# Patient Record
Sex: FEMALE | Race: OTHER
[De-identification: names, ages, dates, MRNs, and addresses within clinical notes are randomized per-mention and may not be internally consistent; named-entity substitution may affect disease eponyms.]

---

## 2021-08-18 ENCOUNTER — HOSPITAL ENCOUNTER (EMERGENCY)
Dept: HOSPITAL 54 - ER | Age: 51
Discharge: HOME | End: 2021-08-18
Payer: MEDICAID

## 2021-08-18 VITALS — WEIGHT: 140 LBS | HEIGHT: 63 IN | BODY MASS INDEX: 24.8 KG/M2

## 2021-08-18 VITALS — DIASTOLIC BLOOD PRESSURE: 68 MMHG | SYSTOLIC BLOOD PRESSURE: 111 MMHG

## 2021-08-18 DIAGNOSIS — K94.23: Primary | ICD-10-CM

## 2021-08-18 DIAGNOSIS — G40.909: ICD-10-CM

## 2021-08-18 DIAGNOSIS — E11.9: ICD-10-CM

## 2021-08-18 PROCEDURE — 74018 RADEX ABDOMEN 1 VIEW: CPT

## 2021-08-18 PROCEDURE — 99284 EMERGENCY DEPT VISIT MOD MDM: CPT

## 2021-08-18 PROCEDURE — 43762 RPLC GTUBE NO REVJ TRC: CPT

## 2021-08-18 NOTE — NUR
PT IS MEDICALLY STABLE FOR D/C PER MD. DRESSING CHANGE ON GT SITE PROVIDED . GT 
PATENT. Patient discharged to home in stable condition. Written and verbal 
after care instructions given to the CGs who verbalized understanding of 
instruction. pt was assisted back to the wc.

## 2021-08-18 NOTE — NUR
-------------------------------------------------------------------------------

            *** Note undone in ED - 08/18/21 at 2145 by MELLY ***             

-------------------------------------------------------------------------------

Patient discharged to home in stable condition. Written and verbal after care 
instructions given. Patient verbalizes understanding of instruction. RX given 
pt alert asymptomatic

## 2021-08-18 NOTE — NUR
PT BIB CG FROMA AN [facility]ULT [facility]IDENT[facility]IV[facility] FOR GT PLACEMENT. PT ARRIVED W/ A 
F/C IN PLACED IN GT STOMA. PT IS IN A WHEELCHAIR AND ALERT.

## 2022-02-20 ENCOUNTER — HOSPITAL ENCOUNTER (INPATIENT)
Dept: HOSPITAL 54 - ER | Age: 52
LOS: 9 days | Discharge: NURSING, REHAB OR LONG-TERM CARE | DRG: 137 | End: 2022-03-01
Attending: INTERNAL MEDICINE | Admitting: INTERNAL MEDICINE
Payer: MEDICAID

## 2022-02-20 VITALS — DIASTOLIC BLOOD PRESSURE: 67 MMHG | SYSTOLIC BLOOD PRESSURE: 121 MMHG

## 2022-02-20 VITALS — WEIGHT: 105.04 LBS | BODY MASS INDEX: 20.62 KG/M2 | HEIGHT: 60 IN

## 2022-02-20 VITALS — DIASTOLIC BLOOD PRESSURE: 85 MMHG | SYSTOLIC BLOOD PRESSURE: 104 MMHG

## 2022-02-20 DIAGNOSIS — Z93.1: ICD-10-CM

## 2022-02-20 DIAGNOSIS — E43: ICD-10-CM

## 2022-02-20 DIAGNOSIS — D53.9: ICD-10-CM

## 2022-02-20 DIAGNOSIS — L30.4: ICD-10-CM

## 2022-02-20 DIAGNOSIS — R78.89: ICD-10-CM

## 2022-02-20 DIAGNOSIS — G80.9: ICD-10-CM

## 2022-02-20 DIAGNOSIS — E11.65: ICD-10-CM

## 2022-02-20 DIAGNOSIS — J98.11: ICD-10-CM

## 2022-02-20 DIAGNOSIS — M41.9: ICD-10-CM

## 2022-02-20 DIAGNOSIS — J15.6: Primary | ICD-10-CM

## 2022-02-20 DIAGNOSIS — D69.6: ICD-10-CM

## 2022-02-20 DIAGNOSIS — E87.6: ICD-10-CM

## 2022-02-20 DIAGNOSIS — G40.909: ICD-10-CM

## 2022-02-20 DIAGNOSIS — G93.49: ICD-10-CM

## 2022-02-20 DIAGNOSIS — Z79.84: ICD-10-CM

## 2022-02-20 DIAGNOSIS — J96.01: ICD-10-CM

## 2022-02-20 DIAGNOSIS — E87.0: ICD-10-CM

## 2022-02-20 DIAGNOSIS — Z20.822: ICD-10-CM

## 2022-02-20 DIAGNOSIS — E88.09: ICD-10-CM

## 2022-02-20 DIAGNOSIS — E87.70: ICD-10-CM

## 2022-02-20 DIAGNOSIS — R13.10: ICD-10-CM

## 2022-02-20 DIAGNOSIS — Z79.4: ICD-10-CM

## 2022-02-20 LAB
ALBUMIN SERPL BCP-MCNC: 1.3 G/DL (ref 3.4–5)
ALP SERPL-CCNC: 110 U/L (ref 46–116)
ALT SERPL W P-5'-P-CCNC: 13 U/L (ref 12–78)
AST SERPL W P-5'-P-CCNC: 18 U/L (ref 15–37)
BASOPHILS # BLD AUTO: 0.1 K/UL (ref 0–0.2)
BASOPHILS NFR BLD AUTO: 0.6 % (ref 0–2)
BILIRUB DIRECT SERPL-MCNC: 0.1 MG/DL (ref 0–0.2)
BILIRUB SERPL-MCNC: 0.2 MG/DL (ref 0.2–1)
BUN SERPL-MCNC: 23 MG/DL (ref 7–18)
CALCIUM SERPL-MCNC: 9.8 MG/DL (ref 8.5–10.1)
CHLORIDE SERPL-SCNC: 121 MMOL/L (ref 98–107)
CO2 SERPL-SCNC: 31 MMOL/L (ref 21–32)
CREAT SERPL-MCNC: 0.5 MG/DL (ref 0.6–1.3)
EOSINOPHIL NFR BLD AUTO: 0.6 % (ref 0–6)
GLUCOSE SERPL-MCNC: 180 MG/DL (ref 74–106)
HCT VFR BLD AUTO: 34 % (ref 33–45)
HGB BLD-MCNC: 11.1 G/DL (ref 11.5–14.8)
LYMPHOCYTES NFR BLD AUTO: 3.7 K/UL (ref 0.8–4.8)
LYMPHOCYTES NFR BLD AUTO: 39.9 % (ref 20–44)
LYMPHOCYTES NFR BLD MANUAL: 41 % (ref 16–48)
MCHC RBC AUTO-ENTMCNC: 33 G/DL (ref 31–36)
MCV RBC AUTO: 104 FL (ref 82–100)
MONOCYTES NFR BLD AUTO: 0.9 K/UL (ref 0.1–1.3)
MONOCYTES NFR BLD AUTO: 10.1 % (ref 2–12)
MONOCYTES NFR BLD MANUAL: 5 % (ref 0–11)
NEUTROPHILS # BLD AUTO: 4.5 K/UL (ref 1.8–8.9)
NEUTROPHILS NFR BLD AUTO: 48.8 % (ref 43–81)
NEUTS BAND NFR BLD MANUAL: 6 % (ref 0–5)
NEUTS SEG NFR BLD MANUAL: 48 % (ref 42–76)
PLATELET # BLD AUTO: 144 K/UL (ref 150–450)
POTASSIUM SERPL-SCNC: 3.3 MMOL/L (ref 3.5–5.1)
PROT SERPL-MCNC: 5.4 G/DL (ref 6.4–8.2)
RBC # BLD AUTO: 3.29 MIL/UL (ref 4–5.2)
SODIUM SERPL-SCNC: 157 MMOL/L (ref 136–145)
WBC NRBC COR # BLD AUTO: 9.2 K/UL (ref 4.3–11)

## 2022-02-20 PROCEDURE — U0003 INFECTIOUS AGENT DETECTION BY NUCLEIC ACID (DNA OR RNA); SEVERE ACUTE RESPIRATORY SYNDROME CORONAVIRUS 2 (SARS-COV-2) (CORONAVIRUS DISEASE [COVID-19]), AMPLIFIED PROBE TECHNIQUE, MAKING USE OF HIGH THROUGHPUT TECHNOLOGIES AS DESCRIBED BY CMS-2020-01-R: HCPCS

## 2022-02-20 PROCEDURE — G0378 HOSPITAL OBSERVATION PER HR: HCPCS

## 2022-02-20 PROCEDURE — 02HV33Z INSERTION OF INFUSION DEVICE INTO SUPERIOR VENA CAVA, PERCUTANEOUS APPROACH: ICD-10-PCS | Performed by: NURSE PRACTITIONER

## 2022-02-20 PROCEDURE — A6403 STERILE GAUZE>16 <= 48 SQ IN: HCPCS

## 2022-02-20 PROCEDURE — C9113 INJ PANTOPRAZOLE SODIUM, VIA: HCPCS

## 2022-02-20 PROCEDURE — B548ZZA ULTRASONOGRAPHY OF SUPERIOR VENA CAVA, GUIDANCE: ICD-10-PCS | Performed by: NURSE PRACTITIONER

## 2022-02-20 PROCEDURE — C1751 CATH, INF, PER/CENT/MIDLINE: HCPCS

## 2022-02-20 RX ADMIN — PIPERACILLIN SODIUM AND TAZOBACTAM SODIUM SCH MLS/HR: .375; 3 INJECTION, POWDER, LYOPHILIZED, FOR SOLUTION INTRAVENOUS at 21:04

## 2022-02-20 RX ADMIN — ENOXAPARIN SODIUM SCH MG: 40 INJECTION SUBCUTANEOUS at 13:00

## 2022-02-20 NOTE — NUR
RN NOTE



PT RESTING IN BED, ON O2 VIA NC @2L WITH O2 SAT OF 98. NOT IN RESPIRATORY DISTRESS. NEEDS 
ATTENDED. ALL SAFETY MEASURES MAINTAINED.

## 2022-02-20 NOTE — NUR
ROBERTO HERNANDEZ From [facility] [phone] "SOB/Respiratory distress. 
Received the patient on non-rebreather mask mask and saturation is at 98%. 
Attached to the monitor. Will continue t monitor the patient.

## 2022-02-20 NOTE — NUR
CALLED [facility] [phone] PER CLARA PRAKASH NOT A RESIDENT

[address] 

[facility] [phone]

## 2022-02-20 NOTE — NUR
To ER bed 8, BIBA  RA78 From [facility] [phone] "SOB/Respiratory 
distress, was seen in [facility] yesterday for the same reasons RA 90% and 
tachypneic, non verbal, gtube, breathing even and non labored, connected to 
monitor, awaiting md orders

## 2022-02-20 NOTE — NUR
WAITING FOR CHEST X-RAY RESULT FOR PICC LINE PLACEMENT CONFIRMATION. ONCE 
CONFIRMED IV MEDS WILL BE GIVEN.

## 2022-02-20 NOTE — NUR
RN OPENING NOTES:



RECEIVED PT IN BED AWAKE, ALERT/ORIENTED X1, RESPONSIVE TO PAINFUL STIMULI. ON O2 @2L/MIN 
VIA N/C, O2 SAT 98%. PT TOLERATED WELL. IV ACCESS ON YULISSA MIDLINE@18G INTACT AND PATENT. NO 
BLEEDING NOTED. RUNNING D5W@70CC/HR. NO FACIAL GRIMACING NOTED. NO ACUTE DISTRESS. PT HAS 
GTUBE. SITE INTACT. AT THIS MOMENT NPO. ALL SAFETY MEASURE IN PLACE. BED ALARM ON. BED SIDE 
RAILS UP X2, PLACE CALL LIGHT WITH IN REACH. WILL CONTINUE TO MONITOR

-------------------------------------------------------------------------------

Addendum: 02/20/22 at 2057 by SCOOTER MOROCHO RN

-------------------------------------------------------------------------------

RN NOTES:



IV ACCESS ON YULISSA PICC#18G INTACT AND PATENT

## 2022-02-20 NOTE — NUR
Unable to give IV meds due to no peripheral or central line. Dr Gibson 
attampted to place a central line, however, not successfull.

## 2022-02-21 VITALS — DIASTOLIC BLOOD PRESSURE: 68 MMHG | SYSTOLIC BLOOD PRESSURE: 105 MMHG

## 2022-02-21 VITALS — SYSTOLIC BLOOD PRESSURE: 116 MMHG | DIASTOLIC BLOOD PRESSURE: 75 MMHG

## 2022-02-21 VITALS — DIASTOLIC BLOOD PRESSURE: 48 MMHG | SYSTOLIC BLOOD PRESSURE: 96 MMHG

## 2022-02-21 VITALS — DIASTOLIC BLOOD PRESSURE: 59 MMHG | SYSTOLIC BLOOD PRESSURE: 103 MMHG

## 2022-02-21 VITALS — DIASTOLIC BLOOD PRESSURE: 56 MMHG | SYSTOLIC BLOOD PRESSURE: 110 MMHG

## 2022-02-21 VITALS — DIASTOLIC BLOOD PRESSURE: 86 MMHG | SYSTOLIC BLOOD PRESSURE: 112 MMHG

## 2022-02-21 LAB
BASOPHILS # BLD AUTO: 0 K/UL (ref 0–0.2)
BASOPHILS NFR BLD AUTO: 0.4 % (ref 0–2)
BUN SERPL-MCNC: 28 MG/DL (ref 7–18)
CALCIUM SERPL-MCNC: 9.1 MG/DL (ref 8.5–10.1)
CHLORIDE SERPL-SCNC: 118 MMOL/L (ref 98–107)
CHOLEST SERPL-MCNC: 85 MG/DL (ref ?–200)
CO2 SERPL-SCNC: 32 MMOL/L (ref 21–32)
CREAT SERPL-MCNC: 0.4 MG/DL (ref 0.6–1.3)
EOSINOPHIL NFR BLD AUTO: 0.1 % (ref 0–6)
GLUCOSE SERPL-MCNC: 190 MG/DL (ref 74–106)
HCT VFR BLD AUTO: 28 % (ref 33–45)
HDLC SERPL-MCNC: 12 MG/DL (ref 40–60)
HGB BLD-MCNC: 9.4 G/DL (ref 11.5–14.8)
LDLC SERPL DIRECT ASSAY-MCNC: 32 MG/DL (ref 0–99)
LYMPHOCYTES NFR BLD AUTO: 2.6 K/UL (ref 0.8–4.8)
LYMPHOCYTES NFR BLD AUTO: 32.4 % (ref 20–44)
LYMPHOCYTES NFR BLD MANUAL: 30 % (ref 16–48)
MAGNESIUM SERPL-MCNC: 2.4 MG/DL (ref 1.8–2.4)
MCHC RBC AUTO-ENTMCNC: 33 G/DL (ref 31–36)
MCV RBC AUTO: 102 FL (ref 82–100)
METAMYELOCYTES NFR BLD MANUAL: 2 % (ref 0–0)
MONOCYTES NFR BLD AUTO: 0.3 K/UL (ref 0.1–1.3)
MONOCYTES NFR BLD AUTO: 4.1 % (ref 2–12)
MONOCYTES NFR BLD MANUAL: 2 % (ref 0–11)
MYELOCYTES NFR BLD MANUAL: 2 % (ref 0–0)
NEUTROPHILS # BLD AUTO: 5.1 K/UL (ref 1.8–8.9)
NEUTROPHILS NFR BLD AUTO: 63 % (ref 43–81)
NEUTS BAND NFR BLD MANUAL: 12 % (ref 0–5)
NEUTS SEG NFR BLD MANUAL: 52 % (ref 42–76)
PHOSPHATE SERPL-MCNC: 4.2 MG/DL (ref 2.5–4.9)
PLATELET # BLD AUTO: 143 K/UL (ref 150–450)
POTASSIUM SERPL-SCNC: 3.4 MMOL/L (ref 3.5–5.1)
RBC # BLD AUTO: 2.78 MIL/UL (ref 4–5.2)
SODIUM SERPL-SCNC: 154 MMOL/L (ref 136–145)
TRIGL SERPL-MCNC: 307 MG/DL (ref 30–150)
TSH SERPL DL<=0.005 MIU/L-ACNC: 3.4 UIU/ML (ref 0.36–3.74)
WBC NRBC COR # BLD AUTO: 8.2 K/UL (ref 4.3–11)

## 2022-02-21 RX ADMIN — PIPERACILLIN SODIUM AND TAZOBACTAM SODIUM SCH MLS/HR: .375; 3 INJECTION, POWDER, LYOPHILIZED, FOR SOLUTION INTRAVENOUS at 20:30

## 2022-02-21 RX ADMIN — PIPERACILLIN SODIUM AND TAZOBACTAM SODIUM SCH MLS/HR: .375; 3 INJECTION, POWDER, LYOPHILIZED, FOR SOLUTION INTRAVENOUS at 05:24

## 2022-02-21 RX ADMIN — DEXAMETHASONE SODIUM PHOSPHATE SCH MG: 10 INJECTION INTRAMUSCULAR; INTRAVENOUS at 08:47

## 2022-02-21 RX ADMIN — SODIUM CHLORIDE SCH MG: 9 INJECTION, SOLUTION INTRAVENOUS at 08:47

## 2022-02-21 RX ADMIN — PIPERACILLIN SODIUM AND TAZOBACTAM SODIUM SCH MLS/HR: .375; 3 INJECTION, POWDER, LYOPHILIZED, FOR SOLUTION INTRAVENOUS at 12:42

## 2022-02-21 RX ADMIN — Medication SCH ML: at 17:00

## 2022-02-21 NOTE — NUR
RN CLOSING NOTES



PT IN BED AWAKE, ALERT/ORIENTED X1, RESPONSIVE TO PAINFUL STIMULI. ON O2 @3L/MIN VIA N/C,  
.IV ACCESS ON YULISSA PICC #18G INTACT AND PATENT. NO BLEEDING NOTED. NO FACIAL GRIMACING NOTED. 
NO ACUTE DISTRESS. PT HAS G TUBE. ALL SAFETY MEASURE IN PLACE. BED ALARM ON. BED SIDE RAILS 
UP X2, PLACE CALL LIGHT WITH IN REACH. WILL ENDORSE TO NIGHT NURSE FOR AVELINA.

## 2022-02-21 NOTE — NUR
RN OPENING  NOTES



PT IN BED AWAKE, ALERT/ORIENTED X1, RESPONSIVE TO PAINFUL STIMULI. ON O2 @3L/MIN VIA N/C,  
.IV ACCESS ON YULISSA PICC #18G INTACT AND PATENT. NO BLEEDING NOTED. NO FACIAL GRIMACING NOTED. 
NO ACUTE DISTRESS. PT HAS G TUBE. ALL SAFETY MEASURE IN PLACE. BED ALARM ON. BED SIDE RAILS 
UP X2, PLACE CALL LIGHT WITH IN REACH.

## 2022-02-21 NOTE — NUR
RN CLOSING NOTES:



 PT IN BED AWAKE, ALERT/ORIENTED X1, RESPONSIVE TO PAINFUL STIMULI. ON O2 @2L/MIN VIA N/C, 
O2 SAT 98%. PT TOLERATED WELL. IV ACCESS ON YULISSA PICC #18G INTACT AND PATENT. NO BLEEDING 
NOTED. NO FACIAL GRIMACING NOTED. NO ACUTE DISTRESS. PT HAS GTUBE. GT SITE SITE INTACT AND 
PATENT. REMAIN NPO. DUE IV MEDS GIVEN AS ORDER. ALL SAFETY MEASURE IN PLACE. BED ALARM ON. 
BED SIDE RAILS UP X2, PLACE CALL LIGHT WITH IN REACH. WILL ENDORSE TO MORNING SHIFT NURSE.

## 2022-02-22 VITALS — DIASTOLIC BLOOD PRESSURE: 53 MMHG | SYSTOLIC BLOOD PRESSURE: 87 MMHG

## 2022-02-22 VITALS — DIASTOLIC BLOOD PRESSURE: 47 MMHG | SYSTOLIC BLOOD PRESSURE: 91 MMHG

## 2022-02-22 VITALS — SYSTOLIC BLOOD PRESSURE: 99 MMHG | DIASTOLIC BLOOD PRESSURE: 51 MMHG

## 2022-02-22 VITALS — SYSTOLIC BLOOD PRESSURE: 79 MMHG | DIASTOLIC BLOOD PRESSURE: 56 MMHG

## 2022-02-22 VITALS — SYSTOLIC BLOOD PRESSURE: 72 MMHG | DIASTOLIC BLOOD PRESSURE: 45 MMHG

## 2022-02-22 VITALS — SYSTOLIC BLOOD PRESSURE: 97 MMHG | DIASTOLIC BLOOD PRESSURE: 53 MMHG

## 2022-02-22 LAB
BASOPHILS # BLD AUTO: 0 K/UL (ref 0–0.2)
BASOPHILS NFR BLD AUTO: 0.4 % (ref 0–2)
BUN SERPL-MCNC: 35 MG/DL (ref 7–18)
CALCIUM SERPL-MCNC: 8.5 MG/DL (ref 8.5–10.1)
CHLORIDE SERPL-SCNC: 118 MMOL/L (ref 98–107)
CO2 SERPL-SCNC: 30 MMOL/L (ref 21–32)
CREAT SERPL-MCNC: 0.6 MG/DL (ref 0.6–1.3)
EOSINOPHIL NFR BLD AUTO: 0 % (ref 0–6)
GLUCOSE SERPL-MCNC: 335 MG/DL (ref 74–106)
HCT VFR BLD AUTO: 25 % (ref 33–45)
HGB BLD-MCNC: 8.1 G/DL (ref 11.5–14.8)
LYMPHOCYTES NFR BLD AUTO: 3 K/UL (ref 0.8–4.8)
LYMPHOCYTES NFR BLD AUTO: 34.5 % (ref 20–44)
LYMPHOCYTES NFR BLD MANUAL: 31 % (ref 16–48)
MAGNESIUM SERPL-MCNC: 2.6 MG/DL (ref 1.8–2.4)
MCHC RBC AUTO-ENTMCNC: 32 G/DL (ref 31–36)
MCV RBC AUTO: 103 FL (ref 82–100)
MONOCYTES NFR BLD AUTO: 0.9 K/UL (ref 0.1–1.3)
MONOCYTES NFR BLD AUTO: 10.2 % (ref 2–12)
MONOCYTES NFR BLD MANUAL: 3 % (ref 0–11)
NEUTROPHILS # BLD AUTO: 4.8 K/UL (ref 1.8–8.9)
NEUTROPHILS NFR BLD AUTO: 54.9 % (ref 43–81)
NEUTS BAND NFR BLD MANUAL: 2 % (ref 0–5)
NEUTS SEG NFR BLD MANUAL: 64 % (ref 42–76)
PHOSPHATE SERPL-MCNC: 4.6 MG/DL (ref 2.5–4.9)
PLATELET # BLD AUTO: 198 K/UL (ref 150–450)
POTASSIUM SERPL-SCNC: 3.5 MMOL/L (ref 3.5–5.1)
RBC # BLD AUTO: 2.43 MIL/UL (ref 4–5.2)
SODIUM SERPL-SCNC: 156 MMOL/L (ref 136–145)
WBC NRBC COR # BLD AUTO: 8.6 K/UL (ref 4.3–11)

## 2022-02-22 RX ADMIN — Medication SCH EACH: at 23:09

## 2022-02-22 RX ADMIN — PIPERACILLIN SODIUM AND TAZOBACTAM SODIUM SCH MLS/HR: .375; 3 INJECTION, POWDER, LYOPHILIZED, FOR SOLUTION INTRAVENOUS at 11:01

## 2022-02-22 RX ADMIN — VALPROIC ACID SCH MG: 250 SOLUTION ORAL at 18:18

## 2022-02-22 RX ADMIN — INSULIN GLARGINE SCH UNIT: 100 INJECTION, SOLUTION SUBCUTANEOUS at 23:08

## 2022-02-22 RX ADMIN — PIPERACILLIN SODIUM AND TAZOBACTAM SODIUM SCH MLS/HR: .375; 3 INJECTION, POWDER, LYOPHILIZED, FOR SOLUTION INTRAVENOUS at 04:43

## 2022-02-22 RX ADMIN — DEXAMETHASONE SODIUM PHOSPHATE SCH MG: 10 INJECTION INTRAMUSCULAR; INTRAVENOUS at 08:37

## 2022-02-22 RX ADMIN — Medication SCH EACH: at 18:25

## 2022-02-22 RX ADMIN — Medication SCH ML: at 18:31

## 2022-02-22 RX ADMIN — INSULIN HUMAN PRN UNITS: 100 INJECTION, SOLUTION PARENTERAL at 18:50

## 2022-02-22 RX ADMIN — SODIUM CHLORIDE SCH MG: 9 INJECTION, SOLUTION INTRAVENOUS at 08:36

## 2022-02-22 RX ADMIN — DEXTROSE MONOHYDRATE SCH MLS/HR: 50 INJECTION, SOLUTION INTRAVENOUS at 20:33

## 2022-02-22 RX ADMIN — ENOXAPARIN SODIUM SCH MG: 40 INJECTION SUBCUTANEOUS at 08:39

## 2022-02-22 RX ADMIN — PIPERACILLIN SODIUM AND TAZOBACTAM SODIUM SCH MLS/HR: .375; 3 INJECTION, POWDER, LYOPHILIZED, FOR SOLUTION INTRAVENOUS at 20:34

## 2022-02-22 RX ADMIN — INSULIN HUMAN PRN UNITS: 100 INJECTION, SOLUTION PARENTERAL at 23:12

## 2022-02-22 NOTE — NUR
RN OPENING NOTES:



 PT IN BED AWAKE, ALERT/ORIENTED X1, RESPONSIVE TO PAINFUL STIMULI. ON O2 @2L/MIN VIA N/C 
AND  PT TOLERATED WELL. IV ACCESS ON YULISSA PICC #18G INTACT AND PATENT. NO BLEEDING NOTED. NO 
FACIAL GRIMACING NOTED. NO ACUTE DISTRESS. ON GTUBE FEEDING GLUCERNA 1.2 @50CC/HR . GT SITE 
SITE INTACT AND PATENT. ALL SAFETY MEASURE IN PLACE. BED ALARM ON. BED SIDE RAILS UP X2, 
PLACE CALL LIGHT WITH IN REACH.

## 2022-02-22 NOTE — NUR
RN OPENING NOTE



RECEIVE REPORT FROM NIGHT SHIFT NURSE. PATIENT IN STABLE CONDITION WITH NO SIGN OF DISTRESS. 
RESTING COMFORTABLY IN BED AND RECEIVING IV FLUID D5W @100ML/HR. WILL FOLLOW UP AM LAB AND 
DOCTOR ORDERS. PROPER ISOLATION PRECAUTION IN PLACE. ALL SAFETY MEASURE IN PLACE. BED ON 
LOWEST POSITION WITH HOB ELEVATED AND 3 SIDE RAIL UP. GERRY LIGHT WITHIN REACH. WILL CONTINUE 
TO MONITOR.

## 2022-02-22 NOTE — NUR
RN NOTES:



PT'S BLOOD SUGAR 365, 10 UNITS OF REGULAR INSULIN PER SLIDING SCALE AND LANTUS 5 UNITS 
GIVEN. NO S/S OF HYPER/HYPOGLYCEMIA. WILL CONTINUE TO MONITOR

## 2022-02-22 NOTE — NUR
RN CLOSING NOTE



REPORT WAS GIVEN TO NIGHT SHIFT NURSE. PATIENT IN STABLE CONDITION WITH NO SIGN OF DISTRESS. 
PROPER ISOLATION IN PLACE. ALL SAFETY MEASURE IN PLACE. BED ON LOWEST POSITION WITH HOB 
ELEVATED. CALL LIGHT WITHIN REACH.

## 2022-02-23 VITALS — SYSTOLIC BLOOD PRESSURE: 102 MMHG | DIASTOLIC BLOOD PRESSURE: 64 MMHG

## 2022-02-23 VITALS — SYSTOLIC BLOOD PRESSURE: 110 MMHG | DIASTOLIC BLOOD PRESSURE: 61 MMHG

## 2022-02-23 VITALS — DIASTOLIC BLOOD PRESSURE: 69 MMHG | SYSTOLIC BLOOD PRESSURE: 113 MMHG

## 2022-02-23 VITALS — DIASTOLIC BLOOD PRESSURE: 68 MMHG | SYSTOLIC BLOOD PRESSURE: 128 MMHG

## 2022-02-23 VITALS — SYSTOLIC BLOOD PRESSURE: 121 MMHG | DIASTOLIC BLOOD PRESSURE: 67 MMHG

## 2022-02-23 VITALS — DIASTOLIC BLOOD PRESSURE: 75 MMHG | SYSTOLIC BLOOD PRESSURE: 128 MMHG

## 2022-02-23 LAB
BASOPHILS # BLD AUTO: 0 K/UL (ref 0–0.2)
BASOPHILS NFR BLD AUTO: 0.3 % (ref 0–2)
BUN SERPL-MCNC: 22 MG/DL (ref 7–18)
BUN SERPL-MCNC: 23 MG/DL (ref 7–18)
CALCIUM SERPL-MCNC: 7.1 MG/DL (ref 8.5–10.1)
CALCIUM SERPL-MCNC: 7.7 MG/DL (ref 8.5–10.1)
CHLORIDE SERPL-SCNC: 100 MMOL/L (ref 98–107)
CHLORIDE SERPL-SCNC: 96 MMOL/L (ref 98–107)
CO2 SERPL-SCNC: 27 MMOL/L (ref 21–32)
CO2 SERPL-SCNC: 29 MMOL/L (ref 21–32)
CREAT SERPL-MCNC: 0.5 MG/DL (ref 0.6–1.3)
CREAT SERPL-MCNC: 0.5 MG/DL (ref 0.6–1.3)
EOSINOPHIL NFR BLD AUTO: 0 % (ref 0–6)
GLUCOSE SERPL-MCNC: 339 MG/DL (ref 74–106)
GLUCOSE SERPL-MCNC: 731 MG/DL (ref 74–106)
HCT VFR BLD AUTO: 24 % (ref 33–45)
HGB BLD-MCNC: 7.6 G/DL (ref 11.5–14.8)
LYMPHOCYTES NFR BLD AUTO: 4.3 K/UL (ref 0.8–4.8)
LYMPHOCYTES NFR BLD AUTO: 41.5 % (ref 20–44)
LYMPHOCYTES NFR BLD MANUAL: 44 % (ref 16–48)
MAGNESIUM SERPL-MCNC: 2.1 MG/DL (ref 1.8–2.4)
MCHC RBC AUTO-ENTMCNC: 32 G/DL (ref 31–36)
MCV RBC AUTO: 109 FL (ref 82–100)
METAMYELOCYTES NFR BLD MANUAL: 1 % (ref 0–0)
MONOCYTES NFR BLD AUTO: 1.2 K/UL (ref 0.1–1.3)
MONOCYTES NFR BLD AUTO: 11.3 % (ref 2–12)
MONOCYTES NFR BLD MANUAL: 12 % (ref 0–11)
NEUTROPHILS # BLD AUTO: 4.8 K/UL (ref 1.8–8.9)
NEUTROPHILS NFR BLD AUTO: 46.9 % (ref 43–81)
NEUTS BAND NFR BLD MANUAL: 6 % (ref 0–5)
NEUTS SEG NFR BLD MANUAL: 37 % (ref 42–76)
PHOSPHATE SERPL-MCNC: 3.1 MG/DL (ref 2.5–4.9)
PLATELET # BLD AUTO: 240 K/UL (ref 150–450)
POTASSIUM SERPL-SCNC: 3 MMOL/L (ref 3.5–5.1)
POTASSIUM SERPL-SCNC: 4.3 MMOL/L (ref 3.5–5.1)
RBC # BLD AUTO: 2.19 MIL/UL (ref 4–5.2)
SODIUM SERPL-SCNC: 129 MMOL/L (ref 136–145)
SODIUM SERPL-SCNC: 133 MMOL/L (ref 136–145)
WBC NRBC COR # BLD AUTO: 10.3 K/UL (ref 4.3–11)

## 2022-02-23 RX ADMIN — INSULIN HUMAN PRN UNITS: 100 INJECTION, SOLUTION PARENTERAL at 23:39

## 2022-02-23 RX ADMIN — Medication SCH EACH: at 06:06

## 2022-02-23 RX ADMIN — ATENOLOL SCH MG: 50 TABLET ORAL at 08:26

## 2022-02-23 RX ADMIN — THERA TABS SCH UDTAB: TAB at 08:27

## 2022-02-23 RX ADMIN — DEXTROSE MONOHYDRATE SCH MLS/HR: 50 INJECTION, SOLUTION INTRAVENOUS at 11:30

## 2022-02-23 RX ADMIN — INSULIN HUMAN PRN UNITS: 100 INJECTION, SOLUTION PARENTERAL at 12:22

## 2022-02-23 RX ADMIN — Medication SCH EACH: at 12:21

## 2022-02-23 RX ADMIN — POTASSIUM CHLORIDE SCH MLS/HR: 200 INJECTION, SOLUTION INTRAVENOUS at 17:57

## 2022-02-23 RX ADMIN — PIPERACILLIN SODIUM AND TAZOBACTAM SODIUM SCH MLS/HR: .375; 3 INJECTION, POWDER, LYOPHILIZED, FOR SOLUTION INTRAVENOUS at 19:55

## 2022-02-23 RX ADMIN — PIPERACILLIN SODIUM AND TAZOBACTAM SODIUM SCH MLS/HR: .375; 3 INJECTION, POWDER, LYOPHILIZED, FOR SOLUTION INTRAVENOUS at 04:03

## 2022-02-23 RX ADMIN — Medication SCH ML: at 15:13

## 2022-02-23 RX ADMIN — ACETAMINOPHEN PRN MG: 325 TABLET ORAL at 13:28

## 2022-02-23 RX ADMIN — DEXTROSE MONOHYDRATE SCH MLS/HR: 50 INJECTION, SOLUTION INTRAVENOUS at 03:54

## 2022-02-23 RX ADMIN — Medication SCH EACH: at 17:53

## 2022-02-23 RX ADMIN — VALPROIC ACID SCH MG: 250 SOLUTION ORAL at 17:57

## 2022-02-23 RX ADMIN — Medication SCH MG: at 08:27

## 2022-02-23 RX ADMIN — PIPERACILLIN SODIUM AND TAZOBACTAM SODIUM SCH MLS/HR: .375; 3 INJECTION, POWDER, LYOPHILIZED, FOR SOLUTION INTRAVENOUS at 12:17

## 2022-02-23 RX ADMIN — POTASSIUM CHLORIDE SCH MLS/HR: 200 INJECTION, SOLUTION INTRAVENOUS at 16:56

## 2022-02-23 RX ADMIN — DEXAMETHASONE SODIUM PHOSPHATE SCH MG: 10 INJECTION INTRAMUSCULAR; INTRAVENOUS at 08:27

## 2022-02-23 RX ADMIN — POTASSIUM CHLORIDE SCH MLS/HR: 200 INJECTION, SOLUTION INTRAVENOUS at 12:10

## 2022-02-23 RX ADMIN — Medication SCH EACH: at 23:32

## 2022-02-23 RX ADMIN — VITAMIN D, TAB 1000IU (100/BT) SCH UNIT: 25 TAB at 08:27

## 2022-02-23 RX ADMIN — POTASSIUM CHLORIDE SCH MLS/HR: 200 INJECTION, SOLUTION INTRAVENOUS at 15:45

## 2022-02-23 RX ADMIN — ENOXAPARIN SODIUM SCH MG: 40 INJECTION SUBCUTANEOUS at 08:28

## 2022-02-23 RX ADMIN — POTASSIUM CHLORIDE SCH MLS/HR: 200 INJECTION, SOLUTION INTRAVENOUS at 14:43

## 2022-02-23 RX ADMIN — INSULIN GLARGINE SCH UNIT: 100 INJECTION, SOLUTION SUBCUTANEOUS at 21:53

## 2022-02-23 RX ADMIN — DEXTROSE MONOHYDRATE SCH MLS/HR: 50 INJECTION, SOLUTION INTRAVENOUS at 19:39

## 2022-02-23 RX ADMIN — POTASSIUM CHLORIDE SCH MLS/HR: 200 INJECTION, SOLUTION INTRAVENOUS at 13:29

## 2022-02-23 RX ADMIN — INSULIN HUMAN PRN UNITS: 100 INJECTION, SOLUTION PARENTERAL at 06:08

## 2022-02-23 RX ADMIN — SODIUM CHLORIDE SCH MG: 9 INJECTION, SOLUTION INTRAVENOUS at 08:26

## 2022-02-23 RX ADMIN — INSULIN HUMAN PRN UNITS: 100 INJECTION, SOLUTION PARENTERAL at 17:56

## 2022-02-23 NOTE — NUR
RN CLOSING NOTES:



 PT IN BED AWAKE, ALERT/ORIENTED X1, RESPONSIVE TO PAINFUL STIMULI. ON O2 @2L/MIN VIA N/C, 
O2 SAT 97%  AND  PT TOLERATED WELL. IV ACCESS ON YULISSA PICC #18G INTACT AND PATENT. NO 
BLEEDING NOTED. RUNNING D5W @100CC/HR. NO FACIAL GRIMACING NOTED. NO ACUTE DISTRESS. ON 
GTUBE FEEDING GLUCERNA 1.2 @50CC/HR . GT SITE SITE INTACT AND PATENT. BLOOD SUGAR 331, 8 
UNITS OF REGULAR INSULIN GIVEN. ALL MEDS GIVEN AS ORDER. ALL SAFETY MEASURE IN PLACE. BED 
ALARM ON. BED SIDE RAILS UP X2, PLACE CALL LIGHT WITH IN REACH.  WILL ENDORSE TO MORNING 
SHIFT NURSE.

## 2022-02-23 NOTE — NUR
RN CLOSING NOTES



PT IN BED AWAKE, ALERT/ORIENTED X1, RESPONSIVE TO PAINFUL STIMULI. ON O2 @4L/MIN VIA N/C,  
.IV ACCESS ON YULISSA PICC #18G INTACT AND RUNNING 125MLS/HR D5. NO BLEEDING NOTED. NO FACIAL 
GRIMACING NOTED. NO ACUTE DISTRESS. PT HAS G TUBE RUNNING GLUCERNA 50ML/HR. ALL SAFETY 
MEASURE IN PLACE. BED ALARM ON. BED SIDE RAILS UP X2, PLACE CALL LIGHT WITH IN REACH. WILL 
ENDORSE TO NIGHT NURSE FOR AVELINA.

## 2022-02-23 NOTE — NUR
RN NOTE 



RECEIVED CRITICAL LAB FOR GLUCOSE . INFORMED NP ALBER DELENO. PER NP REDRAW GLUCOSE 
FROM DIFFERENT SITE. WILL FOLLOW ORDER.

## 2022-02-23 NOTE — NUR
RN NOTE



RECEIVED PATIENT IN BED RESTING ALERT ORIENTED X0 OPEN EYES ON 4L OXYGEN VIA NASAL CANNULA 
O2:99% IV SITE IS ON LEFT UPPER ARM PICC LINE INTACT PATENT ON IV HYDRATION D5 125CC/HR,ON 
G-TUBE GLUCERNA 1.2 50CC/HR CHECKED PLACEMENT IN PLACE,NO RESIDUAL NOTED,SAFETY MEASURE 
IMPLEMENT,HEAD OF THE BED ELEVATED,BED IN LOW POSITON AND LOCKED CONTINUE TO MONITOR.

## 2022-02-23 NOTE — NUR
RN OPENING  NOTES



PT IN BED AWAKE, ALERT/ORIENTED X1, RESPONSIVE TO PAINFUL STIMULI. ON O2 @2L/MIN VIA N/C,  
.IV ACCESS ON YULISSA PICC #18G INTACT AND PATENT. NO BLEEDING NOTED. NO FACIAL GRIMACING NOTED. 
NO ACUTE DISTRESS. PT HAS G TUBE CURRENTLY RUNNING GLUCERNA AT 50ML/HR. ALL SAFETY MEASURE 
IN PLACE. BED ALARM ON. BED SIDE RAILS UP X2, PLACE CALL LIGHT WITH IN REACH.

## 2022-02-24 VITALS — SYSTOLIC BLOOD PRESSURE: 94 MMHG | DIASTOLIC BLOOD PRESSURE: 76 MMHG

## 2022-02-24 VITALS — DIASTOLIC BLOOD PRESSURE: 68 MMHG | SYSTOLIC BLOOD PRESSURE: 113 MMHG

## 2022-02-24 VITALS — DIASTOLIC BLOOD PRESSURE: 59 MMHG | SYSTOLIC BLOOD PRESSURE: 104 MMHG

## 2022-02-24 VITALS — DIASTOLIC BLOOD PRESSURE: 70 MMHG | SYSTOLIC BLOOD PRESSURE: 122 MMHG

## 2022-02-24 VITALS — DIASTOLIC BLOOD PRESSURE: 62 MMHG | SYSTOLIC BLOOD PRESSURE: 124 MMHG

## 2022-02-24 LAB
BASOPHILS # BLD AUTO: 0 K/UL (ref 0–0.2)
BASOPHILS NFR BLD AUTO: 0.3 % (ref 0–2)
BUN SERPL-MCNC: 15 MG/DL (ref 7–18)
CALCIUM SERPL-MCNC: 8.1 MG/DL (ref 8.5–10.1)
CHLORIDE SERPL-SCNC: 108 MMOL/L (ref 98–107)
CO2 SERPL-SCNC: 28 MMOL/L (ref 21–32)
CREAT SERPL-MCNC: 0.4 MG/DL (ref 0.6–1.3)
EOSINOPHIL NFR BLD AUTO: 0 % (ref 0–6)
GLUCOSE SERPL-MCNC: 250 MG/DL (ref 74–106)
HCT VFR BLD AUTO: 27 % (ref 33–45)
HGB BLD-MCNC: 9.3 G/DL (ref 11.5–14.8)
LYMPHOCYTES NFR BLD AUTO: 46.4 % (ref 20–44)
LYMPHOCYTES NFR BLD AUTO: 6.1 K/UL (ref 0.8–4.8)
LYMPHOCYTES NFR BLD MANUAL: 50 % (ref 16–48)
MAGNESIUM SERPL-MCNC: 2.2 MG/DL (ref 1.8–2.4)
MCHC RBC AUTO-ENTMCNC: 34 G/DL (ref 31–36)
MCV RBC AUTO: 101 FL (ref 82–100)
MONOCYTES NFR BLD AUTO: 1.5 K/UL (ref 0.1–1.3)
MONOCYTES NFR BLD AUTO: 11.1 % (ref 2–12)
MONOCYTES NFR BLD MANUAL: 12 % (ref 0–11)
NEUTROPHILS # BLD AUTO: 5.5 K/UL (ref 1.8–8.9)
NEUTROPHILS NFR BLD AUTO: 42.2 % (ref 43–81)
NEUTS BAND NFR BLD MANUAL: 2 % (ref 0–5)
NEUTS SEG NFR BLD MANUAL: 34 % (ref 42–76)
PLATELET # BLD AUTO: 320 K/UL (ref 150–450)
POTASSIUM SERPL-SCNC: 3.8 MMOL/L (ref 3.5–5.1)
RBC # BLD AUTO: 2.7 MIL/UL (ref 4–5.2)
SODIUM SERPL-SCNC: 142 MMOL/L (ref 136–145)
VARIANT LYMPHS NFR BLD MANUAL: 2 % (ref 0–0)
WBC NRBC COR # BLD AUTO: 13.1 K/UL (ref 4.3–11)

## 2022-02-24 RX ADMIN — Medication SCH EACH: at 12:28

## 2022-02-24 RX ADMIN — SODIUM CHLORIDE SCH MG: 9 INJECTION, SOLUTION INTRAVENOUS at 08:41

## 2022-02-24 RX ADMIN — ENOXAPARIN SODIUM SCH MG: 40 INJECTION SUBCUTANEOUS at 08:48

## 2022-02-24 RX ADMIN — PIPERACILLIN SODIUM AND TAZOBACTAM SODIUM SCH MLS/HR: .375; 3 INJECTION, POWDER, LYOPHILIZED, FOR SOLUTION INTRAVENOUS at 20:28

## 2022-02-24 RX ADMIN — INSULIN HUMAN PRN UNITS: 100 INJECTION, SOLUTION PARENTERAL at 23:13

## 2022-02-24 RX ADMIN — Medication SCH EACH: at 16:47

## 2022-02-24 RX ADMIN — INSULIN HUMAN PRN UNITS: 100 INJECTION, SOLUTION PARENTERAL at 06:06

## 2022-02-24 RX ADMIN — DEXAMETHASONE SODIUM PHOSPHATE SCH MG: 10 INJECTION INTRAMUSCULAR; INTRAVENOUS at 08:42

## 2022-02-24 RX ADMIN — INSULIN HUMAN PRN UNITS: 100 INJECTION, SOLUTION PARENTERAL at 17:31

## 2022-02-24 RX ADMIN — INSULIN GLARGINE SCH UNIT: 100 INJECTION, SOLUTION SUBCUTANEOUS at 23:13

## 2022-02-24 RX ADMIN — PIPERACILLIN SODIUM AND TAZOBACTAM SODIUM SCH MLS/HR: .375; 3 INJECTION, POWDER, LYOPHILIZED, FOR SOLUTION INTRAVENOUS at 04:31

## 2022-02-24 RX ADMIN — THERA TABS SCH UDTAB: TAB at 08:41

## 2022-02-24 RX ADMIN — Medication SCH EACH: at 06:05

## 2022-02-24 RX ADMIN — ATENOLOL SCH MG: 50 TABLET ORAL at 08:42

## 2022-02-24 RX ADMIN — Medication SCH EACH: at 23:08

## 2022-02-24 RX ADMIN — Medication SCH MG: at 08:41

## 2022-02-24 RX ADMIN — VALPROIC ACID SCH MG: 250 SOLUTION ORAL at 16:47

## 2022-02-24 RX ADMIN — VITAMIN D, TAB 1000IU (100/BT) SCH UNIT: 25 TAB at 08:41

## 2022-02-24 RX ADMIN — PIPERACILLIN SODIUM AND TAZOBACTAM SODIUM SCH MLS/HR: .375; 3 INJECTION, POWDER, LYOPHILIZED, FOR SOLUTION INTRAVENOUS at 11:37

## 2022-02-24 NOTE — NUR
RN NOTES



RECEIVED PT FOR CONTINUITY OF CARE. PATIENT A/OX0 IN NO S/SX OF ACUTE DISTRESS AT THIS TIME; 
CURRENTLY ON 2L OF 02 VIA NC; WITH 02 SAT >95% AT THIS TIME. WILL ENSURE SAFETY MEASURES 
WITHIN THE SHIFT. PATIENT BED ALARM IS ON. HEAD OF BED ELEVATED. BED IS LOCKED,  IN LOWEST 
POSITION AND SIDE RAILS UP. CALL LIGHT WITHIN REACH OF THE PATIENT. APPLICABLE ISOLATION 
PRECAUTIONS IN PLACE. WILL CONTINUE TO MONITOR AND REASSESS FOR ANY CHANGES AND WILL CARRY 
OUT ANY ONGOING AND ACTIVE MD ORDER.

## 2022-02-25 VITALS — SYSTOLIC BLOOD PRESSURE: 98 MMHG | DIASTOLIC BLOOD PRESSURE: 98 MMHG

## 2022-02-25 VITALS — SYSTOLIC BLOOD PRESSURE: 100 MMHG | DIASTOLIC BLOOD PRESSURE: 67 MMHG

## 2022-02-25 VITALS — SYSTOLIC BLOOD PRESSURE: 97 MMHG | DIASTOLIC BLOOD PRESSURE: 59 MMHG

## 2022-02-25 VITALS — SYSTOLIC BLOOD PRESSURE: 116 MMHG | DIASTOLIC BLOOD PRESSURE: 51 MMHG

## 2022-02-25 VITALS — SYSTOLIC BLOOD PRESSURE: 140 MMHG | DIASTOLIC BLOOD PRESSURE: 80 MMHG

## 2022-02-25 VITALS — DIASTOLIC BLOOD PRESSURE: 80 MMHG | SYSTOLIC BLOOD PRESSURE: 140 MMHG

## 2022-02-25 LAB
BASOPHILS # BLD AUTO: 0 K/UL (ref 0–0.2)
BASOPHILS NFR BLD AUTO: 0.3 % (ref 0–2)
BUN SERPL-MCNC: 22 MG/DL (ref 7–18)
CALCIUM SERPL-MCNC: 7.9 MG/DL (ref 8.5–10.1)
CHLORIDE SERPL-SCNC: 102 MMOL/L (ref 98–107)
CO2 SERPL-SCNC: 28 MMOL/L (ref 21–32)
CREAT SERPL-MCNC: 0.5 MG/DL (ref 0.6–1.3)
EOSINOPHIL NFR BLD AUTO: 0.1 % (ref 0–6)
GLUCOSE SERPL-MCNC: 286 MG/DL (ref 74–106)
HCT VFR BLD AUTO: 28 % (ref 33–45)
HGB BLD-MCNC: 8.8 G/DL (ref 11.5–14.8)
LYMPHOCYTES NFR BLD AUTO: 41.3 % (ref 20–44)
LYMPHOCYTES NFR BLD AUTO: 5 K/UL (ref 0.8–4.8)
LYMPHOCYTES NFR BLD MANUAL: 45 % (ref 16–48)
MAGNESIUM SERPL-MCNC: 2.1 MG/DL (ref 1.8–2.4)
MCHC RBC AUTO-ENTMCNC: 32 G/DL (ref 31–36)
MCV RBC AUTO: 104 FL (ref 82–100)
MONOCYTES NFR BLD AUTO: 1.2 K/UL (ref 0.1–1.3)
MONOCYTES NFR BLD AUTO: 9.7 % (ref 2–12)
MONOCYTES NFR BLD MANUAL: 7 % (ref 0–11)
NEUTROPHILS # BLD AUTO: 5.8 K/UL (ref 1.8–8.9)
NEUTROPHILS NFR BLD AUTO: 48.6 % (ref 43–81)
NEUTS BAND NFR BLD MANUAL: 1 % (ref 0–5)
NEUTS SEG NFR BLD MANUAL: 46 % (ref 42–76)
PLATELET # BLD AUTO: 323 K/UL (ref 150–450)
POTASSIUM SERPL-SCNC: 3.9 MMOL/L (ref 3.5–5.1)
RBC # BLD AUTO: 2.64 MIL/UL (ref 4–5.2)
SODIUM SERPL-SCNC: 136 MMOL/L (ref 136–145)
VARIANT LYMPHS NFR BLD MANUAL: 1 % (ref 0–0)
WBC NRBC COR # BLD AUTO: 12 K/UL (ref 4.3–11)

## 2022-02-25 RX ADMIN — Medication SCH EACH: at 12:47

## 2022-02-25 RX ADMIN — THERA TABS SCH UDTAB: TAB at 09:01

## 2022-02-25 RX ADMIN — INSULIN HUMAN PRN UNITS: 100 INJECTION, SOLUTION PARENTERAL at 23:12

## 2022-02-25 RX ADMIN — Medication SCH EACH: at 05:11

## 2022-02-25 RX ADMIN — INSULIN GLARGINE SCH UNIT: 100 INJECTION, SOLUTION SUBCUTANEOUS at 23:11

## 2022-02-25 RX ADMIN — PIPERACILLIN SODIUM AND TAZOBACTAM SODIUM SCH MLS/HR: .375; 3 INJECTION, POWDER, LYOPHILIZED, FOR SOLUTION INTRAVENOUS at 12:47

## 2022-02-25 RX ADMIN — INSULIN HUMAN PRN UNITS: 100 INJECTION, SOLUTION PARENTERAL at 18:42

## 2022-02-25 RX ADMIN — VALPROIC ACID SCH MG: 250 SOLUTION ORAL at 18:18

## 2022-02-25 RX ADMIN — ENOXAPARIN SODIUM SCH MG: 40 INJECTION SUBCUTANEOUS at 08:59

## 2022-02-25 RX ADMIN — Medication SCH MG: at 09:01

## 2022-02-25 RX ADMIN — ATENOLOL SCH MG: 50 TABLET ORAL at 09:01

## 2022-02-25 RX ADMIN — Medication SCH EACH: at 23:09

## 2022-02-25 RX ADMIN — VITAMIN D, TAB 1000IU (100/BT) SCH UNIT: 25 TAB at 09:01

## 2022-02-25 RX ADMIN — PANTOPRAZOLE SODIUM SCH MG: 40 GRANULE, DELAYED RELEASE ORAL at 09:00

## 2022-02-25 RX ADMIN — DEXAMETHASONE SODIUM PHOSPHATE SCH MG: 10 INJECTION INTRAMUSCULAR; INTRAVENOUS at 09:01

## 2022-02-25 RX ADMIN — PIPERACILLIN SODIUM AND TAZOBACTAM SODIUM SCH MLS/HR: .375; 3 INJECTION, POWDER, LYOPHILIZED, FOR SOLUTION INTRAVENOUS at 04:04

## 2022-02-25 RX ADMIN — INSULIN HUMAN PRN UNITS: 100 INJECTION, SOLUTION PARENTERAL at 12:46

## 2022-02-25 RX ADMIN — PIPERACILLIN SODIUM AND TAZOBACTAM SODIUM SCH MLS/HR: .375; 3 INJECTION, POWDER, LYOPHILIZED, FOR SOLUTION INTRAVENOUS at 20:23

## 2022-02-25 RX ADMIN — Medication SCH EACH: at 18:40

## 2022-02-25 RX ADMIN — Medication SCH ML: at 04:05

## 2022-02-25 RX ADMIN — INSULIN HUMAN PRN UNITS: 100 INJECTION, SOLUTION PARENTERAL at 05:18

## 2022-02-25 NOTE — NUR
RN CLOSING NOTE: 



PATIENT REMAINS IN ROOM IN NO SIGNS OF RESPIRATORY DISTRESS, PATIENT STILL ON 2L OF 02 VIA 
NC;TOLERATING WELL SATURATING @ >95% SP02. STILL ON TUBE FEEDING AS PRESCRIBED; TOLERATES 
WELL; FREE WATER FLUSHES OF 20CC Q4H DONE. SAFETY MEASURES IMPLEMENTED, BED IN LOWEST 
POSITION, LOCKED, SIDE RAILS UP, CALL LIGHT WITHIN REACH. ALL NEEDS AND ORDERS ADDRESSED 
DURING THE SHIFT. IV ACCESS MAINTAINED INTACT, SECURED AND FLUSHING WELL.  ALL DUE MEDS 
GIVEN AS ORDERED & SCHEDULED ; PATIENT TOLERATED WELL. PATIENT KEPT CLEAN AND COMFORTABLE 
WITHIN THE SHIFT. PATIENT ENDORSED TO INCOMING SHIFT RN WITH STABLE VITAL SIGN AND FOR 
CONTINUITY OF CARE.

## 2022-02-25 NOTE — NUR
RN CLOSING NOTES



PT IN BED AWAKE, ALERT/ORIENTED X1, RESPONSIVE TO PAINFUL STIMULI. ON O2 @4L/MIN VIA N/C,  
.IV ACCESS ON YULISSA PICC #18G INTACT. NO BLEEDING NOTED. NO FACIAL GRIMACING NOTED. NO ACUTE 
DISTRESS. PT HAS G TUBE RUNNING GLUCERNA 50ML/HR. ALL SAFETY MEASURE IN PLACE. BED ALARM ON. 
BED SIDE RAILS UP X2, PLACE CALL LIGHT WITH IN REACH. WILL ENDORSE TO NIGHT NURSE FOR AVELINA.

## 2022-02-25 NOTE — NUR
RN NOTES



RECEIVED PT FOR CONTINUITY OF CARE. PATIENT A/OX0 IN NO S/SX OF ACUTE DISTRESS AT THIS TIME; 
CURRENTLY ON 4L OF 02 VIA NC; WITH 02 SAT >95% AT THIS TIME. NOTED IV SITE ON L UA PICC LINE 
; PATENT, INTACT AND FLUSHING WELL; NO S/S OF INFECTION OR INFILTRATION. PT HAS G TUBE 
FLUSHING AND PATENT; SITE CLEAN DRY AND INTACT;CONNECTED TO GTUBE FEEDING OF GLUCERNA 1.2 
@50CC/HR;TOLERATES WELL; WITH FREE WATER FLUSHES Q4H.  WILL ENSURE SAFETY MEASURES WITHIN 
THE SHIFT. PATIENT BED ALARM IS ON. HEAD OF BED ELEVATED. BED IS LOCKED,  IN LOWEST POSITION 
AND SIDE RAILS UP. CALL LIGHT WITHIN REACH OF THE PATIENT. APPLICABLE ISOLATION PRECAUTIONS 
IN PLACE. WILL CONTINUE TO MONITOR AND REASSESS FOR ANY CHANGES AND WILL CARRY OUT ANY 
ONGOING AND ACTIVE MD ORDER.

## 2022-02-25 NOTE — NUR
RN NOTES



PATIENT REMAINED TO BE IN NO SIGNS OF ACUTE RESPIRATORY DISTRESS , VITAL SIGNS STABLE AT 
THIS TIME. REGULAR TURNING AND REPOSITIONING DONE Q2H AND SUCTIONING RENDERED.  WILL 
CONTINUE TO MONITOR AND REASSESS FOR ANY CHANGES THROUGHOUT THE SHIFT.

## 2022-02-26 VITALS — SYSTOLIC BLOOD PRESSURE: 131 MMHG | DIASTOLIC BLOOD PRESSURE: 81 MMHG

## 2022-02-26 VITALS — DIASTOLIC BLOOD PRESSURE: 74 MMHG | SYSTOLIC BLOOD PRESSURE: 118 MMHG

## 2022-02-26 VITALS — SYSTOLIC BLOOD PRESSURE: 117 MMHG | DIASTOLIC BLOOD PRESSURE: 51 MMHG

## 2022-02-26 VITALS — SYSTOLIC BLOOD PRESSURE: 123 MMHG | DIASTOLIC BLOOD PRESSURE: 72 MMHG

## 2022-02-26 VITALS — DIASTOLIC BLOOD PRESSURE: 50 MMHG | SYSTOLIC BLOOD PRESSURE: 91 MMHG

## 2022-02-26 VITALS — DIASTOLIC BLOOD PRESSURE: 80 MMHG | SYSTOLIC BLOOD PRESSURE: 125 MMHG

## 2022-02-26 RX ADMIN — Medication SCH EACH: at 12:34

## 2022-02-26 RX ADMIN — PIPERACILLIN SODIUM AND TAZOBACTAM SODIUM SCH MLS/HR: .375; 3 INJECTION, POWDER, LYOPHILIZED, FOR SOLUTION INTRAVENOUS at 12:35

## 2022-02-26 RX ADMIN — ENOXAPARIN SODIUM SCH MG: 40 INJECTION SUBCUTANEOUS at 09:34

## 2022-02-26 RX ADMIN — VALPROIC ACID SCH MG: 250 SOLUTION ORAL at 17:20

## 2022-02-26 RX ADMIN — INSULIN HUMAN PRN UNITS: 100 INJECTION, SOLUTION PARENTERAL at 05:25

## 2022-02-26 RX ADMIN — INSULIN GLARGINE SCH UNIT: 100 INJECTION, SOLUTION SUBCUTANEOUS at 22:43

## 2022-02-26 RX ADMIN — ATENOLOL SCH MG: 50 TABLET ORAL at 09:33

## 2022-02-26 RX ADMIN — Medication SCH MG: at 09:32

## 2022-02-26 RX ADMIN — PANTOPRAZOLE SODIUM SCH MG: 40 GRANULE, DELAYED RELEASE ORAL at 09:32

## 2022-02-26 RX ADMIN — INSULIN HUMAN PRN UNITS: 100 INJECTION, SOLUTION PARENTERAL at 12:53

## 2022-02-26 RX ADMIN — Medication SCH EACH: at 17:17

## 2022-02-26 RX ADMIN — Medication SCH ML: at 02:00

## 2022-02-26 RX ADMIN — INSULIN HUMAN PRN UNITS: 100 INJECTION, SOLUTION PARENTERAL at 18:00

## 2022-02-26 RX ADMIN — PIPERACILLIN SODIUM AND TAZOBACTAM SODIUM SCH MLS/HR: .375; 3 INJECTION, POWDER, LYOPHILIZED, FOR SOLUTION INTRAVENOUS at 03:20

## 2022-02-26 RX ADMIN — THERA TABS SCH UDTAB: TAB at 09:33

## 2022-02-26 RX ADMIN — Medication SCH EACH: at 05:25

## 2022-02-26 RX ADMIN — PIPERACILLIN SODIUM AND TAZOBACTAM SODIUM SCH MLS/HR: .375; 3 INJECTION, POWDER, LYOPHILIZED, FOR SOLUTION INTRAVENOUS at 20:48

## 2022-02-26 RX ADMIN — VITAMIN D, TAB 1000IU (100/BT) SCH UNIT: 25 TAB at 09:33

## 2022-02-26 NOTE — NUR
RN CLOSING NOTE: 



PATIENT REMAINS IN ROOM IN NO SIGNS OF RESPIRATORY DISTRESS, PATIENT STILL ON 4L OF 02 VIA 
NC;TOLERATING WELL SATURATING @ >95% SP02. STILL ON TUBE FEEDING AS PRESCRIBED; TOLERATES 
WELL; FREE WATER FLUSHES OF 20CC Q4H DONE. SAFETY MEASURES IMPLEMENTED, BED IN LOWEST 
POSITION, LOCKED, SIDE RAILS UP, CALL LIGHT WITHIN REACH. ALL NEEDS AND ORDERS ADDRESSED 
DURING THE SHIFT. IV ACCESS MAINTAINED INTACT, SECURED AND FLUSHING WELL.  ALL DUE MEDS 
GIVEN AS ORDERED & SCHEDULED ; PATIENT TOLERATED WELL. PATIENT KEPT CLEAN AND COMFORTABLE 
WITHIN THE SHIFT. PATIENT ENDORSED TO INCOMING SHIFT RN WITH STABLE VITAL SIGN AND FOR 
CONTINUITY OF CARE.

## 2022-02-26 NOTE — NUR
RN NOTES



SPOKE WITH RICARDO AT FACILITY [phone]. AWARE PATIENT IS GOING BACK TOT [facility] AND 
WILL BE PICKED UP BY AMBULANCE AT 1700.

## 2022-02-26 NOTE — NUR
TELE RN AM NOTE: 



PATIENT IN BED, ON 4L 02 VIA NC;TOLERATING WELL SATURATING @ >95% SP02. NOT IN ANY DISTRESS, 
RESPIRATION UNLABORED. SR ON MONITOR WITH HR AT 85, NO SIGNS OF PAIN OR DISCOMFORT, YULISSA PICC 
LINE FLUSHES WELL, SITE CLEAR, CDI DRESSING. WITH G TUBE, CHECKED FOR PLACEMENT, ON TUBE 
FEEDING AT 50ML, WELL TOLERATED. O RESIDUAL. SAFETY MEASURES IMPLEMENTED, BED IN LOWEST 
POSITION, LOCKED, SIDE RAILS UP, CALL LIGHT WITHIN REACH. PATIENT KEPT CLEAN AND 
COMFORTABLE. WILL TURN AND REPOSITION Q 2 HOURS. WILL CONTINUE TO MONITOR.

## 2022-02-27 VITALS — DIASTOLIC BLOOD PRESSURE: 66 MMHG | SYSTOLIC BLOOD PRESSURE: 117 MMHG

## 2022-02-27 VITALS — DIASTOLIC BLOOD PRESSURE: 51 MMHG | SYSTOLIC BLOOD PRESSURE: 102 MMHG

## 2022-02-27 VITALS — SYSTOLIC BLOOD PRESSURE: 128 MMHG | DIASTOLIC BLOOD PRESSURE: 51 MMHG

## 2022-02-27 VITALS — DIASTOLIC BLOOD PRESSURE: 65 MMHG | SYSTOLIC BLOOD PRESSURE: 113 MMHG

## 2022-02-27 VITALS — SYSTOLIC BLOOD PRESSURE: 101 MMHG | DIASTOLIC BLOOD PRESSURE: 59 MMHG

## 2022-02-27 VITALS — DIASTOLIC BLOOD PRESSURE: 58 MMHG | SYSTOLIC BLOOD PRESSURE: 141 MMHG

## 2022-02-27 RX ADMIN — INSULIN GLARGINE SCH UNIT: 100 INJECTION, SOLUTION SUBCUTANEOUS at 23:02

## 2022-02-27 RX ADMIN — INSULIN HUMAN PRN UNITS: 100 INJECTION, SOLUTION PARENTERAL at 00:18

## 2022-02-27 RX ADMIN — INSULIN HUMAN PRN UNITS: 100 INJECTION, SOLUTION PARENTERAL at 17:49

## 2022-02-27 RX ADMIN — Medication SCH EACH: at 17:47

## 2022-02-27 RX ADMIN — ENOXAPARIN SODIUM SCH MG: 40 INJECTION SUBCUTANEOUS at 08:43

## 2022-02-27 RX ADMIN — THERA TABS SCH UDTAB: TAB at 08:41

## 2022-02-27 RX ADMIN — INSULIN HUMAN PRN UNITS: 100 INJECTION, SOLUTION PARENTERAL at 05:20

## 2022-02-27 RX ADMIN — ACETAMINOPHEN PRN MG: 325 TABLET ORAL at 20:59

## 2022-02-27 RX ADMIN — Medication SCH EACH: at 11:47

## 2022-02-27 RX ADMIN — PIPERACILLIN SODIUM AND TAZOBACTAM SODIUM SCH MLS/HR: .375; 3 INJECTION, POWDER, LYOPHILIZED, FOR SOLUTION INTRAVENOUS at 11:23

## 2022-02-27 RX ADMIN — VITAMIN D, TAB 1000IU (100/BT) SCH UNIT: 25 TAB at 08:41

## 2022-02-27 RX ADMIN — Medication SCH EACH: at 23:03

## 2022-02-27 RX ADMIN — VALPROIC ACID SCH MG: 250 SOLUTION ORAL at 17:05

## 2022-02-27 RX ADMIN — Medication SCH EACH: at 00:10

## 2022-02-27 RX ADMIN — INSULIN HUMAN PRN UNITS: 100 INJECTION, SOLUTION PARENTERAL at 23:05

## 2022-02-27 RX ADMIN — Medication SCH EACH: at 05:07

## 2022-02-27 RX ADMIN — PANTOPRAZOLE SODIUM SCH MG: 40 GRANULE, DELAYED RELEASE ORAL at 08:41

## 2022-02-27 RX ADMIN — PIPERACILLIN SODIUM AND TAZOBACTAM SODIUM SCH MLS/HR: .375; 3 INJECTION, POWDER, LYOPHILIZED, FOR SOLUTION INTRAVENOUS at 04:25

## 2022-02-27 RX ADMIN — Medication SCH ML: at 02:14

## 2022-02-27 RX ADMIN — PIPERACILLIN SODIUM AND TAZOBACTAM SODIUM SCH MLS/HR: .375; 3 INJECTION, POWDER, LYOPHILIZED, FOR SOLUTION INTRAVENOUS at 20:15

## 2022-02-27 RX ADMIN — Medication SCH MG: at 08:41

## 2022-02-27 RX ADMIN — ATENOLOL SCH MG: 50 TABLET ORAL at 08:42

## 2022-02-27 NOTE — NUR
RN NOTES:



PT HAS INCREASED TEMP 99.8. TYLENOL 325MG 2 TABS GIVEN AND PT TOLERATED WELL. WILL CONTINUE 
TO MONITOR

## 2022-02-27 NOTE — NUR
TELE RN NOTE: 



PATIENT AWAKE. NON VERBAL, ON 4L 02 VIA NC;TOLERATING WELL. NOT IN ANY DISTRESS, RESPIRATION 
UNLABORED. SR ON MONITOR  SR , NO SOB NOTED AT THIS TIME , NO SIGNS OF PAIN OR DISCOMFORT, 
YULISSA PICC LINE FLUSHES WELL, WITH G TUBE, CHECKED FOR PLACEMENT, ON TUBE FEEDING AT 50ML, 
WELL TOLERATED. NO RESIDUAL. SAFETY MEASURES IMPLEMENTED, BED IN LOWEST POSITION, LOCKED, 
SIDE RAILS UP, CALL LIGHT WITHIN REACH. PATIENT KEPT CLEAN AND COMFORTABLE. WILL MONITOR

## 2022-02-27 NOTE — NUR
RN NOTES:



PT'S BLOOD SUGAR 212, LANTUS 5 UNITS AND 4 UNITS OF REGULAR INSULIN GIVEN. NO S/S OF 
HYPER/HYPOGLYCEMIA. WILL CONTINUE TO MONITOR

## 2022-02-27 NOTE — NUR
TELE RN CLOSING NOTE: 



PATIENT ASLEEP, WAKES UP ON NAME AND TOUCH. NON VERBAL, ON 4L 02 VIA NC;TOLERATING WELL 
SATURATING @ >95% SP02. NOT IN ANY DISTRESS, RESPIRATION UNLABORED. SR ON MONITOR WITH HR AT 
80s, NO SIGNS OF PAIN OR DISCOMFORT, YULISSA PICC LINE FLUSHES WELL, SITE CLEAR, CDI DRESSING. 
WITH G TUBE, CHECKED FOR PLACEMENT, ON TUBE FEEDING AT 50ML, WELL TOLERATED. O RESIDUAL. 
SAFETY MEASURES IMPLEMENTED, BED IN LOWEST POSITION, LOCKED, SIDE RAILS UP, CALL LIGHT 
WITHIN REACH. PATIENT KEPT CLEAN AND COMFORTABLE. TURNED AND REPOSITIONED Q 2 HOURS. MITTEN 
ON LEFT HAND RELEASED AND CHECKED FOR CIRCULATION Q 2 HOURS. ALL NEEDS MET AT THIS TIME. 
WILL ENDORSE TO NEXT SHIFT FOR AVELINA.

## 2022-02-27 NOTE — NUR
henry rn note 

 noted with chest congestion , dr Wolfgang Calzada notified with order chest x ray order carried 
out 

-------------------------------------------------------------------------------

Addendum: 02/27/22 at 1824 by KEV GUERRERO RN

-------------------------------------------------------------------------------

 henry rn note 

 chest  ray done as ordered

## 2022-02-27 NOTE — NUR
RN OPENING  NOTES



RECEIVED PT IN BED, AWAKE, ALERT/ORIENTED X1, RESPONSIVE TO PAINFUL STIMULI. ON O2 @4L/MIN 
VIA N/C AND PT TOLERATED WELL. IV ACCESS ON YULISSA PICC #18G INTACT AND PATENT. NO BLEEDING 
NOTED. NO FACIAL GRIMACING NOTED. NO ACUTE DISTRESS. G TUBE FEEDING CURRENTLY RUNNING 
GLUCERNA 1.2 AT 50ML/HR AND PT TOLERATED WELL. SISTER AT BEDSIDE.  ALL SAFETY MEASURE IN 
PLACE. BED ALARM ON. BED SIDE RAILS UP X2, PLACE CALL LIGHT WITH IN REACH. WILL CONTINUE TO 
MONITOR

## 2022-02-27 NOTE — NUR
tele rn note

 rounds made, keep clean dry, made a bm,  turn reposition , cont g tube feeding as ordered, 
keep hob elevated as tolerate spoke with sister, update patient condition

## 2022-02-28 VITALS — DIASTOLIC BLOOD PRESSURE: 81 MMHG | SYSTOLIC BLOOD PRESSURE: 136 MMHG

## 2022-02-28 VITALS — DIASTOLIC BLOOD PRESSURE: 79 MMHG | SYSTOLIC BLOOD PRESSURE: 122 MMHG

## 2022-02-28 VITALS — DIASTOLIC BLOOD PRESSURE: 69 MMHG | SYSTOLIC BLOOD PRESSURE: 133 MMHG

## 2022-02-28 VITALS — SYSTOLIC BLOOD PRESSURE: 138 MMHG | DIASTOLIC BLOOD PRESSURE: 69 MMHG

## 2022-02-28 VITALS — SYSTOLIC BLOOD PRESSURE: 122 MMHG | DIASTOLIC BLOOD PRESSURE: 79 MMHG

## 2022-02-28 VITALS — DIASTOLIC BLOOD PRESSURE: 74 MMHG | SYSTOLIC BLOOD PRESSURE: 129 MMHG

## 2022-02-28 LAB
BASOPHILS # BLD AUTO: 0.1 K/UL (ref 0–0.2)
BASOPHILS NFR BLD AUTO: 0.7 % (ref 0–2)
BUN SERPL-MCNC: 10 MG/DL (ref 7–18)
CALCIUM SERPL-MCNC: 8.2 MG/DL (ref 8.5–10.1)
CHLORIDE SERPL-SCNC: 101 MMOL/L (ref 98–107)
CO2 SERPL-SCNC: 27 MMOL/L (ref 21–32)
CREAT SERPL-MCNC: 0.5 MG/DL (ref 0.6–1.3)
EOSINOPHIL NFR BLD AUTO: 0.5 % (ref 0–6)
GLUCOSE SERPL-MCNC: 254 MG/DL (ref 74–106)
HCT VFR BLD AUTO: 30 % (ref 33–45)
HGB BLD-MCNC: 9.7 G/DL (ref 11.5–14.8)
LYMPHOCYTES NFR BLD AUTO: 23.9 % (ref 20–44)
LYMPHOCYTES NFR BLD AUTO: 4.2 K/UL (ref 0.8–4.8)
MAGNESIUM SERPL-MCNC: 2.1 MG/DL (ref 1.8–2.4)
MCHC RBC AUTO-ENTMCNC: 32 G/DL (ref 31–36)
MCV RBC AUTO: 103 FL (ref 82–100)
MONOCYTES NFR BLD AUTO: 1.8 K/UL (ref 0.1–1.3)
MONOCYTES NFR BLD AUTO: 10 % (ref 2–12)
NEUTROPHILS # BLD AUTO: 11.3 K/UL (ref 1.8–8.9)
NEUTROPHILS NFR BLD AUTO: 64.9 % (ref 43–81)
PHOSPHATE SERPL-MCNC: 3.5 MG/DL (ref 2.5–4.9)
PLATELET # BLD AUTO: 469 K/UL (ref 150–450)
POTASSIUM SERPL-SCNC: 4.1 MMOL/L (ref 3.5–5.1)
RBC # BLD AUTO: 2.89 MIL/UL (ref 4–5.2)
SODIUM SERPL-SCNC: 133 MMOL/L (ref 136–145)
WBC NRBC COR # BLD AUTO: 17.5 K/UL (ref 4.3–11)

## 2022-02-28 RX ADMIN — VALPROIC ACID SCH MG: 250 SOLUTION ORAL at 17:31

## 2022-02-28 RX ADMIN — Medication SCH OZ: at 09:49

## 2022-02-28 RX ADMIN — INSULIN HUMAN PRN UNITS: 100 INJECTION, SOLUTION PARENTERAL at 05:45

## 2022-02-28 RX ADMIN — Medication SCH EACH: at 11:51

## 2022-02-28 RX ADMIN — PIPERACILLIN SODIUM AND TAZOBACTAM SODIUM SCH MLS/HR: .375; 3 INJECTION, POWDER, LYOPHILIZED, FOR SOLUTION INTRAVENOUS at 03:41

## 2022-02-28 RX ADMIN — Medication SCH EACH: at 05:42

## 2022-02-28 RX ADMIN — Medication SCH MG: at 09:29

## 2022-02-28 RX ADMIN — VITAMIN D, TAB 1000IU (100/BT) SCH UNIT: 25 TAB at 09:29

## 2022-02-28 RX ADMIN — INSULIN HUMAN PRN UNITS: 100 INJECTION, SOLUTION PARENTERAL at 12:31

## 2022-02-28 RX ADMIN — PANTOPRAZOLE SODIUM SCH MG: 40 GRANULE, DELAYED RELEASE ORAL at 09:29

## 2022-02-28 RX ADMIN — Medication SCH ML: at 00:10

## 2022-02-28 RX ADMIN — ENOXAPARIN SODIUM SCH MG: 40 INJECTION SUBCUTANEOUS at 10:17

## 2022-02-28 RX ADMIN — THERA TABS SCH UDTAB: TAB at 09:29

## 2022-02-28 RX ADMIN — INSULIN GLARGINE SCH UNIT: 100 INJECTION, SOLUTION SUBCUTANEOUS at 22:06

## 2022-02-28 RX ADMIN — Medication SCH EACH: at 18:40

## 2022-02-28 RX ADMIN — ATENOLOL SCH MG: 50 TABLET ORAL at 09:29

## 2022-02-28 NOTE — NUR
RN NOTE



SPOKE WITH [facility] NURSE, SHE SAID HER NAME IS ORA, THAT THEY ARE WAITING FOR THEIR MD 
TO GIVE APPROVAL TO ACCEPT THE PATIENT. WE HAD SEVERAL CORRESPONDENCE IN THE LAST 1 1/2 
HOURS AND EVENTUALLY HAD THE CONFERENCE CALL WITH THE FACILITY NURSE AND  ELISEO 
AND THEY HAVE COME TO A CONCLUSION THAT THEY ARE NOT COMFORTABLE TAKING THE PATIENT AT THIS 
TIME BECAUSE THEIR DOCTOR HASN'T CALLED THEM BACK YET AND THAT BECAUSE PATIENT WAS ADMITTED 
TO [facility] AND WAS SENT BACK TO HOSPITAL A DAY AFTER FOR THE SAME REASON.  
FROY NOTIFIED OF THE FACILITY DECISION. WILL ENDORSE ACCORDINGLY. WILL CONTINUE TO MONITOR 
PATIENT.

## 2022-02-28 NOTE — NUR
TELE RN NOTE



 PATIENT ON BED WITH SPONTANEOUS EYE OPENING. PATIENT IS NON-VERBAL BUT SOMETIMES MAKES 
SOUNDS. WITH CONTRACTURES ON ALL EXTREMITIES. PATIENT ON OXYGEN AT 3LPM VIA NASAL CANULA 
WITH NO SIGNS AND SYMPTOMS OF DISTRESS. ON G-TUBE FEEDING WITH GLUCERNA 1.2 AT 50 ML/HR, 
TOLERATED WELL. WITH LEFT ARM PICC LINE, PATENT AND INTACT.  ON MODERATE TO HIGH BACK REST. 
PATIENT STILL FOR DISCHARGE BUT FACILITY  AND NURSE DOES NOT WANT TO ACCEPT 
PATIENT.  AWARE. AMBULANCE CANCELLED. WILL ENDORSE ACCORDINGLY. SAFETY MEASURES 
ENSURED WITH BED ON LOWEST LOCKED POSITION, SIDERAILS RAISED AND CALL LIGHT WITHIN REACH AT 
ALL TIMES. WILL ENDORSE PATIETN FOR CONTINUITY OF CARE.

## 2022-02-28 NOTE — NUR
RN CLOSING NOTES



PT IN BED, AWAKE, ALERT/ORIENTED X1, RESPONSIVE TO PAINFUL STIMULI. ON O2 @3L/MIN VIA N/C, 
100% AND PT TOLERATED WELL. IV ACCESS ON YULISSA PICC #18G INTACT AND PATENT. NO BLEEDING NOTED. 
NO FACIAL GRIMACING NOTED. NO ACUTE DISTRESS. G TUBE FEEDING CURRENTLY RUNNING GLUCERNA 1.2 
AT 50ML/HR AND PT TOLERATED WELL. ALL DUE MEDS GIVEN AS ORDERED. ALL SAFETY MEASURE IN 
PLACE. BED ALARM ON. BED SIDE RAILS UP X2, PLACE CALL LIGHT WITH IN REACH. WILL ENDORSE TO 
MORNING SHIFT NURSE.

## 2022-02-28 NOTE — NUR
TELE RN OPENING



RECEIVED PATIENT IN BED, NON-VERBAL, RESPONSE TO PAINFUL STIMULI. NOT EXHIBITING S/S OF 
DISTRESS ON ROOM AIR. NOT EXHIBITING PAIN VIA FLACC. PATIENT IS CONTRACTED. NOTED TO HAVE 
BILATERAL ARM BRUISES.-- ONE ON L.UA PICC LINE-- TKO 10CC/HR RUNNING AT THIS TIME. R. FOOT 
PITTING 2+ EDEMA. L. LEG NON-PITTING. L. HAND MITTEN NOTED IN PLACE. TELE MONITOR READING SR 
100 BPM. G-TUBE NOPTED TO BE RUNNING GLUCERNA @50CC/HR. NEEDS ATTENDED AT THIS TIME. SAFETY 
IN PLACE. WILL CONTINUE WITH PATIENT'S PLAN OF CARE.

## 2022-02-28 NOTE — NUR
TELE RN NOTE



RECEIVED PATIENT ON BED WITH SPONTANEOUS EYE OPENING. PATIENT IS NON-VERBAL BUT SOMETIMES 
MAKES SOUNDS. WITH CONTRACTURES ON ALL EXTREMITIES. PATIENT ON OXYGEN AT 3LPM VIA NASAL 
CANULA WITH NO SIGNS AND SYMPTOMS OF DISTRESS. ON G-TUBE FEEDING WITH GLUCERNA 1.2 AT 50 
ML/HR, TOLERATED WELL. WITH LEFT ARM PICC LINE, PATENT AND INTACT.  ON MODERATE TO HIGH BACK 
REST. SAFETY MEASURES ENSURED WITH BED ON LOWEST LOCKED POSITION, SIDERAILS RAISED AND CALL 
LIGHT WITHIN REACH AT ALL TIMES. WILL CONTINUE TO MONITOR PATIENT.

## 2022-03-01 VITALS — DIASTOLIC BLOOD PRESSURE: 74 MMHG | SYSTOLIC BLOOD PRESSURE: 102 MMHG

## 2022-03-01 VITALS — SYSTOLIC BLOOD PRESSURE: 115 MMHG | DIASTOLIC BLOOD PRESSURE: 68 MMHG

## 2022-03-01 VITALS — DIASTOLIC BLOOD PRESSURE: 68 MMHG | SYSTOLIC BLOOD PRESSURE: 119 MMHG

## 2022-03-01 VITALS — SYSTOLIC BLOOD PRESSURE: 122 MMHG | DIASTOLIC BLOOD PRESSURE: 88 MMHG

## 2022-03-01 VITALS — DIASTOLIC BLOOD PRESSURE: 81 MMHG | SYSTOLIC BLOOD PRESSURE: 127 MMHG

## 2022-03-01 LAB
BASOPHILS # BLD AUTO: 0.1 K/UL (ref 0–0.2)
BASOPHILS NFR BLD AUTO: 0.5 % (ref 0–2)
EOSINOPHIL NFR BLD AUTO: 0.6 % (ref 0–6)
EOSINOPHIL NFR BLD MANUAL: 1 % (ref 0–4)
HCT VFR BLD AUTO: 30 % (ref 33–45)
HGB BLD-MCNC: 9.7 G/DL (ref 11.5–14.8)
LYMPHOCYTES NFR BLD AUTO: 36.9 % (ref 20–44)
LYMPHOCYTES NFR BLD AUTO: 5.1 K/UL (ref 0.8–4.8)
LYMPHOCYTES NFR BLD MANUAL: 45 % (ref 16–48)
MCHC RBC AUTO-ENTMCNC: 33 G/DL (ref 31–36)
MCV RBC AUTO: 104 FL (ref 82–100)
MONOCYTES NFR BLD AUTO: 1.6 K/UL (ref 0.1–1.3)
MONOCYTES NFR BLD AUTO: 11.5 % (ref 2–12)
MONOCYTES NFR BLD MANUAL: 12 % (ref 0–11)
NEUTROPHILS # BLD AUTO: 7 K/UL (ref 1.8–8.9)
NEUTROPHILS NFR BLD AUTO: 50.5 % (ref 43–81)
NEUTS SEG NFR BLD MANUAL: 42 % (ref 42–76)
PLATELET # BLD AUTO: 483 K/UL (ref 150–450)
RBC # BLD AUTO: 2.87 MIL/UL (ref 4–5.2)
WBC NRBC COR # BLD AUTO: 13.8 K/UL (ref 4.3–11)

## 2022-03-01 RX ADMIN — CLOTRIMAZOLE AND BETAMETHASONE DIPROPIONATE SCH GM: 10; .5 CREAM TOPICAL at 17:34

## 2022-03-01 RX ADMIN — INSULIN HUMAN PRN UNITS: 100 INJECTION, SOLUTION PARENTERAL at 00:09

## 2022-03-01 RX ADMIN — Medication SCH EACH: at 00:06

## 2022-03-01 RX ADMIN — Medication SCH EACH: at 11:34

## 2022-03-01 RX ADMIN — Medication SCH MG: at 08:46

## 2022-03-01 RX ADMIN — NYSTATIN SCH GM: 100000 CREAM TOPICAL at 11:33

## 2022-03-01 RX ADMIN — Medication SCH OZ: at 08:48

## 2022-03-01 RX ADMIN — PANTOPRAZOLE SODIUM SCH MG: 40 GRANULE, DELAYED RELEASE ORAL at 08:46

## 2022-03-01 RX ADMIN — INSULIN HUMAN PRN UNITS: 100 INJECTION, SOLUTION PARENTERAL at 11:34

## 2022-03-01 RX ADMIN — INSULIN HUMAN PRN UNITS: 100 INJECTION, SOLUTION PARENTERAL at 06:23

## 2022-03-01 RX ADMIN — Medication SCH EACH: at 06:22

## 2022-03-01 RX ADMIN — THERA TABS SCH UDTAB: TAB at 08:46

## 2022-03-01 RX ADMIN — NYSTATIN SCH GM: 100000 CREAM TOPICAL at 17:34

## 2022-03-01 RX ADMIN — ATENOLOL SCH MG: 50 TABLET ORAL at 08:47

## 2022-03-01 RX ADMIN — VITAMIN D, TAB 1000IU (100/BT) SCH UNIT: 25 TAB at 08:46

## 2022-03-01 RX ADMIN — ENOXAPARIN SODIUM SCH MG: 40 INJECTION SUBCUTANEOUS at 08:48

## 2022-03-01 RX ADMIN — CLOTRIMAZOLE AND BETAMETHASONE DIPROPIONATE SCH GM: 10; .5 CREAM TOPICAL at 11:33

## 2022-03-01 NOTE — NUR
RN TELE CLOSING NOTE



PATIENT IN BED WITH EYES CLOSED, EASY TO AROUSE-- CHANGED ROOM. NON-VERBAL, RESPONSIVE TO 
PAIN. NOT EXHIBITING ANY S/S OF DISTRESS ON ROOM AIR THROUGHOUT SHIFT. SATURATION %. 
NOT EXHIBITING PAIN VIA FLACC. REPOSITIONING DONE THROUGHOUT SHIFT WITH CNA. NOTED TO HAVE 
BILATERAL ARMS BRUISES.BLE EDEMA. L. HAND MITTEN RESTRAINTS RENEWED. TELE MONITOR READING ST 
107. ALL NEEDS ATTENDED. ALL SCHEDULED MEDICATIONS ADMINISTERED. SAFETY KEPT IN PLACE THE 
WHOLE SHIFT. WILL ENDORSE TO MORNING SHIFT RN FOR CONTINUITY OF CARE.

## 2022-03-01 NOTE — NUR
RN NOTES 



CALLED [facility] AND SPOKE WITH SHERRY, ENDORSED PATIENT TO BE TRANSFERRED 
ETA 1700 AMBULANCE .

## 2022-03-01 NOTE — NUR
WOUND CARE CONSULT: PT PRESENTS WITH RED RASH TO GROIN FOLDS, INNER THIGHS, PERINEUM AND 
GLUTEAL CREASE/INNER BUTTOCKS AS WELL AS DISCOLORATION TO BILATERAL ARMS. LOWER EXTREMITIES 
ARE CONTRACTED. PT IS INCONTINENT. PT IS IMMOBILE. RECOMMENDATIONS MADE FOR SKIN PROTECTION. 
DISCUSSED WITH NURSING STAFF. FIRST STEP LOW AIRLOSS MATTRESS ORDERED. MD IN AGREEMENT WITH 
PLAN OF CARE. 

-------------------------------------------------------------------------------

Addendum: 03/01/22 at 1032 by JASMINA HOLLAND WNDNU

-------------------------------------------------------------------------------

Amended: Links added.

## 2022-03-01 NOTE — NUR
RN OPENING NOTES 



RECEIVED PATIENT IN BED, NON-VERBAL, NOT IN DISTRESS, NO FACIAL GRIMACING NOTED. LEFT PICC 
LINE PATENT AND INTACT. GT INTACT WITH TF GLUCERNA @ 50CC/HR. WITH LEFT MITTEN RESTRAINT IN 
PLACE. BED TO LOWEST POSITION AND LOCKED.

## 2022-03-01 NOTE — NUR
RN NOTES 



PATIENT WAS DISCHARGED VIA GURNEY IN STABLE CONDITION WITH SISTER EILEEN. PICC LINE WAS 
PULLED OUT ASEPTICALLY.

## 2022-04-16 ENCOUNTER — HOSPITAL ENCOUNTER (INPATIENT)
Dept: HOSPITAL 54 - ER | Age: 52
LOS: 12 days | Discharge: NURSING, REHAB OR LONG-TERM CARE | DRG: 720 | End: 2022-04-28
Attending: INTERNAL MEDICINE | Admitting: INTERNAL MEDICINE
Payer: MEDICAID

## 2022-04-16 VITALS — SYSTOLIC BLOOD PRESSURE: 151 MMHG | DIASTOLIC BLOOD PRESSURE: 75 MMHG

## 2022-04-16 VITALS — SYSTOLIC BLOOD PRESSURE: 101 MMHG | DIASTOLIC BLOOD PRESSURE: 57 MMHG

## 2022-04-16 VITALS — SYSTOLIC BLOOD PRESSURE: 108 MMHG | DIASTOLIC BLOOD PRESSURE: 53 MMHG

## 2022-04-16 VITALS — DIASTOLIC BLOOD PRESSURE: 110 MMHG | SYSTOLIC BLOOD PRESSURE: 140 MMHG

## 2022-04-16 VITALS — SYSTOLIC BLOOD PRESSURE: 136 MMHG | DIASTOLIC BLOOD PRESSURE: 103 MMHG

## 2022-04-16 VITALS — DIASTOLIC BLOOD PRESSURE: 49 MMHG | SYSTOLIC BLOOD PRESSURE: 98 MMHG

## 2022-04-16 VITALS — SYSTOLIC BLOOD PRESSURE: 63 MMHG | DIASTOLIC BLOOD PRESSURE: 39 MMHG

## 2022-04-16 VITALS — BODY MASS INDEX: 21.44 KG/M2 | HEIGHT: 63 IN | WEIGHT: 121 LBS

## 2022-04-16 VITALS — DIASTOLIC BLOOD PRESSURE: 56 MMHG | SYSTOLIC BLOOD PRESSURE: 89 MMHG

## 2022-04-16 VITALS — SYSTOLIC BLOOD PRESSURE: 96 MMHG | DIASTOLIC BLOOD PRESSURE: 63 MMHG

## 2022-04-16 VITALS — DIASTOLIC BLOOD PRESSURE: 57 MMHG | SYSTOLIC BLOOD PRESSURE: 97 MMHG

## 2022-04-16 VITALS — DIASTOLIC BLOOD PRESSURE: 55 MMHG | SYSTOLIC BLOOD PRESSURE: 89 MMHG

## 2022-04-16 VITALS — DIASTOLIC BLOOD PRESSURE: 75 MMHG | SYSTOLIC BLOOD PRESSURE: 111 MMHG

## 2022-04-16 VITALS — DIASTOLIC BLOOD PRESSURE: 50 MMHG | SYSTOLIC BLOOD PRESSURE: 101 MMHG

## 2022-04-16 VITALS — SYSTOLIC BLOOD PRESSURE: 94 MMHG | DIASTOLIC BLOOD PRESSURE: 60 MMHG

## 2022-04-16 VITALS — DIASTOLIC BLOOD PRESSURE: 57 MMHG | SYSTOLIC BLOOD PRESSURE: 147 MMHG

## 2022-04-16 VITALS — DIASTOLIC BLOOD PRESSURE: 25 MMHG | SYSTOLIC BLOOD PRESSURE: 124 MMHG

## 2022-04-16 VITALS — SYSTOLIC BLOOD PRESSURE: 60 MMHG | DIASTOLIC BLOOD PRESSURE: 37 MMHG

## 2022-04-16 VITALS — SYSTOLIC BLOOD PRESSURE: 85 MMHG | DIASTOLIC BLOOD PRESSURE: 53 MMHG

## 2022-04-16 VITALS — SYSTOLIC BLOOD PRESSURE: 104 MMHG | DIASTOLIC BLOOD PRESSURE: 66 MMHG

## 2022-04-16 VITALS — DIASTOLIC BLOOD PRESSURE: 56 MMHG | SYSTOLIC BLOOD PRESSURE: 91 MMHG

## 2022-04-16 VITALS — SYSTOLIC BLOOD PRESSURE: 95 MMHG | DIASTOLIC BLOOD PRESSURE: 51 MMHG

## 2022-04-16 VITALS — DIASTOLIC BLOOD PRESSURE: 61 MMHG | SYSTOLIC BLOOD PRESSURE: 102 MMHG

## 2022-04-16 VITALS — SYSTOLIC BLOOD PRESSURE: 109 MMHG | DIASTOLIC BLOOD PRESSURE: 59 MMHG

## 2022-04-16 VITALS — DIASTOLIC BLOOD PRESSURE: 56 MMHG | SYSTOLIC BLOOD PRESSURE: 96 MMHG

## 2022-04-16 VITALS — DIASTOLIC BLOOD PRESSURE: 69 MMHG | SYSTOLIC BLOOD PRESSURE: 101 MMHG

## 2022-04-16 VITALS — SYSTOLIC BLOOD PRESSURE: 107 MMHG | DIASTOLIC BLOOD PRESSURE: 67 MMHG

## 2022-04-16 VITALS — SYSTOLIC BLOOD PRESSURE: 86 MMHG | DIASTOLIC BLOOD PRESSURE: 54 MMHG

## 2022-04-16 VITALS — SYSTOLIC BLOOD PRESSURE: 88 MMHG | DIASTOLIC BLOOD PRESSURE: 55 MMHG

## 2022-04-16 VITALS — DIASTOLIC BLOOD PRESSURE: 62 MMHG | SYSTOLIC BLOOD PRESSURE: 102 MMHG

## 2022-04-16 VITALS — DIASTOLIC BLOOD PRESSURE: 61 MMHG | SYSTOLIC BLOOD PRESSURE: 99 MMHG

## 2022-04-16 VITALS — SYSTOLIC BLOOD PRESSURE: 66 MMHG | DIASTOLIC BLOOD PRESSURE: 38 MMHG

## 2022-04-16 VITALS — DIASTOLIC BLOOD PRESSURE: 55 MMHG | SYSTOLIC BLOOD PRESSURE: 97 MMHG

## 2022-04-16 VITALS — DIASTOLIC BLOOD PRESSURE: 63 MMHG | SYSTOLIC BLOOD PRESSURE: 98 MMHG

## 2022-04-16 VITALS — SYSTOLIC BLOOD PRESSURE: 73 MMHG | DIASTOLIC BLOOD PRESSURE: 38 MMHG

## 2022-04-16 VITALS — DIASTOLIC BLOOD PRESSURE: 51 MMHG | SYSTOLIC BLOOD PRESSURE: 94 MMHG

## 2022-04-16 VITALS — DIASTOLIC BLOOD PRESSURE: 65 MMHG | SYSTOLIC BLOOD PRESSURE: 84 MMHG

## 2022-04-16 VITALS — DIASTOLIC BLOOD PRESSURE: 53 MMHG | SYSTOLIC BLOOD PRESSURE: 108 MMHG

## 2022-04-16 VITALS — DIASTOLIC BLOOD PRESSURE: 47 MMHG | SYSTOLIC BLOOD PRESSURE: 89 MMHG

## 2022-04-16 VITALS — DIASTOLIC BLOOD PRESSURE: 44 MMHG | SYSTOLIC BLOOD PRESSURE: 79 MMHG

## 2022-04-16 VITALS — SYSTOLIC BLOOD PRESSURE: 88 MMHG | DIASTOLIC BLOOD PRESSURE: 54 MMHG

## 2022-04-16 VITALS — SYSTOLIC BLOOD PRESSURE: 89 MMHG | DIASTOLIC BLOOD PRESSURE: 49 MMHG

## 2022-04-16 VITALS — SYSTOLIC BLOOD PRESSURE: 86 MMHG | DIASTOLIC BLOOD PRESSURE: 53 MMHG

## 2022-04-16 DIAGNOSIS — N17.9: ICD-10-CM

## 2022-04-16 DIAGNOSIS — Z79.84: ICD-10-CM

## 2022-04-16 DIAGNOSIS — Z74.01: ICD-10-CM

## 2022-04-16 DIAGNOSIS — A41.9: Primary | ICD-10-CM

## 2022-04-16 DIAGNOSIS — R13.10: ICD-10-CM

## 2022-04-16 DIAGNOSIS — E43: ICD-10-CM

## 2022-04-16 DIAGNOSIS — F81.9: ICD-10-CM

## 2022-04-16 DIAGNOSIS — L71.9: ICD-10-CM

## 2022-04-16 DIAGNOSIS — M41.9: ICD-10-CM

## 2022-04-16 DIAGNOSIS — I10: ICD-10-CM

## 2022-04-16 DIAGNOSIS — R65.21: ICD-10-CM

## 2022-04-16 DIAGNOSIS — Z99.3: ICD-10-CM

## 2022-04-16 DIAGNOSIS — G35: ICD-10-CM

## 2022-04-16 DIAGNOSIS — M24.561: ICD-10-CM

## 2022-04-16 DIAGNOSIS — E87.5: ICD-10-CM

## 2022-04-16 DIAGNOSIS — L85.3: ICD-10-CM

## 2022-04-16 DIAGNOSIS — Z87.19: ICD-10-CM

## 2022-04-16 DIAGNOSIS — H54.7: ICD-10-CM
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
## 2022-04-16 DIAGNOSIS — Z82.49: ICD-10-CM

## 2022-04-16 DIAGNOSIS — E11.65: ICD-10-CM

## 2022-04-16 DIAGNOSIS — H91.90: ICD-10-CM

## 2022-04-16 DIAGNOSIS — Z20.822: ICD-10-CM

## 2022-04-16 DIAGNOSIS — Z79.4: ICD-10-CM

## 2022-04-16 DIAGNOSIS — S72.332A: ICD-10-CM

## 2022-04-16 DIAGNOSIS — E86.0: ICD-10-CM

## 2022-04-16 DIAGNOSIS — J96.01: ICD-10-CM

## 2022-04-16 DIAGNOSIS — M81.0: ICD-10-CM

## 2022-04-16 DIAGNOSIS — J69.0: ICD-10-CM

## 2022-04-16 DIAGNOSIS — G80.9: ICD-10-CM

## 2022-04-16 DIAGNOSIS — Z93.1: ICD-10-CM
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
## 2022-04-16 DIAGNOSIS — Z83.3: ICD-10-CM

## 2022-04-16 DIAGNOSIS — G40.909: ICD-10-CM

## 2022-04-16 DIAGNOSIS — Z79.83: ICD-10-CM

## 2022-04-16 DIAGNOSIS — M24.562: ICD-10-CM

## 2022-04-16 DIAGNOSIS — E87.0: ICD-10-CM

## 2022-04-16 DIAGNOSIS — G47.00: ICD-10-CM

## 2022-04-16 LAB
ALBUMIN SERPL BCP-MCNC: 2 G/DL (ref 3.4–5)
ALP SERPL-CCNC: 258 U/L (ref 46–116)
ALT SERPL W P-5'-P-CCNC: < 6 U/L (ref 12–78)
AST SERPL W P-5'-P-CCNC: 10 U/L (ref 15–37)
BASE EXCESS BLDA CALC-SCNC: 4.2 MMOL/L
BASOPHILS # BLD AUTO: 0.1 K/UL (ref 0–0.2)
BASOPHILS NFR BLD AUTO: 1.1 % (ref 0–2)
BILIRUB DIRECT SERPL-MCNC: 0.1 MG/DL (ref 0–0.2)
BILIRUB SERPL-MCNC: 0.5 MG/DL (ref 0.2–1)
BILIRUB UR QL STRIP: NEGATIVE
BUN SERPL-MCNC: 84 MG/DL (ref 7–18)
CALCIUM SERPL-MCNC: 9.8 MG/DL (ref 8.5–10.1)
CHLORIDE SERPL-SCNC: 108 MMOL/L (ref 98–107)
CO2 SERPL-SCNC: 28 MMOL/L (ref 21–32)
COLOR UR: YELLOW
CREAT SERPL-MCNC: 1.2 MG/DL (ref 0.6–1.3)
EOSINOPHIL NFR BLD AUTO: 0.4 % (ref 0–6)
GLUCOSE SERPL-MCNC: 477 MG/DL (ref 74–106)
GLUCOSE UR STRIP-MCNC: >=1000 MG/DL
HCT VFR BLD AUTO: 41 % (ref 33–45)
HGB BLD-MCNC: 12.2 G/DL (ref 11.5–14.8)
INHALED O2 CONCENTRATION: 36 %
LEUKOCYTE ESTERASE UR QL STRIP: NEGATIVE
LYMPHOCYTES NFR BLD AUTO: 3.6 K/UL (ref 0.8–4.8)
LYMPHOCYTES NFR BLD AUTO: 35.8 % (ref 20–44)
MCHC RBC AUTO-ENTMCNC: 30 G/DL (ref 31–36)
MCV RBC AUTO: 108 FL (ref 82–100)
MONOCYTES NFR BLD AUTO: 1.4 K/UL (ref 0.1–1.3)
MONOCYTES NFR BLD AUTO: 14.3 % (ref 2–12)
NEUTROPHILS # BLD AUTO: 4.8 K/UL (ref 1.8–8.9)
NEUTROPHILS NFR BLD AUTO: 48.4 % (ref 43–81)
NITRITE UR QL STRIP: NEGATIVE
PCO2 TEMP ADJ BLDA: 47.7 MMHG (ref 35–45)
PH TEMP ADJ BLDA: 7.41 [PH] (ref 7.35–7.45)
PH UR STRIP: 5.5 [PH] (ref 5–8)
PLATELET # BLD AUTO: 174 K/UL (ref 150–450)
PO2 TEMP ADJ BLDA: 76.2 MMHG (ref 75–100)
POTASSIUM SERPL-SCNC: 5.9 MMOL/L (ref 3.5–5.1)
PROT SERPL-MCNC: 6.4 G/DL (ref 6.4–8.2)
PROT UR QL STRIP: (no result) MG/DL
RBC # BLD AUTO: 3.76 MIL/UL (ref 4–5.2)
SODIUM SERPL-SCNC: 143 MMOL/L (ref 136–145)
UROBILINOGEN UR STRIP-MCNC: 0.2 EU/DL
VENTILATION MODE VENT: (no result)
WBC #/AREA URNS HPF: (no result) /HPF (ref 0–3)
WBC NRBC COR # BLD AUTO: 9.9 K/UL (ref 4.3–11)

## 2022-04-16 PROCEDURE — 02HV33Z INSERTION OF INFUSION DEVICE INTO SUPERIOR VENA CAVA, PERCUTANEOUS APPROACH: ICD-10-PCS | Performed by: NURSE PRACTITIONER

## 2022-04-16 PROCEDURE — B548ZZA ULTRASONOGRAPHY OF SUPERIOR VENA CAVA, GUIDANCE: ICD-10-PCS | Performed by: NURSE PRACTITIONER

## 2022-04-16 PROCEDURE — A6253 ABSORPT DRG > 48 SQ IN W/O B: HCPCS

## 2022-04-16 PROCEDURE — A6403 STERILE GAUZE>16 <= 48 SQ IN: HCPCS

## 2022-04-16 PROCEDURE — G0378 HOSPITAL OBSERVATION PER HR: HCPCS

## 2022-04-16 PROCEDURE — C9803 HOPD COVID-19 SPEC COLLECT: HCPCS

## 2022-04-16 RX ADMIN — SODIUM CHLORIDE PRN MLS/HR: 9 INJECTION, SOLUTION INTRAVENOUS at 16:44

## 2022-04-16 RX ADMIN — DEXTROSE MONOHYDRATE SCH MLS/HR: 50 INJECTION, SOLUTION INTRAVENOUS at 21:36

## 2022-04-16 RX ADMIN — PIPERACILLIN SODIUM AND TAZOBACTAM SODIUM SCH MLS/HR: .375; 3 INJECTION, POWDER, LYOPHILIZED, FOR SOLUTION INTRAVENOUS at 16:02

## 2022-04-16 RX ADMIN — ENOXAPARIN SODIUM SCH MG: 40 INJECTION SUBCUTANEOUS at 11:24

## 2022-04-16 RX ADMIN — PIPERACILLIN SODIUM AND TAZOBACTAM SODIUM SCH MLS/HR: .375; 3 INJECTION, POWDER, LYOPHILIZED, FOR SOLUTION INTRAVENOUS at 23:52

## 2022-04-16 RX ADMIN — SODIUM CHLORIDE PRN MLS/HR: 9 INJECTION, SOLUTION INTRAVENOUS at 16:50

## 2022-04-16 RX ADMIN — INSULIN HUMAN PRN UNITS: 100 INJECTION, SOLUTION PARENTERAL at 23:50

## 2022-04-16 RX ADMIN — SODIUM CHLORIDE PRN MLS/HR: 9 INJECTION, SOLUTION INTRAVENOUS at 10:49

## 2022-04-16 RX ADMIN — Medication SCH EACH: at 23:48

## 2022-04-16 RX ADMIN — VALPROIC ACID SCH MG: 250 SOLUTION ORAL at 18:58

## 2022-04-16 RX ADMIN — ACETAMINOPHEN PRN MG: 160 SOLUTION ORAL at 14:49

## 2022-04-16 RX ADMIN — FENOFIBRATE SCH MG: 145 TABLET ORAL at 18:58

## 2022-04-16 RX ADMIN — CLOTRIMAZOLE SCH GM: 1 CREAM TOPICAL at 11:53

## 2022-04-16 RX ADMIN — Medication SCH EACH: at 13:00

## 2022-04-16 RX ADMIN — Medication SCH EACH: at 18:23

## 2022-04-16 RX ADMIN — Medication PRN TAB: at 23:59

## 2022-04-16 RX ADMIN — CLOTRIMAZOLE SCH GM: 1 CREAM TOPICAL at 17:00

## 2022-04-16 RX ADMIN — SODIUM CHLORIDE PRN MLS/HR: 9 INJECTION, SOLUTION INTRAVENOUS at 18:17

## 2022-04-16 RX ADMIN — INSULIN HUMAN PRN UNITS: 100 INJECTION, SOLUTION PARENTERAL at 13:01

## 2022-04-17 VITALS — SYSTOLIC BLOOD PRESSURE: 108 MMHG | DIASTOLIC BLOOD PRESSURE: 59 MMHG

## 2022-04-17 VITALS — DIASTOLIC BLOOD PRESSURE: 57 MMHG | SYSTOLIC BLOOD PRESSURE: 99 MMHG

## 2022-04-17 VITALS — SYSTOLIC BLOOD PRESSURE: 105 MMHG | DIASTOLIC BLOOD PRESSURE: 62 MMHG

## 2022-04-17 VITALS — SYSTOLIC BLOOD PRESSURE: 144 MMHG | DIASTOLIC BLOOD PRESSURE: 97 MMHG

## 2022-04-17 VITALS — DIASTOLIC BLOOD PRESSURE: 59 MMHG | SYSTOLIC BLOOD PRESSURE: 95 MMHG

## 2022-04-17 VITALS — SYSTOLIC BLOOD PRESSURE: 98 MMHG | DIASTOLIC BLOOD PRESSURE: 60 MMHG

## 2022-04-17 VITALS — SYSTOLIC BLOOD PRESSURE: 92 MMHG | DIASTOLIC BLOOD PRESSURE: 43 MMHG

## 2022-04-17 VITALS — SYSTOLIC BLOOD PRESSURE: 101 MMHG | DIASTOLIC BLOOD PRESSURE: 57 MMHG

## 2022-04-17 VITALS — DIASTOLIC BLOOD PRESSURE: 68 MMHG | SYSTOLIC BLOOD PRESSURE: 114 MMHG

## 2022-04-17 VITALS — SYSTOLIC BLOOD PRESSURE: 117 MMHG | DIASTOLIC BLOOD PRESSURE: 51 MMHG

## 2022-04-17 VITALS — SYSTOLIC BLOOD PRESSURE: 90 MMHG | DIASTOLIC BLOOD PRESSURE: 55 MMHG

## 2022-04-17 VITALS — SYSTOLIC BLOOD PRESSURE: 132 MMHG | DIASTOLIC BLOOD PRESSURE: 66 MMHG

## 2022-04-17 VITALS — SYSTOLIC BLOOD PRESSURE: 104 MMHG | DIASTOLIC BLOOD PRESSURE: 60 MMHG

## 2022-04-17 VITALS — DIASTOLIC BLOOD PRESSURE: 46 MMHG | SYSTOLIC BLOOD PRESSURE: 100 MMHG

## 2022-04-17 VITALS — SYSTOLIC BLOOD PRESSURE: 99 MMHG | DIASTOLIC BLOOD PRESSURE: 56 MMHG

## 2022-04-17 VITALS — DIASTOLIC BLOOD PRESSURE: 57 MMHG | SYSTOLIC BLOOD PRESSURE: 98 MMHG

## 2022-04-17 VITALS — DIASTOLIC BLOOD PRESSURE: 67 MMHG | SYSTOLIC BLOOD PRESSURE: 110 MMHG

## 2022-04-17 VITALS — DIASTOLIC BLOOD PRESSURE: 42 MMHG | SYSTOLIC BLOOD PRESSURE: 110 MMHG

## 2022-04-17 VITALS — SYSTOLIC BLOOD PRESSURE: 106 MMHG | DIASTOLIC BLOOD PRESSURE: 64 MMHG

## 2022-04-17 VITALS — SYSTOLIC BLOOD PRESSURE: 102 MMHG | DIASTOLIC BLOOD PRESSURE: 70 MMHG

## 2022-04-17 VITALS — SYSTOLIC BLOOD PRESSURE: 87 MMHG | DIASTOLIC BLOOD PRESSURE: 53 MMHG

## 2022-04-17 VITALS — SYSTOLIC BLOOD PRESSURE: 101 MMHG | DIASTOLIC BLOOD PRESSURE: 73 MMHG

## 2022-04-17 VITALS — SYSTOLIC BLOOD PRESSURE: 72 MMHG | DIASTOLIC BLOOD PRESSURE: 36 MMHG

## 2022-04-17 VITALS — SYSTOLIC BLOOD PRESSURE: 99 MMHG | DIASTOLIC BLOOD PRESSURE: 58 MMHG

## 2022-04-17 VITALS — SYSTOLIC BLOOD PRESSURE: 106 MMHG | DIASTOLIC BLOOD PRESSURE: 20 MMHG

## 2022-04-17 VITALS — SYSTOLIC BLOOD PRESSURE: 116 MMHG | DIASTOLIC BLOOD PRESSURE: 75 MMHG

## 2022-04-17 VITALS — SYSTOLIC BLOOD PRESSURE: 107 MMHG | DIASTOLIC BLOOD PRESSURE: 65 MMHG

## 2022-04-17 VITALS — SYSTOLIC BLOOD PRESSURE: 102 MMHG | DIASTOLIC BLOOD PRESSURE: 64 MMHG

## 2022-04-17 VITALS — SYSTOLIC BLOOD PRESSURE: 119 MMHG | DIASTOLIC BLOOD PRESSURE: 54 MMHG

## 2022-04-17 VITALS — SYSTOLIC BLOOD PRESSURE: 126 MMHG | DIASTOLIC BLOOD PRESSURE: 38 MMHG

## 2022-04-17 VITALS — SYSTOLIC BLOOD PRESSURE: 113 MMHG | DIASTOLIC BLOOD PRESSURE: 67 MMHG

## 2022-04-17 VITALS — SYSTOLIC BLOOD PRESSURE: 106 MMHG | DIASTOLIC BLOOD PRESSURE: 69 MMHG

## 2022-04-17 VITALS — DIASTOLIC BLOOD PRESSURE: 69 MMHG | SYSTOLIC BLOOD PRESSURE: 109 MMHG

## 2022-04-17 VITALS — SYSTOLIC BLOOD PRESSURE: 93 MMHG | DIASTOLIC BLOOD PRESSURE: 56 MMHG

## 2022-04-17 VITALS — DIASTOLIC BLOOD PRESSURE: 61 MMHG | SYSTOLIC BLOOD PRESSURE: 99 MMHG

## 2022-04-17 VITALS — DIASTOLIC BLOOD PRESSURE: 50 MMHG | SYSTOLIC BLOOD PRESSURE: 89 MMHG

## 2022-04-17 VITALS — SYSTOLIC BLOOD PRESSURE: 95 MMHG | DIASTOLIC BLOOD PRESSURE: 58 MMHG

## 2022-04-17 VITALS — SYSTOLIC BLOOD PRESSURE: 106 MMHG | DIASTOLIC BLOOD PRESSURE: 47 MMHG

## 2022-04-17 VITALS — DIASTOLIC BLOOD PRESSURE: 71 MMHG | SYSTOLIC BLOOD PRESSURE: 99 MMHG

## 2022-04-17 VITALS — DIASTOLIC BLOOD PRESSURE: 62 MMHG | SYSTOLIC BLOOD PRESSURE: 99 MMHG

## 2022-04-17 VITALS — DIASTOLIC BLOOD PRESSURE: 51 MMHG | SYSTOLIC BLOOD PRESSURE: 127 MMHG

## 2022-04-17 VITALS — SYSTOLIC BLOOD PRESSURE: 100 MMHG | DIASTOLIC BLOOD PRESSURE: 68 MMHG

## 2022-04-17 VITALS — DIASTOLIC BLOOD PRESSURE: 50 MMHG | SYSTOLIC BLOOD PRESSURE: 118 MMHG

## 2022-04-17 VITALS — SYSTOLIC BLOOD PRESSURE: 98 MMHG | DIASTOLIC BLOOD PRESSURE: 73 MMHG

## 2022-04-17 VITALS — SYSTOLIC BLOOD PRESSURE: 97 MMHG | DIASTOLIC BLOOD PRESSURE: 54 MMHG

## 2022-04-17 VITALS — DIASTOLIC BLOOD PRESSURE: 55 MMHG | SYSTOLIC BLOOD PRESSURE: 102 MMHG

## 2022-04-17 VITALS — SYSTOLIC BLOOD PRESSURE: 97 MMHG | DIASTOLIC BLOOD PRESSURE: 59 MMHG

## 2022-04-17 VITALS — DIASTOLIC BLOOD PRESSURE: 52 MMHG | SYSTOLIC BLOOD PRESSURE: 95 MMHG

## 2022-04-17 VITALS — DIASTOLIC BLOOD PRESSURE: 70 MMHG | SYSTOLIC BLOOD PRESSURE: 125 MMHG

## 2022-04-17 VITALS — SYSTOLIC BLOOD PRESSURE: 121 MMHG | DIASTOLIC BLOOD PRESSURE: 71 MMHG

## 2022-04-17 VITALS — DIASTOLIC BLOOD PRESSURE: 57 MMHG | SYSTOLIC BLOOD PRESSURE: 101 MMHG

## 2022-04-17 VITALS — SYSTOLIC BLOOD PRESSURE: 116 MMHG | DIASTOLIC BLOOD PRESSURE: 57 MMHG

## 2022-04-17 VITALS — DIASTOLIC BLOOD PRESSURE: 61 MMHG | SYSTOLIC BLOOD PRESSURE: 100 MMHG

## 2022-04-17 VITALS — SYSTOLIC BLOOD PRESSURE: 101 MMHG | DIASTOLIC BLOOD PRESSURE: 64 MMHG

## 2022-04-17 VITALS — SYSTOLIC BLOOD PRESSURE: 101 MMHG | DIASTOLIC BLOOD PRESSURE: 62 MMHG

## 2022-04-17 VITALS — SYSTOLIC BLOOD PRESSURE: 137 MMHG | DIASTOLIC BLOOD PRESSURE: 72 MMHG

## 2022-04-17 VITALS — DIASTOLIC BLOOD PRESSURE: 72 MMHG | SYSTOLIC BLOOD PRESSURE: 97 MMHG

## 2022-04-17 VITALS — DIASTOLIC BLOOD PRESSURE: 59 MMHG | SYSTOLIC BLOOD PRESSURE: 101 MMHG

## 2022-04-17 VITALS — SYSTOLIC BLOOD PRESSURE: 92 MMHG | DIASTOLIC BLOOD PRESSURE: 63 MMHG

## 2022-04-17 VITALS — DIASTOLIC BLOOD PRESSURE: 55 MMHG | SYSTOLIC BLOOD PRESSURE: 93 MMHG

## 2022-04-17 VITALS — SYSTOLIC BLOOD PRESSURE: 138 MMHG | DIASTOLIC BLOOD PRESSURE: 74 MMHG

## 2022-04-17 VITALS — DIASTOLIC BLOOD PRESSURE: 64 MMHG | SYSTOLIC BLOOD PRESSURE: 94 MMHG

## 2022-04-17 VITALS — DIASTOLIC BLOOD PRESSURE: 57 MMHG | SYSTOLIC BLOOD PRESSURE: 118 MMHG

## 2022-04-17 VITALS — DIASTOLIC BLOOD PRESSURE: 68 MMHG | SYSTOLIC BLOOD PRESSURE: 110 MMHG

## 2022-04-17 VITALS — SYSTOLIC BLOOD PRESSURE: 105 MMHG | DIASTOLIC BLOOD PRESSURE: 58 MMHG

## 2022-04-17 VITALS — SYSTOLIC BLOOD PRESSURE: 84 MMHG | DIASTOLIC BLOOD PRESSURE: 40 MMHG

## 2022-04-17 VITALS — DIASTOLIC BLOOD PRESSURE: 62 MMHG | SYSTOLIC BLOOD PRESSURE: 114 MMHG

## 2022-04-17 VITALS — DIASTOLIC BLOOD PRESSURE: 61 MMHG | SYSTOLIC BLOOD PRESSURE: 106 MMHG

## 2022-04-17 VITALS — DIASTOLIC BLOOD PRESSURE: 48 MMHG | SYSTOLIC BLOOD PRESSURE: 99 MMHG

## 2022-04-17 VITALS — DIASTOLIC BLOOD PRESSURE: 55 MMHG | SYSTOLIC BLOOD PRESSURE: 84 MMHG

## 2022-04-17 VITALS — SYSTOLIC BLOOD PRESSURE: 109 MMHG | DIASTOLIC BLOOD PRESSURE: 58 MMHG

## 2022-04-17 VITALS — SYSTOLIC BLOOD PRESSURE: 107 MMHG | DIASTOLIC BLOOD PRESSURE: 70 MMHG

## 2022-04-17 VITALS — SYSTOLIC BLOOD PRESSURE: 105 MMHG | DIASTOLIC BLOOD PRESSURE: 74 MMHG

## 2022-04-17 VITALS — SYSTOLIC BLOOD PRESSURE: 87 MMHG | DIASTOLIC BLOOD PRESSURE: 64 MMHG

## 2022-04-17 LAB
BASOPHILS # BLD AUTO: 0.1 K/UL (ref 0–0.2)
BASOPHILS NFR BLD AUTO: 1 % (ref 0–2)
BUN SERPL-MCNC: 32 MG/DL (ref 7–18)
CALCIUM SERPL-MCNC: 7.7 MG/DL (ref 8.5–10.1)
CHLORIDE SERPL-SCNC: 118 MMOL/L (ref 98–107)
CO2 SERPL-SCNC: 24 MMOL/L (ref 21–32)
CREAT SERPL-MCNC: 0.5 MG/DL (ref 0.6–1.3)
EOSINOPHIL NFR BLD AUTO: 0.9 % (ref 0–6)
GLUCOSE SERPL-MCNC: 223 MG/DL (ref 74–106)
HCT VFR BLD AUTO: 36 % (ref 33–45)
HGB BLD-MCNC: 10.9 G/DL (ref 11.5–14.8)
LYMPHOCYTES NFR BLD AUTO: 40.6 % (ref 20–44)
LYMPHOCYTES NFR BLD AUTO: 5 K/UL (ref 0.8–4.8)
LYMPHOCYTES NFR BLD MANUAL: 37 % (ref 16–48)
MAGNESIUM SERPL-MCNC: 2.2 MG/DL (ref 1.8–2.4)
MCHC RBC AUTO-ENTMCNC: 31 G/DL (ref 31–36)
MCV RBC AUTO: 106 FL (ref 82–100)
MONOCYTES NFR BLD AUTO: 1.3 K/UL (ref 0.1–1.3)
MONOCYTES NFR BLD AUTO: 10.9 % (ref 2–12)
MONOCYTES NFR BLD MANUAL: 11 % (ref 0–11)
NEUTROPHILS # BLD AUTO: 5.7 K/UL (ref 1.8–8.9)
NEUTROPHILS NFR BLD AUTO: 46.6 % (ref 43–81)
NEUTS BAND NFR BLD MANUAL: 18 % (ref 0–5)
NEUTS SEG NFR BLD MANUAL: 34 % (ref 42–76)
PHOSPHATE SERPL-MCNC: 2.9 MG/DL (ref 2.5–4.9)
PLATELET # BLD AUTO: 154 K/UL (ref 150–450)
POTASSIUM SERPL-SCNC: 3.5 MMOL/L (ref 3.5–5.1)
RBC # BLD AUTO: 3.36 MIL/UL (ref 4–5.2)
SODIUM SERPL-SCNC: 148 MMOL/L (ref 136–145)
TSH SERPL DL<=0.005 MIU/L-ACNC: 2.08 UIU/ML (ref 0.36–3.74)
WBC NRBC COR # BLD AUTO: 12.3 K/UL (ref 4.3–11)

## 2022-04-17 RX ADMIN — CALCIUM CARBONATE-CHOLECALCIFEROL TAB 250 MG-125 UNIT SCH UDTAB: 250-125 TAB at 18:03

## 2022-04-17 RX ADMIN — Medication SCH EACH: at 09:50

## 2022-04-17 RX ADMIN — INSULIN HUMAN PRN UNITS: 100 INJECTION, SOLUTION PARENTERAL at 18:30

## 2022-04-17 RX ADMIN — CLOTRIMAZOLE SCH GM: 1 CREAM TOPICAL at 09:58

## 2022-04-17 RX ADMIN — SODIUM CHLORIDE PRN MLS/HR: 9 INJECTION, SOLUTION INTRAVENOUS at 11:29

## 2022-04-17 RX ADMIN — PIPERACILLIN SODIUM AND TAZOBACTAM SODIUM SCH MLS/HR: .375; 3 INJECTION, POWDER, LYOPHILIZED, FOR SOLUTION INTRAVENOUS at 16:10

## 2022-04-17 RX ADMIN — Medication SCH EACH: at 18:25

## 2022-04-17 RX ADMIN — PIPERACILLIN SODIUM AND TAZOBACTAM SODIUM SCH MLS/HR: .375; 3 INJECTION, POWDER, LYOPHILIZED, FOR SOLUTION INTRAVENOUS at 23:46

## 2022-04-17 RX ADMIN — Medication SCH ML: at 18:04

## 2022-04-17 RX ADMIN — PIPERACILLIN SODIUM AND TAZOBACTAM SODIUM SCH MLS/HR: .375; 3 INJECTION, POWDER, LYOPHILIZED, FOR SOLUTION INTRAVENOUS at 09:02

## 2022-04-17 RX ADMIN — SODIUM CHLORIDE PRN MLS/HR: 9 INJECTION, SOLUTION INTRAVENOUS at 03:30

## 2022-04-17 RX ADMIN — SODIUM CHLORIDE PRN MLS/HR: 9 INJECTION, SOLUTION INTRAVENOUS at 16:17

## 2022-04-17 RX ADMIN — FENOFIBRATE SCH MG: 145 TABLET ORAL at 18:05

## 2022-04-17 RX ADMIN — SIMETHICONE SCH MG: 20 SUSPENSION/ DROPS ORAL at 18:08

## 2022-04-17 RX ADMIN — DEXTROSE MONOHYDRATE SCH MLS/HR: 50 INJECTION, SOLUTION INTRAVENOUS at 21:00

## 2022-04-17 RX ADMIN — CLOTRIMAZOLE SCH GM: 1 CREAM TOPICAL at 18:18

## 2022-04-17 RX ADMIN — INSULIN HUMAN PRN UNITS: 100 INJECTION, SOLUTION PARENTERAL at 13:14

## 2022-04-17 RX ADMIN — DEXTROSE MONOHYDRATE SCH MLS/HR: 50 INJECTION, SOLUTION INTRAVENOUS at 09:57

## 2022-04-17 RX ADMIN — PANTOPRAZOLE SODIUM SCH MG: 40 GRANULE, DELAYED RELEASE ORAL at 09:54

## 2022-04-17 RX ADMIN — Medication PRN TAB: at 09:22

## 2022-04-17 RX ADMIN — Medication SCH EACH: at 06:23

## 2022-04-17 RX ADMIN — CALCIUM CARBONATE-CHOLECALCIFEROL TAB 250 MG-125 UNIT SCH UDTAB: 250-125 TAB at 09:50

## 2022-04-17 RX ADMIN — LISINOPRIL SCH MG: 5 TABLET ORAL at 09:48

## 2022-04-17 RX ADMIN — INSULIN HUMAN PRN UNITS: 100 INJECTION, SOLUTION PARENTERAL at 06:25

## 2022-04-17 RX ADMIN — VALPROIC ACID SCH MG: 250 SOLUTION ORAL at 18:05

## 2022-04-17 RX ADMIN — Medication SCH MG: at 09:54

## 2022-04-17 RX ADMIN — SODIUM CHLORIDE PRN MLS/HR: 9 INJECTION, SOLUTION INTRAVENOUS at 18:57

## 2022-04-17 RX ADMIN — VITAMIN D, TAB 1000IU (100/BT) SCH UNIT: 25 TAB at 09:53

## 2022-04-17 RX ADMIN — Medication PRN TAB: at 18:06

## 2022-04-17 RX ADMIN — Medication PRN ML: at 12:41

## 2022-04-17 RX ADMIN — SODIUM CHLORIDE PRN MLS/HR: 9 INJECTION, SOLUTION INTRAVENOUS at 05:41

## 2022-04-17 RX ADMIN — Medication SCH ML: at 09:45

## 2022-04-17 RX ADMIN — MAGNESIUM OXIDE TAB 400 MG (241.3 MG ELEMENTAL MG) SCH MG: 400 (241.3 MG) TAB at 18:03

## 2022-04-17 RX ADMIN — THERA TABS SCH UDTAB: TAB at 09:51

## 2022-04-17 RX ADMIN — MAGNESIUM OXIDE TAB 400 MG (241.3 MG ELEMENTAL MG) SCH MG: 400 (241.3 MG) TAB at 09:54

## 2022-04-17 RX ADMIN — SIMETHICONE SCH MG: 20 SUSPENSION/ DROPS ORAL at 09:57

## 2022-04-17 RX ADMIN — Medication SCH EACH: at 12:59

## 2022-04-17 RX ADMIN — SIMETHICONE SCH MG: 20 SUSPENSION/ DROPS ORAL at 13:04

## 2022-04-17 RX ADMIN — ENOXAPARIN SODIUM SCH MG: 40 INJECTION SUBCUTANEOUS at 10:48

## 2022-04-18 VITALS — DIASTOLIC BLOOD PRESSURE: 91 MMHG | SYSTOLIC BLOOD PRESSURE: 138 MMHG

## 2022-04-18 VITALS — SYSTOLIC BLOOD PRESSURE: 114 MMHG | DIASTOLIC BLOOD PRESSURE: 54 MMHG

## 2022-04-18 VITALS — SYSTOLIC BLOOD PRESSURE: 122 MMHG | DIASTOLIC BLOOD PRESSURE: 71 MMHG

## 2022-04-18 VITALS — DIASTOLIC BLOOD PRESSURE: 60 MMHG | SYSTOLIC BLOOD PRESSURE: 129 MMHG

## 2022-04-18 VITALS — DIASTOLIC BLOOD PRESSURE: 19 MMHG | SYSTOLIC BLOOD PRESSURE: 117 MMHG

## 2022-04-18 VITALS — DIASTOLIC BLOOD PRESSURE: 71 MMHG | SYSTOLIC BLOOD PRESSURE: 118 MMHG

## 2022-04-18 VITALS — SYSTOLIC BLOOD PRESSURE: 120 MMHG | DIASTOLIC BLOOD PRESSURE: 68 MMHG

## 2022-04-18 VITALS — DIASTOLIC BLOOD PRESSURE: 38 MMHG | SYSTOLIC BLOOD PRESSURE: 140 MMHG

## 2022-04-18 VITALS — DIASTOLIC BLOOD PRESSURE: 63 MMHG | SYSTOLIC BLOOD PRESSURE: 123 MMHG

## 2022-04-18 VITALS — SYSTOLIC BLOOD PRESSURE: 116 MMHG | DIASTOLIC BLOOD PRESSURE: 73 MMHG

## 2022-04-18 VITALS — DIASTOLIC BLOOD PRESSURE: 52 MMHG | SYSTOLIC BLOOD PRESSURE: 115 MMHG

## 2022-04-18 VITALS — DIASTOLIC BLOOD PRESSURE: 19 MMHG | SYSTOLIC BLOOD PRESSURE: 64 MMHG

## 2022-04-18 VITALS — DIASTOLIC BLOOD PRESSURE: 68 MMHG | SYSTOLIC BLOOD PRESSURE: 119 MMHG

## 2022-04-18 VITALS — DIASTOLIC BLOOD PRESSURE: 52 MMHG | SYSTOLIC BLOOD PRESSURE: 88 MMHG

## 2022-04-18 VITALS — DIASTOLIC BLOOD PRESSURE: 56 MMHG | SYSTOLIC BLOOD PRESSURE: 99 MMHG

## 2022-04-18 VITALS — DIASTOLIC BLOOD PRESSURE: 68 MMHG | SYSTOLIC BLOOD PRESSURE: 106 MMHG

## 2022-04-18 VITALS — SYSTOLIC BLOOD PRESSURE: 93 MMHG | DIASTOLIC BLOOD PRESSURE: 37 MMHG

## 2022-04-18 VITALS — SYSTOLIC BLOOD PRESSURE: 115 MMHG | DIASTOLIC BLOOD PRESSURE: 66 MMHG

## 2022-04-18 VITALS — DIASTOLIC BLOOD PRESSURE: 73 MMHG | SYSTOLIC BLOOD PRESSURE: 125 MMHG

## 2022-04-18 VITALS — SYSTOLIC BLOOD PRESSURE: 136 MMHG | DIASTOLIC BLOOD PRESSURE: 80 MMHG

## 2022-04-18 VITALS — SYSTOLIC BLOOD PRESSURE: 128 MMHG | DIASTOLIC BLOOD PRESSURE: 75 MMHG

## 2022-04-18 VITALS — SYSTOLIC BLOOD PRESSURE: 111 MMHG | DIASTOLIC BLOOD PRESSURE: 71 MMHG

## 2022-04-18 VITALS — SYSTOLIC BLOOD PRESSURE: 117 MMHG | DIASTOLIC BLOOD PRESSURE: 64 MMHG

## 2022-04-18 VITALS — DIASTOLIC BLOOD PRESSURE: 72 MMHG | SYSTOLIC BLOOD PRESSURE: 125 MMHG

## 2022-04-18 VITALS — SYSTOLIC BLOOD PRESSURE: 129 MMHG | DIASTOLIC BLOOD PRESSURE: 69 MMHG

## 2022-04-18 VITALS — DIASTOLIC BLOOD PRESSURE: 67 MMHG | SYSTOLIC BLOOD PRESSURE: 108 MMHG

## 2022-04-18 VITALS — SYSTOLIC BLOOD PRESSURE: 143 MMHG | DIASTOLIC BLOOD PRESSURE: 68 MMHG

## 2022-04-18 VITALS — DIASTOLIC BLOOD PRESSURE: 20 MMHG | SYSTOLIC BLOOD PRESSURE: 119 MMHG

## 2022-04-18 VITALS — SYSTOLIC BLOOD PRESSURE: 115 MMHG | DIASTOLIC BLOOD PRESSURE: 63 MMHG

## 2022-04-18 VITALS — SYSTOLIC BLOOD PRESSURE: 118 MMHG | DIASTOLIC BLOOD PRESSURE: 63 MMHG

## 2022-04-18 VITALS — SYSTOLIC BLOOD PRESSURE: 123 MMHG | DIASTOLIC BLOOD PRESSURE: 71 MMHG

## 2022-04-18 VITALS — SYSTOLIC BLOOD PRESSURE: 111 MMHG | DIASTOLIC BLOOD PRESSURE: 68 MMHG

## 2022-04-18 VITALS — DIASTOLIC BLOOD PRESSURE: 82 MMHG | SYSTOLIC BLOOD PRESSURE: 138 MMHG

## 2022-04-18 VITALS — DIASTOLIC BLOOD PRESSURE: 119 MMHG | SYSTOLIC BLOOD PRESSURE: 137 MMHG

## 2022-04-18 VITALS — SYSTOLIC BLOOD PRESSURE: 116 MMHG | DIASTOLIC BLOOD PRESSURE: 81 MMHG

## 2022-04-18 VITALS — DIASTOLIC BLOOD PRESSURE: 68 MMHG | SYSTOLIC BLOOD PRESSURE: 113 MMHG

## 2022-04-18 VITALS — SYSTOLIC BLOOD PRESSURE: 138 MMHG | DIASTOLIC BLOOD PRESSURE: 67 MMHG

## 2022-04-18 VITALS — DIASTOLIC BLOOD PRESSURE: 39 MMHG | SYSTOLIC BLOOD PRESSURE: 121 MMHG

## 2022-04-18 VITALS — DIASTOLIC BLOOD PRESSURE: 69 MMHG | SYSTOLIC BLOOD PRESSURE: 117 MMHG

## 2022-04-18 VITALS — DIASTOLIC BLOOD PRESSURE: 36 MMHG | SYSTOLIC BLOOD PRESSURE: 123 MMHG

## 2022-04-18 VITALS — SYSTOLIC BLOOD PRESSURE: 121 MMHG | DIASTOLIC BLOOD PRESSURE: 67 MMHG

## 2022-04-18 VITALS — DIASTOLIC BLOOD PRESSURE: 64 MMHG | SYSTOLIC BLOOD PRESSURE: 153 MMHG

## 2022-04-18 VITALS — DIASTOLIC BLOOD PRESSURE: 77 MMHG | SYSTOLIC BLOOD PRESSURE: 121 MMHG

## 2022-04-18 VITALS — DIASTOLIC BLOOD PRESSURE: 116 MMHG | SYSTOLIC BLOOD PRESSURE: 165 MMHG

## 2022-04-18 VITALS — SYSTOLIC BLOOD PRESSURE: 117 MMHG | DIASTOLIC BLOOD PRESSURE: 81 MMHG

## 2022-04-18 VITALS — DIASTOLIC BLOOD PRESSURE: 69 MMHG | SYSTOLIC BLOOD PRESSURE: 120 MMHG

## 2022-04-18 VITALS — DIASTOLIC BLOOD PRESSURE: 65 MMHG | SYSTOLIC BLOOD PRESSURE: 104 MMHG

## 2022-04-18 VITALS — DIASTOLIC BLOOD PRESSURE: 77 MMHG | SYSTOLIC BLOOD PRESSURE: 124 MMHG

## 2022-04-18 VITALS — SYSTOLIC BLOOD PRESSURE: 127 MMHG | DIASTOLIC BLOOD PRESSURE: 71 MMHG

## 2022-04-18 VITALS — DIASTOLIC BLOOD PRESSURE: 59 MMHG | SYSTOLIC BLOOD PRESSURE: 134 MMHG

## 2022-04-18 VITALS — SYSTOLIC BLOOD PRESSURE: 108 MMHG | DIASTOLIC BLOOD PRESSURE: 65 MMHG

## 2022-04-18 VITALS — SYSTOLIC BLOOD PRESSURE: 129 MMHG | DIASTOLIC BLOOD PRESSURE: 73 MMHG

## 2022-04-18 VITALS — SYSTOLIC BLOOD PRESSURE: 122 MMHG | DIASTOLIC BLOOD PRESSURE: 97 MMHG

## 2022-04-18 VITALS — DIASTOLIC BLOOD PRESSURE: 81 MMHG | SYSTOLIC BLOOD PRESSURE: 123 MMHG

## 2022-04-18 VITALS — DIASTOLIC BLOOD PRESSURE: 85 MMHG | SYSTOLIC BLOOD PRESSURE: 137 MMHG

## 2022-04-18 VITALS — SYSTOLIC BLOOD PRESSURE: 98 MMHG | DIASTOLIC BLOOD PRESSURE: 72 MMHG

## 2022-04-18 VITALS — SYSTOLIC BLOOD PRESSURE: 126 MMHG | DIASTOLIC BLOOD PRESSURE: 74 MMHG

## 2022-04-18 VITALS — DIASTOLIC BLOOD PRESSURE: 63 MMHG | SYSTOLIC BLOOD PRESSURE: 121 MMHG

## 2022-04-18 VITALS — SYSTOLIC BLOOD PRESSURE: 131 MMHG | DIASTOLIC BLOOD PRESSURE: 69 MMHG

## 2022-04-18 VITALS — DIASTOLIC BLOOD PRESSURE: 78 MMHG | SYSTOLIC BLOOD PRESSURE: 119 MMHG

## 2022-04-18 VITALS — SYSTOLIC BLOOD PRESSURE: 127 MMHG | DIASTOLIC BLOOD PRESSURE: 76 MMHG

## 2022-04-18 VITALS — SYSTOLIC BLOOD PRESSURE: 117 MMHG | DIASTOLIC BLOOD PRESSURE: 61 MMHG

## 2022-04-18 VITALS — SYSTOLIC BLOOD PRESSURE: 137 MMHG | DIASTOLIC BLOOD PRESSURE: 75 MMHG

## 2022-04-18 VITALS — DIASTOLIC BLOOD PRESSURE: 69 MMHG | SYSTOLIC BLOOD PRESSURE: 116 MMHG

## 2022-04-18 VITALS — SYSTOLIC BLOOD PRESSURE: 152 MMHG | DIASTOLIC BLOOD PRESSURE: 32 MMHG

## 2022-04-18 VITALS — SYSTOLIC BLOOD PRESSURE: 118 MMHG | DIASTOLIC BLOOD PRESSURE: 94 MMHG

## 2022-04-18 VITALS — DIASTOLIC BLOOD PRESSURE: 74 MMHG | SYSTOLIC BLOOD PRESSURE: 121 MMHG

## 2022-04-18 VITALS — SYSTOLIC BLOOD PRESSURE: 122 MMHG | DIASTOLIC BLOOD PRESSURE: 64 MMHG

## 2022-04-18 VITALS — SYSTOLIC BLOOD PRESSURE: 122 MMHG | DIASTOLIC BLOOD PRESSURE: 79 MMHG

## 2022-04-18 VITALS — SYSTOLIC BLOOD PRESSURE: 131 MMHG | DIASTOLIC BLOOD PRESSURE: 62 MMHG

## 2022-04-18 VITALS — SYSTOLIC BLOOD PRESSURE: 142 MMHG | DIASTOLIC BLOOD PRESSURE: 82 MMHG

## 2022-04-18 VITALS — SYSTOLIC BLOOD PRESSURE: 156 MMHG | DIASTOLIC BLOOD PRESSURE: 67 MMHG

## 2022-04-18 VITALS — DIASTOLIC BLOOD PRESSURE: 52 MMHG | SYSTOLIC BLOOD PRESSURE: 125 MMHG

## 2022-04-18 VITALS — SYSTOLIC BLOOD PRESSURE: 125 MMHG | DIASTOLIC BLOOD PRESSURE: 68 MMHG

## 2022-04-18 VITALS — SYSTOLIC BLOOD PRESSURE: 144 MMHG | DIASTOLIC BLOOD PRESSURE: 74 MMHG

## 2022-04-18 VITALS — SYSTOLIC BLOOD PRESSURE: 123 MMHG | DIASTOLIC BLOOD PRESSURE: 63 MMHG

## 2022-04-18 VITALS — SYSTOLIC BLOOD PRESSURE: 127 MMHG | DIASTOLIC BLOOD PRESSURE: 72 MMHG

## 2022-04-18 VITALS — DIASTOLIC BLOOD PRESSURE: 73 MMHG | SYSTOLIC BLOOD PRESSURE: 141 MMHG

## 2022-04-18 VITALS — SYSTOLIC BLOOD PRESSURE: 118 MMHG | DIASTOLIC BLOOD PRESSURE: 65 MMHG

## 2022-04-18 VITALS — DIASTOLIC BLOOD PRESSURE: 68 MMHG | SYSTOLIC BLOOD PRESSURE: 118 MMHG

## 2022-04-18 VITALS — SYSTOLIC BLOOD PRESSURE: 141 MMHG | DIASTOLIC BLOOD PRESSURE: 53 MMHG

## 2022-04-18 VITALS — DIASTOLIC BLOOD PRESSURE: 64 MMHG | SYSTOLIC BLOOD PRESSURE: 106 MMHG

## 2022-04-18 VITALS — DIASTOLIC BLOOD PRESSURE: 42 MMHG | SYSTOLIC BLOOD PRESSURE: 122 MMHG

## 2022-04-18 VITALS — DIASTOLIC BLOOD PRESSURE: 32 MMHG | SYSTOLIC BLOOD PRESSURE: 152 MMHG

## 2022-04-18 VITALS — SYSTOLIC BLOOD PRESSURE: 117 MMHG | DIASTOLIC BLOOD PRESSURE: 67 MMHG

## 2022-04-18 VITALS — DIASTOLIC BLOOD PRESSURE: 56 MMHG | SYSTOLIC BLOOD PRESSURE: 114 MMHG

## 2022-04-18 VITALS — DIASTOLIC BLOOD PRESSURE: 68 MMHG | SYSTOLIC BLOOD PRESSURE: 108 MMHG

## 2022-04-18 VITALS — SYSTOLIC BLOOD PRESSURE: 116 MMHG | DIASTOLIC BLOOD PRESSURE: 76 MMHG

## 2022-04-18 VITALS — DIASTOLIC BLOOD PRESSURE: 82 MMHG | SYSTOLIC BLOOD PRESSURE: 136 MMHG

## 2022-04-18 VITALS — SYSTOLIC BLOOD PRESSURE: 119 MMHG | DIASTOLIC BLOOD PRESSURE: 63 MMHG

## 2022-04-18 VITALS — DIASTOLIC BLOOD PRESSURE: 75 MMHG | SYSTOLIC BLOOD PRESSURE: 119 MMHG

## 2022-04-18 VITALS — DIASTOLIC BLOOD PRESSURE: 66 MMHG | SYSTOLIC BLOOD PRESSURE: 109 MMHG

## 2022-04-18 VITALS — DIASTOLIC BLOOD PRESSURE: 60 MMHG | SYSTOLIC BLOOD PRESSURE: 111 MMHG

## 2022-04-18 VITALS — DIASTOLIC BLOOD PRESSURE: 74 MMHG | SYSTOLIC BLOOD PRESSURE: 125 MMHG

## 2022-04-18 LAB
BASOPHILS # BLD AUTO: 0 K/UL (ref 0–0.2)
BASOPHILS NFR BLD AUTO: 0.3 % (ref 0–2)
BUN SERPL-MCNC: 14 MG/DL (ref 7–18)
CALCIUM SERPL-MCNC: 8.4 MG/DL (ref 8.5–10.1)
CHLORIDE SERPL-SCNC: 120 MMOL/L (ref 98–107)
CO2 SERPL-SCNC: 27 MMOL/L (ref 21–32)
CREAT SERPL-MCNC: 0.4 MG/DL (ref 0.6–1.3)
EOSINOPHIL NFR BLD AUTO: 1.8 % (ref 0–6)
EOSINOPHIL NFR BLD MANUAL: 1 % (ref 0–4)
GLUCOSE SERPL-MCNC: 229 MG/DL (ref 74–106)
HCT VFR BLD AUTO: 33 % (ref 33–45)
HGB BLD-MCNC: 10 G/DL (ref 11.5–14.8)
LYMPHOCYTES NFR BLD AUTO: 2.2 K/UL (ref 0.8–4.8)
LYMPHOCYTES NFR BLD AUTO: 26.5 % (ref 20–44)
LYMPHOCYTES NFR BLD MANUAL: 22 % (ref 16–48)
MAGNESIUM SERPL-MCNC: 2.1 MG/DL (ref 1.8–2.4)
MCHC RBC AUTO-ENTMCNC: 31 G/DL (ref 31–36)
MCV RBC AUTO: 107 FL (ref 82–100)
MONOCYTES NFR BLD AUTO: 0.9 K/UL (ref 0.1–1.3)
MONOCYTES NFR BLD AUTO: 11.2 % (ref 2–12)
MONOCYTES NFR BLD MANUAL: 7 % (ref 0–11)
NEUTROPHILS # BLD AUTO: 5 K/UL (ref 1.8–8.9)
NEUTROPHILS NFR BLD AUTO: 60.2 % (ref 43–81)
NEUTS BAND NFR BLD MANUAL: 41 % (ref 0–5)
NEUTS SEG NFR BLD MANUAL: 29 % (ref 42–76)
PHOSPHATE SERPL-MCNC: 2 MG/DL (ref 2.5–4.9)
PLATELET # BLD AUTO: 134 K/UL (ref 150–450)
POTASSIUM SERPL-SCNC: 3.5 MMOL/L (ref 3.5–5.1)
RBC # BLD AUTO: 3.05 MIL/UL (ref 4–5.2)
SODIUM SERPL-SCNC: 149 MMOL/L (ref 136–145)
WBC NRBC COR # BLD AUTO: 8.3 K/UL (ref 4.3–11)

## 2022-04-18 RX ADMIN — Medication SCH EACH: at 11:52

## 2022-04-18 RX ADMIN — Medication SCH EACH: at 00:24

## 2022-04-18 RX ADMIN — PIPERACILLIN SODIUM AND TAZOBACTAM SODIUM SCH MLS/HR: .375; 3 INJECTION, POWDER, LYOPHILIZED, FOR SOLUTION INTRAVENOUS at 23:06

## 2022-04-18 RX ADMIN — INSULIN HUMAN PRN UNITS: 100 INJECTION, SOLUTION PARENTERAL at 18:01

## 2022-04-18 RX ADMIN — INSULIN HUMAN PRN UNITS: 100 INJECTION, SOLUTION PARENTERAL at 23:11

## 2022-04-18 RX ADMIN — CLOTRIMAZOLE SCH GM: 1 CREAM TOPICAL at 17:00

## 2022-04-18 RX ADMIN — LISINOPRIL SCH MG: 5 TABLET ORAL at 09:00

## 2022-04-18 RX ADMIN — VITAMIN D, TAB 1000IU (100/BT) SCH UNIT: 25 TAB at 08:58

## 2022-04-18 RX ADMIN — PIPERACILLIN SODIUM AND TAZOBACTAM SODIUM SCH MLS/HR: .375; 3 INJECTION, POWDER, LYOPHILIZED, FOR SOLUTION INTRAVENOUS at 08:56

## 2022-04-18 RX ADMIN — DEXTROSE MONOHYDRATE SCH MLS/HR: 50 INJECTION, SOLUTION INTRAVENOUS at 09:08

## 2022-04-18 RX ADMIN — Medication SCH ML: at 17:45

## 2022-04-18 RX ADMIN — DEXTROSE AND SODIUM CHLORIDE PRN MLS/HR: 5; 450 INJECTION, SOLUTION INTRAVENOUS at 09:44

## 2022-04-18 RX ADMIN — INSULIN HUMAN PRN UNITS: 100 INJECTION, SOLUTION PARENTERAL at 00:18

## 2022-04-18 RX ADMIN — Medication SCH ML: at 09:08

## 2022-04-18 RX ADMIN — SODIUM CHLORIDE PRN MLS/HR: 9 INJECTION, SOLUTION INTRAVENOUS at 11:58

## 2022-04-18 RX ADMIN — ACETAMINOPHEN PRN MG: 160 SOLUTION ORAL at 18:45

## 2022-04-18 RX ADMIN — Medication SCH EACH: at 17:46

## 2022-04-18 RX ADMIN — FENOFIBRATE SCH MG: 145 TABLET ORAL at 17:44

## 2022-04-18 RX ADMIN — PANTOPRAZOLE SODIUM SCH MG: 40 GRANULE, DELAYED RELEASE ORAL at 09:01

## 2022-04-18 RX ADMIN — MAGNESIUM OXIDE TAB 400 MG (241.3 MG ELEMENTAL MG) SCH MG: 400 (241.3 MG) TAB at 09:00

## 2022-04-18 RX ADMIN — PIPERACILLIN SODIUM AND TAZOBACTAM SODIUM SCH MLS/HR: .375; 3 INJECTION, POWDER, LYOPHILIZED, FOR SOLUTION INTRAVENOUS at 17:43

## 2022-04-18 RX ADMIN — Medication SCH EACH: at 23:06

## 2022-04-18 RX ADMIN — CLOTRIMAZOLE SCH GM: 1 CREAM TOPICAL at 09:00

## 2022-04-18 RX ADMIN — CLOTRIMAZOLE SCH GM: 1 CREAM TOPICAL at 17:59

## 2022-04-18 RX ADMIN — ACETAMINOPHEN PRN MG: 160 SOLUTION ORAL at 08:58

## 2022-04-18 RX ADMIN — CLOTRIMAZOLE SCH GM: 1 CREAM TOPICAL at 09:03

## 2022-04-18 RX ADMIN — SIMETHICONE SCH MG: 20 SUSPENSION/ DROPS ORAL at 17:44

## 2022-04-18 RX ADMIN — Medication SCH EACH: at 06:25

## 2022-04-18 RX ADMIN — MAGNESIUM OXIDE TAB 400 MG (241.3 MG ELEMENTAL MG) SCH MG: 400 (241.3 MG) TAB at 17:44

## 2022-04-18 RX ADMIN — DEXTROSE MONOHYDRATE SCH MLS/HR: 50 INJECTION, SOLUTION INTRAVENOUS at 21:15

## 2022-04-18 RX ADMIN — INSULIN HUMAN PRN UNITS: 100 INJECTION, SOLUTION PARENTERAL at 11:56

## 2022-04-18 RX ADMIN — SIMETHICONE SCH MG: 20 SUSPENSION/ DROPS ORAL at 14:11

## 2022-04-18 RX ADMIN — DEXTROSE AND SODIUM CHLORIDE PRN MLS/HR: 5; 450 INJECTION, SOLUTION INTRAVENOUS at 20:26

## 2022-04-18 RX ADMIN — VALPROIC ACID SCH MG: 250 SOLUTION ORAL at 17:43

## 2022-04-18 RX ADMIN — INSULIN HUMAN PRN UNITS: 100 INJECTION, SOLUTION PARENTERAL at 06:25

## 2022-04-18 RX ADMIN — SODIUM CHLORIDE PRN MLS/HR: 9 INJECTION, SOLUTION INTRAVENOUS at 04:22

## 2022-04-18 RX ADMIN — Medication SCH MG: at 08:59

## 2022-04-18 RX ADMIN — Medication SCH EACH: at 08:59

## 2022-04-18 RX ADMIN — SIMETHICONE SCH MG: 20 SUSPENSION/ DROPS ORAL at 08:58

## 2022-04-18 RX ADMIN — THERA TABS SCH UDTAB: TAB at 09:00

## 2022-04-18 RX ADMIN — CALCIUM CARBONATE-CHOLECALCIFEROL TAB 250 MG-125 UNIT SCH UDTAB: 250-125 TAB at 17:45

## 2022-04-18 RX ADMIN — CALCIUM CARBONATE-CHOLECALCIFEROL TAB 250 MG-125 UNIT SCH UDTAB: 250-125 TAB at 08:59

## 2022-04-18 RX ADMIN — ENOXAPARIN SODIUM SCH MG: 40 INJECTION SUBCUTANEOUS at 09:18

## 2022-04-19 VITALS — SYSTOLIC BLOOD PRESSURE: 110 MMHG | DIASTOLIC BLOOD PRESSURE: 48 MMHG

## 2022-04-19 VITALS — SYSTOLIC BLOOD PRESSURE: 113 MMHG | DIASTOLIC BLOOD PRESSURE: 86 MMHG

## 2022-04-19 VITALS — SYSTOLIC BLOOD PRESSURE: 127 MMHG | DIASTOLIC BLOOD PRESSURE: 75 MMHG

## 2022-04-19 VITALS — DIASTOLIC BLOOD PRESSURE: 71 MMHG | SYSTOLIC BLOOD PRESSURE: 118 MMHG

## 2022-04-19 VITALS — DIASTOLIC BLOOD PRESSURE: 71 MMHG | SYSTOLIC BLOOD PRESSURE: 125 MMHG

## 2022-04-19 VITALS — DIASTOLIC BLOOD PRESSURE: 73 MMHG | SYSTOLIC BLOOD PRESSURE: 104 MMHG

## 2022-04-19 VITALS — DIASTOLIC BLOOD PRESSURE: 94 MMHG | SYSTOLIC BLOOD PRESSURE: 128 MMHG

## 2022-04-19 VITALS — SYSTOLIC BLOOD PRESSURE: 119 MMHG | DIASTOLIC BLOOD PRESSURE: 67 MMHG

## 2022-04-19 VITALS — SYSTOLIC BLOOD PRESSURE: 147 MMHG | DIASTOLIC BLOOD PRESSURE: 39 MMHG

## 2022-04-19 VITALS — DIASTOLIC BLOOD PRESSURE: 78 MMHG | SYSTOLIC BLOOD PRESSURE: 133 MMHG

## 2022-04-19 VITALS — SYSTOLIC BLOOD PRESSURE: 128 MMHG | DIASTOLIC BLOOD PRESSURE: 72 MMHG

## 2022-04-19 VITALS — SYSTOLIC BLOOD PRESSURE: 121 MMHG | DIASTOLIC BLOOD PRESSURE: 18 MMHG

## 2022-04-19 VITALS — DIASTOLIC BLOOD PRESSURE: 17 MMHG | SYSTOLIC BLOOD PRESSURE: 117 MMHG

## 2022-04-19 VITALS — SYSTOLIC BLOOD PRESSURE: 125 MMHG | DIASTOLIC BLOOD PRESSURE: 72 MMHG

## 2022-04-19 VITALS — SYSTOLIC BLOOD PRESSURE: 107 MMHG | DIASTOLIC BLOOD PRESSURE: 66 MMHG

## 2022-04-19 VITALS — SYSTOLIC BLOOD PRESSURE: 124 MMHG | DIASTOLIC BLOOD PRESSURE: 76 MMHG

## 2022-04-19 VITALS — DIASTOLIC BLOOD PRESSURE: 60 MMHG | SYSTOLIC BLOOD PRESSURE: 138 MMHG

## 2022-04-19 VITALS — SYSTOLIC BLOOD PRESSURE: 123 MMHG | DIASTOLIC BLOOD PRESSURE: 57 MMHG

## 2022-04-19 VITALS — DIASTOLIC BLOOD PRESSURE: 39 MMHG | SYSTOLIC BLOOD PRESSURE: 123 MMHG

## 2022-04-19 VITALS — DIASTOLIC BLOOD PRESSURE: 81 MMHG | SYSTOLIC BLOOD PRESSURE: 121 MMHG

## 2022-04-19 VITALS — SYSTOLIC BLOOD PRESSURE: 153 MMHG | DIASTOLIC BLOOD PRESSURE: 74 MMHG

## 2022-04-19 VITALS — DIASTOLIC BLOOD PRESSURE: 62 MMHG | SYSTOLIC BLOOD PRESSURE: 135 MMHG

## 2022-04-19 VITALS — DIASTOLIC BLOOD PRESSURE: 66 MMHG | SYSTOLIC BLOOD PRESSURE: 149 MMHG

## 2022-04-19 VITALS — DIASTOLIC BLOOD PRESSURE: 69 MMHG | SYSTOLIC BLOOD PRESSURE: 109 MMHG

## 2022-04-19 VITALS — DIASTOLIC BLOOD PRESSURE: 68 MMHG | SYSTOLIC BLOOD PRESSURE: 129 MMHG

## 2022-04-19 VITALS — DIASTOLIC BLOOD PRESSURE: 87 MMHG | SYSTOLIC BLOOD PRESSURE: 140 MMHG

## 2022-04-19 VITALS — SYSTOLIC BLOOD PRESSURE: 111 MMHG | DIASTOLIC BLOOD PRESSURE: 69 MMHG

## 2022-04-19 VITALS — DIASTOLIC BLOOD PRESSURE: 76 MMHG | SYSTOLIC BLOOD PRESSURE: 127 MMHG

## 2022-04-19 VITALS — SYSTOLIC BLOOD PRESSURE: 118 MMHG | DIASTOLIC BLOOD PRESSURE: 40 MMHG

## 2022-04-19 VITALS — DIASTOLIC BLOOD PRESSURE: 90 MMHG | SYSTOLIC BLOOD PRESSURE: 137 MMHG

## 2022-04-19 VITALS — DIASTOLIC BLOOD PRESSURE: 69 MMHG | SYSTOLIC BLOOD PRESSURE: 111 MMHG

## 2022-04-19 VITALS — DIASTOLIC BLOOD PRESSURE: 97 MMHG | SYSTOLIC BLOOD PRESSURE: 140 MMHG

## 2022-04-19 VITALS — SYSTOLIC BLOOD PRESSURE: 149 MMHG | DIASTOLIC BLOOD PRESSURE: 21 MMHG

## 2022-04-19 VITALS — DIASTOLIC BLOOD PRESSURE: 47 MMHG | SYSTOLIC BLOOD PRESSURE: 88 MMHG

## 2022-04-19 VITALS — DIASTOLIC BLOOD PRESSURE: 82 MMHG | SYSTOLIC BLOOD PRESSURE: 117 MMHG

## 2022-04-19 VITALS — DIASTOLIC BLOOD PRESSURE: 68 MMHG | SYSTOLIC BLOOD PRESSURE: 120 MMHG

## 2022-04-19 VITALS — SYSTOLIC BLOOD PRESSURE: 138 MMHG | DIASTOLIC BLOOD PRESSURE: 80 MMHG

## 2022-04-19 LAB
BASOPHILS # BLD AUTO: 0 K/UL (ref 0–0.2)
BASOPHILS NFR BLD AUTO: 0.7 % (ref 0–2)
BUN SERPL-MCNC: 10 MG/DL (ref 7–18)
CALCIUM SERPL-MCNC: 8.8 MG/DL (ref 8.5–10.1)
CHLORIDE SERPL-SCNC: 116 MMOL/L (ref 98–107)
CO2 SERPL-SCNC: 26 MMOL/L (ref 21–32)
CREAT SERPL-MCNC: 0.4 MG/DL (ref 0.6–1.3)
EOSINOPHIL NFR BLD AUTO: 1.8 % (ref 0–6)
EOSINOPHIL NFR BLD MANUAL: 2 % (ref 0–4)
GLUCOSE SERPL-MCNC: 327 MG/DL (ref 74–106)
HCT VFR BLD AUTO: 32 % (ref 33–45)
HGB BLD-MCNC: 10.1 G/DL (ref 11.5–14.8)
LYMPHOCYTES NFR BLD AUTO: 1.5 K/UL (ref 0.8–4.8)
LYMPHOCYTES NFR BLD AUTO: 30.5 % (ref 20–44)
LYMPHOCYTES NFR BLD MANUAL: 38 % (ref 16–48)
MCHC RBC AUTO-ENTMCNC: 31 G/DL (ref 31–36)
MCV RBC AUTO: 107 FL (ref 82–100)
MONOCYTES NFR BLD AUTO: 0.7 K/UL (ref 0.1–1.3)
MONOCYTES NFR BLD AUTO: 14.3 % (ref 2–12)
MONOCYTES NFR BLD MANUAL: 10 % (ref 0–11)
NEUTROPHILS # BLD AUTO: 2.7 K/UL (ref 1.8–8.9)
NEUTROPHILS NFR BLD AUTO: 52.7 % (ref 43–81)
NEUTS BAND NFR BLD MANUAL: 9 % (ref 0–5)
NEUTS SEG NFR BLD MANUAL: 41 % (ref 42–76)
PHOSPHATE SERPL-MCNC: 2.2 MG/DL (ref 2.5–4.9)
PLATELET # BLD AUTO: 138 K/UL (ref 150–450)
POTASSIUM SERPL-SCNC: 3.3 MMOL/L (ref 3.5–5.1)
RBC # BLD AUTO: 3.03 MIL/UL (ref 4–5.2)
SODIUM SERPL-SCNC: 147 MMOL/L (ref 136–145)
WBC NRBC COR # BLD AUTO: 5.1 K/UL (ref 4.3–11)

## 2022-04-19 RX ADMIN — PIPERACILLIN SODIUM AND TAZOBACTAM SODIUM SCH MLS/HR: .375; 3 INJECTION, POWDER, LYOPHILIZED, FOR SOLUTION INTRAVENOUS at 14:01

## 2022-04-19 RX ADMIN — Medication SCH ML: at 09:26

## 2022-04-19 RX ADMIN — Medication SCH EACH: at 09:27

## 2022-04-19 RX ADMIN — CALCIUM CARBONATE-CHOLECALCIFEROL TAB 250 MG-125 UNIT SCH UDTAB: 250-125 TAB at 09:27

## 2022-04-19 RX ADMIN — DEXTROSE MONOHYDRATE SCH MLS/HR: 50 INJECTION, SOLUTION INTRAVENOUS at 20:13

## 2022-04-19 RX ADMIN — ACETAMINOPHEN PRN MG: 160 SOLUTION ORAL at 17:51

## 2022-04-19 RX ADMIN — SIMETHICONE SCH MG: 20 SUSPENSION/ DROPS ORAL at 14:00

## 2022-04-19 RX ADMIN — CLOTRIMAZOLE SCH GM: 1 CREAM TOPICAL at 09:28

## 2022-04-19 RX ADMIN — Medication PRN ML: at 16:46

## 2022-04-19 RX ADMIN — Medication SCH MG: at 09:27

## 2022-04-19 RX ADMIN — VALPROIC ACID SCH MG: 250 SOLUTION ORAL at 17:51

## 2022-04-19 RX ADMIN — CALCIUM CARBONATE-CHOLECALCIFEROL TAB 250 MG-125 UNIT SCH UDTAB: 250-125 TAB at 16:45

## 2022-04-19 RX ADMIN — MAGNESIUM OXIDE TAB 400 MG (241.3 MG ELEMENTAL MG) SCH MG: 400 (241.3 MG) TAB at 09:27

## 2022-04-19 RX ADMIN — SIMETHICONE SCH MG: 20 SUSPENSION/ DROPS ORAL at 09:26

## 2022-04-19 RX ADMIN — Medication PRN ML: at 06:12

## 2022-04-19 RX ADMIN — INSULIN HUMAN PRN UNITS: 100 INJECTION, SOLUTION PARENTERAL at 17:10

## 2022-04-19 RX ADMIN — DEXTROSE AND SODIUM CHLORIDE PRN MLS/HR: 5; 450 INJECTION, SOLUTION INTRAVENOUS at 06:13

## 2022-04-19 RX ADMIN — Medication SCH EACH: at 17:10

## 2022-04-19 RX ADMIN — FENOFIBRATE SCH MG: 145 TABLET ORAL at 17:51

## 2022-04-19 RX ADMIN — PIPERACILLIN SODIUM AND TAZOBACTAM SODIUM SCH MLS/HR: .375; 3 INJECTION, POWDER, LYOPHILIZED, FOR SOLUTION INTRAVENOUS at 08:56

## 2022-04-19 RX ADMIN — PANTOPRAZOLE SODIUM SCH MG: 40 GRANULE, DELAYED RELEASE ORAL at 09:26

## 2022-04-19 RX ADMIN — INSULIN HUMAN PRN UNITS: 100 INJECTION, SOLUTION PARENTERAL at 11:48

## 2022-04-19 RX ADMIN — CLOTRIMAZOLE SCH GM: 1 CREAM TOPICAL at 16:45

## 2022-04-19 RX ADMIN — PIPERACILLIN SODIUM AND TAZOBACTAM SODIUM SCH MLS/HR: .375; 3 INJECTION, POWDER, LYOPHILIZED, FOR SOLUTION INTRAVENOUS at 21:45

## 2022-04-19 RX ADMIN — Medication SCH ML: at 16:51

## 2022-04-19 RX ADMIN — ACETAMINOPHEN PRN MG: 160 SOLUTION ORAL at 12:06

## 2022-04-19 RX ADMIN — Medication SCH EACH: at 06:14

## 2022-04-19 RX ADMIN — CLOTRIMAZOLE SCH GM: 1 CREAM TOPICAL at 16:51

## 2022-04-19 RX ADMIN — SIMETHICONE SCH MG: 20 SUSPENSION/ DROPS ORAL at 16:52

## 2022-04-19 RX ADMIN — Medication SCH EACH: at 11:46

## 2022-04-19 RX ADMIN — THERA TABS SCH UDTAB: TAB at 09:27

## 2022-04-19 RX ADMIN — DEXTROSE MONOHYDRATE SCH MLS/HR: 50 INJECTION, SOLUTION INTRAVENOUS at 09:25

## 2022-04-19 RX ADMIN — ENOXAPARIN SODIUM SCH MG: 40 INJECTION SUBCUTANEOUS at 09:29

## 2022-04-19 RX ADMIN — INSULIN HUMAN PRN UNITS: 100 INJECTION, SOLUTION PARENTERAL at 06:27

## 2022-04-19 RX ADMIN — VITAMIN D, TAB 1000IU (100/BT) SCH UNIT: 25 TAB at 09:27

## 2022-04-19 RX ADMIN — MAGNESIUM OXIDE TAB 400 MG (241.3 MG ELEMENTAL MG) SCH MG: 400 (241.3 MG) TAB at 16:44

## 2022-04-19 RX ADMIN — LISINOPRIL SCH MG: 5 TABLET ORAL at 09:00

## 2022-04-19 RX ADMIN — VITAMIN A AND VITAMIN D SCH GM: 929.3 OINTMENT TOPICAL at 09:28

## 2022-04-20 VITALS — DIASTOLIC BLOOD PRESSURE: 83 MMHG | SYSTOLIC BLOOD PRESSURE: 130 MMHG

## 2022-04-20 VITALS — DIASTOLIC BLOOD PRESSURE: 73 MMHG | SYSTOLIC BLOOD PRESSURE: 112 MMHG

## 2022-04-20 VITALS — SYSTOLIC BLOOD PRESSURE: 134 MMHG | DIASTOLIC BLOOD PRESSURE: 79 MMHG

## 2022-04-20 VITALS — DIASTOLIC BLOOD PRESSURE: 53 MMHG | SYSTOLIC BLOOD PRESSURE: 125 MMHG

## 2022-04-20 VITALS — DIASTOLIC BLOOD PRESSURE: 77 MMHG | SYSTOLIC BLOOD PRESSURE: 136 MMHG

## 2022-04-20 LAB
BASOPHILS # BLD AUTO: 0 K/UL (ref 0–0.2)
BASOPHILS NFR BLD AUTO: 0.3 % (ref 0–2)
BUN SERPL-MCNC: 10 MG/DL (ref 7–18)
CALCIUM SERPL-MCNC: 8.3 MG/DL (ref 8.5–10.1)
CHLORIDE SERPL-SCNC: 112 MMOL/L (ref 98–107)
CO2 SERPL-SCNC: 31 MMOL/L (ref 21–32)
CREAT SERPL-MCNC: 0.4 MG/DL (ref 0.6–1.3)
EOSINOPHIL NFR BLD AUTO: 2.7 % (ref 0–6)
EOSINOPHIL NFR BLD MANUAL: 3 % (ref 0–4)
GLUCOSE SERPL-MCNC: 273 MG/DL (ref 74–106)
HCT VFR BLD AUTO: 32 % (ref 33–45)
HGB BLD-MCNC: 9.8 G/DL (ref 11.5–14.8)
LYMPHOCYTES NFR BLD AUTO: 2.6 K/UL (ref 0.8–4.8)
LYMPHOCYTES NFR BLD AUTO: 47.3 % (ref 20–44)
LYMPHOCYTES NFR BLD MANUAL: 45 % (ref 16–48)
MAGNESIUM SERPL-MCNC: 2 MG/DL (ref 1.8–2.4)
MCHC RBC AUTO-ENTMCNC: 31 G/DL (ref 31–36)
MCV RBC AUTO: 105 FL (ref 82–100)
MONOCYTES NFR BLD AUTO: 0.7 K/UL (ref 0.1–1.3)
MONOCYTES NFR BLD AUTO: 12.9 % (ref 2–12)
MONOCYTES NFR BLD MANUAL: 11 % (ref 0–11)
NEUTROPHILS # BLD AUTO: 2 K/UL (ref 1.8–8.9)
NEUTROPHILS NFR BLD AUTO: 36.8 % (ref 43–81)
NEUTS SEG NFR BLD MANUAL: 39 % (ref 42–76)
PHOSPHATE SERPL-MCNC: 2.5 MG/DL (ref 2.5–4.9)
PLATELET # BLD AUTO: 158 K/UL (ref 150–450)
POTASSIUM SERPL-SCNC: 3.8 MMOL/L (ref 3.5–5.1)
RBC # BLD AUTO: 2.99 MIL/UL (ref 4–5.2)
SODIUM SERPL-SCNC: 145 MMOL/L (ref 136–145)
VARIANT LYMPHS NFR BLD MANUAL: 2 % (ref 0–0)
WBC NRBC COR # BLD AUTO: 5.4 K/UL (ref 4.3–11)

## 2022-04-20 RX ADMIN — SIMETHICONE SCH MG: 20 SUSPENSION/ DROPS ORAL at 12:30

## 2022-04-20 RX ADMIN — FENOFIBRATE SCH MG: 145 TABLET ORAL at 17:13

## 2022-04-20 RX ADMIN — PANTOPRAZOLE SODIUM SCH MG: 40 GRANULE, DELAYED RELEASE ORAL at 08:51

## 2022-04-20 RX ADMIN — Medication SCH EACH: at 11:27

## 2022-04-20 RX ADMIN — Medication PRN TAB: at 15:14

## 2022-04-20 RX ADMIN — CLOTRIMAZOLE SCH GM: 1 CREAM TOPICAL at 09:00

## 2022-04-20 RX ADMIN — INSULIN GLARGINE SCH UNIT: 100 INJECTION, SOLUTION SUBCUTANEOUS at 00:59

## 2022-04-20 RX ADMIN — Medication SCH EACH: at 17:43

## 2022-04-20 RX ADMIN — INSULIN HUMAN PRN UNITS: 100 INJECTION, SOLUTION PARENTERAL at 05:55

## 2022-04-20 RX ADMIN — CALCIUM CARBONATE-CHOLECALCIFEROL TAB 250 MG-125 UNIT SCH UDTAB: 250-125 TAB at 08:51

## 2022-04-20 RX ADMIN — Medication PRN ML: at 18:58

## 2022-04-20 RX ADMIN — VITAMIN A AND VITAMIN D SCH GM: 929.3 OINTMENT TOPICAL at 09:05

## 2022-04-20 RX ADMIN — DEXTROSE MONOHYDRATE SCH MLS/HR: 50 INJECTION, SOLUTION INTRAVENOUS at 20:56

## 2022-04-20 RX ADMIN — CALCIUM CARBONATE-CHOLECALCIFEROL TAB 250 MG-125 UNIT SCH UDTAB: 250-125 TAB at 17:13

## 2022-04-20 RX ADMIN — PIPERACILLIN SODIUM AND TAZOBACTAM SODIUM SCH MLS/HR: .375; 3 INJECTION, POWDER, LYOPHILIZED, FOR SOLUTION INTRAVENOUS at 22:17

## 2022-04-20 RX ADMIN — MAGNESIUM OXIDE TAB 400 MG (241.3 MG ELEMENTAL MG) SCH MG: 400 (241.3 MG) TAB at 08:51

## 2022-04-20 RX ADMIN — INSULIN HUMAN PRN UNITS: 100 INJECTION, SOLUTION PARENTERAL at 01:02

## 2022-04-20 RX ADMIN — SIMETHICONE SCH MG: 20 SUSPENSION/ DROPS ORAL at 08:56

## 2022-04-20 RX ADMIN — INSULIN HUMAN PRN UNITS: 100 INJECTION, SOLUTION PARENTERAL at 11:30

## 2022-04-20 RX ADMIN — ACETAMINOPHEN PRN MG: 160 SOLUTION ORAL at 10:52

## 2022-04-20 RX ADMIN — THERA TABS SCH UDTAB: TAB at 08:51

## 2022-04-20 RX ADMIN — DEXTROSE AND SODIUM CHLORIDE PRN MLS/HR: 5; 450 INJECTION, SOLUTION INTRAVENOUS at 02:19

## 2022-04-20 RX ADMIN — VALPROIC ACID SCH MG: 250 SOLUTION ORAL at 17:14

## 2022-04-20 RX ADMIN — Medication SCH EACH: at 00:54

## 2022-04-20 RX ADMIN — INSULIN HUMAN PRN UNITS: 100 INJECTION, SOLUTION PARENTERAL at 17:45

## 2022-04-20 RX ADMIN — MAGNESIUM OXIDE TAB 400 MG (241.3 MG ELEMENTAL MG) SCH MG: 400 (241.3 MG) TAB at 17:13

## 2022-04-20 RX ADMIN — PIPERACILLIN SODIUM AND TAZOBACTAM SODIUM SCH MLS/HR: .375; 3 INJECTION, POWDER, LYOPHILIZED, FOR SOLUTION INTRAVENOUS at 05:52

## 2022-04-20 RX ADMIN — ENOXAPARIN SODIUM SCH MG: 40 INJECTION SUBCUTANEOUS at 10:14

## 2022-04-20 RX ADMIN — Medication SCH EACH: at 08:51

## 2022-04-20 RX ADMIN — CLOTRIMAZOLE SCH GM: 1 CREAM TOPICAL at 16:50

## 2022-04-20 RX ADMIN — SIMETHICONE SCH MG: 20 SUSPENSION/ DROPS ORAL at 17:13

## 2022-04-20 RX ADMIN — CLOTRIMAZOLE SCH GM: 1 CREAM TOPICAL at 08:56

## 2022-04-20 RX ADMIN — INSULIN GLARGINE SCH UNIT: 100 INJECTION, SOLUTION SUBCUTANEOUS at 21:40

## 2022-04-20 RX ADMIN — DEXTROSE AND SODIUM CHLORIDE PRN MLS/HR: 5; 450 INJECTION, SOLUTION INTRAVENOUS at 13:21

## 2022-04-20 RX ADMIN — LISINOPRIL SCH MG: 5 TABLET ORAL at 08:52

## 2022-04-20 RX ADMIN — Medication SCH EACH: at 05:51

## 2022-04-20 RX ADMIN — DEXTROSE MONOHYDRATE SCH MLS/HR: 50 INJECTION, SOLUTION INTRAVENOUS at 09:02

## 2022-04-20 RX ADMIN — PIPERACILLIN SODIUM AND TAZOBACTAM SODIUM SCH MLS/HR: .375; 3 INJECTION, POWDER, LYOPHILIZED, FOR SOLUTION INTRAVENOUS at 13:21

## 2022-04-20 RX ADMIN — VITAMIN D, TAB 1000IU (100/BT) SCH UNIT: 25 TAB at 08:51

## 2022-04-20 RX ADMIN — Medication SCH ML: at 17:14

## 2022-04-20 RX ADMIN — Medication SCH MG: at 08:51

## 2022-04-20 RX ADMIN — Medication SCH ML: at 09:01

## 2022-04-21 VITALS — SYSTOLIC BLOOD PRESSURE: 123 MMHG | DIASTOLIC BLOOD PRESSURE: 79 MMHG

## 2022-04-21 VITALS — SYSTOLIC BLOOD PRESSURE: 111 MMHG | DIASTOLIC BLOOD PRESSURE: 87 MMHG

## 2022-04-21 VITALS — DIASTOLIC BLOOD PRESSURE: 90 MMHG | SYSTOLIC BLOOD PRESSURE: 136 MMHG

## 2022-04-21 VITALS — SYSTOLIC BLOOD PRESSURE: 133 MMHG | DIASTOLIC BLOOD PRESSURE: 70 MMHG

## 2022-04-21 VITALS — DIASTOLIC BLOOD PRESSURE: 77 MMHG | SYSTOLIC BLOOD PRESSURE: 124 MMHG

## 2022-04-21 VITALS — SYSTOLIC BLOOD PRESSURE: 101 MMHG | DIASTOLIC BLOOD PRESSURE: 81 MMHG

## 2022-04-21 LAB
BASOPHILS # BLD AUTO: 0 K/UL (ref 0–0.2)
BASOPHILS NFR BLD AUTO: 0.4 % (ref 0–2)
BUN SERPL-MCNC: 14 MG/DL (ref 7–18)
CALCIUM SERPL-MCNC: 8.8 MG/DL (ref 8.5–10.1)
CHLORIDE SERPL-SCNC: 106 MMOL/L (ref 98–107)
CO2 SERPL-SCNC: 28 MMOL/L (ref 21–32)
CREAT SERPL-MCNC: 0.4 MG/DL (ref 0.6–1.3)
EOSINOPHIL NFR BLD AUTO: 2.7 % (ref 0–6)
EOSINOPHIL NFR BLD MANUAL: 6 % (ref 0–4)
GLUCOSE SERPL-MCNC: 248 MG/DL (ref 74–106)
HCT VFR BLD AUTO: 31 % (ref 33–45)
HGB BLD-MCNC: 9.8 G/DL (ref 11.5–14.8)
LYMPHOCYTES NFR BLD AUTO: 2.9 K/UL (ref 0.8–4.8)
LYMPHOCYTES NFR BLD AUTO: 38.7 % (ref 20–44)
LYMPHOCYTES NFR BLD MANUAL: 32 % (ref 16–48)
MAGNESIUM SERPL-MCNC: 1.9 MG/DL (ref 1.8–2.4)
MCHC RBC AUTO-ENTMCNC: 32 G/DL (ref 31–36)
MCV RBC AUTO: 102 FL (ref 82–100)
MONOCYTES NFR BLD AUTO: 1 K/UL (ref 0.1–1.3)
MONOCYTES NFR BLD AUTO: 13.7 % (ref 2–12)
MONOCYTES NFR BLD MANUAL: 16 % (ref 0–11)
NEUTROPHILS # BLD AUTO: 3.3 K/UL (ref 1.8–8.9)
NEUTROPHILS NFR BLD AUTO: 44.5 % (ref 43–81)
NEUTS SEG NFR BLD MANUAL: 46 % (ref 42–76)
PHOSPHATE SERPL-MCNC: 2.3 MG/DL (ref 2.5–4.9)
PLATELET # BLD AUTO: 197 K/UL (ref 150–450)
POTASSIUM SERPL-SCNC: 4.1 MMOL/L (ref 3.5–5.1)
RBC # BLD AUTO: 2.98 MIL/UL (ref 4–5.2)
SODIUM SERPL-SCNC: 141 MMOL/L (ref 136–145)
WBC NRBC COR # BLD AUTO: 7.4 K/UL (ref 4.3–11)

## 2022-04-21 RX ADMIN — INSULIN HUMAN PRN UNITS: 100 INJECTION, SOLUTION PARENTERAL at 06:03

## 2022-04-21 RX ADMIN — Medication SCH ML: at 16:38

## 2022-04-21 RX ADMIN — DEXTROSE AND SODIUM CHLORIDE PRN MLS/HR: 5; 450 INJECTION, SOLUTION INTRAVENOUS at 16:36

## 2022-04-21 RX ADMIN — INSULIN HUMAN PRN UNITS: 100 INJECTION, SOLUTION PARENTERAL at 23:31

## 2022-04-21 RX ADMIN — LISINOPRIL SCH MG: 5 TABLET ORAL at 08:52

## 2022-04-21 RX ADMIN — CALCIUM CARBONATE-CHOLECALCIFEROL TAB 250 MG-125 UNIT SCH UDTAB: 250-125 TAB at 08:51

## 2022-04-21 RX ADMIN — CLOTRIMAZOLE SCH GM: 1 CREAM TOPICAL at 17:00

## 2022-04-21 RX ADMIN — CLOTRIMAZOLE SCH GM: 1 CREAM TOPICAL at 08:54

## 2022-04-21 RX ADMIN — SIMETHICONE SCH MG: 20 SUSPENSION/ DROPS ORAL at 16:55

## 2022-04-21 RX ADMIN — DEXTROSE MONOHYDRATE SCH MLS/HR: 50 INJECTION, SOLUTION INTRAVENOUS at 10:07

## 2022-04-21 RX ADMIN — Medication PRN TAB: at 13:07

## 2022-04-21 RX ADMIN — ACETAMINOPHEN PRN MG: 160 SOLUTION ORAL at 05:27

## 2022-04-21 RX ADMIN — Medication PRN ML: at 16:32

## 2022-04-21 RX ADMIN — VALPROIC ACID SCH MG: 250 SOLUTION ORAL at 17:58

## 2022-04-21 RX ADMIN — INSULIN GLARGINE SCH UNIT: 100 INJECTION, SOLUTION SUBCUTANEOUS at 23:30

## 2022-04-21 RX ADMIN — Medication SCH EACH: at 23:31

## 2022-04-21 RX ADMIN — THERA TABS SCH UDTAB: TAB at 08:51

## 2022-04-21 RX ADMIN — INSULIN HUMAN PRN UNITS: 100 INJECTION, SOLUTION PARENTERAL at 12:29

## 2022-04-21 RX ADMIN — ACETAMINOPHEN PRN MG: 160 SOLUTION ORAL at 17:59

## 2022-04-21 RX ADMIN — FENOFIBRATE SCH MG: 145 TABLET ORAL at 17:58

## 2022-04-21 RX ADMIN — VITAMIN D, TAB 1000IU (100/BT) SCH UNIT: 25 TAB at 08:51

## 2022-04-21 RX ADMIN — Medication SCH EACH: at 00:00

## 2022-04-21 RX ADMIN — ENOXAPARIN SODIUM SCH MG: 40 INJECTION SUBCUTANEOUS at 10:10

## 2022-04-21 RX ADMIN — INSULIN HUMAN PRN UNITS: 100 INJECTION, SOLUTION PARENTERAL at 18:04

## 2022-04-21 RX ADMIN — CALCIUM CARBONATE-CHOLECALCIFEROL TAB 250 MG-125 UNIT SCH UDTAB: 250-125 TAB at 16:38

## 2022-04-21 RX ADMIN — VITAMIN A AND VITAMIN D SCH GM: 929.3 OINTMENT TOPICAL at 08:53

## 2022-04-21 RX ADMIN — MAGNESIUM OXIDE TAB 400 MG (241.3 MG ELEMENTAL MG) SCH MG: 400 (241.3 MG) TAB at 08:51

## 2022-04-21 RX ADMIN — Medication SCH EACH: at 17:55

## 2022-04-21 RX ADMIN — Medication SCH EACH: at 08:51

## 2022-04-21 RX ADMIN — INSULIN HUMAN PRN UNITS: 100 INJECTION, SOLUTION PARENTERAL at 01:03

## 2022-04-21 RX ADMIN — PIPERACILLIN SODIUM AND TAZOBACTAM SODIUM SCH MLS/HR: .375; 3 INJECTION, POWDER, LYOPHILIZED, FOR SOLUTION INTRAVENOUS at 05:59

## 2022-04-21 RX ADMIN — Medication SCH MG: at 08:51

## 2022-04-21 RX ADMIN — Medication SCH EACH: at 12:26

## 2022-04-21 RX ADMIN — MAGNESIUM OXIDE TAB 400 MG (241.3 MG ELEMENTAL MG) SCH MG: 400 (241.3 MG) TAB at 16:38

## 2022-04-21 RX ADMIN — PIPERACILLIN SODIUM AND TAZOBACTAM SODIUM SCH MLS/HR: .375; 3 INJECTION, POWDER, LYOPHILIZED, FOR SOLUTION INTRAVENOUS at 13:08

## 2022-04-21 RX ADMIN — SIMETHICONE SCH MG: 20 SUSPENSION/ DROPS ORAL at 12:34

## 2022-04-21 RX ADMIN — Medication SCH ML: at 09:00

## 2022-04-21 RX ADMIN — SIMETHICONE SCH MG: 20 SUSPENSION/ DROPS ORAL at 08:57

## 2022-04-21 RX ADMIN — PIPERACILLIN SODIUM AND TAZOBACTAM SODIUM SCH MLS/HR: .375; 3 INJECTION, POWDER, LYOPHILIZED, FOR SOLUTION INTRAVENOUS at 21:51

## 2022-04-21 RX ADMIN — PANTOPRAZOLE SODIUM SCH MG: 40 GRANULE, DELAYED RELEASE ORAL at 08:52

## 2022-04-21 RX ADMIN — Medication SCH EACH: at 06:10

## 2022-04-21 RX ADMIN — CLOTRIMAZOLE SCH GM: 1 CREAM TOPICAL at 16:49

## 2022-04-22 VITALS — SYSTOLIC BLOOD PRESSURE: 119 MMHG | DIASTOLIC BLOOD PRESSURE: 55 MMHG

## 2022-04-22 VITALS — SYSTOLIC BLOOD PRESSURE: 122 MMHG | DIASTOLIC BLOOD PRESSURE: 84 MMHG

## 2022-04-22 VITALS — SYSTOLIC BLOOD PRESSURE: 127 MMHG | DIASTOLIC BLOOD PRESSURE: 49 MMHG

## 2022-04-22 VITALS — SYSTOLIC BLOOD PRESSURE: 146 MMHG | DIASTOLIC BLOOD PRESSURE: 77 MMHG

## 2022-04-22 VITALS — SYSTOLIC BLOOD PRESSURE: 121 MMHG | DIASTOLIC BLOOD PRESSURE: 85 MMHG

## 2022-04-22 VITALS — DIASTOLIC BLOOD PRESSURE: 68 MMHG | SYSTOLIC BLOOD PRESSURE: 145 MMHG

## 2022-04-22 LAB
BASOPHILS # BLD AUTO: 0 K/UL (ref 0–0.2)
BASOPHILS NFR BLD AUTO: 0.5 % (ref 0–2)
BUN SERPL-MCNC: 15 MG/DL (ref 7–18)
CALCIUM SERPL-MCNC: 8.2 MG/DL (ref 8.5–10.1)
CHLORIDE SERPL-SCNC: 103 MMOL/L (ref 98–107)
CO2 SERPL-SCNC: 30 MMOL/L (ref 21–32)
CREAT SERPL-MCNC: 0.4 MG/DL (ref 0.6–1.3)
EOSINOPHIL NFR BLD AUTO: 2.6 % (ref 0–6)
EOSINOPHIL NFR BLD MANUAL: 1 % (ref 0–4)
GLUCOSE SERPL-MCNC: 245 MG/DL (ref 74–106)
HCT VFR BLD AUTO: 30 % (ref 33–45)
HGB BLD-MCNC: 9.9 G/DL (ref 11.5–14.8)
LYMPHOCYTES NFR BLD AUTO: 3.1 K/UL (ref 0.8–4.8)
LYMPHOCYTES NFR BLD AUTO: 38.3 % (ref 20–44)
LYMPHOCYTES NFR BLD MANUAL: 34 % (ref 16–48)
MCHC RBC AUTO-ENTMCNC: 33 G/DL (ref 31–36)
MCV RBC AUTO: 102 FL (ref 82–100)
MONOCYTES NFR BLD AUTO: 1.2 K/UL (ref 0.1–1.3)
MONOCYTES NFR BLD AUTO: 15.1 % (ref 2–12)
MONOCYTES NFR BLD MANUAL: 7 % (ref 0–11)
NEUTROPHILS # BLD AUTO: 3.5 K/UL (ref 1.8–8.9)
NEUTROPHILS NFR BLD AUTO: 43.5 % (ref 43–81)
NEUTS BAND NFR BLD MANUAL: 1 % (ref 0–5)
NEUTS SEG NFR BLD MANUAL: 43 % (ref 42–76)
PLATELET # BLD AUTO: 261 K/UL (ref 150–450)
POTASSIUM SERPL-SCNC: 4.2 MMOL/L (ref 3.5–5.1)
RBC # BLD AUTO: 2.95 MIL/UL (ref 4–5.2)
SODIUM SERPL-SCNC: 136 MMOL/L (ref 136–145)
VARIANT LYMPHS NFR BLD MANUAL: 14 % (ref 0–0)
WBC NRBC COR # BLD AUTO: 8.1 K/UL (ref 4.3–11)

## 2022-04-22 RX ADMIN — CALCIUM CARBONATE-CHOLECALCIFEROL TAB 250 MG-125 UNIT SCH UDTAB: 250-125 TAB at 16:04

## 2022-04-22 RX ADMIN — VALPROIC ACID SCH MG: 250 SOLUTION ORAL at 17:18

## 2022-04-22 RX ADMIN — ACETAMINOPHEN PRN MG: 160 SOLUTION ORAL at 06:11

## 2022-04-22 RX ADMIN — MAGNESIUM OXIDE TAB 400 MG (241.3 MG ELEMENTAL MG) SCH MG: 400 (241.3 MG) TAB at 16:04

## 2022-04-22 RX ADMIN — Medication SCH MG: at 08:45

## 2022-04-22 RX ADMIN — INSULIN HUMAN PRN UNITS: 100 INJECTION, SOLUTION PARENTERAL at 11:55

## 2022-04-22 RX ADMIN — SIMETHICONE SCH MG: 20 SUSPENSION/ DROPS ORAL at 12:55

## 2022-04-22 RX ADMIN — THERA TABS SCH UDTAB: TAB at 08:45

## 2022-04-22 RX ADMIN — VITAMIN A AND VITAMIN D SCH GM: 929.3 OINTMENT TOPICAL at 08:47

## 2022-04-22 RX ADMIN — VITAMIN D, TAB 1000IU (100/BT) SCH UNIT: 25 TAB at 08:45

## 2022-04-22 RX ADMIN — Medication SCH EACH: at 17:17

## 2022-04-22 RX ADMIN — ACETAMINOPHEN PRN MG: 160 SOLUTION ORAL at 00:11

## 2022-04-22 RX ADMIN — INSULIN HUMAN PRN UNITS: 100 INJECTION, SOLUTION PARENTERAL at 22:11

## 2022-04-22 RX ADMIN — SIMETHICONE SCH MG: 20 SUSPENSION/ DROPS ORAL at 08:54

## 2022-04-22 RX ADMIN — ACETAMINOPHEN PRN MG: 160 SOLUTION ORAL at 16:04

## 2022-04-22 RX ADMIN — LISINOPRIL SCH MG: 5 TABLET ORAL at 08:46

## 2022-04-22 RX ADMIN — INSULIN HUMAN PRN UNITS: 100 INJECTION, SOLUTION PARENTERAL at 05:39

## 2022-04-22 RX ADMIN — ENOXAPARIN SODIUM SCH MG: 40 INJECTION SUBCUTANEOUS at 10:05

## 2022-04-22 RX ADMIN — Medication SCH EACH: at 12:00

## 2022-04-22 RX ADMIN — Medication SCH EACH: at 08:51

## 2022-04-22 RX ADMIN — CALCIUM CARBONATE-CHOLECALCIFEROL TAB 250 MG-125 UNIT SCH UDTAB: 250-125 TAB at 08:46

## 2022-04-22 RX ADMIN — PANTOPRAZOLE SODIUM SCH MG: 40 GRANULE, DELAYED RELEASE ORAL at 08:45

## 2022-04-22 RX ADMIN — INSULIN HUMAN PRN UNITS: 100 INJECTION, SOLUTION PARENTERAL at 17:18

## 2022-04-22 RX ADMIN — INSULIN GLARGINE SCH UNIT: 100 INJECTION, SOLUTION SUBCUTANEOUS at 22:15

## 2022-04-22 RX ADMIN — Medication PRN TAB: at 10:09

## 2022-04-22 RX ADMIN — CLOTRIMAZOLE SCH GM: 1 CREAM TOPICAL at 08:47

## 2022-04-22 RX ADMIN — Medication SCH EACH: at 05:38

## 2022-04-22 RX ADMIN — PIPERACILLIN SODIUM AND TAZOBACTAM SODIUM SCH MLS/HR: .375; 3 INJECTION, POWDER, LYOPHILIZED, FOR SOLUTION INTRAVENOUS at 05:38

## 2022-04-22 RX ADMIN — SIMETHICONE SCH MG: 20 SUSPENSION/ DROPS ORAL at 16:05

## 2022-04-22 RX ADMIN — FENOFIBRATE SCH MG: 145 TABLET ORAL at 17:19

## 2022-04-22 RX ADMIN — DEXTROSE AND SODIUM CHLORIDE PRN MLS/HR: 5; 450 INJECTION, SOLUTION INTRAVENOUS at 15:39

## 2022-04-22 RX ADMIN — MAGNESIUM OXIDE TAB 400 MG (241.3 MG ELEMENTAL MG) SCH MG: 400 (241.3 MG) TAB at 08:45

## 2022-04-22 RX ADMIN — Medication SCH ML: at 08:52

## 2022-04-22 RX ADMIN — Medication SCH ML: at 16:19

## 2022-04-22 RX ADMIN — CLOTRIMAZOLE SCH GM: 1 CREAM TOPICAL at 16:17

## 2022-04-22 RX ADMIN — Medication PRN ML: at 18:03

## 2022-04-23 VITALS — DIASTOLIC BLOOD PRESSURE: 65 MMHG | SYSTOLIC BLOOD PRESSURE: 120 MMHG

## 2022-04-23 VITALS — SYSTOLIC BLOOD PRESSURE: 146 MMHG | DIASTOLIC BLOOD PRESSURE: 77 MMHG

## 2022-04-23 VITALS — DIASTOLIC BLOOD PRESSURE: 77 MMHG | SYSTOLIC BLOOD PRESSURE: 137 MMHG

## 2022-04-23 VITALS — DIASTOLIC BLOOD PRESSURE: 92 MMHG | SYSTOLIC BLOOD PRESSURE: 142 MMHG

## 2022-04-23 VITALS — SYSTOLIC BLOOD PRESSURE: 137 MMHG | DIASTOLIC BLOOD PRESSURE: 77 MMHG

## 2022-04-23 LAB
BASOPHILS # BLD AUTO: 0.1 K/UL (ref 0–0.2)
BASOPHILS NFR BLD AUTO: 0.6 % (ref 0–2)
BUN SERPL-MCNC: 15 MG/DL (ref 7–18)
CALCIUM SERPL-MCNC: 8.4 MG/DL (ref 8.5–10.1)
CHLORIDE SERPL-SCNC: 101 MMOL/L (ref 98–107)
CO2 SERPL-SCNC: 30 MMOL/L (ref 21–32)
CREAT SERPL-MCNC: 0.3 MG/DL (ref 0.6–1.3)
EOSINOPHIL NFR BLD AUTO: 3.1 % (ref 0–6)
EOSINOPHIL NFR BLD MANUAL: 3 % (ref 0–4)
GLUCOSE SERPL-MCNC: 184 MG/DL (ref 74–106)
HCT VFR BLD AUTO: 30 % (ref 33–45)
HGB BLD-MCNC: 10 G/DL (ref 11.5–14.8)
LYMPHOCYTES NFR BLD AUTO: 3.5 K/UL (ref 0.8–4.8)
LYMPHOCYTES NFR BLD AUTO: 34.4 % (ref 20–44)
LYMPHOCYTES NFR BLD MANUAL: 52 % (ref 16–48)
MAGNESIUM SERPL-MCNC: 1.9 MG/DL (ref 1.8–2.4)
MCHC RBC AUTO-ENTMCNC: 33 G/DL (ref 31–36)
MCV RBC AUTO: 101 FL (ref 82–100)
MONOCYTES NFR BLD AUTO: 1.6 K/UL (ref 0.1–1.3)
MONOCYTES NFR BLD AUTO: 15.9 % (ref 2–12)
MONOCYTES NFR BLD MANUAL: 16 % (ref 0–11)
NEUTROPHILS # BLD AUTO: 4.7 K/UL (ref 1.8–8.9)
NEUTROPHILS NFR BLD AUTO: 46 % (ref 43–81)
NEUTS SEG NFR BLD MANUAL: 29 % (ref 42–76)
PHOSPHATE SERPL-MCNC: 3.6 MG/DL (ref 2.5–4.9)
PLATELET # BLD AUTO: 341 K/UL (ref 150–450)
POTASSIUM SERPL-SCNC: 4.1 MMOL/L (ref 3.5–5.1)
RBC # BLD AUTO: 2.98 MIL/UL (ref 4–5.2)
SODIUM SERPL-SCNC: 134 MMOL/L (ref 136–145)
WBC NRBC COR # BLD AUTO: 10.2 K/UL (ref 4.3–11)

## 2022-04-23 RX ADMIN — Medication SCH EACH: at 12:45

## 2022-04-23 RX ADMIN — Medication SCH EACH: at 23:43

## 2022-04-23 RX ADMIN — CLOTRIMAZOLE SCH GM: 1 CREAM TOPICAL at 16:42

## 2022-04-23 RX ADMIN — ENOXAPARIN SODIUM SCH MG: 40 INJECTION SUBCUTANEOUS at 09:04

## 2022-04-23 RX ADMIN — MAGNESIUM OXIDE TAB 400 MG (241.3 MG ELEMENTAL MG) SCH MG: 400 (241.3 MG) TAB at 09:00

## 2022-04-23 RX ADMIN — Medication SCH MG: at 09:11

## 2022-04-23 RX ADMIN — PANTOPRAZOLE SODIUM SCH MG: 40 GRANULE, DELAYED RELEASE ORAL at 08:58

## 2022-04-23 RX ADMIN — Medication SCH EACH: at 05:42

## 2022-04-23 RX ADMIN — MAGNESIUM OXIDE TAB 400 MG (241.3 MG ELEMENTAL MG) SCH MG: 400 (241.3 MG) TAB at 16:42

## 2022-04-23 RX ADMIN — Medication SCH ML: at 16:41

## 2022-04-23 RX ADMIN — CALCIUM CARBONATE-CHOLECALCIFEROL TAB 250 MG-125 UNIT SCH UDTAB: 250-125 TAB at 08:58

## 2022-04-23 RX ADMIN — THERA TABS SCH UDTAB: TAB at 08:58

## 2022-04-23 RX ADMIN — Medication SCH EACH: at 17:29

## 2022-04-23 RX ADMIN — VITAMIN D, TAB 1000IU (100/BT) SCH UNIT: 25 TAB at 08:59

## 2022-04-23 RX ADMIN — VITAMIN A AND VITAMIN D SCH GM: 929.3 OINTMENT TOPICAL at 09:50

## 2022-04-23 RX ADMIN — SIMETHICONE SCH MG: 20 SUSPENSION/ DROPS ORAL at 16:42

## 2022-04-23 RX ADMIN — INSULIN GLARGINE SCH UNIT: 100 INJECTION, SOLUTION SUBCUTANEOUS at 21:52

## 2022-04-23 RX ADMIN — VALPROIC ACID SCH MG: 250 SOLUTION ORAL at 17:34

## 2022-04-23 RX ADMIN — SIMETHICONE SCH MG: 20 SUSPENSION/ DROPS ORAL at 13:28

## 2022-04-23 RX ADMIN — INSULIN HUMAN PRN UNITS: 100 INJECTION, SOLUTION PARENTERAL at 00:52

## 2022-04-23 RX ADMIN — LISINOPRIL SCH MG: 5 TABLET ORAL at 09:01

## 2022-04-23 RX ADMIN — CALCIUM CARBONATE-CHOLECALCIFEROL TAB 250 MG-125 UNIT SCH UDTAB: 250-125 TAB at 16:40

## 2022-04-23 RX ADMIN — INSULIN HUMAN PRN UNITS: 100 INJECTION, SOLUTION PARENTERAL at 05:49

## 2022-04-23 RX ADMIN — Medication SCH EACH: at 08:59

## 2022-04-23 RX ADMIN — CLOTRIMAZOLE SCH GM: 1 CREAM TOPICAL at 09:50

## 2022-04-23 RX ADMIN — Medication SCH ML: at 09:11

## 2022-04-23 RX ADMIN — SIMETHICONE SCH MG: 20 SUSPENSION/ DROPS ORAL at 08:58

## 2022-04-23 RX ADMIN — Medication PRN ML: at 20:54

## 2022-04-23 RX ADMIN — DEXTROSE AND SODIUM CHLORIDE PRN MLS/HR: 5; 450 INJECTION, SOLUTION INTRAVENOUS at 20:51

## 2022-04-23 RX ADMIN — Medication SCH EACH: at 01:01

## 2022-04-23 RX ADMIN — INSULIN HUMAN PRN UNITS: 100 INJECTION, SOLUTION PARENTERAL at 17:27

## 2022-04-23 RX ADMIN — FENOFIBRATE SCH MG: 145 TABLET ORAL at 17:32

## 2022-04-23 RX ADMIN — INSULIN HUMAN PRN UNITS: 100 INJECTION, SOLUTION PARENTERAL at 23:47

## 2022-04-24 VITALS — SYSTOLIC BLOOD PRESSURE: 123 MMHG | DIASTOLIC BLOOD PRESSURE: 61 MMHG

## 2022-04-24 VITALS — DIASTOLIC BLOOD PRESSURE: 63 MMHG | SYSTOLIC BLOOD PRESSURE: 118 MMHG

## 2022-04-24 VITALS — DIASTOLIC BLOOD PRESSURE: 61 MMHG | SYSTOLIC BLOOD PRESSURE: 147 MMHG

## 2022-04-24 VITALS — SYSTOLIC BLOOD PRESSURE: 133 MMHG | DIASTOLIC BLOOD PRESSURE: 61 MMHG

## 2022-04-24 VITALS — SYSTOLIC BLOOD PRESSURE: 108 MMHG | DIASTOLIC BLOOD PRESSURE: 60 MMHG

## 2022-04-24 VITALS — SYSTOLIC BLOOD PRESSURE: 118 MMHG | DIASTOLIC BLOOD PRESSURE: 76 MMHG

## 2022-04-24 RX ADMIN — Medication SCH ML: at 08:27

## 2022-04-24 RX ADMIN — INSULIN GLARGINE SCH UNIT: 100 INJECTION, SOLUTION SUBCUTANEOUS at 22:11

## 2022-04-24 RX ADMIN — Medication SCH EACH: at 11:43

## 2022-04-24 RX ADMIN — CALCIUM CARBONATE-CHOLECALCIFEROL TAB 250 MG-125 UNIT SCH UDTAB: 250-125 TAB at 16:59

## 2022-04-24 RX ADMIN — MAGNESIUM OXIDE TAB 400 MG (241.3 MG ELEMENTAL MG) SCH MG: 400 (241.3 MG) TAB at 16:59

## 2022-04-24 RX ADMIN — Medication SCH EACH: at 08:25

## 2022-04-24 RX ADMIN — CLOTRIMAZOLE SCH GM: 1 CREAM TOPICAL at 08:28

## 2022-04-24 RX ADMIN — VITAMIN D, TAB 1000IU (100/BT) SCH UNIT: 25 TAB at 08:24

## 2022-04-24 RX ADMIN — ENOXAPARIN SODIUM SCH MG: 40 INJECTION SUBCUTANEOUS at 09:23

## 2022-04-24 RX ADMIN — CALCIUM CARBONATE-CHOLECALCIFEROL TAB 250 MG-125 UNIT SCH UDTAB: 250-125 TAB at 08:25

## 2022-04-24 RX ADMIN — SIMETHICONE SCH MG: 20 SUSPENSION/ DROPS ORAL at 08:23

## 2022-04-24 RX ADMIN — SIMETHICONE SCH MG: 20 SUSPENSION/ DROPS ORAL at 16:58

## 2022-04-24 RX ADMIN — MAGNESIUM OXIDE TAB 400 MG (241.3 MG ELEMENTAL MG) SCH MG: 400 (241.3 MG) TAB at 08:25

## 2022-04-24 RX ADMIN — CLOTRIMAZOLE SCH GM: 1 CREAM TOPICAL at 17:00

## 2022-04-24 RX ADMIN — Medication SCH ML: at 16:58

## 2022-04-24 RX ADMIN — FENOFIBRATE SCH MG: 145 TABLET ORAL at 18:29

## 2022-04-24 RX ADMIN — LISINOPRIL SCH MG: 5 TABLET ORAL at 08:26

## 2022-04-24 RX ADMIN — Medication SCH EACH: at 17:38

## 2022-04-24 RX ADMIN — VALPROIC ACID SCH MG: 250 SOLUTION ORAL at 18:29

## 2022-04-24 RX ADMIN — SIMETHICONE SCH MG: 20 SUSPENSION/ DROPS ORAL at 12:08

## 2022-04-24 RX ADMIN — INSULIN HUMAN PRN UNITS: 100 INJECTION, SOLUTION PARENTERAL at 19:36

## 2022-04-24 RX ADMIN — VITAMIN A AND VITAMIN D SCH GM: 929.3 OINTMENT TOPICAL at 08:28

## 2022-04-24 RX ADMIN — Medication PRN ML: at 22:53

## 2022-04-24 RX ADMIN — PANTOPRAZOLE SODIUM SCH MG: 40 GRANULE, DELAYED RELEASE ORAL at 08:25

## 2022-04-24 RX ADMIN — Medication SCH MG: at 08:24

## 2022-04-24 RX ADMIN — Medication SCH EACH: at 06:25

## 2022-04-24 RX ADMIN — THERA TABS SCH UDTAB: TAB at 08:25

## 2022-04-24 RX ADMIN — INSULIN HUMAN PRN UNITS: 100 INJECTION, SOLUTION PARENTERAL at 06:28

## 2022-04-25 VITALS — SYSTOLIC BLOOD PRESSURE: 130 MMHG | DIASTOLIC BLOOD PRESSURE: 75 MMHG

## 2022-04-25 VITALS — DIASTOLIC BLOOD PRESSURE: 68 MMHG | SYSTOLIC BLOOD PRESSURE: 130 MMHG

## 2022-04-25 VITALS — SYSTOLIC BLOOD PRESSURE: 117 MMHG | DIASTOLIC BLOOD PRESSURE: 61 MMHG

## 2022-04-25 VITALS — DIASTOLIC BLOOD PRESSURE: 66 MMHG | SYSTOLIC BLOOD PRESSURE: 116 MMHG

## 2022-04-25 VITALS — DIASTOLIC BLOOD PRESSURE: 62 MMHG | SYSTOLIC BLOOD PRESSURE: 128 MMHG

## 2022-04-25 LAB
BASOPHILS # BLD AUTO: 0.1 K/UL (ref 0–0.2)
BASOPHILS NFR BLD AUTO: 0.6 % (ref 0–2)
BASOPHILS NFR BLD MANUAL: 2 % (ref 0–2)
BUN SERPL-MCNC: 14 MG/DL (ref 7–18)
CALCIUM SERPL-MCNC: 8.6 MG/DL (ref 8.5–10.1)
CHLORIDE SERPL-SCNC: 105 MMOL/L (ref 98–107)
CO2 SERPL-SCNC: 28 MMOL/L (ref 21–32)
CREAT SERPL-MCNC: 0.4 MG/DL (ref 0.6–1.3)
EOSINOPHIL NFR BLD AUTO: 2.7 % (ref 0–6)
GLUCOSE SERPL-MCNC: 280 MG/DL (ref 74–106)
HCT VFR BLD AUTO: 28 % (ref 33–45)
HGB BLD-MCNC: 9.3 G/DL (ref 11.5–14.8)
LYMPHOCYTES NFR BLD AUTO: 3.6 K/UL (ref 0.8–4.8)
LYMPHOCYTES NFR BLD AUTO: 36.5 % (ref 20–44)
LYMPHOCYTES NFR BLD MANUAL: 32 % (ref 16–48)
MAGNESIUM SERPL-MCNC: 2 MG/DL (ref 1.8–2.4)
MCHC RBC AUTO-ENTMCNC: 33 G/DL (ref 31–36)
MCV RBC AUTO: 102 FL (ref 82–100)
METAMYELOCYTES NFR BLD MANUAL: 1 % (ref 0–0)
MONOCYTES NFR BLD AUTO: 1.6 K/UL (ref 0.1–1.3)
MONOCYTES NFR BLD AUTO: 16.5 % (ref 2–12)
MONOCYTES NFR BLD MANUAL: 18 % (ref 0–11)
MYELOCYTES NFR BLD MANUAL: 1 % (ref 0–0)
NEUTROPHILS # BLD AUTO: 4.2 K/UL (ref 1.8–8.9)
NEUTROPHILS NFR BLD AUTO: 43.7 % (ref 43–81)
NEUTS BAND NFR BLD MANUAL: 4 % (ref 0–5)
NEUTS SEG NFR BLD MANUAL: 42 % (ref 42–76)
PHOSPHATE SERPL-MCNC: 4.1 MG/DL (ref 2.5–4.9)
PLATELET # BLD AUTO: 445 K/UL (ref 150–450)
POTASSIUM SERPL-SCNC: 4.3 MMOL/L (ref 3.5–5.1)
RBC # BLD AUTO: 2.79 MIL/UL (ref 4–5.2)
SODIUM SERPL-SCNC: 140 MMOL/L (ref 136–145)
WBC NRBC COR # BLD AUTO: 9.7 K/UL (ref 4.3–11)

## 2022-04-25 RX ADMIN — FENOFIBRATE SCH MG: 145 TABLET ORAL at 17:09

## 2022-04-25 RX ADMIN — INSULIN HUMAN PRN UNITS: 100 INJECTION, SOLUTION PARENTERAL at 06:44

## 2022-04-25 RX ADMIN — SIMETHICONE SCH MG: 20 SUSPENSION/ DROPS ORAL at 17:09

## 2022-04-25 RX ADMIN — CLOTRIMAZOLE SCH GM: 1 CREAM TOPICAL at 11:51

## 2022-04-25 RX ADMIN — INSULIN GLARGINE SCH UNIT: 100 INJECTION, SOLUTION SUBCUTANEOUS at 21:54

## 2022-04-25 RX ADMIN — INSULIN HUMAN PRN UNITS: 100 INJECTION, SOLUTION PARENTERAL at 11:53

## 2022-04-25 RX ADMIN — Medication SCH EACH: at 17:56

## 2022-04-25 RX ADMIN — INSULIN HUMAN PRN UNITS: 100 INJECTION, SOLUTION PARENTERAL at 23:59

## 2022-04-25 RX ADMIN — LISINOPRIL SCH MG: 5 TABLET ORAL at 09:01

## 2022-04-25 RX ADMIN — INSULIN HUMAN PRN UNITS: 100 INJECTION, SOLUTION PARENTERAL at 04:21

## 2022-04-25 RX ADMIN — CALCIUM CARBONATE-CHOLECALCIFEROL TAB 250 MG-125 UNIT SCH UDTAB: 250-125 TAB at 17:09

## 2022-04-25 RX ADMIN — CALCIUM CARBONATE-CHOLECALCIFEROL TAB 250 MG-125 UNIT SCH UDTAB: 250-125 TAB at 09:00

## 2022-04-25 RX ADMIN — SIMETHICONE SCH MG: 20 SUSPENSION/ DROPS ORAL at 11:49

## 2022-04-25 RX ADMIN — Medication SCH EACH: at 00:58

## 2022-04-25 RX ADMIN — VITAMIN A AND VITAMIN D SCH GM: 929.3 OINTMENT TOPICAL at 11:49

## 2022-04-25 RX ADMIN — Medication SCH MG: at 09:00

## 2022-04-25 RX ADMIN — THERA TABS SCH UDTAB: TAB at 09:00

## 2022-04-25 RX ADMIN — MAGNESIUM OXIDE TAB 400 MG (241.3 MG ELEMENTAL MG) SCH MG: 400 (241.3 MG) TAB at 17:09

## 2022-04-25 RX ADMIN — VALPROIC ACID SCH MG: 250 SOLUTION ORAL at 17:10

## 2022-04-25 RX ADMIN — Medication SCH EACH: at 09:00

## 2022-04-25 RX ADMIN — ACETAMINOPHEN PRN MG: 160 SOLUTION ORAL at 23:55

## 2022-04-25 RX ADMIN — CLOTRIMAZOLE SCH GM: 1 CREAM TOPICAL at 17:57

## 2022-04-25 RX ADMIN — Medication PRN TAB: at 03:53

## 2022-04-25 RX ADMIN — VITAMIN D, TAB 1000IU (100/BT) SCH UNIT: 25 TAB at 09:01

## 2022-04-25 RX ADMIN — Medication SCH EACH: at 23:45

## 2022-04-25 RX ADMIN — Medication PRN ML: at 17:29

## 2022-04-25 RX ADMIN — Medication SCH ML: at 11:51

## 2022-04-25 RX ADMIN — ENOXAPARIN SODIUM SCH MG: 40 INJECTION SUBCUTANEOUS at 10:05

## 2022-04-25 RX ADMIN — PANTOPRAZOLE SODIUM SCH MG: 40 GRANULE, DELAYED RELEASE ORAL at 09:01

## 2022-04-25 RX ADMIN — INSULIN HUMAN PRN UNITS: 100 INJECTION, SOLUTION PARENTERAL at 17:14

## 2022-04-25 RX ADMIN — Medication SCH EACH: at 11:53

## 2022-04-25 RX ADMIN — ACETAMINOPHEN PRN MG: 160 SOLUTION ORAL at 17:56

## 2022-04-25 RX ADMIN — SIMETHICONE SCH MG: 20 SUSPENSION/ DROPS ORAL at 09:18

## 2022-04-25 RX ADMIN — Medication SCH EACH: at 06:34

## 2022-04-25 RX ADMIN — MAGNESIUM OXIDE TAB 400 MG (241.3 MG ELEMENTAL MG) SCH MG: 400 (241.3 MG) TAB at 09:00

## 2022-04-25 RX ADMIN — CLOTRIMAZOLE SCH GM: 1 CREAM TOPICAL at 17:10

## 2022-04-25 RX ADMIN — CLOTRIMAZOLE SCH GM: 1 CREAM TOPICAL at 11:49

## 2022-04-25 RX ADMIN — GUAIFENESIN PRN MG: 100 SOLUTION ORAL at 17:56

## 2022-04-25 RX ADMIN — Medication SCH ML: at 17:57

## 2022-04-26 VITALS — SYSTOLIC BLOOD PRESSURE: 139 MMHG | DIASTOLIC BLOOD PRESSURE: 75 MMHG

## 2022-04-26 VITALS — SYSTOLIC BLOOD PRESSURE: 126 MMHG | DIASTOLIC BLOOD PRESSURE: 71 MMHG

## 2022-04-26 VITALS — DIASTOLIC BLOOD PRESSURE: 69 MMHG | SYSTOLIC BLOOD PRESSURE: 126 MMHG

## 2022-04-26 VITALS — SYSTOLIC BLOOD PRESSURE: 129 MMHG | DIASTOLIC BLOOD PRESSURE: 87 MMHG

## 2022-04-26 VITALS — DIASTOLIC BLOOD PRESSURE: 87 MMHG | SYSTOLIC BLOOD PRESSURE: 147 MMHG

## 2022-04-26 VITALS — SYSTOLIC BLOOD PRESSURE: 132 MMHG | DIASTOLIC BLOOD PRESSURE: 69 MMHG

## 2022-04-26 RX ADMIN — CLOTRIMAZOLE SCH GM: 1 CREAM TOPICAL at 16:10

## 2022-04-26 RX ADMIN — VITAMIN D, TAB 1000IU (100/BT) SCH UNIT: 25 TAB at 08:43

## 2022-04-26 RX ADMIN — ACETAMINOPHEN PRN MG: 160 SOLUTION ORAL at 16:17

## 2022-04-26 RX ADMIN — ENOXAPARIN SODIUM SCH MG: 40 INJECTION SUBCUTANEOUS at 10:36

## 2022-04-26 RX ADMIN — GUAIFENESIN PRN MG: 100 SOLUTION ORAL at 13:04

## 2022-04-26 RX ADMIN — INSULIN HUMAN PRN UNITS: 100 INJECTION, SOLUTION PARENTERAL at 17:12

## 2022-04-26 RX ADMIN — Medication SCH EACH: at 08:42

## 2022-04-26 RX ADMIN — ALBUTEROL SULFATE SCH MG: 1.25 SOLUTION RESPIRATORY (INHALATION) at 20:08

## 2022-04-26 RX ADMIN — Medication SCH MG: at 16:47

## 2022-04-26 RX ADMIN — MAGNESIUM OXIDE TAB 400 MG (241.3 MG ELEMENTAL MG) SCH MG: 400 (241.3 MG) TAB at 16:05

## 2022-04-26 RX ADMIN — VALPROIC ACID SCH MG: 250 SOLUTION ORAL at 17:33

## 2022-04-26 RX ADMIN — SIMETHICONE SCH MG: 20 SUSPENSION/ DROPS ORAL at 16:06

## 2022-04-26 RX ADMIN — INSULIN HUMAN PRN UNITS: 100 INJECTION, SOLUTION PARENTERAL at 06:37

## 2022-04-26 RX ADMIN — CLOTRIMAZOLE SCH GM: 1 CREAM TOPICAL at 09:00

## 2022-04-26 RX ADMIN — CLOTRIMAZOLE SCH GM: 1 CREAM TOPICAL at 11:18

## 2022-04-26 RX ADMIN — Medication SCH ML: at 16:06

## 2022-04-26 RX ADMIN — Medication SCH MG: at 08:43

## 2022-04-26 RX ADMIN — VITAMIN A AND VITAMIN D SCH GM: 929.3 OINTMENT TOPICAL at 08:44

## 2022-04-26 RX ADMIN — Medication SCH ML: at 09:00

## 2022-04-26 RX ADMIN — CALCIUM CARBONATE-CHOLECALCIFEROL TAB 250 MG-125 UNIT SCH UDTAB: 250-125 TAB at 08:43

## 2022-04-26 RX ADMIN — CLOTRIMAZOLE SCH GM: 1 CREAM TOPICAL at 16:09

## 2022-04-26 RX ADMIN — CALCIUM CARBONATE-CHOLECALCIFEROL TAB 250 MG-125 UNIT SCH UDTAB: 250-125 TAB at 16:05

## 2022-04-26 RX ADMIN — Medication SCH EACH: at 23:02

## 2022-04-26 RX ADMIN — Medication SCH EACH: at 17:06

## 2022-04-26 RX ADMIN — ACETYLCYSTEINE SCH MG: 100 INHALANT RESPIRATORY (INHALATION) at 23:48

## 2022-04-26 RX ADMIN — INSULIN HUMAN PRN UNITS: 100 INJECTION, SOLUTION PARENTERAL at 11:53

## 2022-04-26 RX ADMIN — PANTOPRAZOLE SODIUM SCH MG: 40 GRANULE, DELAYED RELEASE ORAL at 08:42

## 2022-04-26 RX ADMIN — FENOFIBRATE SCH MG: 145 TABLET ORAL at 17:33

## 2022-04-26 RX ADMIN — INSULIN GLARGINE SCH UNIT: 100 INJECTION, SOLUTION SUBCUTANEOUS at 23:00

## 2022-04-26 RX ADMIN — Medication SCH MG: at 20:08

## 2022-04-26 RX ADMIN — Medication SCH EACH: at 11:51

## 2022-04-26 RX ADMIN — ALBUTEROL SULFATE SCH MG: 1.25 SOLUTION RESPIRATORY (INHALATION) at 16:48

## 2022-04-26 RX ADMIN — SIMETHICONE SCH MG: 20 SUSPENSION/ DROPS ORAL at 08:43

## 2022-04-26 RX ADMIN — SIMETHICONE SCH MG: 20 SUSPENSION/ DROPS ORAL at 13:02

## 2022-04-26 RX ADMIN — LISINOPRIL SCH MG: 5 TABLET ORAL at 08:42

## 2022-04-26 RX ADMIN — THERA TABS SCH UDTAB: TAB at 08:43

## 2022-04-26 RX ADMIN — ACETYLCYSTEINE SCH MG: 100 INHALANT RESPIRATORY (INHALATION) at 16:48

## 2022-04-26 RX ADMIN — MAGNESIUM OXIDE TAB 400 MG (241.3 MG ELEMENTAL MG) SCH MG: 400 (241.3 MG) TAB at 08:43

## 2022-04-26 RX ADMIN — INSULIN HUMAN PRN UNITS: 100 INJECTION, SOLUTION PARENTERAL at 23:04

## 2022-04-26 RX ADMIN — Medication SCH EACH: at 06:31

## 2022-04-26 RX ADMIN — Medication PRN ML: at 17:18

## 2022-04-27 VITALS — DIASTOLIC BLOOD PRESSURE: 59 MMHG | SYSTOLIC BLOOD PRESSURE: 102 MMHG

## 2022-04-27 VITALS — DIASTOLIC BLOOD PRESSURE: 75 MMHG | SYSTOLIC BLOOD PRESSURE: 124 MMHG

## 2022-04-27 VITALS — DIASTOLIC BLOOD PRESSURE: 53 MMHG | SYSTOLIC BLOOD PRESSURE: 116 MMHG

## 2022-04-27 LAB
BASOPHILS # BLD AUTO: 0 K/UL (ref 0–0.2)
BASOPHILS NFR BLD AUTO: 0.6 % (ref 0–2)
BUN SERPL-MCNC: 9 MG/DL (ref 7–18)
CALCIUM SERPL-MCNC: 8.7 MG/DL (ref 8.5–10.1)
CHLORIDE SERPL-SCNC: 107 MMOL/L (ref 98–107)
CO2 SERPL-SCNC: 28 MMOL/L (ref 21–32)
CREAT SERPL-MCNC: 0.4 MG/DL (ref 0.6–1.3)
EOSINOPHIL NFR BLD AUTO: 3.4 % (ref 0–6)
GLUCOSE SERPL-MCNC: 132 MG/DL (ref 74–106)
HCT VFR BLD AUTO: 31 % (ref 33–45)
HGB BLD-MCNC: 9.8 G/DL (ref 11.5–14.8)
LYMPHOCYTES NFR BLD AUTO: 3.1 K/UL (ref 0.8–4.8)
LYMPHOCYTES NFR BLD AUTO: 41.8 % (ref 20–44)
MAGNESIUM SERPL-MCNC: 2 MG/DL (ref 1.8–2.4)
MCHC RBC AUTO-ENTMCNC: 32 G/DL (ref 31–36)
MCV RBC AUTO: 101 FL (ref 82–100)
MONOCYTES NFR BLD AUTO: 1.1 K/UL (ref 0.1–1.3)
MONOCYTES NFR BLD AUTO: 15 % (ref 2–12)
NEUTROPHILS # BLD AUTO: 2.9 K/UL (ref 1.8–8.9)
NEUTROPHILS NFR BLD AUTO: 39.2 % (ref 43–81)
PHOSPHATE SERPL-MCNC: 4.7 MG/DL (ref 2.5–4.9)
PLATELET # BLD AUTO: 482 K/UL (ref 150–450)
POTASSIUM SERPL-SCNC: 4.4 MMOL/L (ref 3.5–5.1)
RBC # BLD AUTO: 3.03 MIL/UL (ref 4–5.2)
SODIUM SERPL-SCNC: 140 MMOL/L (ref 136–145)
WBC NRBC COR # BLD AUTO: 7.3 K/UL (ref 4.3–11)

## 2022-04-27 RX ADMIN — CLOTRIMAZOLE SCH GM: 1 CREAM TOPICAL at 17:46

## 2022-04-27 RX ADMIN — CLOTRIMAZOLE SCH GM: 1 CREAM TOPICAL at 17:47

## 2022-04-27 RX ADMIN — MAGNESIUM OXIDE TAB 400 MG (241.3 MG ELEMENTAL MG) SCH MG: 400 (241.3 MG) TAB at 09:39

## 2022-04-27 RX ADMIN — CALCIUM CARBONATE-CHOLECALCIFEROL TAB 250 MG-125 UNIT SCH UDTAB: 250-125 TAB at 09:39

## 2022-04-27 RX ADMIN — Medication SCH EACH: at 06:14

## 2022-04-27 RX ADMIN — Medication SCH EACH: at 09:39

## 2022-04-27 RX ADMIN — CLOTRIMAZOLE SCH GM: 1 CREAM TOPICAL at 09:40

## 2022-04-27 RX ADMIN — Medication PRN TAB: at 17:49

## 2022-04-27 RX ADMIN — Medication SCH MG: at 19:39

## 2022-04-27 RX ADMIN — ALBUTEROL SULFATE SCH MG: 1.25 SOLUTION RESPIRATORY (INHALATION) at 07:57

## 2022-04-27 RX ADMIN — Medication SCH EACH: at 17:49

## 2022-04-27 RX ADMIN — ALBUTEROL SULFATE SCH MG: 1.25 SOLUTION RESPIRATORY (INHALATION) at 02:16

## 2022-04-27 RX ADMIN — PANTOPRAZOLE SODIUM SCH MG: 40 GRANULE, DELAYED RELEASE ORAL at 09:41

## 2022-04-27 RX ADMIN — INSULIN HUMAN PRN UNITS: 100 INJECTION, SOLUTION PARENTERAL at 06:18

## 2022-04-27 RX ADMIN — ACETAMINOPHEN PRN MG: 160 SOLUTION ORAL at 12:22

## 2022-04-27 RX ADMIN — Medication SCH ML: at 09:38

## 2022-04-27 RX ADMIN — Medication SCH MG: at 02:16

## 2022-04-27 RX ADMIN — ALBUTEROL SULFATE SCH MG: 1.25 SOLUTION RESPIRATORY (INHALATION) at 19:39

## 2022-04-27 RX ADMIN — INSULIN GLARGINE SCH UNIT: 100 INJECTION, SOLUTION SUBCUTANEOUS at 22:51

## 2022-04-27 RX ADMIN — VITAMIN A AND VITAMIN D SCH GM: 929.3 OINTMENT TOPICAL at 09:36

## 2022-04-27 RX ADMIN — ACETAMINOPHEN PRN MG: 160 SOLUTION ORAL at 06:17

## 2022-04-27 RX ADMIN — ENOXAPARIN SODIUM SCH MG: 40 INJECTION SUBCUTANEOUS at 09:57

## 2022-04-27 RX ADMIN — ACETYLCYSTEINE SCH MG: 100 INHALANT RESPIRATORY (INHALATION) at 14:32

## 2022-04-27 RX ADMIN — VITAMIN D, TAB 1000IU (100/BT) SCH UNIT: 25 TAB at 09:39

## 2022-04-27 RX ADMIN — SIMETHICONE SCH MG: 20 SUSPENSION/ DROPS ORAL at 09:37

## 2022-04-27 RX ADMIN — INSULIN HUMAN PRN UNITS: 100 INJECTION, SOLUTION PARENTERAL at 12:12

## 2022-04-27 RX ADMIN — Medication SCH MG: at 14:01

## 2022-04-27 RX ADMIN — Medication SCH MG: at 07:58

## 2022-04-27 RX ADMIN — ALBUTEROL SULFATE SCH MG: 1.25 SOLUTION RESPIRATORY (INHALATION) at 14:01

## 2022-04-27 RX ADMIN — LISINOPRIL SCH MG: 5 TABLET ORAL at 09:40

## 2022-04-27 RX ADMIN — CALCIUM CARBONATE-CHOLECALCIFEROL TAB 250 MG-125 UNIT SCH UDTAB: 250-125 TAB at 17:48

## 2022-04-27 RX ADMIN — INSULIN HUMAN PRN UNITS: 100 INJECTION, SOLUTION PARENTERAL at 17:50

## 2022-04-27 RX ADMIN — CLOTRIMAZOLE SCH GM: 1 CREAM TOPICAL at 09:36

## 2022-04-27 RX ADMIN — THERA TABS SCH UDTAB: TAB at 09:39

## 2022-04-27 RX ADMIN — Medication SCH EACH: at 12:12

## 2022-04-27 RX ADMIN — VALPROIC ACID SCH MG: 250 SOLUTION ORAL at 17:49

## 2022-04-27 RX ADMIN — ACETYLCYSTEINE SCH MG: 100 INHALANT RESPIRATORY (INHALATION) at 07:58

## 2022-04-27 RX ADMIN — Medication SCH ML: at 17:48

## 2022-04-27 RX ADMIN — MAGNESIUM OXIDE TAB 400 MG (241.3 MG ELEMENTAL MG) SCH MG: 400 (241.3 MG) TAB at 17:48

## 2022-04-27 RX ADMIN — SIMETHICONE SCH MG: 20 SUSPENSION/ DROPS ORAL at 17:51

## 2022-04-27 RX ADMIN — FENOFIBRATE SCH MG: 145 TABLET ORAL at 17:48

## 2022-04-27 RX ADMIN — SIMETHICONE SCH MG: 20 SUSPENSION/ DROPS ORAL at 12:10

## 2022-04-27 RX ADMIN — Medication SCH MG: at 09:39

## 2022-04-28 VITALS — SYSTOLIC BLOOD PRESSURE: 102 MMHG | DIASTOLIC BLOOD PRESSURE: 53 MMHG

## 2022-04-28 VITALS — SYSTOLIC BLOOD PRESSURE: 100 MMHG | DIASTOLIC BLOOD PRESSURE: 69 MMHG

## 2022-04-28 RX ADMIN — FENOFIBRATE SCH MG: 145 TABLET ORAL at 17:21

## 2022-04-28 RX ADMIN — ACETYLCYSTEINE SCH MG: 100 INHALANT RESPIRATORY (INHALATION) at 00:31

## 2022-04-28 RX ADMIN — MAGNESIUM OXIDE TAB 400 MG (241.3 MG ELEMENTAL MG) SCH MG: 400 (241.3 MG) TAB at 08:27

## 2022-04-28 RX ADMIN — INSULIN HUMAN PRN UNITS: 100 INJECTION, SOLUTION PARENTERAL at 00:35

## 2022-04-28 RX ADMIN — CLOTRIMAZOLE SCH GM: 1 CREAM TOPICAL at 16:49

## 2022-04-28 RX ADMIN — VITAMIN A AND VITAMIN D SCH GM: 929.3 OINTMENT TOPICAL at 08:26

## 2022-04-28 RX ADMIN — THERA TABS SCH UDTAB: TAB at 08:31

## 2022-04-28 RX ADMIN — ACETYLCYSTEINE SCH MG: 100 INHALANT RESPIRATORY (INHALATION) at 14:41

## 2022-04-28 RX ADMIN — CALCIUM CARBONATE-CHOLECALCIFEROL TAB 250 MG-125 UNIT SCH UDTAB: 250-125 TAB at 16:49

## 2022-04-28 RX ADMIN — CLOTRIMAZOLE SCH GM: 1 CREAM TOPICAL at 08:27

## 2022-04-28 RX ADMIN — Medication SCH MG: at 00:31

## 2022-04-28 RX ADMIN — INSULIN HUMAN PRN UNITS: 100 INJECTION, SOLUTION PARENTERAL at 12:12

## 2022-04-28 RX ADMIN — ALBUTEROL SULFATE SCH MG: 1.25 SOLUTION RESPIRATORY (INHALATION) at 14:37

## 2022-04-28 RX ADMIN — ALBUTEROL SULFATE SCH MG: 1.25 SOLUTION RESPIRATORY (INHALATION) at 00:31

## 2022-04-28 RX ADMIN — Medication SCH EACH: at 12:09

## 2022-04-28 RX ADMIN — Medication SCH MG: at 07:56

## 2022-04-28 RX ADMIN — VITAMIN D, TAB 1000IU (100/BT) SCH UNIT: 25 TAB at 08:28

## 2022-04-28 RX ADMIN — PANTOPRAZOLE SODIUM SCH MG: 40 GRANULE, DELAYED RELEASE ORAL at 08:27

## 2022-04-28 RX ADMIN — SIMETHICONE SCH MG: 20 SUSPENSION/ DROPS ORAL at 12:46

## 2022-04-28 RX ADMIN — CALCIUM CARBONATE-CHOLECALCIFEROL TAB 250 MG-125 UNIT SCH UDTAB: 250-125 TAB at 08:27

## 2022-04-28 RX ADMIN — LISINOPRIL SCH MG: 5 TABLET ORAL at 08:28

## 2022-04-28 RX ADMIN — ACETAMINOPHEN PRN MG: 160 SOLUTION ORAL at 16:49

## 2022-04-28 RX ADMIN — CLOTRIMAZOLE SCH GM: 1 CREAM TOPICAL at 17:15

## 2022-04-28 RX ADMIN — SIMETHICONE SCH MG: 20 SUSPENSION/ DROPS ORAL at 16:49

## 2022-04-28 RX ADMIN — Medication PRN ML: at 04:20

## 2022-04-28 RX ADMIN — SIMETHICONE SCH MG: 20 SUSPENSION/ DROPS ORAL at 08:26

## 2022-04-28 RX ADMIN — Medication SCH EACH: at 17:15

## 2022-04-28 RX ADMIN — ACETAMINOPHEN PRN MG: 160 SOLUTION ORAL at 10:01

## 2022-04-28 RX ADMIN — MAGNESIUM OXIDE TAB 400 MG (241.3 MG ELEMENTAL MG) SCH MG: 400 (241.3 MG) TAB at 16:49

## 2022-04-28 RX ADMIN — ACETYLCYSTEINE SCH MG: 100 INHALANT RESPIRATORY (INHALATION) at 07:56

## 2022-04-28 RX ADMIN — ALBUTEROL SULFATE SCH MG: 1.25 SOLUTION RESPIRATORY (INHALATION) at 07:56

## 2022-04-28 RX ADMIN — INSULIN HUMAN PRN UNITS: 100 INJECTION, SOLUTION PARENTERAL at 06:43

## 2022-04-28 RX ADMIN — Medication SCH ML: at 08:29

## 2022-04-28 RX ADMIN — Medication SCH EACH: at 08:27

## 2022-04-28 RX ADMIN — INSULIN HUMAN PRN UNITS: 100 INJECTION, SOLUTION PARENTERAL at 17:18

## 2022-04-28 RX ADMIN — Medication SCH EACH: at 06:43

## 2022-04-28 RX ADMIN — VALPROIC ACID SCH MG: 250 SOLUTION ORAL at 17:21

## 2022-04-28 RX ADMIN — Medication SCH MG: at 08:28

## 2022-04-28 RX ADMIN — Medication SCH ML: at 16:51

## 2022-04-28 RX ADMIN — Medication SCH MG: at 14:37

## 2022-04-28 RX ADMIN — CLOTRIMAZOLE SCH GM: 1 CREAM TOPICAL at 09:26

## 2022-04-28 RX ADMIN — Medication SCH EACH: at 00:34

## 2022-04-28 RX ADMIN — ENOXAPARIN SODIUM SCH MG: 40 INJECTION SUBCUTANEOUS at 09:36

## 2022-05-03 ENCOUNTER — HOSPITAL ENCOUNTER (INPATIENT)
Dept: HOSPITAL 12 - ER | Age: 52
LOS: 9 days | Discharge: HOME HEALTH | DRG: 720 | End: 2022-05-12
Payer: MEDICAID

## 2022-05-03 VITALS — BODY MASS INDEX: 25.42 KG/M2 | WEIGHT: 113 LBS | HEIGHT: 56 IN

## 2022-05-03 VITALS — DIASTOLIC BLOOD PRESSURE: 52 MMHG | SYSTOLIC BLOOD PRESSURE: 102 MMHG

## 2022-05-03 DIAGNOSIS — Z79.84: ICD-10-CM

## 2022-05-03 DIAGNOSIS — G80.9: ICD-10-CM

## 2022-05-03 DIAGNOSIS — J18.9: ICD-10-CM

## 2022-05-03 DIAGNOSIS — E83.41: ICD-10-CM

## 2022-05-03 DIAGNOSIS — R13.10: ICD-10-CM

## 2022-05-03 DIAGNOSIS — Z20.822: ICD-10-CM
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
## 2022-05-03 DIAGNOSIS — E87.0: ICD-10-CM

## 2022-05-03 DIAGNOSIS — N17.9: ICD-10-CM

## 2022-05-03 DIAGNOSIS — Z93.1: ICD-10-CM

## 2022-05-03 DIAGNOSIS — Z79.4: ICD-10-CM

## 2022-05-03 DIAGNOSIS — R19.7: ICD-10-CM

## 2022-05-03 DIAGNOSIS — E83.39: ICD-10-CM

## 2022-05-03 DIAGNOSIS — Z87.01: ICD-10-CM

## 2022-05-03 DIAGNOSIS — Z98.1: ICD-10-CM

## 2022-05-03 DIAGNOSIS — G92.8: ICD-10-CM

## 2022-05-03 DIAGNOSIS — R53.2: ICD-10-CM

## 2022-05-03 DIAGNOSIS — J15.9: ICD-10-CM

## 2022-05-03 DIAGNOSIS — K59.00: ICD-10-CM

## 2022-05-03 DIAGNOSIS — Z87.440: ICD-10-CM

## 2022-05-03 DIAGNOSIS — N13.6: ICD-10-CM

## 2022-05-03 DIAGNOSIS — E87.5: ICD-10-CM

## 2022-05-03 DIAGNOSIS — E11.9: ICD-10-CM

## 2022-05-03 DIAGNOSIS — M80.052D: ICD-10-CM

## 2022-05-03 DIAGNOSIS — G40.909: ICD-10-CM

## 2022-05-03 DIAGNOSIS — E66.9: ICD-10-CM

## 2022-05-03 DIAGNOSIS — A41.9: Primary | ICD-10-CM

## 2022-05-03 LAB
ALP SERPL-CCNC: 180 U/L (ref 50–136)
ALT SERPL W/O P-5'-P-CCNC: 14 U/L (ref 14–59)
AST SERPL-CCNC: 21 U/L (ref 15–37)
BILIRUB DIRECT SERPL-MCNC: 0.1 MG/DL (ref 0–0.2)
BILIRUB SERPL-MCNC: 0.3 MG/DL (ref 0.2–1)
BUN SERPL-MCNC: 42 MG/DL (ref 7–18)
CHLORIDE SERPL-SCNC: 110 MMOL/L (ref 98–107)
CO2 SERPL-SCNC: 28 MMOL/L (ref 21–32)
CREAT SERPL-MCNC: 0.6 MG/DL (ref 0.6–1.3)
GLUCOSE SERPL-MCNC: 203 MG/DL (ref 74–106)
HCT VFR BLD AUTO: 39.7 % (ref 31.2–41.9)
MCH RBC QN AUTO: 33 UUG (ref 24.7–32.8)
MCV RBC AUTO: 102.4 FL (ref 75.5–95.3)
PLATELET # BLD AUTO: 429 K/UL (ref 179–408)
POTASSIUM SERPL-SCNC: 5.6 MMOL/L (ref 3.5–5.1)
WS STN SPEC: 7.8 G/DL (ref 6.4–8.2)

## 2022-05-03 PROCEDURE — A4663 DIALYSIS BLOOD PRESSURE CUFF: HCPCS

## 2022-05-03 PROCEDURE — A6209 FOAM DRSG <=16 SQ IN W/O BDR: HCPCS

## 2022-05-03 PROCEDURE — G0378 HOSPITAL OBSERVATION PER HR: HCPCS

## 2022-05-03 PROCEDURE — A6213 FOAM DRG >16<=48 SQ IN W/BDR: HCPCS

## 2022-05-03 PROCEDURE — C9113 INJ PANTOPRAZOLE SODIUM, VIA: HCPCS

## 2022-05-03 PROCEDURE — C1758 CATHETER, URETERAL: HCPCS

## 2022-05-03 NOTE — NUR
RECEIVED REPORT FROM AMEE GURROLA. 







PT NOTED TO BE IN BED RESTING COMFORTABLY, NO SOB OR LABORED BREATHING.

## 2022-05-03 NOTE — NUR
Received pt from er via [facility]. Under the care of Dr. Crane. Dx: sepsis. Pt sister at 
bedside. Belonging list done. Admission process and care plan initiated. Skilled nursing 
assessment done. Pt in no acute distress. Pt aphasic. Iv intact. Safety and comfort 
provided. Will endorse to incoming nurse for continuity of care.

## 2022-05-04 VITALS — DIASTOLIC BLOOD PRESSURE: 64 MMHG | SYSTOLIC BLOOD PRESSURE: 98 MMHG

## 2022-05-04 VITALS — DIASTOLIC BLOOD PRESSURE: 50 MMHG | SYSTOLIC BLOOD PRESSURE: 95 MMHG

## 2022-05-04 VITALS — DIASTOLIC BLOOD PRESSURE: 56 MMHG | SYSTOLIC BLOOD PRESSURE: 94 MMHG

## 2022-05-04 VITALS — SYSTOLIC BLOOD PRESSURE: 120 MMHG | DIASTOLIC BLOOD PRESSURE: 71 MMHG

## 2022-05-04 VITALS — DIASTOLIC BLOOD PRESSURE: 68 MMHG | SYSTOLIC BLOOD PRESSURE: 107 MMHG

## 2022-05-04 LAB
ALP SERPL-CCNC: 134 U/L (ref 50–136)
ALT SERPL W/O P-5'-P-CCNC: 16 U/L (ref 14–59)
APPEARANCE UR: (no result)
AST SERPL-CCNC: 19 U/L (ref 15–37)
BILIRUB SERPL-MCNC: 0.4 MG/DL (ref 0.2–1)
BILIRUB UR QL STRIP: NEGATIVE
BUN SERPL-MCNC: 43 MG/DL (ref 7–18)
CHLORIDE SERPL-SCNC: 111 MMOL/L (ref 98–107)
CHOLEST SERPL-MCNC: 150 MG/DL (ref ?–200)
CO2 SERPL-SCNC: 26 MMOL/L (ref 21–32)
COLOR UR: YELLOW
CREAT SERPL-MCNC: 0.6 MG/DL (ref 0.6–1.3)
DEPRECATED SQUAMOUS URNS QL MICRO: (no result) /HPF
GLUCOSE SERPL-MCNC: 86 MG/DL (ref 74–106)
GLUCOSE UR STRIP-MCNC: (no result) MG/DL
HCT VFR BLD AUTO: 33.7 % (ref 31.2–41.9)
HDLC SERPL-MCNC: 14 MG/DL (ref 40–60)
HGB UR QL STRIP: (no result)
KETONES UR STRIP-MCNC: NEGATIVE MG/DL
LEUKOCYTE ESTERASE UR QL STRIP: (no result)
MAGNESIUM SERPL-MCNC: 2.5 MG/DL (ref 1.8–2.4)
MCH RBC QN AUTO: 32.9 UUG (ref 24.7–32.8)
MCV RBC AUTO: 101.3 FL (ref 75.5–95.3)
NITRITE UR QL STRIP: NEGATIVE
PH UR STRIP: 5 [PH] (ref 5–8)
PHOSPHATE SERPL-MCNC: 5.4 MG/DL (ref 2.5–4.9)
PLATELET # BLD AUTO: 294 K/UL (ref 179–408)
POTASSIUM SERPL-SCNC: 4.9 MMOL/L (ref 3.5–5.1)
RBC #/AREA URNS HPF: (no result) /HPF (ref 0–3)
SP GR UR STRIP: 1.02 (ref 1–1.03)
TRIGL SERPL-MCNC: 478 MG/DL (ref 30–150)
TSH SERPL DL<=0.005 MIU/L-ACNC: 2.85 MIU/ML (ref 0.36–3.74)
UROBILINOGEN UR STRIP-MCNC: 0.2 E.U./DL
VALPROATE SERPL-MCNC: 22 UG/ML (ref 50–100)
WBC #/AREA URNS HPF: (no result) /HPF
WS STN SPEC: 6.5 G/DL (ref 6.4–8.2)

## 2022-05-04 PROCEDURE — B547ZZA ULTRASONOGRAPHY OF LEFT SUBCLAVIAN VEIN, GUIDANCE: ICD-10-PCS

## 2022-05-04 PROCEDURE — 05H633Z INSERTION OF INFUSION DEVICE INTO LEFT SUBCLAVIAN VEIN, PERCUTANEOUS APPROACH: ICD-10-PCS

## 2022-05-04 RX ADMIN — Medication PRN ML: at 09:06

## 2022-05-04 RX ADMIN — DEXTROSE SCH MG: 50 INJECTION, SOLUTION INTRAVENOUS at 08:57

## 2022-05-04 RX ADMIN — VITAMIN D, TAB 1000IU (100/BT) SCH UNIT: 25 TAB at 08:56

## 2022-05-04 RX ADMIN — PIPERACILLIN SODIUM AND TAZOBACTAM SODIUM SCH MLS/HR: .375; 3 INJECTION, POWDER, LYOPHILIZED, FOR SOLUTION INTRAVENOUS at 09:06

## 2022-05-04 RX ADMIN — METFORMIN HYDROCHLORIDE SCH MG: 500 TABLET ORAL at 08:56

## 2022-05-04 RX ADMIN — Medication SCH EACH: at 11:39

## 2022-05-04 RX ADMIN — Medication SCH EACH: at 06:14

## 2022-05-04 RX ADMIN — Medication SCH EACH: at 17:15

## 2022-05-04 RX ADMIN — Medication SCH EACH: at 23:46

## 2022-05-04 RX ADMIN — VALPROIC ACID SCH MG: 250 SOLUTION ORAL at 08:56

## 2022-05-04 RX ADMIN — PIPERACILLIN SODIUM AND TAZOBACTAM SODIUM SCH MLS/HR: .375; 3 INJECTION, POWDER, LYOPHILIZED, FOR SOLUTION INTRAVENOUS at 14:01

## 2022-05-04 RX ADMIN — THERA TABS SCH UDTAB: TAB at 08:56

## 2022-05-04 RX ADMIN — DEXTROSE SCH MLS/HR: 50 INJECTION, SOLUTION INTRAVENOUS at 11:41

## 2022-05-04 RX ADMIN — Medication SCH EACH: at 00:45

## 2022-05-04 RX ADMIN — ACETAMINOPHEN PRN MG: 160 SOLUTION ORAL at 00:33

## 2022-05-04 RX ADMIN — OXYCODONE HYDROCHLORIDE AND ACETAMINOPHEN SCH MG: 500 TABLET ORAL at 16:06

## 2022-05-04 RX ADMIN — DEXTROSE SCH MLS/HR: 50 INJECTION, SOLUTION INTRAVENOUS at 23:29

## 2022-05-04 RX ADMIN — VALPROIC ACID SCH MG: 250 SOLUTION ORAL at 17:05

## 2022-05-04 RX ADMIN — ZINC SULFATE CAP 220 MG (50 MG ELEMENTAL ZN) SCH MG: 220 (50 ZN) CAP at 08:56

## 2022-05-04 RX ADMIN — ACETAMINOPHEN PRN MG: 160 SOLUTION ORAL at 21:29

## 2022-05-04 RX ADMIN — Medication SCH ML: at 08:57

## 2022-05-04 RX ADMIN — Medication SCH ML: at 16:06

## 2022-05-04 RX ADMIN — OXYCODONE HYDROCHLORIDE AND ACETAMINOPHEN SCH MG: 500 TABLET ORAL at 08:56

## 2022-05-04 RX ADMIN — SODIUM CHLORIDE PRN UNIT: 9 INJECTION, SOLUTION INTRAVENOUS at 23:49

## 2022-05-04 RX ADMIN — METFORMIN HYDROCHLORIDE SCH MG: 500 TABLET ORAL at 16:06

## 2022-05-04 RX ADMIN — SODIUM CHLORIDE PRN MLS/HR: 0.45 INJECTION, SOLUTION INTRAVENOUS at 13:51

## 2022-05-04 RX ADMIN — SODIUM CHLORIDE SCH MLS/HR: 9 INJECTION, SOLUTION INTRAVENOUS at 21:29

## 2022-05-04 NOTE — NUR
patient seems very comfortable, slept, intermittently through out the shift, aphasic  
turned, repositioned every 2 hours, kept clean and dry, caregiver at bedside, good oral care 
provided, g-tube intact, with positive placement, g-tube site is clean and dry, no skin 
issues noted, midline inserted to left upper arm.

-------------------------------------------------------------------------------

Addendum: 05/04/22 at 1833 by YG MOHAMUD RN, RN

-------------------------------------------------------------------------------

ducolax suppository administered for constipation, continue to monitor for effective, will 
endorse accordingly

-------------------------------------------------------------------------------

Addendum: 05/04/22 at 1853 by YG MOHAMUD RN RN

-------------------------------------------------------------------------------

error documenting ducolax suppository. not administered.

## 2022-05-04 NOTE — NUR
Received pt in no acute distress. Personal caregiver at bedside for safety. Iv intact. 
Tolerating gtube feeding. Pt on 1L nasal cannula. Safety and comfort provided. Will continue 
to monitor.

## 2022-05-04 NOTE — NUR
Sister of the pt  (Maite) called and asked for update regarding her sister. Gave 
information. Maite asked regarding kidney stones that dayshift told her. I just read what's 
the result of the Abdomen/Pelvis CT told her for interpretation and explanation, the doctor 
will interpret it. Will continue to monitor.

## 2022-05-04 NOTE — NUR
Pt in no acute distress. Iv intact. Pt on sinus rhythm . Prescribed medication given and pt 
tolerated it well. Pt turned and repositioned. Safety and comfort provided.Vital signs 
within normal limit.  All needs are met. Will endorse to incoming nurse for continuity of 
care.

## 2022-05-05 VITALS — DIASTOLIC BLOOD PRESSURE: 68 MMHG | SYSTOLIC BLOOD PRESSURE: 121 MMHG

## 2022-05-05 VITALS — DIASTOLIC BLOOD PRESSURE: 80 MMHG | SYSTOLIC BLOOD PRESSURE: 124 MMHG

## 2022-05-05 VITALS — DIASTOLIC BLOOD PRESSURE: 66 MMHG | SYSTOLIC BLOOD PRESSURE: 104 MMHG

## 2022-05-05 VITALS — DIASTOLIC BLOOD PRESSURE: 78 MMHG | SYSTOLIC BLOOD PRESSURE: 134 MMHG

## 2022-05-05 LAB
BUN SERPL-MCNC: 23 MG/DL (ref 7–18)
CHLORIDE SERPL-SCNC: 106 MMOL/L (ref 98–107)
CO2 SERPL-SCNC: 25 MMOL/L (ref 21–32)
CREAT SERPL-MCNC: 0.5 MG/DL (ref 0.6–1.3)
GLUCOSE SERPL-MCNC: 197 MG/DL (ref 74–106)
HCT VFR BLD AUTO: 33.6 % (ref 31.2–41.9)
MAGNESIUM SERPL-MCNC: 2 MG/DL (ref 1.8–2.4)
MCH RBC QN AUTO: 33.3 UUG (ref 24.7–32.8)
MCV RBC AUTO: 103.7 FL (ref 75.5–95.3)
PHOSPHATE SERPL-MCNC: 3.8 MG/DL (ref 2.5–4.9)
PLATELET # BLD AUTO: 225 K/UL (ref 179–408)
POTASSIUM SERPL-SCNC: 4.6 MMOL/L (ref 3.5–5.1)

## 2022-05-05 RX ADMIN — Medication SCH ML: at 08:13

## 2022-05-05 RX ADMIN — THERA TABS SCH UDTAB: TAB at 08:12

## 2022-05-05 RX ADMIN — VALPROIC ACID SCH MG: 250 SOLUTION ORAL at 17:04

## 2022-05-05 RX ADMIN — VALPROIC ACID SCH MG: 250 SOLUTION ORAL at 08:12

## 2022-05-05 RX ADMIN — SODIUM CHLORIDE PRN MLS/HR: 0.45 INJECTION, SOLUTION INTRAVENOUS at 17:07

## 2022-05-05 RX ADMIN — Medication SCH ML: at 17:04

## 2022-05-05 RX ADMIN — OXYCODONE HYDROCHLORIDE AND ACETAMINOPHEN SCH MG: 500 TABLET ORAL at 08:13

## 2022-05-05 RX ADMIN — SODIUM CHLORIDE SCH MLS/HR: 9 INJECTION, SOLUTION INTRAVENOUS at 05:10

## 2022-05-05 RX ADMIN — METFORMIN HYDROCHLORIDE SCH MG: 500 TABLET ORAL at 08:12

## 2022-05-05 RX ADMIN — DEXTROSE SCH MG: 50 INJECTION, SOLUTION INTRAVENOUS at 08:12

## 2022-05-05 RX ADMIN — ACETAMINOPHEN PRN MG: 160 SOLUTION ORAL at 08:15

## 2022-05-05 RX ADMIN — ZINC SULFATE CAP 220 MG (50 MG ELEMENTAL ZN) SCH MG: 220 (50 ZN) CAP at 08:14

## 2022-05-05 RX ADMIN — SODIUM CHLORIDE PRN UNIT: 9 INJECTION, SOLUTION INTRAVENOUS at 05:32

## 2022-05-05 RX ADMIN — Medication SCH EACH: at 17:05

## 2022-05-05 RX ADMIN — ACETAMINOPHEN PRN MG: 160 SOLUTION ORAL at 15:15

## 2022-05-05 RX ADMIN — Medication PRN ML: at 08:12

## 2022-05-05 RX ADMIN — VITAMIN D, TAB 1000IU (100/BT) SCH UNIT: 25 TAB at 08:14

## 2022-05-05 RX ADMIN — Medication SCH EACH: at 11:31

## 2022-05-05 RX ADMIN — SODIUM CHLORIDE SCH MLS/HR: 9 INJECTION, SOLUTION INTRAVENOUS at 21:19

## 2022-05-05 RX ADMIN — SODIUM CHLORIDE SCH MLS/HR: 9 INJECTION, SOLUTION INTRAVENOUS at 13:41

## 2022-05-05 RX ADMIN — Medication PRN MG: at 00:41

## 2022-05-05 RX ADMIN — Medication SCH EACH: at 05:13

## 2022-05-05 RX ADMIN — SODIUM CHLORIDE PRN UNIT: 9 INJECTION, SOLUTION INTRAVENOUS at 11:32

## 2022-05-05 RX ADMIN — METFORMIN HYDROCHLORIDE SCH MG: 500 TABLET ORAL at 17:04

## 2022-05-05 RX ADMIN — SODIUM CHLORIDE PRN UNIT: 9 INJECTION, SOLUTION INTRAVENOUS at 16:56

## 2022-05-05 RX ADMIN — OXYCODONE HYDROCHLORIDE AND ACETAMINOPHEN SCH MG: 500 TABLET ORAL at 17:04

## 2022-05-05 NOTE — NUR
noted with diarrhea x3 during shift, patient kept clean and dry during shift,, good pericare 
provided, no skin issues noted at this time, try to collect the urine, unable to collect, 
will endorse accordingly, turned and repositioned for comfort multiple times during shift, 
g-tube intact with positive placement, g-tube site cleaned and dressing done. continue to 
monitor.

## 2022-05-05 NOTE — NUR
At 0041H morphine 1mg prn given for generalized pains evidenced by facial grimace and 
restlessness.  Pt tolerated it well. After an hour medication effective pt with no facial 
grimace. Pt in no acute distress. Will continue to monitor.

## 2022-05-05 NOTE — NUR
Pt slept intermittently. Pt in no acute distress. Prescribed medication given and pt 
tolerated it well. Safety and comfort provided. All needs are met. Pt turned and 
repositioned. Will endorse to incoming nurse for continuity of care.

## 2022-05-06 VITALS — DIASTOLIC BLOOD PRESSURE: 52 MMHG | SYSTOLIC BLOOD PRESSURE: 115 MMHG

## 2022-05-06 VITALS — SYSTOLIC BLOOD PRESSURE: 115 MMHG | DIASTOLIC BLOOD PRESSURE: 64 MMHG

## 2022-05-06 VITALS — DIASTOLIC BLOOD PRESSURE: 67 MMHG | SYSTOLIC BLOOD PRESSURE: 108 MMHG

## 2022-05-06 VITALS — SYSTOLIC BLOOD PRESSURE: 116 MMHG | DIASTOLIC BLOOD PRESSURE: 70 MMHG

## 2022-05-06 LAB
BUN SERPL-MCNC: 13 MG/DL (ref 7–18)
CHLORIDE SERPL-SCNC: 104 MMOL/L (ref 98–107)
CO2 SERPL-SCNC: 29 MMOL/L (ref 21–32)
CREAT SERPL-MCNC: 0.4 MG/DL (ref 0.6–1.3)
GLUCOSE SERPL-MCNC: 143 MG/DL (ref 74–106)
HCT VFR BLD AUTO: 32.6 % (ref 31.2–41.9)
MAGNESIUM SERPL-MCNC: 1.7 MG/DL (ref 1.8–2.4)
MCH RBC QN AUTO: 33.5 UUG (ref 24.7–32.8)
MCV RBC AUTO: 100 FL (ref 75.5–95.3)
PHOSPHATE SERPL-MCNC: 3.2 MG/DL (ref 2.5–4.9)
PLATELET # BLD AUTO: 240 K/UL (ref 179–408)
POTASSIUM SERPL-SCNC: 4.4 MMOL/L (ref 3.5–5.1)

## 2022-05-06 RX ADMIN — OXYCODONE HYDROCHLORIDE AND ACETAMINOPHEN SCH MG: 500 TABLET ORAL at 16:54

## 2022-05-06 RX ADMIN — Medication PRN MG: at 05:55

## 2022-05-06 RX ADMIN — SODIUM CHLORIDE SCH MLS/HR: 9 INJECTION, SOLUTION INTRAVENOUS at 22:26

## 2022-05-06 RX ADMIN — THERA TABS SCH UDTAB: TAB at 08:49

## 2022-05-06 RX ADMIN — Medication SCH EACH: at 00:05

## 2022-05-06 RX ADMIN — PANTOPRAZOLE SODIUM SCH MG: 40 GRANULE, DELAYED RELEASE ORAL at 05:38

## 2022-05-06 RX ADMIN — SODIUM CHLORIDE PRN UNIT: 9 INJECTION, SOLUTION INTRAVENOUS at 12:25

## 2022-05-06 RX ADMIN — Medication SCH ML: at 16:54

## 2022-05-06 RX ADMIN — ACETAMINOPHEN PRN MG: 160 SOLUTION ORAL at 00:19

## 2022-05-06 RX ADMIN — Medication SCH ML: at 08:50

## 2022-05-06 RX ADMIN — SODIUM CHLORIDE PRN UNIT: 9 INJECTION, SOLUTION INTRAVENOUS at 00:06

## 2022-05-06 RX ADMIN — Medication SCH EACH: at 17:31

## 2022-05-06 RX ADMIN — ZINC SULFATE CAP 220 MG (50 MG ELEMENTAL ZN) SCH MG: 220 (50 ZN) CAP at 08:49

## 2022-05-06 RX ADMIN — SODIUM CHLORIDE SCH MLS/HR: 9 INJECTION, SOLUTION INTRAVENOUS at 14:21

## 2022-05-06 RX ADMIN — SODIUM CHLORIDE SCH MLS/HR: 9 INJECTION, SOLUTION INTRAVENOUS at 05:28

## 2022-05-06 RX ADMIN — VALPROIC ACID SCH MG: 250 SOLUTION ORAL at 17:00

## 2022-05-06 RX ADMIN — Medication PRN ML: at 09:49

## 2022-05-06 RX ADMIN — METFORMIN HYDROCHLORIDE SCH MG: 500 TABLET ORAL at 08:49

## 2022-05-06 RX ADMIN — SODIUM CHLORIDE PRN MLS/HR: 0.45 INJECTION, SOLUTION INTRAVENOUS at 05:38

## 2022-05-06 RX ADMIN — VITAMIN D, TAB 1000IU (100/BT) SCH UNIT: 25 TAB at 08:49

## 2022-05-06 RX ADMIN — OXYCODONE HYDROCHLORIDE AND ACETAMINOPHEN SCH MG: 500 TABLET ORAL at 08:49

## 2022-05-06 RX ADMIN — SODIUM CHLORIDE PRN UNIT: 9 INJECTION, SOLUTION INTRAVENOUS at 05:30

## 2022-05-06 RX ADMIN — METFORMIN HYDROCHLORIDE SCH MG: 500 TABLET ORAL at 16:54

## 2022-05-06 RX ADMIN — VALPROIC ACID SCH MG: 250 SOLUTION ORAL at 08:49

## 2022-05-06 RX ADMIN — Medication SCH EACH: at 12:26

## 2022-05-06 RX ADMIN — Medication SCH EACH: at 05:29

## 2022-05-06 NOTE — NUR
With loose BM, Incontinence care done with sponge bath. Repositioned comfortably. G tube 
feeding tolerating.

## 2022-05-07 VITALS — SYSTOLIC BLOOD PRESSURE: 109 MMHG | DIASTOLIC BLOOD PRESSURE: 62 MMHG

## 2022-05-07 VITALS — SYSTOLIC BLOOD PRESSURE: 101 MMHG | DIASTOLIC BLOOD PRESSURE: 61 MMHG

## 2022-05-07 VITALS — SYSTOLIC BLOOD PRESSURE: 107 MMHG | DIASTOLIC BLOOD PRESSURE: 69 MMHG

## 2022-05-07 VITALS — SYSTOLIC BLOOD PRESSURE: 121 MMHG | DIASTOLIC BLOOD PRESSURE: 77 MMHG

## 2022-05-07 LAB
BUN SERPL-MCNC: 15 MG/DL (ref 7–18)
CHLORIDE SERPL-SCNC: 104 MMOL/L (ref 98–107)
CO2 SERPL-SCNC: 27 MMOL/L (ref 21–32)
CREAT SERPL-MCNC: 0.4 MG/DL (ref 0.6–1.3)
GLUCOSE SERPL-MCNC: 171 MG/DL (ref 74–106)
HCT VFR BLD AUTO: 32.8 % (ref 31.2–41.9)
MAGNESIUM SERPL-MCNC: 1.9 MG/DL (ref 1.8–2.4)
MCH RBC QN AUTO: 33.8 UUG (ref 24.7–32.8)
MCV RBC AUTO: 101.1 FL (ref 75.5–95.3)
PHOSPHATE SERPL-MCNC: 3.2 MG/DL (ref 2.5–4.9)
PLATELET # BLD AUTO: 247 K/UL (ref 179–408)
POTASSIUM SERPL-SCNC: 4.8 MMOL/L (ref 3.5–5.1)

## 2022-05-07 RX ADMIN — OXYCODONE HYDROCHLORIDE AND ACETAMINOPHEN SCH MG: 500 TABLET ORAL at 09:40

## 2022-05-07 RX ADMIN — ACETAMINOPHEN PRN MG: 160 SOLUTION ORAL at 22:39

## 2022-05-07 RX ADMIN — Medication SCH EACH: at 06:58

## 2022-05-07 RX ADMIN — ZINC SULFATE CAP 220 MG (50 MG ELEMENTAL ZN) SCH MG: 220 (50 ZN) CAP at 09:40

## 2022-05-07 RX ADMIN — Medication SCH EACH: at 12:10

## 2022-05-07 RX ADMIN — SODIUM CHLORIDE PRN UNIT: 9 INJECTION, SOLUTION INTRAVENOUS at 17:37

## 2022-05-07 RX ADMIN — PANTOPRAZOLE SODIUM SCH MG: 40 GRANULE, DELAYED RELEASE ORAL at 06:26

## 2022-05-07 RX ADMIN — THERA TABS SCH UDTAB: TAB at 09:39

## 2022-05-07 RX ADMIN — VALPROIC ACID SCH MG: 250 SOLUTION ORAL at 17:16

## 2022-05-07 RX ADMIN — SODIUM CHLORIDE PRN UNIT: 9 INJECTION, SOLUTION INTRAVENOUS at 09:41

## 2022-05-07 RX ADMIN — SODIUM CHLORIDE PRN MLS/HR: 0.45 INJECTION, SOLUTION INTRAVENOUS at 12:35

## 2022-05-07 RX ADMIN — Medication SCH ML: at 17:16

## 2022-05-07 RX ADMIN — OXYCODONE HYDROCHLORIDE AND ACETAMINOPHEN SCH MG: 500 TABLET ORAL at 17:15

## 2022-05-07 RX ADMIN — SODIUM CHLORIDE SCH MLS/HR: 9 INJECTION, SOLUTION INTRAVENOUS at 13:46

## 2022-05-07 RX ADMIN — SODIUM CHLORIDE PRN UNIT: 9 INJECTION, SOLUTION INTRAVENOUS at 23:12

## 2022-05-07 RX ADMIN — SODIUM CHLORIDE PRN UNIT: 9 INJECTION, SOLUTION INTRAVENOUS at 12:12

## 2022-05-07 RX ADMIN — ACETAMINOPHEN PRN MG: 160 SOLUTION ORAL at 13:46

## 2022-05-07 RX ADMIN — VITAMIN D, TAB 1000IU (100/BT) SCH UNIT: 25 TAB at 09:39

## 2022-05-07 RX ADMIN — SODIUM CHLORIDE SCH MLS/HR: 9 INJECTION, SOLUTION INTRAVENOUS at 06:26

## 2022-05-07 RX ADMIN — METFORMIN HYDROCHLORIDE SCH MG: 500 TABLET ORAL at 09:39

## 2022-05-07 RX ADMIN — Medication SCH EACH: at 17:35

## 2022-05-07 RX ADMIN — Medication PRN ML: at 12:35

## 2022-05-07 RX ADMIN — SODIUM CHLORIDE PRN UNIT: 9 INJECTION, SOLUTION INTRAVENOUS at 01:13

## 2022-05-07 RX ADMIN — Medication SCH EACH: at 00:00

## 2022-05-07 RX ADMIN — Medication SCH EACH: at 23:11

## 2022-05-07 RX ADMIN — METFORMIN HYDROCHLORIDE SCH MG: 500 TABLET ORAL at 17:15

## 2022-05-07 RX ADMIN — Medication SCH ML: at 09:39

## 2022-05-07 RX ADMIN — VALPROIC ACID SCH MG: 250 SOLUTION ORAL at 09:38

## 2022-05-07 RX ADMIN — SODIUM CHLORIDE SCH MLS/HR: 9 INJECTION, SOLUTION INTRAVENOUS at 21:26

## 2022-05-07 NOTE — NUR
Received patient lying in bed. Awake with eyes open. Non-verbal. Unable to make eye contact. 
In no apparent distress. No signs or symptoms of pain or SOB. On O2 at1LPM via NC in place. 
O2 sat at 98%. GT feeding infusing. RIA midline intact and patent. IVF infusing. Needs 
assessed and anticipated to. Safety measure initaited and call light within reached.

## 2022-05-07 NOTE — NUR
Report given to am nurse pt blood sugar 135 no signs of diabetic reaction noted. Am 
medication given as ordered. No adverse reaction noted.

## 2022-05-07 NOTE — NUR
PER HOSPITALIST PATIENT IS STABLE FOR DISCHARGE.BACK TO SKILL FACILITY WITH ORDER TO D/C 
TODAY.PER ID RECOMMENDATION PATIENT NEEDS TWO WEEKS OF IV ATB THERAPY. CM REACHED OUT TO 
FACILITY

AND THEY STATED THAT NEED TO COORDINATED WITH [facility] [phone]. CM CALLED AND LEFT VM. CM 
TO

FOLLOW UP.HOSPITALIST MADE AWARE AND D/C IS PENDING

## 2022-05-07 NOTE — NUR
Received patient with sitter at bedside pt is alert times.  Pt has pt has GT feeding of 
Glucerna infusing at 65ml/hr pt is tolerating feeding well. pt is given  no signs of 
respiratory distress noted. 

Pt midnight blood sugar is 206 no signs of diabetic reaction noted.Insulin coverage given as 
ordered will continue to monitor hourly.  Bed in  low position.

## 2022-05-07 NOTE — NUR
Received patient awake mentally delay, with sitter at bedside pt is alert times respirations 
even and unlabored with O2 at 1l via N/C Sat 98%. Pt has GT feeding of Glucerna infusing at 
65ml/hr she is tolerating feeding well.Incontinent of B&B 

 blood sugar check , no signs of diabetic reaction noted.Insulin coverage given as ordered 
will continue to monitor hourly.safety measures in place.  Bed in  low position with alarm 
on side rails up called light at reach..

## 2022-05-08 VITALS — DIASTOLIC BLOOD PRESSURE: 56 MMHG | SYSTOLIC BLOOD PRESSURE: 105 MMHG

## 2022-05-08 VITALS — SYSTOLIC BLOOD PRESSURE: 107 MMHG | DIASTOLIC BLOOD PRESSURE: 68 MMHG

## 2022-05-08 VITALS — SYSTOLIC BLOOD PRESSURE: 116 MMHG | DIASTOLIC BLOOD PRESSURE: 72 MMHG

## 2022-05-08 VITALS — SYSTOLIC BLOOD PRESSURE: 116 MMHG | DIASTOLIC BLOOD PRESSURE: 67 MMHG

## 2022-05-08 RX ADMIN — Medication SCH EACH: at 13:05

## 2022-05-08 RX ADMIN — SODIUM CHLORIDE SCH MLS/HR: 9 INJECTION, SOLUTION INTRAVENOUS at 13:14

## 2022-05-08 RX ADMIN — VITAMIN D, TAB 1000IU (100/BT) SCH UNIT: 25 TAB at 08:47

## 2022-05-08 RX ADMIN — Medication SCH ML: at 09:11

## 2022-05-08 RX ADMIN — SODIUM CHLORIDE PRN UNIT: 9 INJECTION, SOLUTION INTRAVENOUS at 05:06

## 2022-05-08 RX ADMIN — Medication PRN MG: at 04:07

## 2022-05-08 RX ADMIN — OXYCODONE HYDROCHLORIDE AND ACETAMINOPHEN SCH MG: 500 TABLET ORAL at 08:47

## 2022-05-08 RX ADMIN — ACETAMINOPHEN PRN MG: 160 SOLUTION ORAL at 22:34

## 2022-05-08 RX ADMIN — Medication PRN ML: at 09:00

## 2022-05-08 RX ADMIN — NYSTATIN SCH GM: 100000 CREAM TOPICAL at 20:19

## 2022-05-08 RX ADMIN — OXYCODONE HYDROCHLORIDE AND ACETAMINOPHEN SCH MG: 500 TABLET ORAL at 17:32

## 2022-05-08 RX ADMIN — SODIUM CHLORIDE PRN UNIT: 9 INJECTION, SOLUTION INTRAVENOUS at 13:09

## 2022-05-08 RX ADMIN — Medication SCH ML: at 17:34

## 2022-05-08 RX ADMIN — VALPROIC ACID SCH MG: 250 SOLUTION ORAL at 17:32

## 2022-05-08 RX ADMIN — THERA TABS SCH UDTAB: TAB at 08:47

## 2022-05-08 RX ADMIN — VALPROIC ACID SCH MG: 250 SOLUTION ORAL at 08:46

## 2022-05-08 RX ADMIN — SODIUM CHLORIDE PRN MLS/HR: 0.45 INJECTION, SOLUTION INTRAVENOUS at 18:46

## 2022-05-08 RX ADMIN — METFORMIN HYDROCHLORIDE SCH MG: 500 TABLET ORAL at 08:47

## 2022-05-08 RX ADMIN — SODIUM CHLORIDE PRN UNIT: 9 INJECTION, SOLUTION INTRAVENOUS at 17:39

## 2022-05-08 RX ADMIN — Medication PRN MG: at 19:31

## 2022-05-08 RX ADMIN — Medication SCH EACH: at 17:38

## 2022-05-08 RX ADMIN — SODIUM CHLORIDE SCH MLS/HR: 9 INJECTION, SOLUTION INTRAVENOUS at 05:12

## 2022-05-08 RX ADMIN — METFORMIN HYDROCHLORIDE SCH MG: 500 TABLET ORAL at 17:32

## 2022-05-08 RX ADMIN — PANTOPRAZOLE SODIUM SCH MG: 40 GRANULE, DELAYED RELEASE ORAL at 05:06

## 2022-05-08 RX ADMIN — SODIUM CHLORIDE SCH MLS/HR: 9 INJECTION, SOLUTION INTRAVENOUS at 21:18

## 2022-05-08 RX ADMIN — Medication SCH EACH: at 05:06

## 2022-05-08 RX ADMIN — ZINC SULFATE CAP 220 MG (50 MG ELEMENTAL ZN) SCH MG: 220 (50 ZN) CAP at 08:47

## 2022-05-08 NOTE — NUR
Seen by APRIL Fernández, patient may be discharged tomorrow upon confirmation of home health 
services for IV ABX help.

## 2022-05-08 NOTE — NUR
Awake with eyes open. Making off and on high pitch sound. Tylenol 650mg via GT and Morphine 
1mg via IV given for signs of pain. In no apparent distress. No signs or symptoms of SOB. On 
O2 at1LPM via NC in place. O2 sat at 97%. GT feeding and flushing  tolerated. RIA midline 
intact and patent. IVF infusing. No adverse reaction noted form IV antibiotic. Needs 
anticipated to and met. Safety measure maintained and call light within reached.

## 2022-05-08 NOTE — NUR
At 2234H pt was given Tylenol 650 mg prn for generalized pain as evidenced by pt having 
facial grimace and moaning. After an hour pt more comfortable and no facial grimace. Will 
continue to monitor.

## 2022-05-08 NOTE — NUR
Received patient lying in bedand awake. Pt nonverbal. Personal Caregiver at bedside. Pt in 
no acute  distress. On O2 at1LPM via NC in place. O2 sat at 98%. GT feeding infusing. RIA 
midline intact and patent. IVF infusing. Will continue to monitor.

## 2022-05-09 VITALS — DIASTOLIC BLOOD PRESSURE: 64 MMHG | SYSTOLIC BLOOD PRESSURE: 111 MMHG

## 2022-05-09 VITALS — DIASTOLIC BLOOD PRESSURE: 77 MMHG | SYSTOLIC BLOOD PRESSURE: 120 MMHG

## 2022-05-09 VITALS — DIASTOLIC BLOOD PRESSURE: 68 MMHG | SYSTOLIC BLOOD PRESSURE: 131 MMHG

## 2022-05-09 VITALS — DIASTOLIC BLOOD PRESSURE: 69 MMHG | SYSTOLIC BLOOD PRESSURE: 110 MMHG

## 2022-05-09 RX ADMIN — VALPROIC ACID SCH MG: 250 SOLUTION ORAL at 09:10

## 2022-05-09 RX ADMIN — VITAMIN D, TAB 1000IU (100/BT) SCH UNIT: 25 TAB at 09:09

## 2022-05-09 RX ADMIN — METFORMIN HYDROCHLORIDE SCH MG: 500 TABLET ORAL at 17:43

## 2022-05-09 RX ADMIN — SODIUM CHLORIDE PRN UNIT: 9 INJECTION, SOLUTION INTRAVENOUS at 00:12

## 2022-05-09 RX ADMIN — METFORMIN HYDROCHLORIDE SCH MG: 500 TABLET ORAL at 09:09

## 2022-05-09 RX ADMIN — VALPROIC ACID SCH MG: 250 SOLUTION ORAL at 17:42

## 2022-05-09 RX ADMIN — Medication SCH ML: at 17:52

## 2022-05-09 RX ADMIN — THERA TABS SCH UDTAB: TAB at 09:09

## 2022-05-09 RX ADMIN — ACETAMINOPHEN PRN MG: 160 SOLUTION ORAL at 15:52

## 2022-05-09 RX ADMIN — Medication PRN MG: at 22:06

## 2022-05-09 RX ADMIN — SODIUM CHLORIDE PRN UNIT: 9 INJECTION, SOLUTION INTRAVENOUS at 12:11

## 2022-05-09 RX ADMIN — Medication SCH EACH: at 12:09

## 2022-05-09 RX ADMIN — SODIUM CHLORIDE PRN UNIT: 9 INJECTION, SOLUTION INTRAVENOUS at 06:09

## 2022-05-09 RX ADMIN — Medication SCH EACH: at 17:50

## 2022-05-09 RX ADMIN — NYSTATIN SCH GM: 100000 CREAM TOPICAL at 09:25

## 2022-05-09 RX ADMIN — SODIUM CHLORIDE PRN UNIT: 9 INJECTION, SOLUTION INTRAVENOUS at 17:51

## 2022-05-09 RX ADMIN — ZINC SULFATE CAP 220 MG (50 MG ELEMENTAL ZN) SCH MG: 220 (50 ZN) CAP at 09:09

## 2022-05-09 RX ADMIN — PANTOPRAZOLE SODIUM SCH MG: 40 GRANULE, DELAYED RELEASE ORAL at 06:02

## 2022-05-09 RX ADMIN — SODIUM CHLORIDE SCH MLS/HR: 9 INJECTION, SOLUTION INTRAVENOUS at 21:10

## 2022-05-09 RX ADMIN — SODIUM CHLORIDE PRN MLS/HR: 0.45 INJECTION, SOLUTION INTRAVENOUS at 17:42

## 2022-05-09 RX ADMIN — ACETAMINOPHEN PRN MG: 160 SOLUTION ORAL at 21:21

## 2022-05-09 RX ADMIN — Medication SCH EACH: at 06:03

## 2022-05-09 RX ADMIN — SODIUM CHLORIDE SCH MLS/HR: 9 INJECTION, SOLUTION INTRAVENOUS at 06:05

## 2022-05-09 RX ADMIN — Medication SCH ML: at 09:10

## 2022-05-09 RX ADMIN — SODIUM CHLORIDE SCH MLS/HR: 9 INJECTION, SOLUTION INTRAVENOUS at 13:15

## 2022-05-09 RX ADMIN — NYSTATIN SCH GM: 100000 CREAM TOPICAL at 21:12

## 2022-05-09 RX ADMIN — OXYCODONE HYDROCHLORIDE AND ACETAMINOPHEN SCH MG: 500 TABLET ORAL at 17:42

## 2022-05-09 RX ADMIN — Medication SCH EACH: at 00:10

## 2022-05-09 RX ADMIN — OXYCODONE HYDROCHLORIDE AND ACETAMINOPHEN SCH MG: 500 TABLET ORAL at 09:09

## 2022-05-09 RX ADMIN — Medication PRN ML: at 03:43

## 2022-05-09 NOTE — NUR
at 2206H morphine 1mg prn given for pt for generalized pain as evidenced by facial grimace 
and pt making sounds. Pt tolerated it well after an hour pt calm and no facial grimace 
noted.

## 2022-05-09 NOTE — NUR
Received pt in no acute distress. Personal caregiver at bedside. Pt on 1l nasal cannula. Pt 
tolerating gtube feeding. Safety and comfort provided. Will continue to monitor.

## 2022-05-10 VITALS — DIASTOLIC BLOOD PRESSURE: 79 MMHG | SYSTOLIC BLOOD PRESSURE: 113 MMHG

## 2022-05-10 VITALS — SYSTOLIC BLOOD PRESSURE: 122 MMHG | DIASTOLIC BLOOD PRESSURE: 71 MMHG

## 2022-05-10 VITALS — SYSTOLIC BLOOD PRESSURE: 113 MMHG | DIASTOLIC BLOOD PRESSURE: 79 MMHG

## 2022-05-10 VITALS — SYSTOLIC BLOOD PRESSURE: 155 MMHG | DIASTOLIC BLOOD PRESSURE: 83 MMHG

## 2022-05-10 RX ADMIN — Medication SCH EACH: at 17:41

## 2022-05-10 RX ADMIN — Medication SCH EACH: at 00:25

## 2022-05-10 RX ADMIN — Medication SCH ML: at 17:30

## 2022-05-10 RX ADMIN — SODIUM CHLORIDE SCH MLS/HR: 9 INJECTION, SOLUTION INTRAVENOUS at 05:22

## 2022-05-10 RX ADMIN — OXYCODONE HYDROCHLORIDE AND ACETAMINOPHEN SCH MG: 500 TABLET ORAL at 17:31

## 2022-05-10 RX ADMIN — OXYCODONE HYDROCHLORIDE AND ACETAMINOPHEN SCH MG: 500 TABLET ORAL at 08:52

## 2022-05-10 RX ADMIN — VITAMIN D, TAB 1000IU (100/BT) SCH UNIT: 25 TAB at 08:51

## 2022-05-10 RX ADMIN — Medication SCH ML: at 08:53

## 2022-05-10 RX ADMIN — SODIUM CHLORIDE SCH MLS/HR: 9 INJECTION, SOLUTION INTRAVENOUS at 14:24

## 2022-05-10 RX ADMIN — Medication PRN ML: at 01:13

## 2022-05-10 RX ADMIN — METFORMIN HYDROCHLORIDE SCH MG: 500 TABLET ORAL at 08:52

## 2022-05-10 RX ADMIN — SODIUM CHLORIDE PRN UNIT: 9 INJECTION, SOLUTION INTRAVENOUS at 05:40

## 2022-05-10 RX ADMIN — METFORMIN HYDROCHLORIDE SCH MG: 500 TABLET ORAL at 17:31

## 2022-05-10 RX ADMIN — THERA TABS SCH UDTAB: TAB at 08:52

## 2022-05-10 RX ADMIN — SODIUM CHLORIDE PRN UNIT: 9 INJECTION, SOLUTION INTRAVENOUS at 12:17

## 2022-05-10 RX ADMIN — NYSTATIN SCH GM: 100000 CREAM TOPICAL at 21:22

## 2022-05-10 RX ADMIN — Medication SCH EACH: at 12:14

## 2022-05-10 RX ADMIN — ACETAMINOPHEN PRN MG: 160 SOLUTION ORAL at 05:22

## 2022-05-10 RX ADMIN — SODIUM CHLORIDE PRN UNIT: 9 INJECTION, SOLUTION INTRAVENOUS at 00:26

## 2022-05-10 RX ADMIN — ZINC SULFATE CAP 220 MG (50 MG ELEMENTAL ZN) SCH MG: 220 (50 ZN) CAP at 08:52

## 2022-05-10 RX ADMIN — PANTOPRAZOLE SODIUM SCH MG: 40 GRANULE, DELAYED RELEASE ORAL at 05:22

## 2022-05-10 RX ADMIN — SODIUM CHLORIDE PRN UNIT: 9 INJECTION, SOLUTION INTRAVENOUS at 17:42

## 2022-05-10 RX ADMIN — Medication SCH EACH: at 05:37

## 2022-05-10 RX ADMIN — VALPROIC ACID SCH MG: 250 SOLUTION ORAL at 08:51

## 2022-05-10 RX ADMIN — VALPROIC ACID SCH MG: 250 SOLUTION ORAL at 17:31

## 2022-05-10 RX ADMIN — NYSTATIN SCH GM: 100000 CREAM TOPICAL at 08:55

## 2022-05-10 RX ADMIN — SODIUM CHLORIDE SCH MLS/HR: 9 INJECTION, SOLUTION INTRAVENOUS at 21:16

## 2022-05-10 NOTE — NUR
Patient has VSS. No acute distress. GT patent and flushing well. Tolerating gt feeding. IV 
site patent and flushing well. Gentle handling provided during care, precautionary for left 
femur fracture. Safety measures continued. Private care giver at bedside.

## 2022-05-10 NOTE — NUR
Patient noted with upper respiratory congestion, minimal rhonchi upon auscultation. RT made 
aware. Provided PRN breathing treatment. Nurse practitioner, Sofie Carlton aware with new 
orders for routine breathing treatments every 6 hours.

## 2022-05-10 NOTE — NUR
Pt slept intermittently.Pt tolerating gtube feeding. Pt had no residual. . Pt iv intact and 
patent. Prescribed medication given and pt tolerated it well.Pt turned and repositioned.Pt 
observed to have left buttocks have skin tear.   Safety and comfort provided. All needs are 
met. Vital signs within normal limit. Will endorse to incoming nurse for continuity of care.

## 2022-05-11 VITALS — SYSTOLIC BLOOD PRESSURE: 156 MMHG | DIASTOLIC BLOOD PRESSURE: 90 MMHG

## 2022-05-11 VITALS — SYSTOLIC BLOOD PRESSURE: 118 MMHG | DIASTOLIC BLOOD PRESSURE: 72 MMHG

## 2022-05-11 VITALS — SYSTOLIC BLOOD PRESSURE: 120 MMHG | DIASTOLIC BLOOD PRESSURE: 72 MMHG

## 2022-05-11 VITALS — SYSTOLIC BLOOD PRESSURE: 118 MMHG | DIASTOLIC BLOOD PRESSURE: 75 MMHG

## 2022-05-11 RX ADMIN — Medication SCH EACH: at 13:20

## 2022-05-11 RX ADMIN — PANTOPRAZOLE SODIUM SCH MG: 40 GRANULE, DELAYED RELEASE ORAL at 05:07

## 2022-05-11 RX ADMIN — ACETAMINOPHEN PRN MG: 160 SOLUTION ORAL at 05:07

## 2022-05-11 RX ADMIN — SODIUM CHLORIDE SCH MLS/HR: 9 INJECTION, SOLUTION INTRAVENOUS at 21:16

## 2022-05-11 RX ADMIN — VITAMIN D, TAB 1000IU (100/BT) SCH UNIT: 25 TAB at 08:53

## 2022-05-11 RX ADMIN — Medication SCH EACH: at 00:48

## 2022-05-11 RX ADMIN — VALPROIC ACID SCH MG: 250 SOLUTION ORAL at 18:23

## 2022-05-11 RX ADMIN — ZINC SULFATE CAP 220 MG (50 MG ELEMENTAL ZN) SCH MG: 220 (50 ZN) CAP at 08:54

## 2022-05-11 RX ADMIN — SODIUM CHLORIDE PRN UNIT: 9 INJECTION, SOLUTION INTRAVENOUS at 18:21

## 2022-05-11 RX ADMIN — SODIUM CHLORIDE PRN UNIT: 9 INJECTION, SOLUTION INTRAVENOUS at 00:49

## 2022-05-11 RX ADMIN — OXYCODONE HYDROCHLORIDE AND ACETAMINOPHEN SCH MG: 500 TABLET ORAL at 08:54

## 2022-05-11 RX ADMIN — NYSTATIN SCH GM: 100000 CREAM TOPICAL at 11:23

## 2022-05-11 RX ADMIN — METFORMIN HYDROCHLORIDE SCH MG: 500 TABLET ORAL at 17:12

## 2022-05-11 RX ADMIN — NYSTATIN SCH GM: 100000 CREAM TOPICAL at 21:14

## 2022-05-11 RX ADMIN — METFORMIN HYDROCHLORIDE SCH MG: 500 TABLET ORAL at 08:53

## 2022-05-11 RX ADMIN — Medication SCH ML: at 17:13

## 2022-05-11 RX ADMIN — SODIUM CHLORIDE PRN UNIT: 9 INJECTION, SOLUTION INTRAVENOUS at 05:21

## 2022-05-11 RX ADMIN — THERA TABS SCH UDTAB: TAB at 08:54

## 2022-05-11 RX ADMIN — Medication SCH EACH: at 18:19

## 2022-05-11 RX ADMIN — SODIUM CHLORIDE SCH MLS/HR: 9 INJECTION, SOLUTION INTRAVENOUS at 14:28

## 2022-05-11 RX ADMIN — SODIUM CHLORIDE SCH MLS/HR: 9 INJECTION, SOLUTION INTRAVENOUS at 05:07

## 2022-05-11 RX ADMIN — Medication PRN MG: at 20:30

## 2022-05-11 RX ADMIN — Medication SCH EACH: at 05:19

## 2022-05-11 RX ADMIN — VALPROIC ACID SCH MG: 250 SOLUTION ORAL at 08:53

## 2022-05-11 RX ADMIN — SODIUM CHLORIDE PRN UNIT: 9 INJECTION, SOLUTION INTRAVENOUS at 13:21

## 2022-05-11 RX ADMIN — ACETAMINOPHEN PRN MG: 160 SOLUTION ORAL at 19:42

## 2022-05-11 RX ADMIN — OXYCODONE HYDROCHLORIDE AND ACETAMINOPHEN SCH MG: 500 TABLET ORAL at 17:12

## 2022-05-11 RX ADMIN — Medication SCH ML: at 10:47

## 2022-05-11 NOTE — NUR
PATIENT AWAKE BUT NON VERBAL, NO SOB NO CHEST PAIN, ON GTF TOLERATE WELL, NO N/V NO DIARRHEA 
NOTED, NO RESIDUAL, HAND RESIDENT GENTLY AT ALL TIMES, MEDICATED WITH TYLENOL AS ORDERED FOR 
PAIN MB CRYING AND FIST CLOSE TIGHT. CONT TO MONITOR.

## 2022-05-11 NOTE — NUR
PATIENT WAS TURN AND REPOSITION EARLIER, CAUSE PATIENT TO HAVE PAIN, TYLENOL VIA GT NOT 
EFFECTIVE AT THIS TIME, GIVEN MORPHINE 1MG IV WITH EFFECTIVE RESULTS, PATIENT WENT TO SLEEP, 
CONT TO MONITOR.

## 2022-05-12 VITALS — DIASTOLIC BLOOD PRESSURE: 78 MMHG | SYSTOLIC BLOOD PRESSURE: 143 MMHG

## 2022-05-12 VITALS — SYSTOLIC BLOOD PRESSURE: 116 MMHG | DIASTOLIC BLOOD PRESSURE: 85 MMHG

## 2022-05-12 VITALS — SYSTOLIC BLOOD PRESSURE: 148 MMHG | DIASTOLIC BLOOD PRESSURE: 74 MMHG

## 2022-05-12 RX ADMIN — VITAMIN D, TAB 1000IU (100/BT) SCH UNIT: 25 TAB at 08:47

## 2022-05-12 RX ADMIN — SODIUM CHLORIDE PRN UNIT: 9 INJECTION, SOLUTION INTRAVENOUS at 05:35

## 2022-05-12 RX ADMIN — VALPROIC ACID SCH MG: 250 SOLUTION ORAL at 08:45

## 2022-05-12 RX ADMIN — SODIUM CHLORIDE PRN UNIT: 9 INJECTION, SOLUTION INTRAVENOUS at 12:33

## 2022-05-12 RX ADMIN — METFORMIN HYDROCHLORIDE SCH MG: 500 TABLET ORAL at 08:46

## 2022-05-12 RX ADMIN — Medication SCH EACH: at 05:23

## 2022-05-12 RX ADMIN — ACETAMINOPHEN PRN MG: 160 SOLUTION ORAL at 07:45

## 2022-05-12 RX ADMIN — VALPROIC ACID SCH MG: 250 SOLUTION ORAL at 17:13

## 2022-05-12 RX ADMIN — THERA TABS SCH UDTAB: TAB at 08:46

## 2022-05-12 RX ADMIN — Medication SCH ML: at 08:58

## 2022-05-12 RX ADMIN — ZINC SULFATE CAP 220 MG (50 MG ELEMENTAL ZN) SCH MG: 220 (50 ZN) CAP at 08:47

## 2022-05-12 RX ADMIN — METFORMIN HYDROCHLORIDE SCH MG: 500 TABLET ORAL at 17:13

## 2022-05-12 RX ADMIN — PANTOPRAZOLE SODIUM SCH MG: 40 GRANULE, DELAYED RELEASE ORAL at 05:15

## 2022-05-12 RX ADMIN — SODIUM CHLORIDE SCH MLS/HR: 9 INJECTION, SOLUTION INTRAVENOUS at 05:14

## 2022-05-12 RX ADMIN — OXYCODONE HYDROCHLORIDE AND ACETAMINOPHEN SCH MG: 500 TABLET ORAL at 17:13

## 2022-05-12 RX ADMIN — OXYCODONE HYDROCHLORIDE AND ACETAMINOPHEN SCH MG: 500 TABLET ORAL at 08:47

## 2022-05-12 RX ADMIN — Medication SCH ML: at 17:13

## 2022-05-12 RX ADMIN — Medication SCH EACH: at 12:28

## 2022-05-12 RX ADMIN — SODIUM CHLORIDE SCH MLS/HR: 9 INJECTION, SOLUTION INTRAVENOUS at 13:03

## 2022-05-12 RX ADMIN — SODIUM CHLORIDE PRN UNIT: 9 INJECTION, SOLUTION INTRAVENOUS at 01:18

## 2022-05-12 RX ADMIN — NYSTATIN SCH GM: 100000 CREAM TOPICAL at 08:58

## 2022-05-12 RX ADMIN — Medication SCH EACH: at 01:16

## 2022-05-12 NOTE — NUR
called [facility] ambulance per [facility] ambulance they are running 15 minutes late.

## 2022-05-12 NOTE — NUR
new discharge instructions. unable to give discharge instructions to patient due to 
cognitive impairment. instructions and report given to yaneli navarrete at [facility]. 
all questions answered.

## 2022-05-12 NOTE — NUR
all discharge paperwork signed with two rn due to cognitive impairment, belonging inventory 
complete all belongings present.

## 2022-07-14 ENCOUNTER — HOSPITAL ENCOUNTER (INPATIENT)
Dept: HOSPITAL 54 - ER | Age: 52
LOS: 9 days | Discharge: NURSING, REHAB OR LONG-TERM CARE | DRG: 720 | End: 2022-07-23
Attending: INTERNAL MEDICINE | Admitting: INTERNAL MEDICINE
Payer: MEDICAID

## 2022-07-14 VITALS — WEIGHT: 115 LBS | HEIGHT: 60 IN | BODY MASS INDEX: 22.58 KG/M2

## 2022-07-14 DIAGNOSIS — E86.0: ICD-10-CM

## 2022-07-14 DIAGNOSIS — Z93.1: ICD-10-CM

## 2022-07-14 DIAGNOSIS — N39.0: ICD-10-CM

## 2022-07-14 DIAGNOSIS — R62.50: ICD-10-CM

## 2022-07-14 DIAGNOSIS — R13.10: ICD-10-CM

## 2022-07-14 DIAGNOSIS — Z16.12: ICD-10-CM

## 2022-07-14 DIAGNOSIS — Z79.899: ICD-10-CM

## 2022-07-14 DIAGNOSIS — E11.9: ICD-10-CM
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
## 2022-07-14 DIAGNOSIS — E87.6: ICD-10-CM

## 2022-07-14 DIAGNOSIS — E43: ICD-10-CM

## 2022-07-14 DIAGNOSIS — J96.01: ICD-10-CM

## 2022-07-14 DIAGNOSIS — J69.0: ICD-10-CM

## 2022-07-14 DIAGNOSIS — Z79.4: ICD-10-CM

## 2022-07-14 DIAGNOSIS — E87.0: ICD-10-CM

## 2022-07-14 DIAGNOSIS — Z74.09: ICD-10-CM

## 2022-07-14 DIAGNOSIS — J15.6: ICD-10-CM

## 2022-07-14 DIAGNOSIS — Z20.822: ICD-10-CM

## 2022-07-14 DIAGNOSIS — G80.9: ICD-10-CM

## 2022-07-14 DIAGNOSIS — A41.9: Primary | ICD-10-CM

## 2022-07-14 DIAGNOSIS — B96.1: ICD-10-CM

## 2022-07-14 DIAGNOSIS — B95.2: ICD-10-CM

## 2022-07-14 DIAGNOSIS — E87.1: ICD-10-CM

## 2022-07-14 DIAGNOSIS — E88.09: ICD-10-CM

## 2022-07-14 DIAGNOSIS — Z83.3: ICD-10-CM

## 2022-07-14 DIAGNOSIS — M41.9: ICD-10-CM

## 2022-07-14 DIAGNOSIS — Z79.84: ICD-10-CM

## 2022-07-14 DIAGNOSIS — D53.9: ICD-10-CM

## 2022-07-14 DIAGNOSIS — D68.59: ICD-10-CM

## 2022-07-14 DIAGNOSIS — Z82.49: ICD-10-CM

## 2022-07-14 DIAGNOSIS — Z74.01: ICD-10-CM

## 2022-07-14 DIAGNOSIS — I10: ICD-10-CM

## 2022-07-14 DIAGNOSIS — G40.909: ICD-10-CM

## 2022-07-14 DIAGNOSIS — G93.41: ICD-10-CM

## 2022-07-14 DIAGNOSIS — E86.1: ICD-10-CM

## 2022-07-14 DIAGNOSIS — N17.0: ICD-10-CM

## 2022-07-14 LAB
ALBUMIN SERPL BCP-MCNC: 1.9 G/DL (ref 3.4–5)
ALP SERPL-CCNC: 102 U/L (ref 46–116)
ALT SERPL W P-5'-P-CCNC: 7 U/L (ref 12–78)
AST SERPL W P-5'-P-CCNC: 50 U/L (ref 15–37)
BASOPHILS # BLD AUTO: 0 K/UL (ref 0–0.2)
BASOPHILS NFR BLD AUTO: 0.4 % (ref 0–2)
BASOPHILS NFR BLD MANUAL: 1 % (ref 0–2)
BILIRUB DIRECT SERPL-MCNC: 0.1 MG/DL (ref 0–0.2)
BILIRUB SERPL-MCNC: 0.3 MG/DL (ref 0.2–1)
BILIRUB UR QL STRIP: NEGATIVE
BUN SERPL-MCNC: 98 MG/DL (ref 7–18)
CALCIUM SERPL-MCNC: 10.5 MG/DL (ref 8.5–10.1)
CHLORIDE SERPL-SCNC: 110 MMOL/L (ref 98–107)
CO2 SERPL-SCNC: 38 MMOL/L (ref 21–32)
COLOR UR: YELLOW
CREAT SERPL-MCNC: 1 MG/DL (ref 0.6–1.3)
DEPRECATED SQUAMOUS URNS QL MICRO: (no result) /HPF
EOSINOPHIL NFR BLD AUTO: 2.1 % (ref 0–6)
EOSINOPHIL NFR BLD MANUAL: 3 % (ref 0–4)
GLUCOSE SERPL-MCNC: 383 MG/DL (ref 74–106)
GLUCOSE UR STRIP-MCNC: >=1000 MG/DL
HCT VFR BLD AUTO: 37 % (ref 33–45)
HGB BLD-MCNC: 11.5 G/DL (ref 11.5–14.8)
LEUKOCYTE ESTERASE UR QL STRIP: (no result)
LYMPHOCYTES NFR BLD AUTO: 2.5 K/UL (ref 0.8–4.8)
LYMPHOCYTES NFR BLD AUTO: 31.9 % (ref 20–44)
LYMPHOCYTES NFR BLD MANUAL: 32 % (ref 16–48)
MCHC RBC AUTO-ENTMCNC: 31 G/DL (ref 31–36)
MCV RBC AUTO: 100 FL (ref 82–100)
MONOCYTES NFR BLD AUTO: 0.9 K/UL (ref 0.1–1.3)
MONOCYTES NFR BLD AUTO: 10.8 % (ref 2–12)
MONOCYTES NFR BLD MANUAL: 7 % (ref 0–11)
NEUTROPHILS # BLD AUTO: 4.4 K/UL (ref 1.8–8.9)
NEUTROPHILS NFR BLD AUTO: 54.8 % (ref 43–81)
NEUTS SEG NFR BLD MANUAL: 56 % (ref 42–76)
NITRITE UR QL STRIP: NEGATIVE
PH UR STRIP: 6 [PH] (ref 5–8)
PLATELET # BLD AUTO: 274 K/UL (ref 150–450)
POTASSIUM SERPL-SCNC: 5.5 MMOL/L (ref 3.5–5.1)
PROT SERPL-MCNC: 7.9 G/DL (ref 6.4–8.2)
PROT UR QL STRIP: 100 MG/DL
RBC # BLD AUTO: 3.68 MIL/UL (ref 4–5.2)
RBC #/AREA URNS HPF: (no result) /HPF (ref 0–2)
SODIUM SERPL-SCNC: 151 MMOL/L (ref 136–145)
UROBILINOGEN UR STRIP-MCNC: 0.2 EU/DL
VARIANT LYMPHS NFR BLD MANUAL: 1 % (ref 0–0)
WBC #/AREA URNS HPF: (no result) /HPF
WBC #/AREA URNS HPF: (no result) /HPF (ref 0–3)
WBC NRBC COR # BLD AUTO: 8 K/UL (ref 4.3–11)

## 2022-07-14 PROCEDURE — A6253 ABSORPT DRG > 48 SQ IN W/O B: HCPCS

## 2022-07-14 PROCEDURE — C9803 HOPD COVID-19 SPEC COLLECT: HCPCS

## 2022-07-14 PROCEDURE — G0378 HOSPITAL OBSERVATION PER HR: HCPCS

## 2022-07-14 PROCEDURE — A6403 STERILE GAUZE>16 <= 48 SQ IN: HCPCS

## 2022-07-14 RX ADMIN — ACETAMINOPHEN PRN MG: 325 TABLET ORAL at 22:59

## 2022-07-14 NOTE — NUR
2145 Admitted from ER 51 year old female via [facility] with Dx of Sepsis.  Patient is awake but 
non verbal.  Accompanied by sister Brittany.  Transferred to bed, total assist.  Admission 
care rendered.  Skin assessment done.  Contractures of bilateral upper and lower extremities 
noted.  PEG tube intact.  Noted with right nephrostomy connected to drainage bag. Noted with 
adequate amount of yellow urine with sedimentation.  Site reinforced with clear dressing.  
Perianal noted with excoriation/rash.  Small amount of yellow soft bm noted.  Good pericare 
rendered.  Applied with generous amount of skin barrier for protection.  Per sister patient 
has "broken" left femur.  Handled patient with care when turning. Admitted with plastic 
pillow cushion under her back.  Used 5 pillows for repositioning.  All needs attended.  
Caregiver at bedside.  Call light placed within reach and instructed to call for assistance. 
 Patient noted with no IV sites pending midline insertion.

## 2022-07-14 NOTE — NUR
ROBERTO MEDINA From [facility] cough/congestion, hypotensive, low O2sats 5lpm via 
NC. patient is MR, non verbal. connected to the monitor and pulse ox, kept 
comfortable, will continue to monitor accordingly.

## 2022-07-15 VITALS — DIASTOLIC BLOOD PRESSURE: 46 MMHG | SYSTOLIC BLOOD PRESSURE: 97 MMHG

## 2022-07-15 VITALS — DIASTOLIC BLOOD PRESSURE: 60 MMHG | SYSTOLIC BLOOD PRESSURE: 90 MMHG

## 2022-07-15 VITALS — SYSTOLIC BLOOD PRESSURE: 99 MMHG | DIASTOLIC BLOOD PRESSURE: 55 MMHG

## 2022-07-15 VITALS — SYSTOLIC BLOOD PRESSURE: 95 MMHG | DIASTOLIC BLOOD PRESSURE: 52 MMHG

## 2022-07-15 VITALS — SYSTOLIC BLOOD PRESSURE: 120 MMHG | DIASTOLIC BLOOD PRESSURE: 91 MMHG

## 2022-07-15 VITALS — SYSTOLIC BLOOD PRESSURE: 98 MMHG | DIASTOLIC BLOOD PRESSURE: 52 MMHG

## 2022-07-15 LAB
BASOPHILS # BLD AUTO: 0.1 K/UL (ref 0–0.2)
BASOPHILS NFR BLD AUTO: 0.7 % (ref 0–2)
BUN SERPL-MCNC: 76 MG/DL (ref 7–18)
CALCIUM SERPL-MCNC: 9.5 MG/DL (ref 8.5–10.1)
CHLORIDE SERPL-SCNC: 119 MMOL/L (ref 98–107)
CO2 SERPL-SCNC: 34 MMOL/L (ref 21–32)
CREAT SERPL-MCNC: 0.8 MG/DL (ref 0.6–1.3)
EOSINOPHIL NFR BLD AUTO: 2 % (ref 0–6)
GLUCOSE SERPL-MCNC: 169 MG/DL (ref 74–106)
HCT VFR BLD AUTO: 30 % (ref 33–45)
HGB BLD-MCNC: 9.5 G/DL (ref 11.5–14.8)
LYMPHOCYTES NFR BLD AUTO: 3 K/UL (ref 0.8–4.8)
LYMPHOCYTES NFR BLD AUTO: 35.8 % (ref 20–44)
MAGNESIUM SERPL-MCNC: 2.9 MG/DL (ref 1.8–2.4)
MCHC RBC AUTO-ENTMCNC: 32 G/DL (ref 31–36)
MCV RBC AUTO: 99 FL (ref 82–100)
MONOCYTES NFR BLD AUTO: 0.9 K/UL (ref 0.1–1.3)
MONOCYTES NFR BLD AUTO: 11.4 % (ref 2–12)
NEUTROPHILS # BLD AUTO: 4.2 K/UL (ref 1.8–8.9)
NEUTROPHILS NFR BLD AUTO: 50.1 % (ref 43–81)
PHOSPHATE SERPL-MCNC: 2.2 MG/DL (ref 2.5–4.9)
PLATELET # BLD AUTO: 202 K/UL (ref 150–450)
POTASSIUM SERPL-SCNC: 4.3 MMOL/L (ref 3.5–5.1)
RBC # BLD AUTO: 2.98 MIL/UL (ref 4–5.2)
SODIUM SERPL-SCNC: 157 MMOL/L (ref 136–145)
WBC NRBC COR # BLD AUTO: 8.3 K/UL (ref 4.3–11)

## 2022-07-15 PROCEDURE — 05H933Z INSERTION OF INFUSION DEVICE INTO RIGHT BRACHIAL VEIN, PERCUTANEOUS APPROACH: ICD-10-PCS | Performed by: NURSE PRACTITIONER

## 2022-07-15 RX ADMIN — MAGNESIUM HYDROXIDE SCH ML: 400 SUSPENSION ORAL at 17:48

## 2022-07-15 RX ADMIN — SIMETHICONE SCH MG: 20 SUSPENSION/ DROPS ORAL at 16:08

## 2022-07-15 RX ADMIN — DEXTROSE MONOHYDRATE SCH MLS/HR: 50 INJECTION, SOLUTION INTRAVENOUS at 06:41

## 2022-07-15 RX ADMIN — SODIUM CHLORIDE SCH MLS/HR: 4.5 INJECTION, SOLUTION INTRAVENOUS at 11:21

## 2022-07-15 RX ADMIN — VALPROIC ACID SCH MG: 250 SOLUTION ORAL at 16:08

## 2022-07-15 RX ADMIN — SIMETHICONE SCH MG: 20 SUSPENSION/ DROPS ORAL at 09:56

## 2022-07-15 RX ADMIN — PIPERACILLIN SODIUM AND TAZOBACTAM SODIUM SCH MLS/HR: .375; 3 INJECTION, POWDER, LYOPHILIZED, FOR SOLUTION INTRAVENOUS at 06:42

## 2022-07-15 RX ADMIN — Medication PRN ML: at 17:31

## 2022-07-15 RX ADMIN — PIPERACILLIN SODIUM AND TAZOBACTAM SODIUM SCH MLS/HR: .375; 3 INJECTION, POWDER, LYOPHILIZED, FOR SOLUTION INTRAVENOUS at 23:25

## 2022-07-15 RX ADMIN — Medication SCH EACH: at 09:56

## 2022-07-15 RX ADMIN — MAGNESIUM OXIDE TAB 400 MG (241.3 MG ELEMENTAL MG) SCH MG: 400 (241.3 MG) TAB at 16:08

## 2022-07-15 RX ADMIN — VALPROIC ACID SCH MG: 250 SOLUTION ORAL at 09:56

## 2022-07-15 RX ADMIN — PIPERACILLIN SODIUM AND TAZOBACTAM SODIUM SCH MLS/HR: .375; 3 INJECTION, POWDER, LYOPHILIZED, FOR SOLUTION INTRAVENOUS at 17:31

## 2022-07-15 RX ADMIN — Medication SCH EACH: at 16:08

## 2022-07-15 RX ADMIN — VITAMIN D, TAB 1000IU (100/BT) SCH UNIT: 25 TAB at 09:57

## 2022-07-15 RX ADMIN — PANTOPRAZOLE SODIUM SCH MG: 40 GRANULE, DELAYED RELEASE ORAL at 07:30

## 2022-07-15 RX ADMIN — MAGNESIUM OXIDE TAB 400 MG (241.3 MG ELEMENTAL MG) SCH MG: 400 (241.3 MG) TAB at 09:56

## 2022-07-15 RX ADMIN — ATENOLOL SCH MG: 50 TABLET ORAL at 09:56

## 2022-07-15 RX ADMIN — ACETAMINOPHEN PRN MG: 325 TABLET ORAL at 20:03

## 2022-07-15 RX ADMIN — PIPERACILLIN SODIUM AND TAZOBACTAM SODIUM SCH MLS/HR: .375; 3 INJECTION, POWDER, LYOPHILIZED, FOR SOLUTION INTRAVENOUS at 11:23

## 2022-07-15 RX ADMIN — PIPERACILLIN SODIUM AND TAZOBACTAM SODIUM SCH MLS/HR: .375; 3 INJECTION, POWDER, LYOPHILIZED, FOR SOLUTION INTRAVENOUS at 02:09

## 2022-07-15 RX ADMIN — DEXTROSE MONOHYDRATE SCH MLS/HR: 50 INJECTION, SOLUTION INTRAVENOUS at 17:47

## 2022-07-15 RX ADMIN — SIMETHICONE SCH MG: 20 SUSPENSION/ DROPS ORAL at 15:04

## 2022-07-15 RX ADMIN — ACETAMINOPHEN PRN MG: 325 TABLET ORAL at 05:37

## 2022-07-15 NOTE — NUR
RN NOTES





BP 69/39 MMHHG. INFORMED DR MUNOZ WITH NEW ORDER TO GIVE NS BOLUS 1L X1 DOSE NOW. WILL 
CONTINUE TO MONITOR. PATIENT BASELIN MENTAL STATUS IS NON VERBAL OPENS EYE.

## 2022-07-15 NOTE — NUR
RN OPENING NOTE



PATIENT IN BED, OBTUNDED. CARE GIVER IN THE ROOM. NO S/S OF APPARENT DISTRESS IN 4LPM OF O2 
VIA NC SATING AT 97%. TELE MONITOR READING SR WITH 77 BPM AT THIS TIME. NOT EXHIBITING PAIN 
VIA FLACC AT THIS TIME. PEG NOTED, NO NUTRITION RUNNING. IV ACCESS CARMELITA MIDLINE RUNNING NS @ 
75ML/HR.  ALL SAFETY MEASURES IN PLACE, WILL CONT TO WATCH THROUGHOUT SHIFT.

## 2022-07-15 NOTE — NUR
TELE RN CLOSING NOTE



PATIENT IN BED, OBTUNDED. CARE GIVER IN THE ROOM. NO S/S OF APPARENT DISTRESS IN 4LPM OF O2 
VIA NC. TELE MONITOR READING SR WITH 77 BPM AT THIS TIME. NOT EXHIBITING PAIN VIA FLACC AT 
THIS TIME. NEPROSTOMY DRAINING CLEAR YELLOW OUTPUT WITH 300 ML OF OUTPUT. ALL NEEDS 
ATTENDED. ALL SCHEDULED MEDICATIONS ADMINISTERED. SAFETY KEPT IN PLACE THE WHOLE SHIFT. 
ENDORSED TO PRADEEP BECERRA FOR CONTINUITY OF CARE.

## 2022-07-15 NOTE — NUR
WOUND CARE CONSULT: PT PRESENTS WITH PERIANAL INCONTINENCE ASSOCIATED SKIN DAMAGE, LEFT 
TOENAIL DISCOLORATION AND RT HEEL CALLUS/SCAR, PRESENT ON ADMISSION. RECOMMENDATIONS MADE 
FOR SKIN PROTECTION. DISCUSSED WITH NURSING STAFF. PT TO BE PLACED ON [facility] ISOFLEX LOW 
AIRLOSS BED. MD IN AGREEMENT WITH PLAN OF CARE. 

-------------------------------------------------------------------------------

Addendum: 07/15/22 at 0909 by JASMINA HOLLAND WNDNU

-------------------------------------------------------------------------------

Amended: Links added.

## 2022-07-15 NOTE — NUR
TELE RN NOTE



PATIENT SISTER REQUEST FEEDING TO BE DIABETIC SOUCE RATHER THAN GLUCERNA. MESSAGED ON CALL 
MONY MUNOZ. OK TO CHANGE PER DOCTOR.

## 2022-07-15 NOTE — NUR
RN NOTES





RECEIVED REPORT FROM MORNING RN. PATIENT IN BED CAREGIVER AT BEDSIDE. WITH OXYGEN INHALATION 
AT 4LPM VIA NASAL CANULA SATING 96% NO SOB NO DISTRESS NOTED AT THIS TIME. WITH IV ACCESS AT 
R UA MIDLINE PATENT ON CONTINUOS IVF 1/2 NS @ 75CC/HR. WITH GT INTACT ON CONTINUOUS GT 
FEEDING GLUCERNA @ 20 CC/HR TOLERATING WILL. WILL INCREASE BY 10 Q6 HOURS TO REACH 50CC 
GOAL. WITH NEPHROSTOMY TUBE INTACT DRAINING DARK YELLOW OUTPUT. ALL SAFETY MEASURES IN PLACE 
AT ALL TIMES. HOB ELEVATED. CALL LIGHT WITHIN REACH. WILL CLOSELY MONITOR THE PATIENT.

## 2022-07-15 NOTE — NUR
RN CLOSING NOTE



PATIENT IN BED, OBTUNDED. CARE GIVER IN THE ROOM. NO S/S OF APPARENT DISTRESS IN 4LPM OF O2 
VIA NC SATING AT 97%. TELE MONITOR READING SR WITH 77 BPM AT THIS TIME. NOT EXHIBITING PAIN 
VIA FLACC AT THIS TIME. PEG NOTED, GLUCERNA RUNNING AT 20 ML/HR. IV ACCESS CARMELITA MIDLINE 
RUNNING 1/2 NS @ 75ML/HR.  ALL SAFETY MEASURES IN PLACE, WILL ENDORSE TO INCOMING RN

## 2022-07-16 VITALS — SYSTOLIC BLOOD PRESSURE: 90 MMHG | DIASTOLIC BLOOD PRESSURE: 60 MMHG

## 2022-07-16 VITALS — DIASTOLIC BLOOD PRESSURE: 99 MMHG | SYSTOLIC BLOOD PRESSURE: 126 MMHG

## 2022-07-16 VITALS — DIASTOLIC BLOOD PRESSURE: 47 MMHG | SYSTOLIC BLOOD PRESSURE: 99 MMHG

## 2022-07-16 VITALS — DIASTOLIC BLOOD PRESSURE: 70 MMHG | SYSTOLIC BLOOD PRESSURE: 100 MMHG

## 2022-07-16 VITALS — SYSTOLIC BLOOD PRESSURE: 84 MMHG | DIASTOLIC BLOOD PRESSURE: 52 MMHG

## 2022-07-16 VITALS — DIASTOLIC BLOOD PRESSURE: 46 MMHG | SYSTOLIC BLOOD PRESSURE: 77 MMHG

## 2022-07-16 LAB
BUN SERPL-MCNC: 31 MG/DL (ref 7–18)
CALCIUM SERPL-MCNC: 8.1 MG/DL (ref 8.5–10.1)
CHLORIDE SERPL-SCNC: 116 MMOL/L (ref 98–107)
CO2 SERPL-SCNC: 29 MMOL/L (ref 21–32)
CREAT SERPL-MCNC: 0.5 MG/DL (ref 0.6–1.3)
GLUCOSE SERPL-MCNC: 267 MG/DL (ref 74–106)
POTASSIUM SERPL-SCNC: 3.5 MMOL/L (ref 3.5–5.1)
SODIUM SERPL-SCNC: 149 MMOL/L (ref 136–145)

## 2022-07-16 RX ADMIN — SIMETHICONE SCH MG: 20 SUSPENSION/ DROPS ORAL at 18:05

## 2022-07-16 RX ADMIN — PIPERACILLIN SODIUM AND TAZOBACTAM SODIUM SCH MLS/HR: .375; 3 INJECTION, POWDER, LYOPHILIZED, FOR SOLUTION INTRAVENOUS at 05:38

## 2022-07-16 RX ADMIN — PIPERACILLIN SODIUM AND TAZOBACTAM SODIUM SCH MLS/HR: .375; 3 INJECTION, POWDER, LYOPHILIZED, FOR SOLUTION INTRAVENOUS at 12:07

## 2022-07-16 RX ADMIN — ACETAMINOPHEN PRN MG: 325 TABLET ORAL at 23:49

## 2022-07-16 RX ADMIN — MAGNESIUM OXIDE TAB 400 MG (241.3 MG ELEMENTAL MG) SCH MG: 400 (241.3 MG) TAB at 18:08

## 2022-07-16 RX ADMIN — SODIUM CHLORIDE SCH MLS/HR: 4.5 INJECTION, SOLUTION INTRAVENOUS at 12:03

## 2022-07-16 RX ADMIN — PIPERACILLIN SODIUM AND TAZOBACTAM SODIUM SCH MLS/HR: .375; 3 INJECTION, POWDER, LYOPHILIZED, FOR SOLUTION INTRAVENOUS at 18:03

## 2022-07-16 RX ADMIN — DEXTROSE MONOHYDRATE SCH MLS/HR: 50 INJECTION, SOLUTION INTRAVENOUS at 06:30

## 2022-07-16 RX ADMIN — VALPROIC ACID SCH MG: 250 SOLUTION ORAL at 18:05

## 2022-07-16 RX ADMIN — VITAMIN D, TAB 1000IU (100/BT) SCH UNIT: 25 TAB at 09:07

## 2022-07-16 RX ADMIN — VALPROIC ACID SCH MG: 250 SOLUTION ORAL at 09:08

## 2022-07-16 RX ADMIN — PANTOPRAZOLE SODIUM SCH MG: 40 GRANULE, DELAYED RELEASE ORAL at 07:57

## 2022-07-16 RX ADMIN — PIPERACILLIN SODIUM AND TAZOBACTAM SODIUM SCH MLS/HR: .375; 3 INJECTION, POWDER, LYOPHILIZED, FOR SOLUTION INTRAVENOUS at 23:47

## 2022-07-16 RX ADMIN — SODIUM CHLORIDE SCH MLS/HR: 4.5 INJECTION, SOLUTION INTRAVENOUS at 22:06

## 2022-07-16 RX ADMIN — DEXTROSE MONOHYDRATE SCH MLS/HR: 50 INJECTION, SOLUTION INTRAVENOUS at 07:00

## 2022-07-16 RX ADMIN — SODIUM CHLORIDE SCH MLS/HR: 4.5 INJECTION, SOLUTION INTRAVENOUS at 03:47

## 2022-07-16 RX ADMIN — SIMETHICONE SCH MG: 20 SUSPENSION/ DROPS ORAL at 09:09

## 2022-07-16 RX ADMIN — MAGNESIUM OXIDE TAB 400 MG (241.3 MG ELEMENTAL MG) SCH MG: 400 (241.3 MG) TAB at 09:07

## 2022-07-16 RX ADMIN — Medication SCH EACH: at 18:08

## 2022-07-16 RX ADMIN — ATENOLOL SCH MG: 50 TABLET ORAL at 09:08

## 2022-07-16 RX ADMIN — Medication SCH EACH: at 09:07

## 2022-07-16 RX ADMIN — DEXTROSE MONOHYDRATE SCH MLS/HR: 50 INJECTION, SOLUTION INTRAVENOUS at 18:03

## 2022-07-16 RX ADMIN — SIMETHICONE SCH MG: 20 SUSPENSION/ DROPS ORAL at 12:07

## 2022-07-16 NOTE — NUR
RN CLOSING NOTE



PATIENT IN BED, OBTUNDED. CARE GIVER IN THE ROOM. NO S/S OF APPARENT DISTRESS IN 4LPM OF O2 
VIA NC SATING AT 98%. TELE MONITOR READING SR 70'S AT THIS TIME. NOT EXHIBITING PAIN AT THIS 
TIME. PEG INFUSING GLUCERNA AT 50 ML/HR. IV ACCESS CARMELITA MIDLINE INFUSING 1/2 NS @ 75ML/HR.  
ALL SAFETY MEASURES IN PLACE, CALL LIGHT WITHIN REACH, HEAD OF BED SLIGHTLY ELEVATED. WILL 
ENDORSE TO NEXT NURSE ON DUTY FOR CONTINUITY OF CARE.

## 2022-07-16 NOTE — NUR
RN NOTES





RECEIVED REPORT FROM MORNING RN. PATIENT IN BED CAREGIVER AT BEDSIDE. WITH OXYGEN INHALATION 
AT 4LPM VIA NASAL CANULA SATING 96% NO SOB NO DISTRESS NOTED AT THIS TIME. WITH IV ACCESS AT 
R UA MIDLINE PATENT ON CONTINUOS IVF 1/2 NS @ 125CC/HR. WITH GT INTACT ON CONTINUOUS GT 
FEEDING GLUCERNA @ 50 CC/HR TOLERATING WILL. WILL INCREASE BY 10 Q6 HOURS TO REACH 50CC 
GOAL. WITH NEPHROSTOMY TUBE INTACT DRAINING DARK YELLOW OUTPUT. ALL SAFETY MEASURES IN PLACE 
AT ALL TIMES. HOB ELEVATED. CALL LIGHT WITHIN REACH. WILL CLOSELY MONITOR THE PATIENT.

## 2022-07-16 NOTE — NUR
RN NOTES





BP 77/46, HR 81. CHARGE NURSE AND MD INFORMED. WAITING FOR ORDERS. WILL CONTINUE TO MONITOR.

## 2022-07-16 NOTE — NUR
RN OPENING NOTE



PATIENT IN BED, OBTUNDED. CARE GIVER IN THE ROOM. NO S/S OF APPARENT DISTRESS IN 4LPM OF O2 
VIA NC SATING AT 96%. TELE MONITOR READING SR 60'S AT THIS TIME. NOT EXHIBITING PAIN AT THIS 
TIME. PEG NOTED, GLUCERNA INFUSING AT 40 ML/HR WITH A GOAL OF 50ML/HR. IV ACCESS CARMELITA MIDLINE 
INFUSING 1/2 NS @ 75ML/HR.  ALL SAFETY MEASURES IN PLACE, CALL LIGHT WITHIN REACH, HEAD OF 
BED SLIGHTLY ELEVATED. WILL CONTINUE PLAN OF CARE.

## 2022-07-16 NOTE — NUR
RN CLOSING NOTE



PATIENT IN BED, OBTUNDED. CARE GIVER IN THE ROOM. NO S/S OF APPARENT DISTRESS IN 4LPM OF O2 
VIA NC SATING AT 97%. TELE MONITOR READING SR WITH 64 BPM AT THIS TIME. NOT EXHIBITING PAIN 
VIA FLACC AT THIS TIME. PEG NOTED, GLUCERNA RUNNING AT 40 ML/HR. IV ACCESS CARMELITA MIDLINE 
RUNNING 1/2 NS @ 75ML/HR.  ALL SAFETY MEASURES IN PLACE, WILL ENDORSE TO INCOMING RN

## 2022-07-17 VITALS — SYSTOLIC BLOOD PRESSURE: 95 MMHG | DIASTOLIC BLOOD PRESSURE: 59 MMHG

## 2022-07-17 VITALS — SYSTOLIC BLOOD PRESSURE: 133 MMHG | DIASTOLIC BLOOD PRESSURE: 72 MMHG

## 2022-07-17 VITALS — DIASTOLIC BLOOD PRESSURE: 65 MMHG | SYSTOLIC BLOOD PRESSURE: 109 MMHG

## 2022-07-17 VITALS — SYSTOLIC BLOOD PRESSURE: 103 MMHG | DIASTOLIC BLOOD PRESSURE: 80 MMHG

## 2022-07-17 VITALS — DIASTOLIC BLOOD PRESSURE: 63 MMHG | SYSTOLIC BLOOD PRESSURE: 115 MMHG

## 2022-07-17 VITALS — SYSTOLIC BLOOD PRESSURE: 99 MMHG | DIASTOLIC BLOOD PRESSURE: 51 MMHG

## 2022-07-17 LAB
BASOPHILS # BLD AUTO: 0 K/UL (ref 0–0.2)
BASOPHILS NFR BLD AUTO: 0.3 % (ref 0–2)
BUN SERPL-MCNC: 16 MG/DL (ref 7–18)
CALCIUM SERPL-MCNC: 7.9 MG/DL (ref 8.5–10.1)
CHLORIDE SERPL-SCNC: 108 MMOL/L (ref 98–107)
CO2 SERPL-SCNC: 26 MMOL/L (ref 21–32)
CREAT SERPL-MCNC: 0.4 MG/DL (ref 0.6–1.3)
EOSINOPHIL NFR BLD AUTO: 2.5 % (ref 0–6)
GLUCOSE SERPL-MCNC: 336 MG/DL (ref 74–106)
HCT VFR BLD AUTO: 31 % (ref 33–45)
HGB BLD-MCNC: 9.6 G/DL (ref 11.5–14.8)
LYMPHOCYTES NFR BLD AUTO: 2.2 K/UL (ref 0.8–4.8)
LYMPHOCYTES NFR BLD AUTO: 35.6 % (ref 20–44)
MCHC RBC AUTO-ENTMCNC: 31 G/DL (ref 31–36)
MCV RBC AUTO: 102 FL (ref 82–100)
MONOCYTES NFR BLD AUTO: 0.6 K/UL (ref 0.1–1.3)
MONOCYTES NFR BLD AUTO: 9.1 % (ref 2–12)
NEUTROPHILS # BLD AUTO: 3.3 K/UL (ref 1.8–8.9)
NEUTROPHILS NFR BLD AUTO: 52.5 % (ref 43–81)
PLATELET # BLD AUTO: 185 K/UL (ref 150–450)
POTASSIUM SERPL-SCNC: 3.8 MMOL/L (ref 3.5–5.1)
RBC # BLD AUTO: 3.05 MIL/UL (ref 4–5.2)
SODIUM SERPL-SCNC: 139 MMOL/L (ref 136–145)
WBC NRBC COR # BLD AUTO: 6.2 K/UL (ref 4.3–11)

## 2022-07-17 RX ADMIN — MAGNESIUM OXIDE TAB 400 MG (241.3 MG ELEMENTAL MG) SCH MG: 400 (241.3 MG) TAB at 17:01

## 2022-07-17 RX ADMIN — Medication SCH EACH: at 17:01

## 2022-07-17 RX ADMIN — PIPERACILLIN SODIUM AND TAZOBACTAM SODIUM SCH MLS/HR: .375; 3 INJECTION, POWDER, LYOPHILIZED, FOR SOLUTION INTRAVENOUS at 17:01

## 2022-07-17 RX ADMIN — PIPERACILLIN SODIUM AND TAZOBACTAM SODIUM SCH MLS/HR: .375; 3 INJECTION, POWDER, LYOPHILIZED, FOR SOLUTION INTRAVENOUS at 23:41

## 2022-07-17 RX ADMIN — VALPROIC ACID SCH MG: 250 SOLUTION ORAL at 09:17

## 2022-07-17 RX ADMIN — Medication PRN ML: at 06:12

## 2022-07-17 RX ADMIN — PANTOPRAZOLE SODIUM SCH MG: 40 GRANULE, DELAYED RELEASE ORAL at 09:16

## 2022-07-17 RX ADMIN — PIPERACILLIN SODIUM AND TAZOBACTAM SODIUM SCH MLS/HR: .375; 3 INJECTION, POWDER, LYOPHILIZED, FOR SOLUTION INTRAVENOUS at 05:44

## 2022-07-17 RX ADMIN — DEXTROSE MONOHYDRATE SCH MLS/HR: 50 INJECTION, SOLUTION INTRAVENOUS at 18:11

## 2022-07-17 RX ADMIN — ACETAMINOPHEN PRN MG: 325 TABLET ORAL at 12:21

## 2022-07-17 RX ADMIN — SIMETHICONE SCH MG: 20 SUSPENSION/ DROPS ORAL at 09:17

## 2022-07-17 RX ADMIN — SIMETHICONE SCH MG: 20 SUSPENSION/ DROPS ORAL at 17:01

## 2022-07-17 RX ADMIN — SIMETHICONE SCH MG: 20 SUSPENSION/ DROPS ORAL at 12:05

## 2022-07-17 RX ADMIN — VALPROIC ACID SCH MG: 250 SOLUTION ORAL at 17:01

## 2022-07-17 RX ADMIN — DEXTROSE MONOHYDRATE SCH MLS/HR: 50 INJECTION, SOLUTION INTRAVENOUS at 06:22

## 2022-07-17 RX ADMIN — PIPERACILLIN SODIUM AND TAZOBACTAM SODIUM SCH MLS/HR: .375; 3 INJECTION, POWDER, LYOPHILIZED, FOR SOLUTION INTRAVENOUS at 12:05

## 2022-07-17 RX ADMIN — VITAMIN D, TAB 1000IU (100/BT) SCH UNIT: 25 TAB at 09:17

## 2022-07-17 RX ADMIN — Medication SCH EACH: at 09:16

## 2022-07-17 RX ADMIN — MAGNESIUM OXIDE TAB 400 MG (241.3 MG ELEMENTAL MG) SCH MG: 400 (241.3 MG) TAB at 09:16

## 2022-07-17 NOTE — NUR
RT NOTE



DID NT SUCTION ON PT. SAT 97%, HR 87. THIN BLOODY AND MINIMAL SECRETIONS. NO RESP DISTRESS. 
WILL NT SUCTION AGAIN AT 2330. PLACED PT BACK ON 3L NC.

-------------------------------------------------------------------------------

Addendum: 07/17/22 at 2153 by Nabeel Keyes RT

-------------------------------------------------------------------------------

Amended: Links added.

## 2022-07-17 NOTE — NUR
RN OPENING NOTES



RECEIVED PATIENT IN BED, OBTUNDED. CARE GIVER IN THE ROOM. ON 4LPM OF O2 VIA NC, TOLERATING 
WELL. ON TELE MONITOR READING NSR WITH HR OF 88 AT THIS TIME. NO SOB OR ANY S/SX OF ANY 
RESPIRATORY DISTRESS NOTED AT THE TIME. PEG NOTED, GLUCERNA INFUSING AT 50 ML/HR. IV ACCESS 
CARMELITA MIDLINE PATENT AND INTACT, FLUSHES WELL. ALL SAFETY MEASURES IN PLACE,  BED LOCKED IN 
LOWEST POSITION WITH SIDE RAILS UP X 2, CALL LIGHT WITHIN REACH, HEAD OF BED ELEVATED. WILL 
CONTINUE TO MONITOR AND REASSESS PATIENT FOR ANY CHANGES DURING SHIFT.

-------------------------------------------------------------------------------

Addendum: 07/17/22 at 2029 by AVI MORE RN

-------------------------------------------------------------------------------

WRONG TIME

## 2022-07-17 NOTE — NUR
RN CLOSING NOTE



PATIENT IN BED, OBTUNDED. CARE GIVER IN THE ROOM. NO S/S OF APPARENT DISTRESS IN 4LPM OF O2 
VIA NC SATING AT 97%. TELE MONITOR READING SR WITH 64 BPM AT THIS TIME. NOT EXHIBITING PAIN 
VIA FLACC AT THIS TIME. PEG NOTED, GLUCERNA RUNNING AT 50 ML/HR. IV ACCESS CARMELITA MIDLINE 
PATENT. NEPHROSTOMY TUBE PATENT DRAINING YELLOWISH OUTPUT ALL SAFETY MEASURES IN PLACE, WILL 
ENDORSE TO INCOMING RN

## 2022-07-17 NOTE — NUR
RT NOTE



PT NT SUCTIONED. BLOODY BUT MINIMAL SECRETIONS. NO RESP DISTRESS. PT STABLE POST SUCTION. 
PLACED BACK ON 3L NC.

-------------------------------------------------------------------------------

Addendum: 07/17/22 at 2329 by Nabeel Keyes RT

-------------------------------------------------------------------------------

Amended: Links added.

## 2022-07-17 NOTE — NUR
RN CLOSING NOTES



NO SIGNIFICANT CHANGES ON PATIENT CONDITION THROUGHOUT SHIFT. PATIENT IN BED, OBTUNDED. CARE 
GIVER IN THE ROOM. ON 4LPM OF O2 VIA NC, TOLERATING WELL.  NO SOB OR ANY S/SX OF ANY 
RESPIRATORY DISTRESS NOTED AT THE TIME. PEG NOTED, GLUCERNA INFUSING AT 50 ML/HR. NO 
RESIDUAL NOTED. IV ACCESS CARMELITA MIDLINE PATENT AND INTACT, FLUSHES WELL. ALL DUE MEDS GIVEN AS 
ORDERED. KEPT PATIENT CLEAN DRY AND COMFORTABLE. ALL NEEDS ANTICIPATED. ALL SAFETY MEASURES 
IN PLACE,  BED LOCKED IN LOWEST POSITION WITH SIDE RAILS UP X 2, CALL LIGHT WITHIN REACH, 
HEAD OF BED ELEVATED. WILL ENDORSE TO NIGHT SHIFT NURSE FOR CONTINUITY OF CARE.

## 2022-07-17 NOTE — NUR
RT NOTE



RECEIVED PT ON 3LPM NC. SAT 97%. NO RESP DISTRESS. ORDERS FOR Q4 NT SUCTION. WILL SUCTION 
2130, NO AUDIBLE SECRETIONS UPON AUSCULTATION.

-------------------------------------------------------------------------------

Addendum: 07/17/22 at 2010 by Nabeel Keyes RT

-------------------------------------------------------------------------------

Amended: Links added.

## 2022-07-17 NOTE — NUR
RN OPENING NOTES



RECEIVED PATIENT IN BED, OBTUNDED. CARE GIVER IN THE ROOM. ON 4LPM OF O2 VIA NC, TOLERATING 
WELL. ON TELE MONITOR READING NSR WITH HR OF 88 AT THIS TIME. NO SOB OR ANY S/SX OF ANY 
DISTRESS NOTED AT THE TIME. PEG NOTED, GLUCERNA INFUSING AT 50 ML/HR. IV ACCESS CARMELITA MIDLINE 
PATENT AND INTACT, FLUSHES WELL. CARE GIVER IS ASKING FOR TYLENOL, IF BP IS GOOD TYLENOL 
WILL BE GIVEN,ALL SAFETY MEASURES IN PLACE,  BED LOCKED IN LOWEST POSITION WITH SIDE RAILS 
UP X 2, CALL LIGHT WITHIN REACH, HEAD OF BED ELEVATED. WILL CONTINUE TO MONITOR AND REASSESS 
PATIENT FOR ANY CHANGES DURING SHIFT.

## 2022-07-17 NOTE — NUR
RN NOTES





PATIENT NOTED CONGESTION UPON AUSCULTATION. DR MUNOZ INFORMED WITH ORDER TO D/C IVF. WILL 
CONTINUE TO MONITOR THE PATIENT

## 2022-07-17 NOTE — NUR
RN OPENING NOTES



RECEIVED PATIENT IN BED, OBTUNDED. CARE GIVER IN THE ROOM. ON 4LPM OF O2 VIA NC, TOLERATING 
WELL. ON TELE MONITOR READING NSR WITH HR OF 88 AT THIS TIME. NO SOB OR ANY S/SX OF ANY 
RESPIRATORY DISTRESS NOTED AT THE TIME. PEG NOTED, GLUCERNA INFUSING AT 50 ML/HR. IV ACCESS 
CARMELITA MIDLINE PATENT AND INTACT, FLUSHES WELL. ALL SAFETY MEASURES IN PLACE,  BED LOCKED IN 
LOWEST POSITION WITH SIDE RAILS UP X 2, CALL LIGHT WITHIN REACH, HEAD OF BED ELEVATED. WILL 
CONTINUE TO MONITOR AND REASSESS PATIENT FOR ANY CHANGES DURING SHIFT.

## 2022-07-18 VITALS — SYSTOLIC BLOOD PRESSURE: 99 MMHG | DIASTOLIC BLOOD PRESSURE: 65 MMHG

## 2022-07-18 VITALS — DIASTOLIC BLOOD PRESSURE: 65 MMHG | SYSTOLIC BLOOD PRESSURE: 106 MMHG

## 2022-07-18 VITALS — DIASTOLIC BLOOD PRESSURE: 49 MMHG | SYSTOLIC BLOOD PRESSURE: 108 MMHG

## 2022-07-18 VITALS — SYSTOLIC BLOOD PRESSURE: 110 MMHG | DIASTOLIC BLOOD PRESSURE: 58 MMHG

## 2022-07-18 VITALS — SYSTOLIC BLOOD PRESSURE: 98 MMHG | DIASTOLIC BLOOD PRESSURE: 50 MMHG

## 2022-07-18 VITALS — DIASTOLIC BLOOD PRESSURE: 55 MMHG | SYSTOLIC BLOOD PRESSURE: 100 MMHG

## 2022-07-18 LAB
BUN SERPL-MCNC: 6 MG/DL (ref 7–18)
CALCIUM SERPL-MCNC: 5.9 MG/DL (ref 8.5–10.1)
CHLORIDE SERPL-SCNC: 113 MMOL/L (ref 98–107)
CO2 SERPL-SCNC: 21 MMOL/L (ref 21–32)
CREAT SERPL-MCNC: 0.2 MG/DL (ref 0.6–1.3)
EOSINOPHIL NFR BLD MANUAL: 4 % (ref 0–4)
GLUCOSE SERPL-MCNC: 291 MG/DL (ref 74–106)
HCT VFR BLD AUTO: 30 % (ref 33–45)
HGB BLD-MCNC: 9.5 G/DL (ref 11.5–14.8)
LYMPHOCYTES NFR BLD MANUAL: 32 % (ref 16–48)
MAGNESIUM SERPL-MCNC: 1.5 MG/DL (ref 1.8–2.4)
MCHC RBC AUTO-ENTMCNC: 32 G/DL (ref 31–36)
MCV RBC AUTO: 98 FL (ref 82–100)
METAMYELOCYTES NFR BLD MANUAL: 1 % (ref 0–0)
MONOCYTES NFR BLD MANUAL: 2 % (ref 0–11)
MYELOCYTES NFR BLD MANUAL: 1 % (ref 0–0)
NEUTS BAND NFR BLD MANUAL: 12 % (ref 0–5)
NEUTS SEG NFR BLD MANUAL: 48 % (ref 42–76)
PHOSPHATE SERPL-MCNC: 1.6 MG/DL (ref 2.5–4.9)
PLATELET # BLD AUTO: 223 K/UL (ref 150–450)
POTASSIUM SERPL-SCNC: 2.7 MMOL/L (ref 3.5–5.1)
RBC # BLD AUTO: 3.01 MIL/UL (ref 4–5.2)
SODIUM SERPL-SCNC: 141 MMOL/L (ref 136–145)
WBC NRBC COR # BLD AUTO: 8.9 K/UL (ref 4.3–11)

## 2022-07-18 RX ADMIN — POTASSIUM CHLORIDE SCH MLS/HR: 200 INJECTION, SOLUTION INTRAVENOUS at 13:45

## 2022-07-18 RX ADMIN — VITAMIN D, TAB 1000IU (100/BT) SCH UNIT: 25 TAB at 08:49

## 2022-07-18 RX ADMIN — SIMETHICONE SCH MG: 20 SUSPENSION/ DROPS ORAL at 08:50

## 2022-07-18 RX ADMIN — VALPROIC ACID SCH MG: 250 SOLUTION ORAL at 17:47

## 2022-07-18 RX ADMIN — MEROPENEM SCH MLS/HR: 1 INJECTION INTRAVENOUS at 18:00

## 2022-07-18 RX ADMIN — MAGNESIUM HYDROXIDE SCH ML: 400 SUSPENSION ORAL at 18:06

## 2022-07-18 RX ADMIN — SIMETHICONE SCH MG: 20 SUSPENSION/ DROPS ORAL at 17:47

## 2022-07-18 RX ADMIN — POTASSIUM CHLORIDE SCH MLS/HR: 200 INJECTION, SOLUTION INTRAVENOUS at 08:50

## 2022-07-18 RX ADMIN — PANTOPRAZOLE SODIUM SCH MG: 40 GRANULE, DELAYED RELEASE ORAL at 08:48

## 2022-07-18 RX ADMIN — VALPROIC ACID SCH MG: 250 SOLUTION ORAL at 08:49

## 2022-07-18 RX ADMIN — MAGNESIUM OXIDE TAB 400 MG (241.3 MG ELEMENTAL MG) SCH MG: 400 (241.3 MG) TAB at 17:59

## 2022-07-18 RX ADMIN — Medication SCH EACH: at 08:48

## 2022-07-18 RX ADMIN — ACETAMINOPHEN PRN MG: 325 TABLET ORAL at 22:52

## 2022-07-18 RX ADMIN — DEXTROSE MONOHYDRATE SCH MLS/HR: 50 INJECTION, SOLUTION INTRAVENOUS at 06:50

## 2022-07-18 RX ADMIN — Medication PRN ML: at 16:50

## 2022-07-18 RX ADMIN — POTASSIUM CHLORIDE SCH MLS/HR: 200 INJECTION, SOLUTION INTRAVENOUS at 12:48

## 2022-07-18 RX ADMIN — MAGNESIUM SULFATE IN DEXTROSE SCH MLS/HR: 10 INJECTION, SOLUTION INTRAVENOUS at 12:48

## 2022-07-18 RX ADMIN — MAGNESIUM OXIDE TAB 400 MG (241.3 MG ELEMENTAL MG) SCH MG: 400 (241.3 MG) TAB at 08:49

## 2022-07-18 RX ADMIN — MAGNESIUM SULFATE IN DEXTROSE SCH MLS/HR: 10 INJECTION, SOLUTION INTRAVENOUS at 11:44

## 2022-07-18 RX ADMIN — SIMETHICONE SCH MG: 20 SUSPENSION/ DROPS ORAL at 12:48

## 2022-07-18 RX ADMIN — LINEZOLID SCH MG: 600 TABLET, FILM COATED ORAL at 11:01

## 2022-07-18 RX ADMIN — Medication SCH EACH: at 17:47

## 2022-07-18 RX ADMIN — LINEZOLID SCH MG: 600 TABLET, FILM COATED ORAL at 21:02

## 2022-07-18 RX ADMIN — PIPERACILLIN SODIUM AND TAZOBACTAM SODIUM SCH MLS/HR: .375; 3 INJECTION, POWDER, LYOPHILIZED, FOR SOLUTION INTRAVENOUS at 06:12

## 2022-07-18 RX ADMIN — POTASSIUM CHLORIDE SCH MLS/HR: 200 INJECTION, SOLUTION INTRAVENOUS at 11:43

## 2022-07-18 NOTE — NUR
RN CLOSING NOTE

PATIENT IS IN STABLE CONDITION DURING SHIFT, NO DISTRESS OR DISCOMFORT NOTED. ALL NEEDS 
ATTENDED, KEPT PATIENT CLEAN AND DRY, NEPHROSTOMY BAG DRAINING PINKISH COLOR BUT CLEARING 
UP.REPOSITIONED EVERY 2 HOURS, SIDE RAILS X2, CALL LIGHT WITHIN REACH, WILL ENDORSE TO 
DAYSHIFT NURSE.

## 2022-07-18 NOTE — NUR
TELE RN NOTE

MORNING NURSE REPORTED THAT THERE WAS A PENDING LAB DRAW FOR A REPEAT K LEVEL FROM A 
CRITICAL LAB VALUE OF 6.8. MORNING NURSE RECEIVED ORDER FROM  MU HUDSON OF KAYEXALATE 
30G TO BE GIVEN NOW AND ONCE IN THE MORNING  IF REDRAW COMES BACK >5.5. AT 2020 JAMIE RUBIO FROM THE LAB CALLED WITH THE REDRAW AS A CRITICAL LAB OF 6.89. PER NP WYATT GIVE 
KAYEXALATE 30G ONCE. AND DO A REPEAT LAB OF K LEVEL AT 2200. ORDER NOTED AND CARRIED OUT

## 2022-07-18 NOTE — NUR
TELE RN OPENING NOTE

RECEIVED PATIENT AWAKE IN BED NO VERBAL,  ON 02 2L VIA NC TOLERATING WELL,HOB ELEVATED, NO 
S/S OF RESP DISTRESS, NO S/S OF PAIN NOTED , TELE MONITOR  READING SR HR 88,CARMELITA MIDLINE #18G 
RUNNING MAXIPIME ATB @33.33ML/HR ,NO S/S OF INFILTRATION NOTED, INTACT AND PATENT, RT SIDE 
NEPHROSTOMY BAG/DRAIN DRAINING CLEAR YELLOW URINE, GTUBE PRESENT INTACT AND PATENT, NO 
RESIDUAL NOTED, SIDE RAILS X2, CALL LIGHT WITHIN REACH, WILL CONTINUE TO MONITOR. VSS

-------------------------------------------------------------------------------

Addendum: 07/18/22 at 2000 by AVI MORE RN

-------------------------------------------------------------------------------

PRIVATE CARE GIVER @ BEDSIDE

## 2022-07-18 NOTE — NUR
RN CLOSING NOTES



NO SIGNIFICANT CHANGES ON PATIENT CONDITION THROUGHOUT SHIFT. PATIENT IN BED, OBTUNDED. CARE 
GIVER IN THE ROOM. ON 4LPM OF O2 VIA NC, TOLERATING WELL.  NO SOB OR ANY S/SX OF ANY 
RESPIRATORY DISTRESS NOTED AT THE TIME. PEG NOTED, GLUCERNA INFUSING AT 50 ML/HR.  IV ACCESS 
CARMELITA MIDLINE PATENT AND INTACT, FLUSHES WELL. ALL DUE MEDS GIVEN AS ORDERED. KEPT PATIENT 
CLEAN DRY AND COMFORTABLE. ALL NEEDS ANTICIPATED. ALL SAFETY MEASURES IN PLACE,  BED LOCKED 
IN LOWEST POSITION WITH SIDE RAILS UP X 2, CALL LIGHT WITHIN REACH, HEAD OF BED ELEVATED. 
WILL ENDORSE TO NIGHT SHIFT NURSE FOR CONTINUITY OF CARE.

## 2022-07-18 NOTE — NUR
TELE RN OPENING NOTES:



RECEIVED PATIENT IN BED WITH CAREGIVER AT BEDSIDE, AWAKE NON VERBAL, OBTUNDED, ON OXYGEN AT 
4L/MIN VIA N/C, NO S/S RESPIRATORY DISTRESS NOTED AT THIS TIME. ON GLUCERNA VIA GTUBE 
RUNNING AT 50 ML/HR.  PATIENT HAS RIGHT UPPER MIDLINE IV ACCESS, FLUSHING WELL, NO S/S OF 
INFILTRATION.  NEPHROSTOMY TUBE NOTED ON PATIENTS RIGHT BACK, INTACT, DRAINING WITH DARK 
YELLOW ORANGE URINE.  HOB ELEVATED, SIDERAILS UP, BED LOCKED IN LOWEST POSITION.  WILL 
CONTINUE TO MONITOR AND REASSESS PATIENT THROUGHOUT SHIFT

## 2022-07-18 NOTE — NUR
RN NOTE

PATIENT RECEIVED BED BATH, AND COMPLETE LINEN CHANGE, NEPHROSTOMY NOTED WITH SEROSANGUINEOUS 
FLUID DRAINING, 275 OUTPUT. WILL CONTINUE TO MONITOR.

## 2022-07-19 VITALS — SYSTOLIC BLOOD PRESSURE: 115 MMHG | DIASTOLIC BLOOD PRESSURE: 71 MMHG

## 2022-07-19 VITALS — DIASTOLIC BLOOD PRESSURE: 96 MMHG | SYSTOLIC BLOOD PRESSURE: 137 MMHG

## 2022-07-19 VITALS — SYSTOLIC BLOOD PRESSURE: 124 MMHG | DIASTOLIC BLOOD PRESSURE: 77 MMHG

## 2022-07-19 VITALS — SYSTOLIC BLOOD PRESSURE: 100 MMHG | DIASTOLIC BLOOD PRESSURE: 59 MMHG

## 2022-07-19 VITALS — DIASTOLIC BLOOD PRESSURE: 57 MMHG | SYSTOLIC BLOOD PRESSURE: 108 MMHG

## 2022-07-19 VITALS — DIASTOLIC BLOOD PRESSURE: 74 MMHG | SYSTOLIC BLOOD PRESSURE: 107 MMHG

## 2022-07-19 LAB
BASOPHILS # BLD AUTO: 0 K/UL (ref 0–0.2)
BASOPHILS NFR BLD AUTO: 0.3 % (ref 0–2)
BASOPHILS NFR BLD MANUAL: 0 % (ref 0–2)
BUN SERPL-MCNC: 9 MG/DL (ref 7–18)
CALCIUM SERPL-MCNC: 8.4 MG/DL (ref 8.5–10.1)
CHLORIDE SERPL-SCNC: 105 MMOL/L (ref 98–107)
CO2 SERPL-SCNC: 25 MMOL/L (ref 21–32)
CREAT SERPL-MCNC: 0.5 MG/DL (ref 0.6–1.3)
EOSINOPHIL NFR BLD AUTO: 2 % (ref 0–6)
EOSINOPHIL NFR BLD MANUAL: 2 % (ref 0–4)
GLUCOSE SERPL-MCNC: 323 MG/DL (ref 74–106)
HCT VFR BLD AUTO: 29 % (ref 33–45)
HGB BLD-MCNC: 9.4 G/DL (ref 11.5–14.8)
LYMPHOCYTES NFR BLD AUTO: 2.3 K/UL (ref 0.8–4.8)
LYMPHOCYTES NFR BLD AUTO: 29.6 % (ref 20–44)
LYMPHOCYTES NFR BLD MANUAL: 29 % (ref 16–48)
MAGNESIUM SERPL-MCNC: 2.4 MG/DL (ref 1.8–2.4)
MCHC RBC AUTO-ENTMCNC: 33 G/DL (ref 31–36)
MCV RBC AUTO: 98 FL (ref 82–100)
MONOCYTES NFR BLD AUTO: 0.9 K/UL (ref 0.1–1.3)
MONOCYTES NFR BLD AUTO: 11.1 % (ref 2–12)
MONOCYTES NFR BLD MANUAL: 12 % (ref 0–11)
NEUTROPHILS # BLD AUTO: 4.5 K/UL (ref 1.8–8.9)
NEUTROPHILS NFR BLD AUTO: 57 % (ref 43–81)
NEUTS BAND NFR BLD MANUAL: 5 % (ref 0–5)
NEUTS SEG NFR BLD MANUAL: 52 % (ref 42–76)
PHOSPHATE SERPL-MCNC: 2.7 MG/DL (ref 2.5–4.9)
PLATELET # BLD AUTO: 266 K/UL (ref 150–450)
POTASSIUM SERPL-SCNC: 5.3 MMOL/L (ref 3.5–5.1)
RBC # BLD AUTO: 2.92 MIL/UL (ref 4–5.2)
SODIUM SERPL-SCNC: 134 MMOL/L (ref 136–145)
WBC NRBC COR # BLD AUTO: 7.8 K/UL (ref 4.3–11)

## 2022-07-19 RX ADMIN — SIMETHICONE SCH MG: 20 SUSPENSION/ DROPS ORAL at 13:13

## 2022-07-19 RX ADMIN — Medication SCH EACH: at 08:37

## 2022-07-19 RX ADMIN — LINEZOLID SCH MG: 600 TABLET, FILM COATED ORAL at 08:37

## 2022-07-19 RX ADMIN — Medication PRN ML: at 15:59

## 2022-07-19 RX ADMIN — SIMETHICONE SCH MG: 20 SUSPENSION/ DROPS ORAL at 08:40

## 2022-07-19 RX ADMIN — ACETAMINOPHEN PRN MG: 325 TABLET ORAL at 22:20

## 2022-07-19 RX ADMIN — MEROPENEM SCH MLS/HR: 1 INJECTION INTRAVENOUS at 17:55

## 2022-07-19 RX ADMIN — PANTOPRAZOLE SODIUM SCH MG: 40 GRANULE, DELAYED RELEASE ORAL at 08:37

## 2022-07-19 RX ADMIN — VALPROIC ACID SCH MG: 250 SOLUTION ORAL at 16:10

## 2022-07-19 RX ADMIN — MAGNESIUM OXIDE TAB 400 MG (241.3 MG ELEMENTAL MG) SCH MG: 400 (241.3 MG) TAB at 16:10

## 2022-07-19 RX ADMIN — MEROPENEM SCH MLS/HR: 1 INJECTION INTRAVENOUS at 09:44

## 2022-07-19 RX ADMIN — VITAMIN D, TAB 1000IU (100/BT) SCH UNIT: 25 TAB at 08:37

## 2022-07-19 RX ADMIN — LINEZOLID SCH MG: 600 TABLET, FILM COATED ORAL at 21:47

## 2022-07-19 RX ADMIN — MEROPENEM SCH MLS/HR: 1 INJECTION INTRAVENOUS at 02:08

## 2022-07-19 RX ADMIN — VALPROIC ACID SCH MG: 250 SOLUTION ORAL at 08:37

## 2022-07-19 RX ADMIN — MAGNESIUM OXIDE TAB 400 MG (241.3 MG ELEMENTAL MG) SCH MG: 400 (241.3 MG) TAB at 08:37

## 2022-07-19 RX ADMIN — SIMETHICONE SCH MG: 20 SUSPENSION/ DROPS ORAL at 16:11

## 2022-07-19 RX ADMIN — Medication SCH EACH: at 16:10

## 2022-07-19 RX ADMIN — ACETAMINOPHEN PRN MG: 325 TABLET ORAL at 16:17

## 2022-07-19 NOTE — NUR
RN CLOSING NOTE

PATIENT REMAINED STABLE THROUGHOUT SHIFT. PATIENT TOLERATES OXYGEN AT 3L/MIN VIA NASAL 
CANNULA, SATTING AT 96%. RIGHT UPPER ARM MIDLINE INTACT AND PATENT. G-TUBE INTACT AND PATENT 
WITH INSERTION SITE COVERED WITH DRY DRESSING. NEPHROSTOMY TUBE INTACT AND ATTACHED TO URINE 
BAG. NEPHROSTOMY SITE COVERED WITH DRY DRESSING. BED IS LOCKED IN LOWEST POSITION, 3 SIDE 
RAILS UP, CALL LIGHT WITHIN REACH. WILL ENDORSE TO NIGHT SHIFT NURSE.

## 2022-07-19 NOTE — NUR
RN OPENING NOTE

PATIENT IS IN BED, AWAKE, NON VERBAL. ON OXYGEN VIA NASAL CANNULA AT 4L/MIN. SINUS RHYTHM ON 
TELEMETRY MONITOR. BREATHING UNLABORED AND NOT IN ANY FORM OF DISTRESS. WITH G-TUBE INFUSING 
WITH GLUCERNA AT 50 ML/HR. WITH NEPHROSTOMY TUBE ATTACHED TO BAG DRAINING TO CLEAR YELLOW 
URINE. WITH RIGHT UPPER ARM MIDLINE INTACT AND PATENT. PATIENT IS ACCOMPANIED BY A CAREGIVER 
AT BEDSIDE. BED IS LOCKED IN LOWEST POSITION, 3 SIDE RAILS UP, CALL LIGHT WITHIN REACH. WILL 
CONTINUE TO MONITOR THROUGHOUT SHIFT.

## 2022-07-19 NOTE — NUR
TELE RN CLOSING NOTE

PATIENT IS IN STABLE CONDITION DURING SHIFT, NO DISTRESS OR DISCOMFORT NOTED. SINUS RHYTHM 
98, CARMELITA MIDLINE #18G,GTUBE INFUSING GLUCERNA @50ML/HR WELL, NO RESIDUAL NOTED, FLUSHED GTUBE 
WITH 250ML Q6H, NEPHROSTOMY BAG DRAINING WELL WITH 475 OUTPUT, NO LEAKAGE NOTED, NO S/S OF 
INFECTION NOTED ON NEPHRO SITE, ALL NEEDS ATTENDED, KEPT PATIENT CLEAN AND DRY, SIDE RAILS 
X2, CALL LIGHT WITHIN REACH, WILL ENDORSE TO DAYSHIFT NURSE.

## 2022-07-19 NOTE — NUR
TELE RN OPENING NOTE

RECEIVED PATIENT ASLEEP IN BED NO VERBAL,  ON 02 3L VIA NC TOLERATING WELL,HOB ELEVATED, NO 
S/S OF RESP DISTRESS, NO S/S OF PAIN NOTED , TELE MONITOR  READING SR HR 88,CARMELITA MIDLINE #18G 
RUNNING MAXIPIME ATB @33.33ML/HR ,NO S/S OF INFILTRATION NOTED, INTACT AND PATENT, RT SIDE 
NEPHROSTOMY BAG/DRAIN DRAINING CLEAR YELLOW URINE, GTUBE PRESENT INTACT AND PATENT, NO 
RESIDUAL NOTED, SIDE RAILS X2, CALL LIGHT WITHIN REACH, WILL CONTINUE TO MONITOR. VSS. 
PRIVATE CARE GIVER AT BEDSIDE

## 2022-07-20 VITALS — DIASTOLIC BLOOD PRESSURE: 63 MMHG | SYSTOLIC BLOOD PRESSURE: 110 MMHG

## 2022-07-20 VITALS — SYSTOLIC BLOOD PRESSURE: 111 MMHG | DIASTOLIC BLOOD PRESSURE: 65 MMHG

## 2022-07-20 VITALS — SYSTOLIC BLOOD PRESSURE: 115 MMHG | DIASTOLIC BLOOD PRESSURE: 77 MMHG

## 2022-07-20 VITALS — SYSTOLIC BLOOD PRESSURE: 99 MMHG | DIASTOLIC BLOOD PRESSURE: 62 MMHG

## 2022-07-20 VITALS — DIASTOLIC BLOOD PRESSURE: 64 MMHG | SYSTOLIC BLOOD PRESSURE: 114 MMHG

## 2022-07-20 VITALS — SYSTOLIC BLOOD PRESSURE: 129 MMHG | DIASTOLIC BLOOD PRESSURE: 66 MMHG

## 2022-07-20 LAB
BASOPHILS # BLD AUTO: 0 K/UL (ref 0–0.2)
BASOPHILS NFR BLD AUTO: 0.1 % (ref 0–2)
BUN SERPL-MCNC: 11 MG/DL (ref 7–18)
CALCIUM SERPL-MCNC: 8.6 MG/DL (ref 8.5–10.1)
CHLORIDE SERPL-SCNC: 105 MMOL/L (ref 98–107)
CO2 SERPL-SCNC: 25 MMOL/L (ref 21–32)
CREAT SERPL-MCNC: 0.4 MG/DL (ref 0.6–1.3)
EOSINOPHIL NFR BLD AUTO: 2.1 % (ref 0–6)
EOSINOPHIL NFR BLD MANUAL: 2 % (ref 0–4)
GLUCOSE SERPL-MCNC: 246 MG/DL (ref 74–106)
HCT VFR BLD AUTO: 30 % (ref 33–45)
HGB BLD-MCNC: 9.6 G/DL (ref 11.5–14.8)
LYMPHOCYTES NFR BLD AUTO: 2.5 K/UL (ref 0.8–4.8)
LYMPHOCYTES NFR BLD AUTO: 29.6 % (ref 20–44)
LYMPHOCYTES NFR BLD MANUAL: 32 % (ref 16–48)
MCHC RBC AUTO-ENTMCNC: 33 G/DL (ref 31–36)
MCV RBC AUTO: 96 FL (ref 82–100)
METAMYELOCYTES NFR BLD MANUAL: 1 % (ref 0–0)
MONOCYTES NFR BLD AUTO: 1 K/UL (ref 0.1–1.3)
MONOCYTES NFR BLD AUTO: 11.8 % (ref 2–12)
MONOCYTES NFR BLD MANUAL: 10 % (ref 0–11)
MYELOCYTES NFR BLD MANUAL: 1 % (ref 0–0)
NEUTROPHILS # BLD AUTO: 4.8 K/UL (ref 1.8–8.9)
NEUTROPHILS NFR BLD AUTO: 56.4 % (ref 43–81)
NEUTS BAND NFR BLD MANUAL: 14 % (ref 0–5)
NEUTS SEG NFR BLD MANUAL: 40 % (ref 42–76)
PLATELET # BLD AUTO: 395 K/UL (ref 150–450)
POTASSIUM SERPL-SCNC: 4.8 MMOL/L (ref 3.5–5.1)
RBC # BLD AUTO: 3.07 MIL/UL (ref 4–5.2)
SODIUM SERPL-SCNC: 136 MMOL/L (ref 136–145)
WBC NRBC COR # BLD AUTO: 8.6 K/UL (ref 4.3–11)

## 2022-07-20 RX ADMIN — VALPROIC ACID SCH MG: 250 SOLUTION ORAL at 16:29

## 2022-07-20 RX ADMIN — LINEZOLID SCH MG: 600 TABLET, FILM COATED ORAL at 08:29

## 2022-07-20 RX ADMIN — ACETAMINOPHEN PRN MG: 325 TABLET ORAL at 16:38

## 2022-07-20 RX ADMIN — LINEZOLID SCH MG: 600 TABLET, FILM COATED ORAL at 20:53

## 2022-07-20 RX ADMIN — MEROPENEM SCH MLS/HR: 1 INJECTION INTRAVENOUS at 18:06

## 2022-07-20 RX ADMIN — PANTOPRAZOLE SODIUM SCH MG: 40 GRANULE, DELAYED RELEASE ORAL at 08:30

## 2022-07-20 RX ADMIN — MEROPENEM SCH MLS/HR: 1 INJECTION INTRAVENOUS at 01:44

## 2022-07-20 RX ADMIN — MAGNESIUM OXIDE TAB 400 MG (241.3 MG ELEMENTAL MG) SCH MG: 400 (241.3 MG) TAB at 16:29

## 2022-07-20 RX ADMIN — Medication PRN ML: at 22:28

## 2022-07-20 RX ADMIN — MEROPENEM SCH MLS/HR: 1 INJECTION INTRAVENOUS at 10:47

## 2022-07-20 RX ADMIN — SIMETHICONE SCH MG: 20 SUSPENSION/ DROPS ORAL at 16:29

## 2022-07-20 RX ADMIN — SIMETHICONE SCH MG: 20 SUSPENSION/ DROPS ORAL at 08:31

## 2022-07-20 RX ADMIN — Medication SCH EACH: at 16:29

## 2022-07-20 RX ADMIN — SIMETHICONE SCH MG: 20 SUSPENSION/ DROPS ORAL at 13:24

## 2022-07-20 RX ADMIN — VALPROIC ACID SCH MG: 250 SOLUTION ORAL at 08:30

## 2022-07-20 RX ADMIN — Medication SCH EACH: at 08:29

## 2022-07-20 RX ADMIN — MAGNESIUM HYDROXIDE SCH ML: 400 SUSPENSION ORAL at 20:53

## 2022-07-20 RX ADMIN — MAGNESIUM OXIDE TAB 400 MG (241.3 MG ELEMENTAL MG) SCH MG: 400 (241.3 MG) TAB at 08:30

## 2022-07-20 RX ADMIN — VITAMIN D, TAB 1000IU (100/BT) SCH UNIT: 25 TAB at 08:30

## 2022-07-20 NOTE — NUR
RN OPENING NOTE

PATIENT IS IN BED ASLEEP BUT EASILY AROUSABLE, NON VERBAL. WITH O2 VIA NASAL CANNULA AT 
3L/MIN. SINUS RHYTHM ON TELEMETRY MONITOR.  WITH G-TUBE INTACT AND PATENT INFUSING WITH 
GLUCERNA AT 50 ML/HR. WITH NEPHROSTOMY COVERED WITH DRY DRESSING AND ATTACHED TO URINE BAG 
DRAINING TO CLEAR YELLOW URINE. WITH RIGHT UPPER ARM MIDLINE GAUGE 18 INTACT AND PATENT. 
BREATHING UNLABORED AND NOT IN ANY FORM OF DISTRESS. WILL CONTINUE TO MONITOR THROUGHOUT 
SHIFT.

## 2022-07-20 NOTE — NUR
RN CLOSING NOTE

PATIENT IS RESTING COMFORTABLY IN BED AND REMAINED STABLE THROUGHOUT SHIFT. TOLERATES OXYGEN 
VIA NASAL CANNULA AT 2L/MIN, WITH O2 SAT AT 95%. G TUBE INTACT AND COVERED WITH DRY 
DRESSING. NEPHROSTOMY INTACT AND COVERED WITH DRY DRESSING. KEPT COMFORTABLE THROUGHOUT 
SHIFT. REPOSITIONED EVERY 2 HOURS. SUCTIONED ORAL SECRETIONS AS NEEDED. BED IS LOCKED IN 
LOWEST POSITION, 3 SIDE RAILS UP, CALL LIGHT WITHIN REACH. WILL ENDORSE TO NIGHT SHIFT 
NURSE.

## 2022-07-20 NOTE — NUR
TELE RN CLOSING NOTE

PATIENT IS IN STABLE CONDITION DURING SHIFT, NO DISTRESS OR DISCOMFORT NOTED. SINUS RHYTHM 
92, CARMELITA MIDLINE #18G,GTUBE INFUSING GLUCERNA @50ML/HR WELL, NO RESIDUAL NOTED, UNCLOGGED 
GTUBE,FLUSHED GTUBE WITH 250ML Q6H, NEPHROSTOMY BAG DRAINING WELL WITH 325ML OUTPUT, NO 
LEAKAGE NOTED, NO S/S OF INFECTION NOTED ON NEPHRO SITE, ALL NEEDS ATTENDED, KEPT PATIENT 
CLEAN AND DRY, SIDE RAILS X2, CALL LIGHT WITHIN REACH, WILL ENDORSE TO DAYSHIFT NURSE.

## 2022-07-20 NOTE — NUR
RN OPENING NOTES:



RECEIVED PATIENT IS IN BED SLEEPING  BUT EASILY AROUSABLE, NON VERBAL. ON O2 AT 3L/MIN VIA 
NASAL CANNULA. IV ACCESS ON CARMELITA MIDLINE#18G INTACT AND PATENT. NO S/S OF INFILTRATIONS.  
G-TUBE WELL TOLERATED WITH GLUCERNA 1.2 AT 50CC/HR.  NEPHROSTOMY TUBE COVERED WITH DRY 
DRESSING AND ATTACHED TO URINE BAG. DRAINING TO CLEAR/YELLOW URINE. NO FACIAL GRIMACING 
NOTED. NO ACUTE DISTRESS. ALL SAFETY MEASURES IN PLACE. CARE-GIVER AT BEDSIDE. BED IN LOWEST 
POSITION AND LOCKED. SIDE RAILS UP X3, PLACE CALL LIGHT WITH IN REACH. WILL CONTINUE TO 
MONITOR

## 2022-07-21 VITALS — SYSTOLIC BLOOD PRESSURE: 114 MMHG | DIASTOLIC BLOOD PRESSURE: 66 MMHG

## 2022-07-21 VITALS — DIASTOLIC BLOOD PRESSURE: 83 MMHG | SYSTOLIC BLOOD PRESSURE: 118 MMHG

## 2022-07-21 VITALS — DIASTOLIC BLOOD PRESSURE: 57 MMHG | SYSTOLIC BLOOD PRESSURE: 117 MMHG

## 2022-07-21 VITALS — SYSTOLIC BLOOD PRESSURE: 105 MMHG | DIASTOLIC BLOOD PRESSURE: 67 MMHG

## 2022-07-21 VITALS — DIASTOLIC BLOOD PRESSURE: 77 MMHG | SYSTOLIC BLOOD PRESSURE: 121 MMHG

## 2022-07-21 VITALS — SYSTOLIC BLOOD PRESSURE: 113 MMHG | DIASTOLIC BLOOD PRESSURE: 71 MMHG

## 2022-07-21 LAB
BASOPHILS # BLD AUTO: 0 K/UL (ref 0–0.2)
BASOPHILS NFR BLD AUTO: 0.4 % (ref 0–2)
BUN SERPL-MCNC: 12 MG/DL (ref 7–18)
CALCIUM SERPL-MCNC: 8.3 MG/DL (ref 8.5–10.1)
CHLORIDE SERPL-SCNC: 103 MMOL/L (ref 98–107)
CO2 SERPL-SCNC: 24 MMOL/L (ref 21–32)
CREAT SERPL-MCNC: 0.5 MG/DL (ref 0.6–1.3)
EOSINOPHIL NFR BLD AUTO: 1.8 % (ref 0–6)
EOSINOPHIL NFR BLD MANUAL: 3 % (ref 0–4)
GLUCOSE SERPL-MCNC: 251 MG/DL (ref 74–106)
HCT VFR BLD AUTO: 29 % (ref 33–45)
HGB BLD-MCNC: 9.6 G/DL (ref 11.5–14.8)
LYMPHOCYTES NFR BLD AUTO: 2.4 K/UL (ref 0.8–4.8)
LYMPHOCYTES NFR BLD AUTO: 27.4 % (ref 20–44)
LYMPHOCYTES NFR BLD MANUAL: 30 % (ref 16–48)
MCHC RBC AUTO-ENTMCNC: 34 G/DL (ref 31–36)
MCV RBC AUTO: 96 FL (ref 82–100)
MONOCYTES NFR BLD AUTO: 1.1 K/UL (ref 0.1–1.3)
MONOCYTES NFR BLD AUTO: 13.3 % (ref 2–12)
MONOCYTES NFR BLD MANUAL: 10 % (ref 0–11)
NEUTROPHILS # BLD AUTO: 4.9 K/UL (ref 1.8–8.9)
NEUTROPHILS NFR BLD AUTO: 57.1 % (ref 43–81)
NEUTS BAND NFR BLD MANUAL: 0 % (ref 0–5)
NEUTS SEG NFR BLD MANUAL: 57 % (ref 42–76)
PLATELET # BLD AUTO: 480 K/UL (ref 150–450)
POTASSIUM SERPL-SCNC: 5 MMOL/L (ref 3.5–5.1)
RBC # BLD AUTO: 3 MIL/UL (ref 4–5.2)
SODIUM SERPL-SCNC: 132 MMOL/L (ref 136–145)
WBC NRBC COR # BLD AUTO: 8.6 K/UL (ref 4.3–11)

## 2022-07-21 RX ADMIN — MAGNESIUM OXIDE TAB 400 MG (241.3 MG ELEMENTAL MG) SCH MG: 400 (241.3 MG) TAB at 08:23

## 2022-07-21 RX ADMIN — VALPROIC ACID SCH MG: 250 SOLUTION ORAL at 08:23

## 2022-07-21 RX ADMIN — LINEZOLID SCH MG: 600 TABLET, FILM COATED ORAL at 08:24

## 2022-07-21 RX ADMIN — VITAMIN D, TAB 1000IU (100/BT) SCH UNIT: 25 TAB at 08:24

## 2022-07-21 RX ADMIN — SIMETHICONE SCH MG: 20 SUSPENSION/ DROPS ORAL at 16:03

## 2022-07-21 RX ADMIN — Medication SCH EACH: at 16:03

## 2022-07-21 RX ADMIN — Medication SCH EACH: at 08:24

## 2022-07-21 RX ADMIN — MEROPENEM SCH MLS/HR: 1 INJECTION INTRAVENOUS at 01:42

## 2022-07-21 RX ADMIN — Medication PRN ML: at 17:55

## 2022-07-21 RX ADMIN — SIMETHICONE SCH MG: 20 SUSPENSION/ DROPS ORAL at 12:37

## 2022-07-21 RX ADMIN — SIMETHICONE SCH MG: 20 SUSPENSION/ DROPS ORAL at 08:26

## 2022-07-21 RX ADMIN — ACETAMINOPHEN PRN MG: 325 TABLET ORAL at 12:46

## 2022-07-21 RX ADMIN — MEROPENEM SCH MLS/HR: 1 INJECTION INTRAVENOUS at 09:27

## 2022-07-21 RX ADMIN — MEROPENEM SCH MLS/HR: 1 INJECTION INTRAVENOUS at 17:55

## 2022-07-21 RX ADMIN — VALPROIC ACID SCH MG: 250 SOLUTION ORAL at 16:03

## 2022-07-21 RX ADMIN — MAGNESIUM OXIDE TAB 400 MG (241.3 MG ELEMENTAL MG) SCH MG: 400 (241.3 MG) TAB at 16:03

## 2022-07-21 RX ADMIN — PANTOPRAZOLE SODIUM SCH MG: 40 GRANULE, DELAYED RELEASE ORAL at 08:23

## 2022-07-21 RX ADMIN — LINEZOLID SCH MG: 600 TABLET, FILM COATED ORAL at 20:29

## 2022-07-21 NOTE — NUR
ZORAN/LVN

REPORT RECIEVED FROM AM SHIFT, SEE IV SPREAD SHEET FOR IV'S. ALSO SEE FLOWSHEET FOR 
ASSESSMENT, PT CURRENTLY HAS MANY SKIN ISSUES  WHICH ARE ADDRESSED ON THE FLOWSHEET ALONG 
WITH THE INTERVENTIONS TO EACH. PT TURNED AND REPOSITIONED FOR COMFORT AND CARE.

## 2022-07-21 NOTE — NUR
RN NOTE



PATIENT IS NONVERBAL ON 3L NC O2 SAT 95%. NEPHROSTOMY BAG IN PLACE. GT IN PLACE RUNNING 
GLUCERNA AT 50ML/HR.  CARMELITA MIDLINE #18 IN PLACE. PERSONAL CAREGIVER IS ON THE BED SIDE. 

BED LOCK ON THE LOWEST POSITION, CALL LIGHT WITHIN REACH. AND 3 SIDE RAILS UP.

## 2022-07-21 NOTE — NUR
RN CLOSING NOTES:



PATIENT IN BED SLEEPING  BUT EASILY AROUSABLE, NON VERBAL. ON O2 AT 3L/MIN VIA NASAL 
CANNULA. O2 SAT 98%. IV ACCESS ON CARMELITA MIDLINE#18G INTACT AND PATENT. NO S/S OF 
INFILTRATIONS.  G-TUBE WELL TOLERATED WITH GLUCERNA 1.2 AT 50CC/HR.  NEPHROSTOMY TUBE 
COVERED WITH DRY DRESSING AND ATTACHED TO URINE BAG. DRAINING TO CLEAR/YELLOW URINE. NO 
FACIAL GRIMACING NOTED. NO ACUTE DISTRESS. ALL DUE MEDS GIVEN AS ORDER. ALL SAFETY MEASURES 
IN PLACE. CARE-GIVER AT BEDSIDE. BED IN LOWEST POSITION AND LOCKED. SIDE RAILS UP X3, PLACE 
CALL LIGHT WITH IN REACH. WILL ENDORSE TO MORNING SHIFT NURSE.

## 2022-07-22 VITALS — SYSTOLIC BLOOD PRESSURE: 116 MMHG | DIASTOLIC BLOOD PRESSURE: 70 MMHG

## 2022-07-22 VITALS — DIASTOLIC BLOOD PRESSURE: 71 MMHG | SYSTOLIC BLOOD PRESSURE: 114 MMHG

## 2022-07-22 VITALS — DIASTOLIC BLOOD PRESSURE: 70 MMHG | SYSTOLIC BLOOD PRESSURE: 110 MMHG

## 2022-07-22 VITALS — SYSTOLIC BLOOD PRESSURE: 114 MMHG | DIASTOLIC BLOOD PRESSURE: 71 MMHG

## 2022-07-22 VITALS — SYSTOLIC BLOOD PRESSURE: 122 MMHG | DIASTOLIC BLOOD PRESSURE: 60 MMHG

## 2022-07-22 VITALS — DIASTOLIC BLOOD PRESSURE: 60 MMHG | SYSTOLIC BLOOD PRESSURE: 97 MMHG

## 2022-07-22 RX ADMIN — Medication PRN ML: at 19:11

## 2022-07-22 RX ADMIN — VALPROIC ACID SCH MG: 250 SOLUTION ORAL at 18:10

## 2022-07-22 RX ADMIN — INSULIN HUMAN PRN UNITS: 100 INJECTION, SOLUTION PARENTERAL at 18:36

## 2022-07-22 RX ADMIN — MEROPENEM SCH MLS/HR: 1 INJECTION INTRAVENOUS at 09:33

## 2022-07-22 RX ADMIN — SIMETHICONE SCH MG: 20 SUSPENSION/ DROPS ORAL at 18:08

## 2022-07-22 RX ADMIN — MAGNESIUM OXIDE TAB 400 MG (241.3 MG ELEMENTAL MG) SCH MG: 400 (241.3 MG) TAB at 18:09

## 2022-07-22 RX ADMIN — LINEZOLID SCH MG: 600 TABLET, FILM COATED ORAL at 21:45

## 2022-07-22 RX ADMIN — MAGNESIUM HYDROXIDE SCH ML: 400 SUSPENSION ORAL at 18:15

## 2022-07-22 RX ADMIN — ACETAMINOPHEN PRN MG: 325 TABLET ORAL at 19:11

## 2022-07-22 RX ADMIN — ACETAMINOPHEN PRN MG: 325 TABLET ORAL at 09:58

## 2022-07-22 RX ADMIN — INSULIN HUMAN PRN UNITS: 100 INJECTION, SOLUTION PARENTERAL at 05:35

## 2022-07-22 RX ADMIN — MEROPENEM SCH MLS/HR: 1 INJECTION INTRAVENOUS at 18:08

## 2022-07-22 RX ADMIN — MAGNESIUM OXIDE TAB 400 MG (241.3 MG ELEMENTAL MG) SCH MG: 400 (241.3 MG) TAB at 09:32

## 2022-07-22 RX ADMIN — SIMETHICONE SCH MG: 20 SUSPENSION/ DROPS ORAL at 09:34

## 2022-07-22 RX ADMIN — Medication SCH EACH: at 18:09

## 2022-07-22 RX ADMIN — VITAMIN D, TAB 1000IU (100/BT) SCH UNIT: 25 TAB at 09:33

## 2022-07-22 RX ADMIN — Medication SCH EACH: at 09:33

## 2022-07-22 RX ADMIN — PANTOPRAZOLE SODIUM SCH MG: 40 GRANULE, DELAYED RELEASE ORAL at 05:36

## 2022-07-22 RX ADMIN — INSULIN HUMAN PRN UNITS: 100 INJECTION, SOLUTION PARENTERAL at 12:31

## 2022-07-22 RX ADMIN — LINEZOLID SCH MG: 600 TABLET, FILM COATED ORAL at 09:33

## 2022-07-22 RX ADMIN — ACETAMINOPHEN PRN MG: 325 TABLET ORAL at 02:28

## 2022-07-22 RX ADMIN — Medication SCH EACH: at 12:27

## 2022-07-22 RX ADMIN — Medication SCH EACH: at 05:34

## 2022-07-22 RX ADMIN — Medication SCH EACH: at 18:10

## 2022-07-22 RX ADMIN — VALPROIC ACID SCH MG: 250 SOLUTION ORAL at 09:32

## 2022-07-22 RX ADMIN — MEROPENEM SCH MLS/HR: 1 INJECTION INTRAVENOUS at 01:17

## 2022-07-22 RX ADMIN — SIMETHICONE SCH MG: 20 SUSPENSION/ DROPS ORAL at 12:45

## 2022-07-22 NOTE — NUR
RN CLOSING NOTES:



PATIENT IN BED SLEEPING  BUT EASILY AROUSABLE, NON VERBAL. ON O2 AT 3L/MIN VIA NASAL 
CANNULA. O2 SAT 99%. IV ACCESS ON CARMELITA MIDLINE#18G INTACT AND PATENT. NO S/S OF 
INFILTRATIONS.  G-TUBE WELL TOLERATED WITH GLUCERNA 1.2 AT 50CC/HR.  NEPHROSTOMY TUBE 
COVERED WITH DRY DRESSING AND ATTACHED TO URINE BAG. DRAINING TO CLEAR/YELLOW URINE. NO 
ACUTE DISTRESS. ALL DUE MEDS GIVEN AS ORDER. ALL SAFETY MEASURES IN PLACE. CARE-GIVER AT 
BEDSIDE. BED IN LOWEST POSITION AND LOCKED. SIDE RAILS UP X3, PLACE CALL LIGHT WITH IN 
REACH. WILL ENDORSE TO NEXT NURSE ON DUTY FOR CONTINUITY OF CARE.

## 2022-07-22 NOTE — NUR
TELE RN OPENING NOTES: RECEIVED PATIENT IN BED, AWAKE, NON VERBAL. NO S/S OF DISTRESS NOTED. 
NOT IN PAIN. BED ALRM ON. BED IN LOWEST AND LOCKED POSITION. HOB ELEVATED AT ALL TIMES. WITH 
SISTER AT THE BEDSIDE. HEELS OFFLOADED. WITH O2 AT 3L/MIN. WITH RIGHT SIDE NEPHROSTOMY TUBE 
WITH CLEAR YELLOW OUTPUT. WITH GT FEEDING RUNNING AT 50ML/HR.

## 2022-07-22 NOTE — NUR
RN OPENING NOTES:



RECEIVED PATIENT IS IN BED SLEEPING BUT EASILY AROUSABLE, NON VERBAL. ON O2 AT 3L/MIN VIA 
NASAL CANNULA. IV ACCESS ON CARMELITA MIDLINE#18G INTACT AND PATENT. NO S/S OF INFILTRATIONS.  
G-TUBE WELL TOLERATED WITH GLUCERNA 1.2 INFUSING AT 50CC/HR.  NEPHROSTOMY TUBE COVERED WITH 
DRY DRESSING AND ATTACHED TO URINE BAG. DRAINING CLEAR/YELLOW URINE. NO FACIAL GRIMACING 
NOTED. NO ACUTE DISTRESS. ALL SAFETY MEASURES IN PLACE. CARE-GIVER AT BEDSIDE. BED IN LOWEST 
POSITION AND LOCKED. SIDE RAILS UP X3, PLACE CALL LIGHT WITH IN REACH. WILL CONTINUE PLAN OF 
CARE.

## 2022-07-22 NOTE — NUR
ZORAN/LVN

PT'S CAREGIVER NOTICED THAT PT IS GRUNTING, WHICH LOOKS LIKE SHE IS IN PAIN. GAVE TYLENOL 
VIA G/TUBE FOR THIS.

## 2022-07-22 NOTE — NUR
ZORAN/LVN

CALLED THE ON CALL TO GET FREE H2O FLUSHES OF 250ML STOPPED HOWEVER FRANCIA RNP SAID HAVE AM 
SHIFT ADDRESS THIS WITH NEPHROLOGY, EVEN THOUGH NA THIS MORNING  AND HAS BEEN TRENDING 
DOWN.

ALSO FRANCIA WAS TOLD PT HAS HX OF DM AND TONIGHT BS  AND THERE IS NO COVERAGE. 

ON CALL ALSO LISTENED TO LUNGS AND NO BREATHING TREATMENT ORDERED.

## 2022-07-22 NOTE — NUR
TELE RN OPENING NOTE

PT RECEIVED IN BED, NON-VERBAL, OPENS EYES WITH CAREGIVER AT BEDSIDE. NOTED TO BE ON 3L NC 
WITH NO S/S OF RESP DISTRESS, NON-LABORED AND EQUAL BREATHING, NO COUGH OR SOB NOTED; 
CURRENT O2SAT 97%. ATTACHED TO EXTERNAL MONITOR, SR WITH HR OF 60. PT NOTED TO HAVE 
NEPHROSTOMY TUBE ON RIGHT SIDE, DRAINING CLEAR AND YELLOW URINE. GT DRESSING C/D/I WITH 
GLUCERNA RUNNING AT 50 ML/HR. CARMELITA MIDLINE INTACT AND PATENT, FLUSHES EASILY WITH NO 
RESISTANCE, CURRENTLY HAS NS TKO. BED IN LOWEST POSITION, CALL LIGHT WITHIN REACH, SIDE 
RAILS UP X3. WILL CONTINUE PLAN OF CARE.

## 2022-07-22 NOTE — NUR
ZORAN/LVN

DESK AREA FLOOD IS BEING CLEANED, AT THE SAME TIME LOST COMMUNICATION WITH TELEY BOX IN RM 
105. TRIED TO CHANGE BATTERIES HOWEVER UNABLE TO GAIN COMMUNICATION. ICU MONITOR TECH IS 
AWARE.

## 2022-07-23 VITALS — DIASTOLIC BLOOD PRESSURE: 51 MMHG | SYSTOLIC BLOOD PRESSURE: 98 MMHG

## 2022-07-23 VITALS — DIASTOLIC BLOOD PRESSURE: 70 MMHG | SYSTOLIC BLOOD PRESSURE: 96 MMHG

## 2022-07-23 VITALS — DIASTOLIC BLOOD PRESSURE: 63 MMHG | SYSTOLIC BLOOD PRESSURE: 97 MMHG

## 2022-07-23 RX ADMIN — LINEZOLID SCH MG: 600 TABLET, FILM COATED ORAL at 08:08

## 2022-07-23 RX ADMIN — Medication SCH EACH: at 05:18

## 2022-07-23 RX ADMIN — MEROPENEM SCH MLS/HR: 1 INJECTION INTRAVENOUS at 10:11

## 2022-07-23 RX ADMIN — INSULIN HUMAN PRN UNITS: 100 INJECTION, SOLUTION PARENTERAL at 05:20

## 2022-07-23 RX ADMIN — MEROPENEM SCH MLS/HR: 1 INJECTION INTRAVENOUS at 02:09

## 2022-07-23 RX ADMIN — INSULIN HUMAN PRN UNITS: 100 INJECTION, SOLUTION PARENTERAL at 00:32

## 2022-07-23 RX ADMIN — MAGNESIUM OXIDE TAB 400 MG (241.3 MG ELEMENTAL MG) SCH MG: 400 (241.3 MG) TAB at 08:08

## 2022-07-23 RX ADMIN — VITAMIN D, TAB 1000IU (100/BT) SCH UNIT: 25 TAB at 08:08

## 2022-07-23 RX ADMIN — ACETAMINOPHEN PRN MG: 325 TABLET ORAL at 02:30

## 2022-07-23 RX ADMIN — Medication SCH EACH: at 08:08

## 2022-07-23 RX ADMIN — SIMETHICONE SCH MG: 20 SUSPENSION/ DROPS ORAL at 08:08

## 2022-07-23 RX ADMIN — SIMETHICONE SCH MG: 20 SUSPENSION/ DROPS ORAL at 13:55

## 2022-07-23 RX ADMIN — INSULIN HUMAN PRN UNITS: 100 INJECTION, SOLUTION PARENTERAL at 12:33

## 2022-07-23 RX ADMIN — PANTOPRAZOLE SODIUM SCH MG: 40 GRANULE, DELAYED RELEASE ORAL at 08:07

## 2022-07-23 RX ADMIN — Medication SCH EACH: at 12:31

## 2022-07-23 RX ADMIN — Medication SCH EACH: at 00:28

## 2022-07-23 RX ADMIN — VALPROIC ACID SCH MG: 250 SOLUTION ORAL at 08:09

## 2022-07-23 NOTE — NUR
TELE RN CLOSING NOTE

PT REMAINS IN BED WITH CAREGIVER AT BEDSIDE, PT IS NON-VERBAL, BUT GRIMACES AND WITHDRAWS TO 
LOCALIZED PAIN. ON 3L NC, O2SAT RANGED FROM 98%-100% WITH NO S/S OF RESP DISTRESS, NO SOB OR 
COUGH, NON-LABORED AND EQUAL BREATHING. ATTACHED TO EXTERNAL MONITOR SR-ST WITH HR RANGING 
FROM . NEPHROSTOMY TUBE INTACT AND PATENT, NO SIGNS OF LEAKING, DRAINING CLEAR AND 
YELLOW URINE. GT DRESSING C/D/I WITH GLUCERNA RUNNING AT 50 ML/HR, NO RESIDUAL NOTED. CARMELITA 
MIDLINE INTACT AND PATENT, NS TKO AT 10 ML/HR. ALL DUE MEDS ADMINISTERED DURING THE NIGHT. 
WILL ENDORSE TO DAYSHIFT NURSE TO CONTINUE CARE.

## 2022-07-23 NOTE — NUR
DISCHARGE



PT DISCHARGED TO FACILITY UNDER CARE OF [facility] . REPORT GIVEN TO ATTENDING EMT, ALL 
QUESTIONS ANSWERED. R UA ML LEFT IN PER INSTRUCTION. VSS. PT REMAINS OBTUNDED, NON VERBAL. 
PT IS BREATHING EVEN AND UNLABORED ON 3L O2 NC. FLACC = 0. PT HAD NO BELONGINGS. PT 
DISCHARGED IN STABLE CONDITION.

## 2022-07-23 NOTE — NUR
RN NOTES



PT FOUND SEMI FOWLERS DISPLAYING NO S/S OF DISTRESS, FLACC = 0 AND BREATHING IS EVEN AND 
UNLABORED ON 3L O2 NC. PT IS NON-VERBAL. NEPHROSTOMY TUBE RESERVOIR BELOW PATIENT DRAINING 
BY GRAVITY. R UA ML IS PATIENT AND INTACT. CARE GIVER AT BEDSIDE. RN WILL CONTINUE CARE PLAN 
AND ANTICIPATE NEEDS. SAFETY MEASURES IN PLACE, BED LOCKED AND IN LOWEST POSITION, SIDE 
RAILS UPX2, CALL LIGHT WITHIN REACH, BED ALARM ARMED.

## 2022-07-23 NOTE — NUR
RN NOTE

[facility] REPORTS PT IS "CRYING" AND ASKS IF THERE'S SOMETHING THAT CAN BE GIVEN FOR PAIN. UPON 
ASSESSMENT, PT IS GRIMACING AND LIMBS ARE NOTED TO BE NOT IN A RELAXED POSITION. PT 
ADMINISTERED TYLENOL 650 MG; WILL MONITOR FOR EFFECTIVENESS.

## 2022-08-02 ENCOUNTER — HOSPITAL ENCOUNTER (INPATIENT)
Dept: HOSPITAL 54 - ER | Age: 52
LOS: 7 days | Discharge: NURSING, REHAB OR LONG-TERM CARE | DRG: 720 | End: 2022-08-09
Attending: INTERNAL MEDICINE | Admitting: NURSE PRACTITIONER
Payer: MEDICAID

## 2022-08-02 VITALS — DIASTOLIC BLOOD PRESSURE: 62 MMHG | SYSTOLIC BLOOD PRESSURE: 137 MMHG

## 2022-08-02 VITALS — WEIGHT: 107 LBS | BODY MASS INDEX: 21.01 KG/M2 | HEIGHT: 60 IN

## 2022-08-02 DIAGNOSIS — G92.8: ICD-10-CM

## 2022-08-02 DIAGNOSIS — G80.9: ICD-10-CM

## 2022-08-02 DIAGNOSIS — N20.0: ICD-10-CM

## 2022-08-02 DIAGNOSIS — M41.9: ICD-10-CM

## 2022-08-02 DIAGNOSIS — Z79.51: ICD-10-CM

## 2022-08-02 DIAGNOSIS — Z74.09: ICD-10-CM

## 2022-08-02 DIAGNOSIS — E11.9: ICD-10-CM

## 2022-08-02 DIAGNOSIS — Z79.4: ICD-10-CM

## 2022-08-02 DIAGNOSIS — Z83.3: ICD-10-CM

## 2022-08-02 DIAGNOSIS — Z82.49: ICD-10-CM

## 2022-08-02 DIAGNOSIS — L25.9: ICD-10-CM

## 2022-08-02 DIAGNOSIS — Z93.6: ICD-10-CM

## 2022-08-02 DIAGNOSIS — E87.1: ICD-10-CM

## 2022-08-02 DIAGNOSIS — M81.0: ICD-10-CM

## 2022-08-02 DIAGNOSIS — B96.89: ICD-10-CM

## 2022-08-02 DIAGNOSIS — A41.9: Primary | ICD-10-CM

## 2022-08-02 DIAGNOSIS — E86.0: ICD-10-CM

## 2022-08-02 DIAGNOSIS — Z87.442: ICD-10-CM

## 2022-08-02 DIAGNOSIS — G40.909: ICD-10-CM

## 2022-08-02 DIAGNOSIS — Z16.12: ICD-10-CM

## 2022-08-02 DIAGNOSIS — J96.90: ICD-10-CM

## 2022-08-02 DIAGNOSIS — L30.4: ICD-10-CM

## 2022-08-02 DIAGNOSIS — Z87.440: ICD-10-CM

## 2022-08-02 DIAGNOSIS — M84.453A: ICD-10-CM

## 2022-08-02 DIAGNOSIS — Z79.84: ICD-10-CM

## 2022-08-02 DIAGNOSIS — Z20.822: ICD-10-CM

## 2022-08-02 DIAGNOSIS — Z93.1: ICD-10-CM

## 2022-08-02 DIAGNOSIS — I10: ICD-10-CM

## 2022-08-02 DIAGNOSIS — Z79.83: ICD-10-CM

## 2022-08-02 DIAGNOSIS — N39.0: ICD-10-CM

## 2022-08-02 DIAGNOSIS — Z86.19: ICD-10-CM

## 2022-08-02 LAB
ALBUMIN SERPL BCP-MCNC: 2.3 G/DL (ref 3.4–5)
ALP SERPL-CCNC: 124 U/L (ref 46–116)
ALT SERPL W P-5'-P-CCNC: 11 U/L (ref 12–78)
AST SERPL W P-5'-P-CCNC: 29 U/L (ref 15–37)
BASOPHILS # BLD AUTO: 0 K/UL (ref 0–0.2)
BASOPHILS NFR BLD AUTO: 0.6 % (ref 0–2)
BILIRUB DIRECT SERPL-MCNC: 0 MG/DL (ref 0–0.2)
BILIRUB SERPL-MCNC: 0.3 MG/DL (ref 0.2–1)
BILIRUB UR QL STRIP: NEGATIVE
BUN SERPL-MCNC: 32 MG/DL (ref 7–18)
CALCIUM SERPL-MCNC: 9.2 MG/DL (ref 8.5–10.1)
CHLORIDE SERPL-SCNC: 99 MMOL/L (ref 98–107)
CO2 SERPL-SCNC: 29 MMOL/L (ref 21–32)
COLOR UR: YELLOW
CREAT SERPL-MCNC: 0.4 MG/DL (ref 0.6–1.3)
EOSINOPHIL NFR BLD AUTO: 7 % (ref 0–6)
GLUCOSE SERPL-MCNC: 123 MG/DL (ref 74–106)
GLUCOSE UR STRIP-MCNC: >=1000 MG/DL
HCT VFR BLD AUTO: 32 % (ref 33–45)
HGB BLD-MCNC: 10.2 G/DL (ref 11.5–14.8)
LEUKOCYTE ESTERASE UR QL STRIP: (no result)
LYMPHOCYTES NFR BLD AUTO: 3.3 K/UL (ref 0.8–4.8)
LYMPHOCYTES NFR BLD AUTO: 51.5 % (ref 20–44)
MCHC RBC AUTO-ENTMCNC: 32 G/DL (ref 31–36)
MCV RBC AUTO: 96 FL (ref 82–100)
MONOCYTES NFR BLD AUTO: 0.5 K/UL (ref 0.1–1.3)
MONOCYTES NFR BLD AUTO: 7.5 % (ref 2–12)
NEUTROPHILS # BLD AUTO: 2.1 K/UL (ref 1.8–8.9)
NEUTROPHILS NFR BLD AUTO: 33.4 % (ref 43–81)
NITRITE UR QL STRIP: NEGATIVE
PH UR STRIP: 6 [PH] (ref 5–8)
PLATELET # BLD AUTO: 447 K/UL (ref 150–450)
POTASSIUM SERPL-SCNC: 5 MMOL/L (ref 3.5–5.1)
PROT SERPL-MCNC: 7.5 G/DL (ref 6.4–8.2)
PROT UR QL STRIP: 100 MG/DL
RBC # BLD AUTO: 3.29 MIL/UL (ref 4–5.2)
RBC #/AREA URNS HPF: (no result) /HPF (ref 0–2)
SODIUM SERPL-SCNC: 133 MMOL/L (ref 136–145)
UROBILINOGEN UR STRIP-MCNC: 0.2 EU/DL
WBC #/AREA URNS HPF: (no result) /HPF (ref 0–3)
WBC NRBC COR # BLD AUTO: 6.4 K/UL (ref 4.3–11)

## 2022-08-02 PROCEDURE — G0378 HOSPITAL OBSERVATION PER HR: HCPCS

## 2022-08-02 PROCEDURE — C9113 INJ PANTOPRAZOLE SODIUM, VIA: HCPCS

## 2022-08-02 PROCEDURE — C9803 HOPD COVID-19 SPEC COLLECT: HCPCS

## 2022-08-02 RX ADMIN — Medication PRN TAB: at 21:50

## 2022-08-02 RX ADMIN — MEROPENEM SCH MLS/HR: 1 INJECTION INTRAVENOUS at 21:14

## 2022-08-02 RX ADMIN — Medication SCH MG: at 21:14

## 2022-08-02 NOTE — NUR
TELE RN OPENING NOTE



RECEIVED REPORT FROM PRADEEP HADDAD. PT IN BED AWAKE WITH SISTER AND CAREGIVER ON BED SIDE.

PT IS NON VERBAL AND HAS HX OF CEREBRAL PALSY. CURRENTLY ON O2 VIA NC AT 2LPM, TOLERATING 
WELL SATING AT 94%. TELE MONITOR SHOW SR WITH HR IN 70s. PT HAS GTUBE IN PLACE, FLUSHES 
WELL. NEPHROSTOMY NOTED ON HER RIGHT LOWER BACK. SHINGLE-LIKE RASHES FOUND ALL OVER PT'S 
BODY. ALL SAFETY MEASURES IN PLACE: BED LOCKED IN LOW POSITION. CALL LIGHT WITHIN REACH. 
WILL CONTINUE TO MONITOR THROUGHOUT THE SHIFT.

## 2022-08-02 NOTE — NUR
RN NOTE



ADMINISTERED NORCO TO PT, AFTER SHOWING SIGNS OF FACIAL GRIMACE AND RESTLESSNESS.



WILL REASSESS PT AFTER 30 MINUTES.

## 2022-08-02 NOTE — NUR
[facility] FROM [facility] FOR ALTERED MENTATION "NOT TRACKING" RECENTLY DISCHARGED FOR 
SEPSIS. PT HAS A G TUBE AND UROSTOMY IN PLACE. NON VERBAL. ATTACHED TO MONITOR. 
AWAITING MD FUNES.

## 2022-08-03 VITALS — SYSTOLIC BLOOD PRESSURE: 112 MMHG | DIASTOLIC BLOOD PRESSURE: 67 MMHG

## 2022-08-03 VITALS — DIASTOLIC BLOOD PRESSURE: 76 MMHG | SYSTOLIC BLOOD PRESSURE: 114 MMHG

## 2022-08-03 VITALS — SYSTOLIC BLOOD PRESSURE: 140 MMHG | DIASTOLIC BLOOD PRESSURE: 84 MMHG

## 2022-08-03 VITALS — DIASTOLIC BLOOD PRESSURE: 64 MMHG | SYSTOLIC BLOOD PRESSURE: 111 MMHG

## 2022-08-03 VITALS — SYSTOLIC BLOOD PRESSURE: 111 MMHG | DIASTOLIC BLOOD PRESSURE: 70 MMHG

## 2022-08-03 VITALS — DIASTOLIC BLOOD PRESSURE: 67 MMHG | SYSTOLIC BLOOD PRESSURE: 112 MMHG

## 2022-08-03 VITALS — DIASTOLIC BLOOD PRESSURE: 64 MMHG | SYSTOLIC BLOOD PRESSURE: 93 MMHG

## 2022-08-03 LAB
ALBUMIN SERPL BCP-MCNC: 2.3 G/DL (ref 3.4–5)
ALP SERPL-CCNC: 107 U/L (ref 46–116)
ALT SERPL W P-5'-P-CCNC: 7 U/L (ref 12–78)
AST SERPL W P-5'-P-CCNC: 15 U/L (ref 15–37)
BASOPHILS # BLD AUTO: 0 K/UL (ref 0–0.2)
BASOPHILS NFR BLD AUTO: 0.4 % (ref 0–2)
BILIRUB SERPL-MCNC: 0.2 MG/DL (ref 0.2–1)
BUN SERPL-MCNC: 21 MG/DL (ref 7–18)
CALCIUM SERPL-MCNC: 9.1 MG/DL (ref 8.5–10.1)
CHLORIDE SERPL-SCNC: 105 MMOL/L (ref 98–107)
CO2 SERPL-SCNC: 27 MMOL/L (ref 21–32)
CREAT SERPL-MCNC: 0.4 MG/DL (ref 0.6–1.3)
EOSINOPHIL NFR BLD AUTO: 5.6 % (ref 0–6)
GLUCOSE SERPL-MCNC: 92 MG/DL (ref 74–106)
HCT VFR BLD AUTO: 33 % (ref 33–45)
HGB BLD-MCNC: 10.5 G/DL (ref 11.5–14.8)
LYMPHOCYTES NFR BLD AUTO: 2.4 K/UL (ref 0.8–4.8)
LYMPHOCYTES NFR BLD AUTO: 33.5 % (ref 20–44)
MAGNESIUM SERPL-MCNC: 2.3 MG/DL (ref 1.8–2.4)
MCHC RBC AUTO-ENTMCNC: 32 G/DL (ref 31–36)
MCV RBC AUTO: 96 FL (ref 82–100)
MONOCYTES NFR BLD AUTO: 0.5 K/UL (ref 0.1–1.3)
MONOCYTES NFR BLD AUTO: 7.7 % (ref 2–12)
NEUTROPHILS # BLD AUTO: 3.7 K/UL (ref 1.8–8.9)
NEUTROPHILS NFR BLD AUTO: 52.8 % (ref 43–81)
PHOSPHATE SERPL-MCNC: 5.4 MG/DL (ref 2.5–4.9)
PLATELET # BLD AUTO: 369 K/UL (ref 150–450)
POTASSIUM SERPL-SCNC: 4.8 MMOL/L (ref 3.5–5.1)
PROT SERPL-MCNC: 7.4 G/DL (ref 6.4–8.2)
RBC # BLD AUTO: 3.43 MIL/UL (ref 4–5.2)
SODIUM SERPL-SCNC: 137 MMOL/L (ref 136–145)
TSH SERPL DL<=0.005 MIU/L-ACNC: 4.27 UIU/ML (ref 0.36–3.74)
WBC NRBC COR # BLD AUTO: 7.1 K/UL (ref 4.3–11)

## 2022-08-03 RX ADMIN — LACTOBACILLUS TAB SCH EACH: TAB at 08:36

## 2022-08-03 RX ADMIN — INSULIN HUMAN PRN UNITS: 100 INJECTION, SOLUTION PARENTERAL at 00:02

## 2022-08-03 RX ADMIN — INSULIN HUMAN PRN UNITS: 100 INJECTION, SOLUTION PARENTERAL at 17:40

## 2022-08-03 RX ADMIN — Medication SCH EACH: at 17:39

## 2022-08-03 RX ADMIN — VALPROIC ACID SCH MG: 250 SOLUTION ORAL at 17:18

## 2022-08-03 RX ADMIN — MEROPENEM SCH MLS/HR: 1 INJECTION INTRAVENOUS at 04:01

## 2022-08-03 RX ADMIN — Medication SCH MG: at 22:17

## 2022-08-03 RX ADMIN — MAGNESIUM OXIDE TAB 400 MG (241.3 MG ELEMENTAL MG) SCH MG: 400 (241.3 MG) TAB at 08:36

## 2022-08-03 RX ADMIN — Medication SCH MG: at 08:36

## 2022-08-03 RX ADMIN — METFORMIN HYDROCHLORIDE SCH MG: 500 TABLET, FILM COATED ORAL at 17:18

## 2022-08-03 RX ADMIN — Medication PRN TAB: at 10:36

## 2022-08-03 RX ADMIN — VALPROIC ACID SCH MG: 250 SOLUTION ORAL at 08:36

## 2022-08-03 RX ADMIN — Medication PRN ML: at 09:23

## 2022-08-03 RX ADMIN — VITAMIN D, TAB 1000IU (100/BT) SCH UNIT: 25 TAB at 08:36

## 2022-08-03 RX ADMIN — Medication SCH ML: at 08:38

## 2022-08-03 RX ADMIN — Medication PRN TAB: at 04:51

## 2022-08-03 RX ADMIN — Medication SCH EACH: at 00:01

## 2022-08-03 RX ADMIN — SODIUM CHLORIDE SCH MG: 9 INJECTION, SOLUTION INTRAVENOUS at 08:36

## 2022-08-03 RX ADMIN — Medication SCH EACH: at 12:03

## 2022-08-03 RX ADMIN — Medication SCH ML: at 17:18

## 2022-08-03 RX ADMIN — ACETAMINOPHEN PRN MG: 160 SOLUTION ORAL at 17:17

## 2022-08-03 RX ADMIN — Medication SCH ML: at 12:06

## 2022-08-03 RX ADMIN — Medication SCH EACH: at 08:36

## 2022-08-03 RX ADMIN — CLOTRIMAZOLE AND BETAMETHASONE DIPROPIONATE SCH GM: 10; .5 CREAM TOPICAL at 22:16

## 2022-08-03 RX ADMIN — METFORMIN HYDROCHLORIDE SCH MG: 500 TABLET, FILM COATED ORAL at 08:36

## 2022-08-03 RX ADMIN — MAGNESIUM OXIDE TAB 400 MG (241.3 MG ELEMENTAL MG) SCH MG: 400 (241.3 MG) TAB at 17:18

## 2022-08-03 RX ADMIN — Medication SCH EACH: at 00:00

## 2022-08-03 RX ADMIN — INSULIN HUMAN PRN UNITS: 100 INJECTION, SOLUTION PARENTERAL at 05:27

## 2022-08-03 RX ADMIN — THERA TABS SCH UDTAB: TAB at 08:36

## 2022-08-03 RX ADMIN — MEROPENEM SCH MLS/HR: 1 INJECTION INTRAVENOUS at 22:17

## 2022-08-03 RX ADMIN — Medication SCH EACH: at 17:18

## 2022-08-03 RX ADMIN — MEROPENEM SCH MLS/HR: 1 INJECTION INTRAVENOUS at 12:06

## 2022-08-03 RX ADMIN — Medication SCH EACH: at 05:26

## 2022-08-03 RX ADMIN — INSULIN HUMAN PRN UNITS: 100 INJECTION, SOLUTION PARENTERAL at 12:08

## 2022-08-03 NOTE — NUR
TELE RN OPENING NOTE



 PT IN BED AWAKE WITH SISTER AND CAREGIVER ON BED SIDE.PT IS NON VERBAL AND HAS HX oF 
CEREBRAL PALSY. CURRENTLY ON O2 VIA NC AT 2LPM, TOLERATING WELL. ON TELE MONITOR. PT HAS 
GTUBE IN PLACE, FLUSHES WELL. NEPHROSTOMY NOTED ON HER RIGHT LOWER BACK. ALL SAFETY MEASURES 
IN PLACE: BED LOCKED IN LOW POSITION. CALL LIGHT WITHIN REACH.

## 2022-08-03 NOTE — NUR
TELE RN CLOSING NOTE



NO SIGNIFICANT CHANGE THROUGHOUT THE SHIFT. PT REMAINED STABLE.



ALL DUE MEDS GIVEN. NEEDS ATTENDED TO.



WILL ENDORSE TO AM SHIFT NURSE FOR AVELINA.

## 2022-08-03 NOTE — NUR
TELE RN OPENING NOTE

PT RECEIVED IN BED WITH SISTER AND CAREGIVER AT BEDSIDE, NON-VERBAL, OPENS EYES WITH 
OCCASIONAL GRIMACE AND MOANING. PT ON 2L NC WITH CURRENT O2SAT %; NO S/S OF RESP 
DISTRESS, NO SOB OR COUGH, NON-LABORED AND EQUAL BREATHING. ATTACHED TO EXTERNAL MONITOR, SR 
WITH HR OF 92. PT NOTED TO HAVE NEPHROSTOMY TUBE ON RIGHT SIDE, DRAINING CLEAR AND MORAIMA 
URINE; NEPHROSTOMY TUBE INTACT AND PATENT WITH NO SIGNS OF LEAKING. GTD C/D/I WITH GLUCERNA 
RUNNING AT 45 ML/HR; NO RESIDUAL NOTED. BED IN LOWEST POSITION, CALL LIGHT WITHIN REACH, 
SIDE RAILS UP X3. WILL CONTINUE TO MONITOR THROUGHOUT THE NIGHT.

## 2022-08-03 NOTE — NUR
RN NOTE



ADMINISTERED NORCO, PT SHOWS FACIAL GRIMACING AND RESTLESSNESS.



WILL REASSESS IN 30 MINUTES.

## 2022-08-03 NOTE — NUR
RN NOTE



TRIED GIVING PT FOOD VIA MOUTH, TOOK ONE BITE AND REFUSES TO HAVE MORE.



PT HAS PEG PLACEMENT BUT NO GT FEEDING.



WILL ENDORSE TO AM SHIFT NURSE.

## 2022-08-04 VITALS — SYSTOLIC BLOOD PRESSURE: 123 MMHG | DIASTOLIC BLOOD PRESSURE: 78 MMHG

## 2022-08-04 VITALS — SYSTOLIC BLOOD PRESSURE: 130 MMHG | DIASTOLIC BLOOD PRESSURE: 66 MMHG

## 2022-08-04 VITALS — DIASTOLIC BLOOD PRESSURE: 52 MMHG | SYSTOLIC BLOOD PRESSURE: 124 MMHG

## 2022-08-04 VITALS — DIASTOLIC BLOOD PRESSURE: 69 MMHG | SYSTOLIC BLOOD PRESSURE: 125 MMHG

## 2022-08-04 VITALS — SYSTOLIC BLOOD PRESSURE: 128 MMHG | DIASTOLIC BLOOD PRESSURE: 81 MMHG

## 2022-08-04 VITALS — DIASTOLIC BLOOD PRESSURE: 85 MMHG | SYSTOLIC BLOOD PRESSURE: 118 MMHG

## 2022-08-04 LAB
BASOPHILS # BLD AUTO: 0.1 K/UL (ref 0–0.2)
BASOPHILS NFR BLD AUTO: 1 % (ref 0–2)
BASOPHILS NFR BLD MANUAL: 0 % (ref 0–2)
BUN SERPL-MCNC: 13 MG/DL (ref 7–18)
CALCIUM SERPL-MCNC: 8.6 MG/DL (ref 8.5–10.1)
CHLORIDE SERPL-SCNC: 109 MMOL/L (ref 98–107)
CO2 SERPL-SCNC: 26 MMOL/L (ref 21–32)
CREAT SERPL-MCNC: 0.3 MG/DL (ref 0.6–1.3)
EOSINOPHIL NFR BLD AUTO: 6.5 % (ref 0–6)
EOSINOPHIL NFR BLD MANUAL: 6 % (ref 0–4)
GLUCOSE SERPL-MCNC: 175 MG/DL (ref 74–106)
HCT VFR BLD AUTO: 29 % (ref 33–45)
HGB BLD-MCNC: 9.6 G/DL (ref 11.5–14.8)
LYMPHOCYTES NFR BLD AUTO: 2.4 K/UL (ref 0.8–4.8)
LYMPHOCYTES NFR BLD AUTO: 30.1 % (ref 20–44)
LYMPHOCYTES NFR BLD MANUAL: 32 % (ref 16–48)
MAGNESIUM SERPL-MCNC: 2.1 MG/DL (ref 1.8–2.4)
MCHC RBC AUTO-ENTMCNC: 33 G/DL (ref 31–36)
MCV RBC AUTO: 95 FL (ref 82–100)
MONOCYTES NFR BLD AUTO: 0.6 K/UL (ref 0.1–1.3)
MONOCYTES NFR BLD AUTO: 7.7 % (ref 2–12)
MONOCYTES NFR BLD MANUAL: 10 % (ref 0–11)
NEUTROPHILS # BLD AUTO: 4.4 K/UL (ref 1.8–8.9)
NEUTROPHILS NFR BLD AUTO: 54.7 % (ref 43–81)
NEUTS SEG NFR BLD MANUAL: 52 % (ref 42–76)
PHOSPHATE SERPL-MCNC: 4.5 MG/DL (ref 2.5–4.9)
PLATELET # BLD AUTO: 393 K/UL (ref 150–450)
POTASSIUM SERPL-SCNC: 5.3 MMOL/L (ref 3.5–5.1)
RBC # BLD AUTO: 3.11 MIL/UL (ref 4–5.2)
SODIUM SERPL-SCNC: 140 MMOL/L (ref 136–145)
WBC NRBC COR # BLD AUTO: 8 K/UL (ref 4.3–11)

## 2022-08-04 PROCEDURE — 05HA33Z INSERTION OF INFUSION DEVICE INTO LEFT BRACHIAL VEIN, PERCUTANEOUS APPROACH: ICD-10-PCS | Performed by: NURSE PRACTITIONER

## 2022-08-04 RX ADMIN — MEROPENEM SCH MLS/HR: 1 INJECTION INTRAVENOUS at 20:47

## 2022-08-04 RX ADMIN — Medication SCH EACH: at 00:36

## 2022-08-04 RX ADMIN — Medication SCH MG: at 21:38

## 2022-08-04 RX ADMIN — THERA TABS SCH UDTAB: TAB at 08:31

## 2022-08-04 RX ADMIN — Medication PRN ML: at 07:29

## 2022-08-04 RX ADMIN — Medication SCH EACH: at 17:32

## 2022-08-04 RX ADMIN — MAGNESIUM OXIDE TAB 400 MG (241.3 MG ELEMENTAL MG) SCH MG: 400 (241.3 MG) TAB at 08:31

## 2022-08-04 RX ADMIN — VITAMIN D, TAB 1000IU (100/BT) SCH UNIT: 25 TAB at 08:31

## 2022-08-04 RX ADMIN — LACTOBACILLUS TAB SCH EACH: TAB at 08:30

## 2022-08-04 RX ADMIN — VALPROIC ACID SCH MG: 250 SOLUTION ORAL at 17:26

## 2022-08-04 RX ADMIN — METFORMIN HYDROCHLORIDE SCH MG: 500 TABLET, FILM COATED ORAL at 08:31

## 2022-08-04 RX ADMIN — INSULIN HUMAN PRN UNITS: 100 INJECTION, SOLUTION PARENTERAL at 13:11

## 2022-08-04 RX ADMIN — ACETAMINOPHEN PRN MG: 160 SOLUTION ORAL at 04:12

## 2022-08-04 RX ADMIN — SODIUM CHLORIDE SCH MG: 9 INJECTION, SOLUTION INTRAVENOUS at 08:36

## 2022-08-04 RX ADMIN — Medication PRN TAB: at 09:46

## 2022-08-04 RX ADMIN — Medication SCH ML: at 12:54

## 2022-08-04 RX ADMIN — Medication SCH ML: at 17:27

## 2022-08-04 RX ADMIN — Medication PRN TAB: at 14:46

## 2022-08-04 RX ADMIN — VALPROIC ACID SCH MG: 250 SOLUTION ORAL at 08:30

## 2022-08-04 RX ADMIN — Medication SCH EACH: at 12:00

## 2022-08-04 RX ADMIN — MAGNESIUM OXIDE TAB 400 MG (241.3 MG ELEMENTAL MG) SCH MG: 400 (241.3 MG) TAB at 17:26

## 2022-08-04 RX ADMIN — MEROPENEM SCH MLS/HR: 1 INJECTION INTRAVENOUS at 12:54

## 2022-08-04 RX ADMIN — Medication SCH ML: at 08:33

## 2022-08-04 RX ADMIN — Medication SCH EACH: at 06:15

## 2022-08-04 RX ADMIN — Medication SCH EACH: at 17:26

## 2022-08-04 RX ADMIN — Medication SCH EACH: at 08:35

## 2022-08-04 RX ADMIN — METFORMIN HYDROCHLORIDE SCH MG: 500 TABLET, FILM COATED ORAL at 17:26

## 2022-08-04 RX ADMIN — CLOTRIMAZOLE AND BETAMETHASONE DIPROPIONATE SCH GM: 10; .5 CREAM TOPICAL at 08:33

## 2022-08-04 RX ADMIN — Medication SCH MG: at 08:31

## 2022-08-04 RX ADMIN — CLOTRIMAZOLE AND BETAMETHASONE DIPROPIONATE SCH GM: 10; .5 CREAM TOPICAL at 17:27

## 2022-08-04 RX ADMIN — MEROPENEM SCH MLS/HR: 1 INJECTION INTRAVENOUS at 04:04

## 2022-08-04 RX ADMIN — INSULIN HUMAN PRN UNITS: 100 INJECTION, SOLUTION PARENTERAL at 17:57

## 2022-08-04 RX ADMIN — INSULIN HUMAN PRN UNITS: 100 INJECTION, SOLUTION PARENTERAL at 00:48

## 2022-08-04 NOTE — NUR
TELE RN CLOSING NOTE

PT REMAINS IN BED WITH CAREGIVER AT BEDSIDE, NON-VERBAL; GRIMACES OCCASIONALLY WITH 
SQUIRMING NOTED. REMAINS ON 2L NC, O2SAT RANGED FROM 95%-100% THROUGHOUT THE NIGHT; NO S/S 
OF RESP DISTRESS, NON-LABORED AND EQUAL BREATHING, NO COUGH OR SOB; OVERALL APPEARS 
COMFORTABLE. ATTACHED TO EXTERNAL MONITOR, SR-ST WITH HR RANGING FROM . NEPHROSTOMY 
INTACT AND PATENT, NO SIGNS OF LEAKING, DRAINING CLEAR AND MORAIMA URINE. GTD C/D/I WITH 
GLUCERNA RUNNING AT 45 ML/HR. YULISSA MIDLINE INTACT AND PATENT, FLUSHES EASILY WITH NO 
RESISTANCE, NO FLUIDS/MEDS RUNNING THROUGH IT AT THE MOMENT. ALL DUE MEDS ADMINISTERED 
DURING THE SHIFT. BED IN LOWEST POSITION, CALL LIGHT WITHIN REACH, SIDE RAILS UP X3. WILL 
ENDORSE TO DAYSHIFT NURSE TO CONTINUE CARE.

## 2022-08-04 NOTE — NUR
ZORAN RN OPENING NOTE



RECEIVED PATIENT RESTING COMFORTABLY IN BED, A/O X1-2.ON 3L NC CURRENTLY SATING 96%.  NEPHRO 
CATHETER PATENT AND DRAINING BELOW THE BLADDER. WITH IV ACCESS ON YULISSA MIDLINE INTACT, PATENT 
AND FLUSHING WITH NS. ALL SAFETY FALL PRECAUTIONS IN PLACE BED LOCK ON, BED IN LOWEST 
POSITION, BED ALARM ON, SIDE RAILS UP, CALL LIGHT WITHIN REACH. WILL CONTINUE TO MONITOR.

## 2022-08-04 NOTE — NUR
RN NOTE

PT NOTED TO BE GRIMACING, MOANING, APPEARS TO BE RESTLESS. PT ADMINISTERED TYLENOL 650 MG 
FOR COMFORT. WILL MONITOR FOR EFFECTIVENESS.

## 2022-08-04 NOTE — NUR
RN NOTES





RECEOIVED REPORT FROM MORNING RN. PATIENT IN BED NON VERBAL. WITH OXYGEN INHALATION AT 3LPM 
VIA NASAL CANULA TOLERATING WELL SATING 98%.NO SOB NO DISTRESS NOTED AT THIS TIME. WITH IV 
ACCESS AT L UA MIDLINE PATENT FLUSHES WELL. WITH R NEPHROSTOMY TUBE PATENT DRAINING 
YELLOWISH OUTPUT. WITH GT PATENT FLUSHES WELL ON CONTINUOS GT FEEDING GLUCERNA AT 45CC/HR 
TOLERATING WELL. SITTER AT BEDSIDE AT ALL TIMES. HOB ELEVATED. CALL LIGHT WITHIN REACH. BED 
ON LOWEST POSITION AND LOCKED. WILL CLOSELY MONITOR THE PATIENT

## 2022-08-04 NOTE — NUR
ZORAN RN CLOSING NOTE



RECEIVED PATIENT RESTING COMFORTABLY IN BED, A/O X1-2.ON 3L NC CURRENTLY SATING 96%.  NEPHRO 
CATHETER PATENT AND DRAINING BELOW THE BLADDER. WITH IV ACCESS ON YULISSA MIDLINE INTACT, PATENT 
AND FLUSHING WITH NS. ALL SAFETY FALL PRECAUTIONS IN PLACE BED LOCK ON, BED IN LOWEST 
POSITION, BED ALARM ON, SIDE RAILS UP, CALL LIGHT WITHIN REACH.ALL VSS. CARE ENDORSED TO 
NIGHT NURSE.

## 2022-08-05 VITALS — DIASTOLIC BLOOD PRESSURE: 75 MMHG | SYSTOLIC BLOOD PRESSURE: 97 MMHG

## 2022-08-05 VITALS — SYSTOLIC BLOOD PRESSURE: 137 MMHG | DIASTOLIC BLOOD PRESSURE: 72 MMHG

## 2022-08-05 VITALS — DIASTOLIC BLOOD PRESSURE: 76 MMHG | SYSTOLIC BLOOD PRESSURE: 119 MMHG

## 2022-08-05 VITALS — SYSTOLIC BLOOD PRESSURE: 113 MMHG | DIASTOLIC BLOOD PRESSURE: 46 MMHG

## 2022-08-05 VITALS — DIASTOLIC BLOOD PRESSURE: 63 MMHG | SYSTOLIC BLOOD PRESSURE: 101 MMHG

## 2022-08-05 VITALS — DIASTOLIC BLOOD PRESSURE: 75 MMHG | SYSTOLIC BLOOD PRESSURE: 95 MMHG

## 2022-08-05 LAB
BASOPHILS # BLD AUTO: 0.1 K/UL (ref 0–0.2)
BASOPHILS NFR BLD AUTO: 1.1 % (ref 0–2)
BUN SERPL-MCNC: 11 MG/DL (ref 7–18)
CALCIUM SERPL-MCNC: 8.7 MG/DL (ref 8.5–10.1)
CHLORIDE SERPL-SCNC: 108 MMOL/L (ref 98–107)
CO2 SERPL-SCNC: 27 MMOL/L (ref 21–32)
CREAT SERPL-MCNC: 0.4 MG/DL (ref 0.6–1.3)
EOSINOPHIL NFR BLD AUTO: 8.7 % (ref 0–6)
GLUCOSE SERPL-MCNC: 131 MG/DL (ref 74–106)
HCT VFR BLD AUTO: 30 % (ref 33–45)
HGB BLD-MCNC: 9.7 G/DL (ref 11.5–14.8)
LYMPHOCYTES NFR BLD AUTO: 2.6 K/UL (ref 0.8–4.8)
LYMPHOCYTES NFR BLD AUTO: 41.5 % (ref 20–44)
MAGNESIUM SERPL-MCNC: 2 MG/DL (ref 1.8–2.4)
MCHC RBC AUTO-ENTMCNC: 33 G/DL (ref 31–36)
MCV RBC AUTO: 95 FL (ref 82–100)
MONOCYTES NFR BLD AUTO: 0.5 K/UL (ref 0.1–1.3)
MONOCYTES NFR BLD AUTO: 8.2 % (ref 2–12)
NEUTROPHILS # BLD AUTO: 2.5 K/UL (ref 1.8–8.9)
NEUTROPHILS NFR BLD AUTO: 40.5 % (ref 43–81)
PHOSPHATE SERPL-MCNC: 3.9 MG/DL (ref 2.5–4.9)
PLATELET # BLD AUTO: 369 K/UL (ref 150–450)
POTASSIUM SERPL-SCNC: 5.1 MMOL/L (ref 3.5–5.1)
RBC # BLD AUTO: 3.1 MIL/UL (ref 4–5.2)
SODIUM SERPL-SCNC: 142 MMOL/L (ref 136–145)
WBC NRBC COR # BLD AUTO: 6.2 K/UL (ref 4.3–11)

## 2022-08-05 RX ADMIN — Medication SCH EACH: at 17:01

## 2022-08-05 RX ADMIN — VALPROIC ACID SCH MG: 250 SOLUTION ORAL at 17:00

## 2022-08-05 RX ADMIN — MAGNESIUM OXIDE TAB 400 MG (241.3 MG ELEMENTAL MG) SCH MG: 400 (241.3 MG) TAB at 16:11

## 2022-08-05 RX ADMIN — Medication SCH ML: at 16:19

## 2022-08-05 RX ADMIN — Medication SCH MG: at 08:38

## 2022-08-05 RX ADMIN — INSULIN HUMAN PRN UNITS: 100 INJECTION, SOLUTION PARENTERAL at 00:10

## 2022-08-05 RX ADMIN — Medication SCH EACH: at 06:09

## 2022-08-05 RX ADMIN — CLOTRIMAZOLE AND BETAMETHASONE DIPROPIONATE SCH GM: 10; .5 CREAM TOPICAL at 09:03

## 2022-08-05 RX ADMIN — LACTOBACILLUS TAB SCH EACH: TAB at 08:42

## 2022-08-05 RX ADMIN — Medication SCH ML: at 12:59

## 2022-08-05 RX ADMIN — MEROPENEM SCH MLS/HR: 1 INJECTION INTRAVENOUS at 05:53

## 2022-08-05 RX ADMIN — INSULIN HUMAN PRN UNITS: 100 INJECTION, SOLUTION PARENTERAL at 05:53

## 2022-08-05 RX ADMIN — MEROPENEM SCH MLS/HR: 1 INJECTION INTRAVENOUS at 21:05

## 2022-08-05 RX ADMIN — Medication SCH EACH: at 11:28

## 2022-08-05 RX ADMIN — IBUPROFEN PRN MG: 100 SUSPENSION ORAL at 16:18

## 2022-08-05 RX ADMIN — CLOTRIMAZOLE AND BETAMETHASONE DIPROPIONATE SCH GM: 10; .5 CREAM TOPICAL at 16:11

## 2022-08-05 RX ADMIN — Medication SCH EACH: at 08:42

## 2022-08-05 RX ADMIN — Medication SCH EACH: at 00:07

## 2022-08-05 RX ADMIN — METFORMIN HYDROCHLORIDE SCH MG: 500 TABLET, FILM COATED ORAL at 08:42

## 2022-08-05 RX ADMIN — INSULIN HUMAN PRN UNITS: 100 INJECTION, SOLUTION PARENTERAL at 17:01

## 2022-08-05 RX ADMIN — METFORMIN HYDROCHLORIDE SCH MG: 500 TABLET, FILM COATED ORAL at 16:11

## 2022-08-05 RX ADMIN — MAGNESIUM OXIDE TAB 400 MG (241.3 MG ELEMENTAL MG) SCH MG: 400 (241.3 MG) TAB at 08:42

## 2022-08-05 RX ADMIN — INSULIN HUMAN PRN UNITS: 100 INJECTION, SOLUTION PARENTERAL at 11:28

## 2022-08-05 RX ADMIN — VITAMIN D, TAB 1000IU (100/BT) SCH UNIT: 25 TAB at 08:42

## 2022-08-05 RX ADMIN — Medication SCH EACH: at 16:11

## 2022-08-05 RX ADMIN — Medication PRN ML: at 06:11

## 2022-08-05 RX ADMIN — Medication SCH ML: at 08:43

## 2022-08-05 RX ADMIN — MEROPENEM SCH MLS/HR: 1 INJECTION INTRAVENOUS at 12:59

## 2022-08-05 RX ADMIN — VALPROIC ACID SCH MG: 250 SOLUTION ORAL at 08:37

## 2022-08-05 RX ADMIN — THERA TABS SCH UDTAB: TAB at 08:38

## 2022-08-05 RX ADMIN — Medication SCH MG: at 21:05

## 2022-08-05 RX ADMIN — PANTOPRAZOLE SODIUM SCH MG: 40 GRANULE, DELAYED RELEASE ORAL at 08:42

## 2022-08-05 NOTE — NUR
RN TELE NOTES

PT IN BED, RESTING, NO SIGN OF PAIN OR ANY DISCOMFORT, RESPIRATIONS NORMAL, GT FEEDING 
INFUSING WELL, TOLERATES WELL, KEPT HOB ELEVATED TO PREVENT ASPIRATION, FINAL URINE CULTURE 
RESULT RELAYED TO DR. OSUNA, NEPHROSTOMY TUBE AT RIGHT SIDE OF BACK IN PLACE, DRAINED 
300ML OF YELLOW COLORED URINE, PT WILL DISCHARGE BACK TO HER BOARD AND CARE TO CONTINUE 2 
MORE DAYS OF IV ATB,  ARRANGING TRANSFER AT THIS TIME, PT'S CARE GIVER AT 
BEDSIDE, AWARE OF PLAN OF CARE, PM MEDS GIVEN, ALL NEEDS ATTENDED.

## 2022-08-05 NOTE — NUR
RN TELE NOTES

FOLLOWED UP FINAL URINE CULTURE RESULTS WITH SENSITIVITY, SPOKE WITH CINDI OF LAB, SAID IT 
IS NOT YET AVAILABLE AND IT WAS SENT OUT.

## 2022-08-05 NOTE — NUR
RN TELE NOTES

PT IN BED, ASLEEP, RESPIRATIONS REGULAR AND NON LABORED, NO SIGN OF PAIN OR ANY DISCOMFORT, 
GT FEEDING INFUSING WELL, TOLERATING WELL, KEPT WARM AND COMFORTABLE.

## 2022-08-05 NOTE — NUR
TELE RN OPENING NOTE



RECEIVED PT IN BED ASLEEP WITH SISTER/CAREGIVER AT BED SIDE.

PT IS NON VERBAL AND HAS HX OF CEREBRAL PALSY. PATIENT ON O2 VIA NC AT 3L, TOLERATING WELL 
SATING AT 94%. TELE MONITOR SHOW SR HR 85. PT HAS A YULISSA MIDLINE TKO. PT HAS GTUBE, INTACT 
AND PATENT, AND RUNNING GLUCERNA AT 45ML/HR,. NEPHROSTOMY NOTED ON HER RIGHT LOWER BACK 
DRAINING CLEAR YELLOW URINE. PATIENT HAS A RASH ON NECK, AND BLISTER ON ARM NEAR 
ELBOW/WRIST/ L HIP/ NILES AREA. ALL SAFETY MEASURES IN PLACE: BED LOCKED IN LOW POSITION. 
CALL LIGHT WITHIN REACH. WILL CONTINUE TO MONITOR THROUGHOUT THE SHIFT.

## 2022-08-06 VITALS — SYSTOLIC BLOOD PRESSURE: 112 MMHG | DIASTOLIC BLOOD PRESSURE: 67 MMHG

## 2022-08-06 VITALS — DIASTOLIC BLOOD PRESSURE: 70 MMHG | SYSTOLIC BLOOD PRESSURE: 123 MMHG

## 2022-08-06 VITALS — DIASTOLIC BLOOD PRESSURE: 62 MMHG | SYSTOLIC BLOOD PRESSURE: 132 MMHG

## 2022-08-06 VITALS — DIASTOLIC BLOOD PRESSURE: 81 MMHG | SYSTOLIC BLOOD PRESSURE: 124 MMHG

## 2022-08-06 VITALS — SYSTOLIC BLOOD PRESSURE: 103 MMHG | DIASTOLIC BLOOD PRESSURE: 57 MMHG

## 2022-08-06 RX ADMIN — Medication SCH EACH: at 09:56

## 2022-08-06 RX ADMIN — INSULIN HUMAN PRN UNITS: 100 INJECTION, SOLUTION PARENTERAL at 05:48

## 2022-08-06 RX ADMIN — MEROPENEM SCH MLS/HR: 1 INJECTION INTRAVENOUS at 05:33

## 2022-08-06 RX ADMIN — Medication SCH EACH: at 05:46

## 2022-08-06 RX ADMIN — MEROPENEM SCH MLS/HR: 1 INJECTION INTRAVENOUS at 21:23

## 2022-08-06 RX ADMIN — CLOTRIMAZOLE AND BETAMETHASONE DIPROPIONATE SCH GM: 10; .5 CREAM TOPICAL at 10:34

## 2022-08-06 RX ADMIN — Medication SCH EACH: at 18:11

## 2022-08-06 RX ADMIN — PANTOPRAZOLE SODIUM SCH MG: 40 GRANULE, DELAYED RELEASE ORAL at 09:55

## 2022-08-06 RX ADMIN — ACETAMINOPHEN PRN MG: 160 SOLUTION ORAL at 19:57

## 2022-08-06 RX ADMIN — IBUPROFEN PRN MG: 100 SUSPENSION ORAL at 00:08

## 2022-08-06 RX ADMIN — INSULIN HUMAN PRN UNITS: 100 INJECTION, SOLUTION PARENTERAL at 12:43

## 2022-08-06 RX ADMIN — VITAMIN D, TAB 1000IU (100/BT) SCH UNIT: 25 TAB at 09:56

## 2022-08-06 RX ADMIN — LACTOBACILLUS TAB SCH EACH: TAB at 09:55

## 2022-08-06 RX ADMIN — Medication SCH ML: at 13:14

## 2022-08-06 RX ADMIN — Medication SCH EACH: at 18:49

## 2022-08-06 RX ADMIN — VALPROIC ACID SCH MG: 250 SOLUTION ORAL at 10:17

## 2022-08-06 RX ADMIN — MAGNESIUM OXIDE TAB 400 MG (241.3 MG ELEMENTAL MG) SCH MG: 400 (241.3 MG) TAB at 09:56

## 2022-08-06 RX ADMIN — METFORMIN HYDROCHLORIDE SCH MG: 500 TABLET, FILM COATED ORAL at 09:00

## 2022-08-06 RX ADMIN — Medication SCH ML: at 10:53

## 2022-08-06 RX ADMIN — Medication SCH ML: at 18:12

## 2022-08-06 RX ADMIN — ACETAMINOPHEN PRN MG: 160 SOLUTION ORAL at 10:34

## 2022-08-06 RX ADMIN — THERA TABS SCH UDTAB: TAB at 09:56

## 2022-08-06 RX ADMIN — Medication SCH MG: at 21:23

## 2022-08-06 RX ADMIN — Medication SCH MG: at 09:55

## 2022-08-06 RX ADMIN — Medication SCH EACH: at 12:40

## 2022-08-06 RX ADMIN — Medication SCH EACH: at 00:58

## 2022-08-06 RX ADMIN — METFORMIN HYDROCHLORIDE SCH MG: 500 TABLET, FILM COATED ORAL at 18:11

## 2022-08-06 RX ADMIN — MEROPENEM SCH MLS/HR: 1 INJECTION INTRAVENOUS at 13:14

## 2022-08-06 RX ADMIN — CLOTRIMAZOLE AND BETAMETHASONE DIPROPIONATE SCH GM: 10; .5 CREAM TOPICAL at 19:03

## 2022-08-06 RX ADMIN — MAGNESIUM OXIDE TAB 400 MG (241.3 MG ELEMENTAL MG) SCH MG: 400 (241.3 MG) TAB at 18:11

## 2022-08-06 RX ADMIN — VALPROIC ACID SCH MG: 250 SOLUTION ORAL at 18:11

## 2022-08-06 RX ADMIN — INSULIN HUMAN PRN UNITS: 100 INJECTION, SOLUTION PARENTERAL at 02:13

## 2022-08-06 NOTE — NUR
TELE RN OPENING NOTE



PATIENT IS ALERT AND NONVERBAL,OBTUNDED. PATIENT IS CURRENTLY ON SINUS RHYTM. PATIENT HAS 
NEPHROSTOMY BAG. DRAINING YELLOW/ MORAIMA COLOR. PATIENT HAS DIAPER. PATIENT HAS BROKEN LEFT 
FEMUR FRACTURE. PATIENT HAS NECK RASH. PATIENT HAS RIGHT WRIST BLISTER. CAREGIVER AT 
BEDSIDE. PATIENT HAS GTUBE. GTUBE PATENT. CURRENTLY RUNNING NEPHRO 45ML/HR.PATIENT HAS RIGHT 
UPPER ARM MIDLINE. INTACT AND PATENT. ALL SAFETY MEASURES IN PLACE. CALL LIGHT WITHIN REACH. 
BED LOCKED AT LOWEST POSITION. SIDE RAILS X2. WILL CONTINUE TO ASSESS THROUGHOUT SHIFT

## 2022-08-06 NOTE — NUR
sister at bedside

-------------------------------------------------------------------------------

Addendum: 08/06/22 at 1957 by YASMIN LYNN RN

-------------------------------------------------------------------------------

asked to reconcile home med

-------------------------------------------------------------------------------

Addendum: 08/06/22 at 1957 by YASMIN LYNN RN

-------------------------------------------------------------------------------

because of allergic reaction from taking home med. endorsed to night shift

## 2022-08-06 NOTE — NUR
TELE RN CLOSING NOTE



PATIENT IS ALERT AND NONVERBAL,OBTUNDED. PATIENT IS CURRENTLY ON SINUS RHYTHM. PATIENT HAS 
NEPHROSTOMY BAG. DRAINING YELLOW/ MORAIMA COLOR. PATIENT HAS DIAPER. PATIENT HAS BROKEN LEFT 
FEMUR FRACTURE. PATIENT HAS NECK RASH. PATIENT HAS RIGHT WRIST BLISTER. CAREGIVER AT 
BEDSIDE. PATIENT HAS GTUBE. GTUBE PATENT. CURRENTLY RUNNING NEPHRO 45ML/HR.PATIENT HAS RIGHT 
UPPER ARM MIDLINE. INTACT AND PATENT. ALL SAFETY MEASURES IN PLACE. CALL LIGHT WITHIN REACH. 
BED LOCKED AT LOWEST POSITION. SIDE RAILS X2.

## 2022-08-06 NOTE — NUR
TELE RN CLOSING NOTE



PT ASLEEP IN BED, EASILY AROUSABLE, CAREGIVER AT BED SIDE.

PT IS NON VERBAL AND HAS HX OF CEREBRAL PALSY. PATIENT ON O2 VIA NC AT 2L, TOLERATING WELL 
SATING AT >95%. TELE MONITOR SHOW SR HR 88. ALL DUE MEDS GIVEN.ALL SAFETY MEASURES IN PLACE: 
BED LOCKED IN LOW POSITION. CALL LIGHT WITHIN REACH. WILL ENDORSE TO MORNING SHIFT.

## 2022-08-06 NOTE — NUR
MS RN OPENING NOTE



RECEIVED PT IN BED ASLEEP, NO CAREGIVER AT BEDSIDE. PT IS NON VERBAL AND HAS HX OF CEREBRAL 
PALSY. PATIENT ON O2 VIA NC AT 2L, TOLERATING DESENT, SATING AT 93%, NC WAS RETAPED TO FACE. 
PT HAS A YULISSA MIDLINE TKO. PT HAS GTUBE, INTACT AND PATENT, AND RUNNING GLUCERNA AT 45ML/HR,. 
NEPHROSTOMY NOTED ON HER RIGHT LOWER BACK DRAINING CLEAR YELLOW URINE. PATIENT HAS A RASH ON 
NECK, AND BLISTER ON ARM NEAR ELBOW/WRIST/ L HIP/ NILES AREA. ALL SAFETY MEASURES IN PLACE: 
BED LOCKED IN LOW POSITION. CALL LIGHT WITHIN REACH. WILL CONTINUE TO MONITOR THROUGHOUT THE 
SHIFT.

## 2022-08-07 VITALS — DIASTOLIC BLOOD PRESSURE: 80 MMHG | SYSTOLIC BLOOD PRESSURE: 138 MMHG

## 2022-08-07 VITALS — DIASTOLIC BLOOD PRESSURE: 79 MMHG | SYSTOLIC BLOOD PRESSURE: 118 MMHG

## 2022-08-07 VITALS — DIASTOLIC BLOOD PRESSURE: 86 MMHG | SYSTOLIC BLOOD PRESSURE: 135 MMHG

## 2022-08-07 RX ADMIN — Medication PRN TAB: at 20:15

## 2022-08-07 RX ADMIN — Medication SCH EACH: at 05:46

## 2022-08-07 RX ADMIN — MEROPENEM SCH MLS/HR: 1 INJECTION INTRAVENOUS at 20:15

## 2022-08-07 RX ADMIN — Medication SCH MG: at 21:24

## 2022-08-07 RX ADMIN — THERA TABS SCH UDTAB: TAB at 09:10

## 2022-08-07 RX ADMIN — CLOTRIMAZOLE AND BETAMETHASONE DIPROPIONATE SCH GM: 10; .5 CREAM TOPICAL at 16:49

## 2022-08-07 RX ADMIN — METFORMIN HYDROCHLORIDE SCH MG: 500 TABLET, FILM COATED ORAL at 09:10

## 2022-08-07 RX ADMIN — Medication SCH EACH: at 00:47

## 2022-08-07 RX ADMIN — MAGNESIUM OXIDE TAB 400 MG (241.3 MG ELEMENTAL MG) SCH MG: 400 (241.3 MG) TAB at 09:11

## 2022-08-07 RX ADMIN — Medication PRN ML: at 21:32

## 2022-08-07 RX ADMIN — INSULIN HUMAN PRN UNITS: 100 INJECTION, SOLUTION PARENTERAL at 05:48

## 2022-08-07 RX ADMIN — CLOTRIMAZOLE AND BETAMETHASONE DIPROPIONATE SCH GM: 10; .5 CREAM TOPICAL at 09:14

## 2022-08-07 RX ADMIN — VALPROIC ACID SCH MG: 250 SOLUTION ORAL at 09:11

## 2022-08-07 RX ADMIN — INSULIN HUMAN PRN UNITS: 100 INJECTION, SOLUTION PARENTERAL at 00:48

## 2022-08-07 RX ADMIN — LACTOBACILLUS TAB SCH EACH: TAB at 09:10

## 2022-08-07 RX ADMIN — Medication SCH EACH: at 23:32

## 2022-08-07 RX ADMIN — VALPROIC ACID SCH MG: 250 SOLUTION ORAL at 17:47

## 2022-08-07 RX ADMIN — METFORMIN HYDROCHLORIDE SCH MG: 500 TABLET, FILM COATED ORAL at 16:36

## 2022-08-07 RX ADMIN — Medication SCH EACH: at 17:56

## 2022-08-07 RX ADMIN — Medication SCH EACH: at 16:36

## 2022-08-07 RX ADMIN — MEROPENEM SCH MLS/HR: 1 INJECTION INTRAVENOUS at 05:30

## 2022-08-07 RX ADMIN — Medication SCH ML: at 13:08

## 2022-08-07 RX ADMIN — Medication SCH EACH: at 09:10

## 2022-08-07 RX ADMIN — Medication SCH MG: at 09:10

## 2022-08-07 RX ADMIN — MAGNESIUM OXIDE TAB 400 MG (241.3 MG ELEMENTAL MG) SCH MG: 400 (241.3 MG) TAB at 16:36

## 2022-08-07 RX ADMIN — MEROPENEM SCH MLS/HR: 1 INJECTION INTRAVENOUS at 13:07

## 2022-08-07 RX ADMIN — Medication SCH ML: at 09:17

## 2022-08-07 RX ADMIN — Medication SCH EACH: at 12:00

## 2022-08-07 RX ADMIN — INSULIN HUMAN PRN UNITS: 100 INJECTION, SOLUTION PARENTERAL at 23:35

## 2022-08-07 RX ADMIN — PANTOPRAZOLE SODIUM SCH MG: 40 GRANULE, DELAYED RELEASE ORAL at 09:10

## 2022-08-07 RX ADMIN — VITAMIN D, TAB 1000IU (100/BT) SCH UNIT: 25 TAB at 09:10

## 2022-08-07 NOTE — NUR
RN CLOSING NOTE 

PATIENT IS ALERT AND NONVERBAL,OBTUNDED. PATIENT PATIENT HAS NEPHROSTOMY BAG. DRAINING 
YELLOW/ MORAIMA COLOR. PATIENT HAS BROKEN LEFT FEMUR FRACTURE. PATIENT HAS NECK RASH. PATIENT 
HAS RIGHT WRIST BLISTER. CAREGIVER AT BEDSIDE. PATIENT HAS GTUBE CURRENTLY RUNNING NEPHRO 
45ML/HR.PATIENT HAS RIGHT UPPER ARM MIDLINE. INTACT  ALL SAFETY MEASURES IN PLACE. CALL 
LIGHT WITHIN REACH. BED LOCKED AT LOWEST POSITION. SIDE RAILS X2.

## 2022-08-07 NOTE — NUR
MS RN OPENING NOTE



RECEIVED PT IN BED AWAKE, CAREGIVER AT BEDSIDE. PT IS NON VERBAL AND HAS HX OF CEREBRAL 
PALSY. CURRENTLY ON O2 VIA NC AT 2L, TOLERATING WELL SATING AT 97%. NO S/SX OF ACUTE 
DISTRESS NOTED AT THIS TIME. IV ACCESS IN YULISSA MIDLINE NOTED. PT HAS GTUBE, INTACT AND 
PATENT, AND RUNNING GLUCERNA AT 45ML/HR,. NEPHROSTOMY NOTED ON HER RIGHT LOWER BACK DRAINING 
CLEAR YELLOW URINE. ALL SAFETY MEASURES IN PLACE: BED LOCKED IN LOW POSITION. CALL LIGHT 
WITHIN REACH. WILL CONTINUE TO MONITOR THROUGHOUT THE SHIFT.

## 2022-08-07 NOTE — NUR
RN OPEN NOTE

RECEIVED PT IN BED ASLEEP WITH SISTER/CAREGIVER AT BED SIDE.

PT IS NON VERBAL AND HAS HX OF CEREBRAL PALSY. PATIENT ON O2 VIA NC AT 3L, TOLERATING WELL 
SATING AT 94%. PT HAS A YULISSA MIDLINE TKO. PT HAS GTUBE, INTACT AND PATENT, AND RUNNING 
GLUCERNA AT 45ML/HR,. PATIENT HAS A RASH ON NECK, AND ARM NEAR ELBOW/WRIST/ L HIP/ NILES 
AREA. ALL SAFETY MEASURES IN PLACE: BED LOCKED IN LOW POSITION. CALL LIGHT WITHIN REACH. 
WILL CONTINUE TO MONITOR THROUGHOUT THE SHIFT.

## 2022-08-07 NOTE — NUR
MS RN CLOSING NOTE



PT ASLEEP IN BED, EASILY AROUSABLE, CAREGIVER AT BED SIDE.

PT IS NON VERBAL AND HAS HX OF CEREBRAL PALSY. PATIENT ON O2 VIA NC AT 2L, TOLERATING WELL 
SATING AT >95%. ALL DUE MEDS GIVEN.ALL SAFETY MEASURES IN PLACE: BED LOCKED IN LOW POSITION. 
CALL LIGHT WITHIN REACH. WILL ENDORSE TO MORNING SHIFT.

## 2022-08-08 VITALS — DIASTOLIC BLOOD PRESSURE: 75 MMHG | SYSTOLIC BLOOD PRESSURE: 107 MMHG

## 2022-08-08 VITALS — SYSTOLIC BLOOD PRESSURE: 137 MMHG | DIASTOLIC BLOOD PRESSURE: 54 MMHG

## 2022-08-08 VITALS — SYSTOLIC BLOOD PRESSURE: 117 MMHG | DIASTOLIC BLOOD PRESSURE: 65 MMHG

## 2022-08-08 RX ADMIN — Medication SCH MG: at 21:10

## 2022-08-08 RX ADMIN — PANTOPRAZOLE SODIUM SCH MG: 40 GRANULE, DELAYED RELEASE ORAL at 08:43

## 2022-08-08 RX ADMIN — Medication SCH MG: at 08:43

## 2022-08-08 RX ADMIN — MAGNESIUM OXIDE TAB 400 MG (241.3 MG ELEMENTAL MG) SCH MG: 400 (241.3 MG) TAB at 17:48

## 2022-08-08 RX ADMIN — Medication SCH EACH: at 23:46

## 2022-08-08 RX ADMIN — MAGNESIUM OXIDE TAB 400 MG (241.3 MG ELEMENTAL MG) SCH MG: 400 (241.3 MG) TAB at 08:43

## 2022-08-08 RX ADMIN — Medication PRN TAB: at 15:46

## 2022-08-08 RX ADMIN — THERA TABS SCH UDTAB: TAB at 08:43

## 2022-08-08 RX ADMIN — Medication PRN TAB: at 14:20

## 2022-08-08 RX ADMIN — CLOTRIMAZOLE AND BETAMETHASONE DIPROPIONATE SCH GM: 10; .5 CREAM TOPICAL at 17:59

## 2022-08-08 RX ADMIN — Medication SCH EACH: at 18:40

## 2022-08-08 RX ADMIN — INSULIN HUMAN PRN UNITS: 100 INJECTION, SOLUTION PARENTERAL at 05:48

## 2022-08-08 RX ADMIN — Medication PRN TAB: at 20:50

## 2022-08-08 RX ADMIN — MEROPENEM SCH MLS/HR: 1 INJECTION INTRAVENOUS at 04:07

## 2022-08-08 RX ADMIN — METFORMIN HYDROCHLORIDE SCH MG: 500 TABLET, FILM COATED ORAL at 08:43

## 2022-08-08 RX ADMIN — VALPROIC ACID SCH MG: 250 SOLUTION ORAL at 08:42

## 2022-08-08 RX ADMIN — METFORMIN HYDROCHLORIDE SCH MG: 500 TABLET, FILM COATED ORAL at 17:48

## 2022-08-08 RX ADMIN — Medication SCH EACH: at 08:43

## 2022-08-08 RX ADMIN — LACTOBACILLUS TAB SCH EACH: TAB at 08:43

## 2022-08-08 RX ADMIN — Medication SCH EACH: at 05:43

## 2022-08-08 RX ADMIN — VALPROIC ACID SCH MG: 250 SOLUTION ORAL at 17:48

## 2022-08-08 RX ADMIN — VITAMIN D, TAB 1000IU (100/BT) SCH UNIT: 25 TAB at 08:43

## 2022-08-08 RX ADMIN — NEOMYCIN AND POLYMYXIN B SULFATES AND BACITRACIN ZINC SCH GM: 400; 3.5; 5 OINTMENT TOPICAL at 22:00

## 2022-08-08 RX ADMIN — CLOTRIMAZOLE AND BETAMETHASONE DIPROPIONATE SCH GM: 10; .5 CREAM TOPICAL at 09:34

## 2022-08-08 RX ADMIN — INSULIN HUMAN PRN UNITS: 100 INJECTION, SOLUTION PARENTERAL at 23:50

## 2022-08-08 RX ADMIN — Medication SCH EACH: at 17:48

## 2022-08-08 RX ADMIN — Medication SCH EACH: at 12:00

## 2022-08-08 NOTE — NUR
MS RN CLOSING NOTE



PT REMAINED STABLE THROUGHOUT THE SHIFT. CAREGIVER PRESENT ON BEDSIDE.



ALL DUE MEDS GIVEN. NEEDS ATTENDED TO. KEPT PT CLEAN AND DRY.



ALL SAFETY MEASURES IMPLEMENTED: BED LOCKED IN LOW POSITION. BED ALARM ON. CALL LIGHT WITHIN 
REACH. SR UP X 3.



WILL ENDORSE TO AM SHIFT NURSE FOR AVELINA.

## 2022-08-08 NOTE — NUR
patient's sirrwe reported that patient develped pain due to increased moaning, BP checked 
115/70 ,  , Norco 5 mh/325 1 tab via G tube provided

## 2022-08-08 NOTE — NUR
RN OPEN NOTE

RECEIVED PT IN BED ASLEEP WITH SISTER/CAREGIVER AT BED SIDE.

PT IS NON VERBAL AND HAS HX OF CEREBRAL PALSY. PATIENT ON O2 VIA NC AT 2L, TOLERATING WELL 
SATING AT 94%. PT HAS A YULISSA MIDLINE TKO. PT HAS GTUBE, INTACT AND PATENT, AND RUNNING 
GLUCERNA AT 45ML/HR,. PATIENT HAS A RASH ON NECK, AND ARM NEAR ELBOW/WRIST/ L HIP/ NILES 
AREA. ALL SAFETY MEASURES IN PLACE: BED LOCKED IN LOW POSITION. CALL LIGHT WITHIN REACH. 
WILL CONTINUE TO MONITOR THROUGHOUT THE SHIFT.

## 2022-08-08 NOTE — NUR
MS RN OPENING NOTE



RECEIVED PT IN BED AWAKE, CAREGIVER AT BEDSIDE. PT IS NON VERBAL AND HAS HX OF CEREBRAL 
PALSY. CURRENTLY ON O2 VIA NC AT 2L, TOLERATING WELL SATING %. NO S/SX OF ACUTE 
DISTRESS NOTED AT THIS TIME. IV ACCESS IN YULISSA MIDLINE NOTED. PT HAS GTUBE, INTACT AND 
PATENT, AND RUNNING GLUCERNA AT 45ML/HR,. NEPHROSTOMY NOTED ON HER RIGHT LOWER BACK DRAINING 
CLEAR YELLOW URINE. ALL SAFETY MEASURES IN PLACE: BED LOCKED IN LOW POSITION. CALL LIGHT 
WITHIN REACH. WILL CONTINUE TO MONITOR THROUGHOUT THE SHIFT.

## 2022-08-08 NOTE — NUR
RN CLOSING NOTE

RECEIVED PT IN BED ASLEEP WITH SISTER/CAREGIVER AT BED SIDE.

PT IS NON VERBAL AND HAS HX OF CEREBRAL PALSY. PATIENT ON O2 VIA NC AT 2L, TOLERATING WELL 
SATING AT 94%. PT HAS A YULISSA MIDLINE TKO. PT HAS GTUBE, INTACT AND PATENT, AND RUNNING 
GLUCERNA AT 45ML/HR,. PATIENT HAS A RASH ON NECK, AND ARM NEAR ELBOW/WRIST/ L HIP/ NILES 
AREA. ALL SAFETY MEASURES IN PLACE: BED LOCKED IN LOW POSITION. CALL LIGHT WITHIN REACH. 
WILL CONTINUE TO MONITOR THROUGHOUT THE SHIFT.

## 2022-08-09 VITALS — DIASTOLIC BLOOD PRESSURE: 66 MMHG | SYSTOLIC BLOOD PRESSURE: 112 MMHG

## 2022-08-09 VITALS — DIASTOLIC BLOOD PRESSURE: 76 MMHG | SYSTOLIC BLOOD PRESSURE: 116 MMHG

## 2022-08-09 RX ADMIN — INSULIN HUMAN PRN UNITS: 100 INJECTION, SOLUTION PARENTERAL at 05:27

## 2022-08-09 RX ADMIN — VITAMIN D, TAB 1000IU (100/BT) SCH UNIT: 25 TAB at 08:58

## 2022-08-09 RX ADMIN — Medication SCH EACH: at 11:54

## 2022-08-09 RX ADMIN — CLOTRIMAZOLE AND BETAMETHASONE DIPROPIONATE SCH GM: 10; .5 CREAM TOPICAL at 09:05

## 2022-08-09 RX ADMIN — MAGNESIUM OXIDE TAB 400 MG (241.3 MG ELEMENTAL MG) SCH MG: 400 (241.3 MG) TAB at 08:59

## 2022-08-09 RX ADMIN — INSULIN HUMAN PRN UNITS: 100 INJECTION, SOLUTION PARENTERAL at 11:55

## 2022-08-09 RX ADMIN — NEOMYCIN AND POLYMYXIN B SULFATES AND BACITRACIN ZINC SCH GM: 400; 3.5; 5 OINTMENT TOPICAL at 09:06

## 2022-08-09 RX ADMIN — PANTOPRAZOLE SODIUM SCH MG: 40 GRANULE, DELAYED RELEASE ORAL at 08:59

## 2022-08-09 RX ADMIN — Medication SCH EACH: at 08:59

## 2022-08-09 RX ADMIN — METFORMIN HYDROCHLORIDE SCH MG: 500 TABLET, FILM COATED ORAL at 08:58

## 2022-08-09 RX ADMIN — Medication PRN TAB: at 07:20

## 2022-08-09 RX ADMIN — Medication SCH EACH: at 05:26

## 2022-08-09 RX ADMIN — LACTOBACILLUS TAB SCH EACH: TAB at 08:58

## 2022-08-09 RX ADMIN — THERA TABS SCH UDTAB: TAB at 08:59

## 2022-08-09 RX ADMIN — VALPROIC ACID SCH MG: 250 SOLUTION ORAL at 08:57

## 2022-08-09 RX ADMIN — Medication PRN ML: at 05:28

## 2022-08-09 RX ADMIN — Medication SCH MG: at 08:59

## 2022-08-09 NOTE — NUR
RN OPENING NOTE



RECEIVED PT IN BED AWAKE, CAREGIVER AT BEDSIDE. PT IS NON VERBAL AND HAS HX OF CEREBRAL 
PALSY. CURRENTLY ON O2 VIA NC AT 2L, TOLERATING WELL. IV ACCESS IN YULISSA MIDLINE NOTED. PT HAS 
GTUBE, INTACT AND PATENT, AND RUNNING GLUCERNA AT 45ML/HR,. NEPHROSTOMY NOTED ON HER RIGHT 
LOWER BACK DRAINING CLEAR YELLOW URINE. ALL SAFETY MEASURES IN PLACE: BED LOCKED IN LOW 
POSITION. CALL LIGHT WITHIN REACH.

## 2022-09-30 ENCOUNTER — HOSPITAL ENCOUNTER (INPATIENT)
Dept: HOSPITAL 54 - ER | Age: 52
LOS: 6 days | Discharge: HOME HEALTH | DRG: 720 | End: 2022-10-06
Attending: INTERNAL MEDICINE | Admitting: INTERNAL MEDICINE
Payer: MEDICAID

## 2022-09-30 VITALS — BODY MASS INDEX: 28.93 KG/M2 | WEIGHT: 125 LBS | HEIGHT: 55 IN

## 2022-09-30 DIAGNOSIS — D63.8: ICD-10-CM

## 2022-09-30 DIAGNOSIS — E87.20: ICD-10-CM

## 2022-09-30 DIAGNOSIS — E87.6: ICD-10-CM

## 2022-09-30 DIAGNOSIS — G40.909: ICD-10-CM

## 2022-09-30 DIAGNOSIS — N17.0: ICD-10-CM

## 2022-09-30 DIAGNOSIS — J15.6: ICD-10-CM

## 2022-09-30 DIAGNOSIS — M81.0: ICD-10-CM

## 2022-09-30 DIAGNOSIS — Z20.822: ICD-10-CM

## 2022-09-30 DIAGNOSIS — G93.41: ICD-10-CM

## 2022-09-30 DIAGNOSIS — L89.156: ICD-10-CM

## 2022-09-30 DIAGNOSIS — Z79.4: ICD-10-CM

## 2022-09-30 DIAGNOSIS — E11.22: ICD-10-CM

## 2022-09-30 DIAGNOSIS — G80.9: ICD-10-CM

## 2022-09-30 DIAGNOSIS — Z93.1: ICD-10-CM

## 2022-09-30 DIAGNOSIS — Z93.6: ICD-10-CM

## 2022-09-30 DIAGNOSIS — N39.0: ICD-10-CM

## 2022-09-30 DIAGNOSIS — A41.9: Primary | ICD-10-CM

## 2022-09-30 DIAGNOSIS — N18.9: ICD-10-CM

## 2022-09-30 DIAGNOSIS — Z87.440: ICD-10-CM

## 2022-09-30 DIAGNOSIS — E87.0: ICD-10-CM

## 2022-09-30 PROCEDURE — A6403 STERILE GAUZE>16 <= 48 SQ IN: HCPCS

## 2022-09-30 PROCEDURE — C9803 HOPD COVID-19 SPEC COLLECT: HCPCS

## 2022-09-30 PROCEDURE — G0378 HOSPITAL OBSERVATION PER HR: HCPCS

## 2022-09-30 RX ADMIN — DEXTROSE MONOHYDRATE ONE MLS/HR: 50 INJECTION, SOLUTION INTRAVENOUS at 01:00

## 2022-09-30 NOTE — NUR
TO ER BED 6. [id number]. FEVER 100.7 X 1700. PER SISTER PT HAS BEEN CONGESTED X 2 
DAYS. PT [id number]. CONNECTED TO MONITOR. AWAITING MD FUNES

## 2022-09-30 NOTE — NUR
-------------------------------------------------------------------------------

           *** Note undone in [facility] - 10/01/22 at 0105 by JOVANY ***            

-------------------------------------------------------------------------------

PT RETURNED FROM CT SCAN

## 2022-10-01 VITALS — DIASTOLIC BLOOD PRESSURE: 47 MMHG | SYSTOLIC BLOOD PRESSURE: 101 MMHG

## 2022-10-01 VITALS — SYSTOLIC BLOOD PRESSURE: 100 MMHG | DIASTOLIC BLOOD PRESSURE: 65 MMHG

## 2022-10-01 VITALS — SYSTOLIC BLOOD PRESSURE: 110 MMHG | DIASTOLIC BLOOD PRESSURE: 71 MMHG

## 2022-10-01 LAB
ALBUMIN SERPL BCP-MCNC: 1.9 G/DL (ref 3.4–5)
ALBUMIN SERPL BCP-MCNC: 1.9 G/DL (ref 3.4–5)
ALP SERPL-CCNC: 104 U/L (ref 46–116)
ALP SERPL-CCNC: 106 U/L (ref 46–116)
ALT SERPL W P-5'-P-CCNC: 8 U/L (ref 12–78)
ALT SERPL W P-5'-P-CCNC: 9 U/L (ref 12–78)
AST SERPL W P-5'-P-CCNC: 14 U/L (ref 15–37)
AST SERPL W P-5'-P-CCNC: 17 U/L (ref 15–37)
BASOPHILS # BLD AUTO: 0.1 K/UL (ref 0–0.2)
BASOPHILS NFR BLD AUTO: 0.9 % (ref 0–2)
BILIRUB DIRECT SERPL-MCNC: 0.3 MG/DL (ref 0–0.2)
BILIRUB DIRECT SERPL-MCNC: 0.3 MG/DL (ref 0–0.2)
BILIRUB SERPL-MCNC: 0.4 MG/DL (ref 0.2–1)
BILIRUB SERPL-MCNC: 0.4 MG/DL (ref 0.2–1)
BILIRUB UR QL STRIP: NEGATIVE
BUN SERPL-MCNC: 63 MG/DL (ref 7–18)
CALCIUM SERPL-MCNC: 11.3 MG/DL (ref 8.5–10.1)
CHLORIDE SERPL-SCNC: 107 MMOL/L (ref 98–107)
CHOLEST SERPL-MCNC: 63 MG/DL (ref ?–200)
CO2 SERPL-SCNC: 36 MMOL/L (ref 21–32)
COLOR UR: YELLOW
CREAT SERPL-MCNC: 0.9 MG/DL (ref 0.6–1.3)
EOSINOPHIL NFR BLD AUTO: 0.5 % (ref 0–6)
GLUCOSE SERPL-MCNC: 270 MG/DL (ref 74–106)
GLUCOSE UR STRIP-MCNC: >=1000 MG/DL
HCT VFR BLD AUTO: 34 % (ref 33–45)
HDLC SERPL-MCNC: 16 MG/DL (ref 40–60)
HGB BLD-MCNC: 10.2 G/DL (ref 11.5–14.8)
IRON SERPL-MCNC: 22 UG/DL (ref 50–175)
LDLC SERPL DIRECT ASSAY-MCNC: 29 MG/DL (ref 0–99)
LEUKOCYTE ESTERASE UR QL STRIP: (no result)
LIPASE SERPL-CCNC: 111 U/L (ref 73–393)
LYMPHOCYTES NFR BLD AUTO: 27 % (ref 20–44)
LYMPHOCYTES NFR BLD AUTO: 4.7 K/UL (ref 0.8–4.8)
MCHC RBC AUTO-ENTMCNC: 30 G/DL (ref 31–36)
MCV RBC AUTO: 101 FL (ref 82–100)
MONOCYTES NFR BLD AUTO: 1.2 K/UL (ref 0.1–1.3)
MONOCYTES NFR BLD AUTO: 6.9 % (ref 2–12)
NEUTROPHILS # BLD AUTO: 11.3 K/UL (ref 1.8–8.9)
NEUTROPHILS NFR BLD AUTO: 64.7 % (ref 43–81)
NITRITE UR QL STRIP: NEGATIVE
PH UR STRIP: 6 [PH] (ref 5–8)
PLATELET # BLD AUTO: 637 K/UL (ref 150–450)
POTASSIUM SERPL-SCNC: 3.3 MMOL/L (ref 3.5–5.1)
PROT SERPL-MCNC: 8.2 G/DL (ref 6.4–8.2)
PROT SERPL-MCNC: 8.3 G/DL (ref 6.4–8.2)
PROT UR QL STRIP: 100 MG/DL
RBC # BLD AUTO: 3.34 MIL/UL (ref 4–5.2)
RBC #/AREA URNS HPF: (no result) /HPF (ref 0–2)
SODIUM SERPL-SCNC: 149 MMOL/L (ref 136–145)
TIBC SERPL-MCNC: 360 UG/DL (ref 250–450)
TRIGL SERPL-MCNC: 258 MG/DL (ref 30–150)
TSH SERPL DL<=0.005 MIU/L-ACNC: 2.24 UIU/ML (ref 0.36–3.74)
UROBILINOGEN UR STRIP-MCNC: 0.2 EU/DL
WBC #/AREA URNS HPF: (no result) /HPF (ref 0–3)
WBC NRBC COR # BLD AUTO: 17.5 K/UL (ref 4.3–11)

## 2022-10-01 RX ADMIN — SODIUM CHLORIDE PRN MLS/HR: 4.5 INJECTION, SOLUTION INTRAVENOUS at 14:23

## 2022-10-01 RX ADMIN — HEPARIN SODIUM SCH UNITS: 5000 INJECTION INTRAVENOUS; SUBCUTANEOUS at 09:00

## 2022-10-01 RX ADMIN — HEPARIN SODIUM SCH UNITS: 5000 INJECTION INTRAVENOUS; SUBCUTANEOUS at 22:03

## 2022-10-01 RX ADMIN — THERA TABS SCH UDTAB: TAB at 09:00

## 2022-10-01 RX ADMIN — ATENOLOL SCH MG: 50 TABLET ORAL at 09:00

## 2022-10-01 RX ADMIN — MAGNESIUM OXIDE TAB 400 MG (241.3 MG ELEMENTAL MG) SCH MG: 400 (241.3 MG) TAB at 17:20

## 2022-10-01 RX ADMIN — Medication SCH EACH: at 17:20

## 2022-10-01 RX ADMIN — PANTOPRAZOLE SODIUM SCH MG: 40 GRANULE, DELAYED RELEASE ORAL at 09:00

## 2022-10-01 RX ADMIN — DEXTROSE MONOHYDRATE ONE MLS/HR: 50 INJECTION, SOLUTION INTRAVENOUS at 01:00

## 2022-10-01 RX ADMIN — VITAMIN D, TAB 1000IU (100/BT) SCH UNIT: 25 TAB at 09:00

## 2022-10-01 RX ADMIN — Medication SCH EACH: at 17:35

## 2022-10-01 RX ADMIN — INSULIN HUMAN PRN UNITS: 100 INJECTION, SOLUTION PARENTERAL at 06:01

## 2022-10-01 RX ADMIN — VALPROIC ACID SCH MG: 250 SOLUTION ORAL at 17:20

## 2022-10-01 RX ADMIN — MAGNESIUM OXIDE TAB 400 MG (241.3 MG ELEMENTAL MG) SCH MG: 400 (241.3 MG) TAB at 09:00

## 2022-10-01 RX ADMIN — Medication SCH EACH: at 05:38

## 2022-10-01 RX ADMIN — MEROPENEM SCH MLS/HR: 1 INJECTION INTRAVENOUS at 17:19

## 2022-10-01 RX ADMIN — VALPROIC ACID SCH MG: 250 SOLUTION ORAL at 09:00

## 2022-10-01 RX ADMIN — Medication SCH EACH: at 12:00

## 2022-10-01 NOTE — NUR
TELE RN OPENING NOTES:



RECEIVED PATIENT SLEEP IN BED COMFORTABLY, BED IN LOW POSITION CALL LIGHTS WITHIN REACH, NO 
COMPLAIN OF PAIN AND DISCOMFORT AT THIS TIME, ON O2 INHALATION AT 3LPM SATURATING WELL, WITH 
1:1 SITTER, ON TELE MONITOR- S-88, PATIENT WITH IV LINE AT RFA#20 WITH 1/2 NSS@75ML/HR 
INFUSING WELL, PATIENT KEPT CLEAN AND DRY ALL NEEDS MET WILL CONTINUE TO MONITOR.

## 2022-10-01 NOTE — NUR
TELE RN CLOSING NOTES

PATIENT RESTING ON BED AND NONVERBAL. WITH 24/7 CAREGIVER. ON ROOM AIR TOLERATING WELL. NO 
SOB NOTED. NOT IN DISTRESS. IN NO SIGNS OF PAIN VIA FLACC LEVEL OF PAIN. ON TELE MONITOR 
CURRENTLY READING SINUS RHYTHM AT 80BPM. WITH RIGHT NEPHROSTOMY TUBE DRAINING CLEAR YELLOW 
URINE. WITH G-TUBE IN PLACED FLUSHING WELL. WITH IV ACCESS AT THE RIGHT FOREARM G20, SALINE 
LOCKED, PATENT AND INTACT. DUE MEDS GIVEN. SAFETY MEASURES IN PLACED. CALL LIGHT WITHIN 
REACH. BED ON LOWEST LOCKED POSITION, SIDE RAILS UP X2. WILL ENDORSE TO NEXT SHIFT FOR AVELINA.

## 2022-10-01 NOTE — NUR
MS RN ADMISSION NOTES

RECEIVED PATIENT FROM ER ENDORSED BY PRADEEP MORRIS. PATIENT IS AWAKE AND NONVERBAL. WITH 24/7 
CAREGIVER. ON ROOM AIR TOLERATING WELL. NO SOB NOTED. NOT IN DISTRESS. IN NO SIGNS OF PAIN 
VIA FLACC LEVEL OF PAIN. SKIN ASSESSMENT DONE AND TAKEN PHOTOS, PLACED ON CHART. WITH RIGHT 
NEPHROSTOMY TUBE DRAINING CLEAR YELLOW URINE. WITH G-TUBE IN PLACED FLUSHING WELL. WITH IV 
ACCESS AT THE RIGHT FOREARM G20, SALINE LOCKED, PATENT AND INTACT. SAFETY MEASURES IN 
PLACED. CALL LIGHT WITHIN REACH. BED ON LOWEST LOCKED POSITION, SIDE RAILS UP X2. WILL 
CONTINUE TO MONITOR.

## 2022-10-01 NOTE — NUR
PT'S CARE GIVER REPORTED THAT THE NEPHROSTOMY BAG HAD NOT CHANGED SINCE THE 
MORNING. DRESSED WS ASSESSED AND FLUSHED TO CHECK FOR PATENTCY. PT IS MAKING 
URINE IN THE DIAPER.

## 2022-10-02 VITALS — SYSTOLIC BLOOD PRESSURE: 90 MMHG | DIASTOLIC BLOOD PRESSURE: 55 MMHG

## 2022-10-02 VITALS — SYSTOLIC BLOOD PRESSURE: 95 MMHG | DIASTOLIC BLOOD PRESSURE: 55 MMHG

## 2022-10-02 VITALS — SYSTOLIC BLOOD PRESSURE: 101 MMHG | DIASTOLIC BLOOD PRESSURE: 55 MMHG

## 2022-10-02 VITALS — DIASTOLIC BLOOD PRESSURE: 61 MMHG | SYSTOLIC BLOOD PRESSURE: 109 MMHG

## 2022-10-02 VITALS — SYSTOLIC BLOOD PRESSURE: 90 MMHG | DIASTOLIC BLOOD PRESSURE: 52 MMHG

## 2022-10-02 LAB
BASOPHILS # BLD AUTO: 0.1 K/UL (ref 0–0.2)
BASOPHILS NFR BLD AUTO: 0.7 % (ref 0–2)
BUN SERPL-MCNC: 34 MG/DL (ref 7–18)
CALCIUM SERPL-MCNC: 9.7 MG/DL (ref 8.5–10.1)
CHLORIDE SERPL-SCNC: 113 MMOL/L (ref 98–107)
CO2 SERPL-SCNC: 29 MMOL/L (ref 21–32)
CREAT SERPL-MCNC: 0.6 MG/DL (ref 0.6–1.3)
EOSINOPHIL NFR BLD AUTO: 1.1 % (ref 0–6)
GLUCOSE SERPL-MCNC: 216 MG/DL (ref 74–106)
HCT VFR BLD AUTO: 30 % (ref 33–45)
HGB BLD-MCNC: 9.1 G/DL (ref 11.5–14.8)
LYMPHOCYTES NFR BLD AUTO: 1.9 K/UL (ref 0.8–4.8)
LYMPHOCYTES NFR BLD AUTO: 15.7 % (ref 20–44)
MAGNESIUM SERPL-MCNC: 2.4 MG/DL (ref 1.8–2.4)
MCHC RBC AUTO-ENTMCNC: 30 G/DL (ref 31–36)
MCV RBC AUTO: 101 FL (ref 82–100)
MONOCYTES NFR BLD AUTO: 1 K/UL (ref 0.1–1.3)
MONOCYTES NFR BLD AUTO: 8.5 % (ref 2–12)
NEUTROPHILS # BLD AUTO: 8.8 K/UL (ref 1.8–8.9)
NEUTROPHILS NFR BLD AUTO: 74 % (ref 43–81)
PHOSPHATE SERPL-MCNC: 3 MG/DL (ref 2.5–4.9)
PLATELET # BLD AUTO: 456 K/UL (ref 150–450)
POTASSIUM SERPL-SCNC: 3.9 MMOL/L (ref 3.5–5.1)
RBC # BLD AUTO: 2.97 MIL/UL (ref 4–5.2)
SODIUM SERPL-SCNC: 148 MMOL/L (ref 136–145)
WBC NRBC COR # BLD AUTO: 11.8 K/UL (ref 4.3–11)

## 2022-10-02 RX ADMIN — Medication SCH EACH: at 00:07

## 2022-10-02 RX ADMIN — INSULIN HUMAN PRN UNITS: 100 INJECTION, SOLUTION PARENTERAL at 00:03

## 2022-10-02 RX ADMIN — THERA TABS SCH UDTAB: TAB at 09:31

## 2022-10-02 RX ADMIN — Medication SCH EACH: at 12:54

## 2022-10-02 RX ADMIN — HEPARIN SODIUM SCH UNITS: 5000 INJECTION INTRAVENOUS; SUBCUTANEOUS at 20:27

## 2022-10-02 RX ADMIN — VALPROIC ACID SCH MG: 250 SOLUTION ORAL at 09:31

## 2022-10-02 RX ADMIN — MEROPENEM SCH MLS/HR: 1 INJECTION INTRAVENOUS at 17:40

## 2022-10-02 RX ADMIN — INSULIN HUMAN PRN UNITS: 100 INJECTION, SOLUTION PARENTERAL at 17:32

## 2022-10-02 RX ADMIN — SODIUM CHLORIDE PRN MLS/HR: 4.5 INJECTION, SOLUTION INTRAVENOUS at 20:56

## 2022-10-02 RX ADMIN — MAGNESIUM OXIDE TAB 400 MG (241.3 MG ELEMENTAL MG) SCH MG: 400 (241.3 MG) TAB at 09:31

## 2022-10-02 RX ADMIN — VITAMIN D, TAB 1000IU (100/BT) SCH UNIT: 25 TAB at 09:31

## 2022-10-02 RX ADMIN — ACETAMINOPHEN PRN MG: 160 SOLUTION ORAL at 10:42

## 2022-10-02 RX ADMIN — ATENOLOL SCH MG: 50 TABLET ORAL at 09:37

## 2022-10-02 RX ADMIN — MEROPENEM SCH MLS/HR: 1 INJECTION INTRAVENOUS at 05:32

## 2022-10-02 RX ADMIN — MAGNESIUM OXIDE TAB 400 MG (241.3 MG ELEMENTAL MG) SCH MG: 400 (241.3 MG) TAB at 17:37

## 2022-10-02 RX ADMIN — INSULIN HUMAN PRN UNITS: 100 INJECTION, SOLUTION PARENTERAL at 12:52

## 2022-10-02 RX ADMIN — SODIUM CHLORIDE PRN MLS/HR: 4.5 INJECTION, SOLUTION INTRAVENOUS at 06:16

## 2022-10-02 RX ADMIN — LISINOPRIL SCH MG: 5 TABLET ORAL at 09:00

## 2022-10-02 RX ADMIN — DEXTROSE MONOHYDRATE SCH MLS/HR: 50 INJECTION, SOLUTION INTRAVENOUS at 00:37

## 2022-10-02 RX ADMIN — INSULIN HUMAN PRN UNITS: 100 INJECTION, SOLUTION PARENTERAL at 06:04

## 2022-10-02 RX ADMIN — VALPROIC ACID SCH MG: 250 SOLUTION ORAL at 17:41

## 2022-10-02 RX ADMIN — Medication SCH EACH: at 17:39

## 2022-10-02 RX ADMIN — Medication SCH EACH: at 09:32

## 2022-10-02 RX ADMIN — Medication SCH EACH: at 06:07

## 2022-10-02 RX ADMIN — HEPARIN SODIUM SCH UNITS: 5000 INJECTION INTRAVENOUS; SUBCUTANEOUS at 09:35

## 2022-10-02 RX ADMIN — PANTOPRAZOLE SODIUM SCH MG: 40 GRANULE, DELAYED RELEASE ORAL at 09:38

## 2022-10-02 RX ADMIN — Medication SCH EACH: at 17:41

## 2022-10-02 NOTE — NUR
RN OPENING NOTES



RECEIVED PT IN BED, ASLEEP, ON SIDE, WITH CAREGIVER AT BEDSIDE. NON-VERBAL AND OPENS EYES. 
ON NC 3LPM AND TOLERATING WELL. NO SOB NOTED. NO S/SX OF RESPIRATORY DISTRESS NOTED. TELE 
MONITOR DETECTS SINUS RHYTHM WITH RATE OF 90s. IV ACCESS IN RFA #20G RUNNING NS @75 ML/HR.  
G-TUBE IN PLACE RUNNING GLUCERNA @ 45 ML/HR. SAFETY PRECAUTIONS IN PLACE: BED IN LOWEST, 
LOCKED POSITION, SIDERAILS UPx2, AND BRAKES ON. TABLE AND CALL LIGHT WITHIN REACH. ALL NEEDS 
MET AT THIS TIME.

## 2022-10-02 NOTE — NUR
RN CLOSING NOTE



PATIENT IN BED RESTING, CAREGIVER AT BED SIDE. IN NO ACUTE DISTRESS NOTED. RESPIRATORY EVEN 
AND UNLABORED IN ROOM AIR. SKIN IS WARM TO TOUCH, KEEP CLEAN/DRY. TUBE FEEDING RUNNING AT 
45ML/HR, NO RESIDUAL OBSERVED FROM G TUBE. KEPT LOWEST BED POSITION AND LOCKED. BED ALARM IS 
ON AT ALL THE TIMES. ALL SAFETY MEASURED IN PLACED. CALL LIGHT WITHIN REACH, WILL ENDORSED 
TO NEXT SHIFT.

## 2022-10-02 NOTE — NUR
RN OPENING NOTE



PATIENT RECEIVED IN BED, NON VERBAL, ABLE TO RESPONDS PHYSICAL STIMULI. IN NO ACUTE DISTRESS 
NOTED. RESPIRATORY EVEN AND UNLABORED ON ROOM AIR. SKIN IS WARM TO TOUCH, KEEP CLEAN/DRY. 
KEPT ELEVATED HOB FOR ENSURE AIRWAY AND ASPIRATION PRECAUTION, ALSO LOWEST POSITION OF THE 
BED, S/R UP X 2, BED ALARM IS ON AT ALL THE TIMES. ALL SAFETY PRECAUTION APPLIED. CALL LIGHT 
WITHIN REACH, WILL CONTINUE TO MONITOR.

## 2022-10-02 NOTE — NUR
TELE  RN CLOSING NOTES: 



PATIENT SLEEP IN BED COMFORTABLY, BED IN LOW POSITION CALL LIGHTS WITHIN REACH, NO COMPLAIN 
OF PAIN AND DISCOMFORT AT THIS TIME, NO FACIAL  GRIMACING WAS OBSERVED, ON O2 INHALATION AT 
3LPM SATURATING WELL, ON TELE MONITOR-ST-102,  ON NEPHROSTOMY TUBE WITH 600CC OUTPUT ON NPO 
ON GTUBE FEEDING ON GLUCERNA 1.2@45ML/HR INFUSING WELL, PATIENT WITH IV LINE AT RFA#20 WITH 
ONGOING  1/2 NSS@75ML/HR INFUSING WELL, REPOSITION Q2H, PATIENT KEPT CLEAN AND DRY ALL NEEDS 
MET ENDORSE TO INCOMING SHIFT.

## 2022-10-03 VITALS — DIASTOLIC BLOOD PRESSURE: 59 MMHG | SYSTOLIC BLOOD PRESSURE: 93 MMHG

## 2022-10-03 VITALS — DIASTOLIC BLOOD PRESSURE: 65 MMHG | SYSTOLIC BLOOD PRESSURE: 124 MMHG

## 2022-10-03 VITALS — SYSTOLIC BLOOD PRESSURE: 100 MMHG | DIASTOLIC BLOOD PRESSURE: 56 MMHG

## 2022-10-03 VITALS — SYSTOLIC BLOOD PRESSURE: 106 MMHG | DIASTOLIC BLOOD PRESSURE: 65 MMHG

## 2022-10-03 VITALS — DIASTOLIC BLOOD PRESSURE: 58 MMHG | SYSTOLIC BLOOD PRESSURE: 117 MMHG

## 2022-10-03 VITALS — DIASTOLIC BLOOD PRESSURE: 60 MMHG | SYSTOLIC BLOOD PRESSURE: 94 MMHG

## 2022-10-03 LAB
BASOPHILS # BLD AUTO: 0 K/UL (ref 0–0.2)
BASOPHILS NFR BLD AUTO: 0.5 % (ref 0–2)
BUN SERPL-MCNC: 23 MG/DL (ref 7–18)
CALCIUM SERPL-MCNC: 8.9 MG/DL (ref 8.5–10.1)
CHLORIDE SERPL-SCNC: 116 MMOL/L (ref 98–107)
CO2 SERPL-SCNC: 32 MMOL/L (ref 21–32)
CREAT SERPL-MCNC: 0.5 MG/DL (ref 0.6–1.3)
EOSINOPHIL NFR BLD AUTO: 2.4 % (ref 0–6)
GLUCOSE SERPL-MCNC: 158 MG/DL (ref 74–106)
HCT VFR BLD AUTO: 30 % (ref 33–45)
HGB BLD-MCNC: 9.4 G/DL (ref 11.5–14.8)
LYMPHOCYTES NFR BLD AUTO: 2.5 K/UL (ref 0.8–4.8)
LYMPHOCYTES NFR BLD AUTO: 30.1 % (ref 20–44)
MAGNESIUM SERPL-MCNC: 2.1 MG/DL (ref 1.8–2.4)
MCHC RBC AUTO-ENTMCNC: 31 G/DL (ref 31–36)
MCV RBC AUTO: 99 FL (ref 82–100)
MONOCYTES NFR BLD AUTO: 1 K/UL (ref 0.1–1.3)
MONOCYTES NFR BLD AUTO: 11.9 % (ref 2–12)
NEUTROPHILS # BLD AUTO: 4.6 K/UL (ref 1.8–8.9)
NEUTROPHILS NFR BLD AUTO: 55.1 % (ref 43–81)
PHOSPHATE SERPL-MCNC: 2.6 MG/DL (ref 2.5–4.9)
PLATELET # BLD AUTO: 443 K/UL (ref 150–450)
POTASSIUM SERPL-SCNC: 3.8 MMOL/L (ref 3.5–5.1)
RBC # BLD AUTO: 3.05 MIL/UL (ref 4–5.2)
SODIUM SERPL-SCNC: 149 MMOL/L (ref 136–145)
WBC NRBC COR # BLD AUTO: 8.3 K/UL (ref 4.3–11)

## 2022-10-03 RX ADMIN — MAGNESIUM OXIDE TAB 400 MG (241.3 MG ELEMENTAL MG) SCH MG: 400 (241.3 MG) TAB at 09:13

## 2022-10-03 RX ADMIN — Medication SCH EACH: at 12:30

## 2022-10-03 RX ADMIN — Medication SCH EACH: at 09:13

## 2022-10-03 RX ADMIN — VALPROIC ACID SCH MG: 250 SOLUTION ORAL at 09:13

## 2022-10-03 RX ADMIN — VALPROIC ACID SCH MG: 250 SOLUTION ORAL at 17:20

## 2022-10-03 RX ADMIN — Medication SCH EACH: at 17:20

## 2022-10-03 RX ADMIN — Medication SCH EACH: at 00:22

## 2022-10-03 RX ADMIN — ACETAMINOPHEN PRN MG: 160 SOLUTION ORAL at 15:36

## 2022-10-03 RX ADMIN — Medication SCH EACH: at 09:15

## 2022-10-03 RX ADMIN — Medication SCH EACH: at 17:43

## 2022-10-03 RX ADMIN — SODIUM CHLORIDE PRN MLS/HR: 4.5 INJECTION, SOLUTION INTRAVENOUS at 20:19

## 2022-10-03 RX ADMIN — MEROPENEM SCH MLS/HR: 1 INJECTION INTRAVENOUS at 05:14

## 2022-10-03 RX ADMIN — INSULIN HUMAN PRN UNITS: 100 INJECTION, SOLUTION PARENTERAL at 05:31

## 2022-10-03 RX ADMIN — INSULIN HUMAN PRN UNITS: 100 INJECTION, SOLUTION PARENTERAL at 12:32

## 2022-10-03 RX ADMIN — DEXTROSE MONOHYDRATE SCH MLS/HR: 50 INJECTION, SOLUTION INTRAVENOUS at 00:21

## 2022-10-03 RX ADMIN — LISINOPRIL SCH MG: 5 TABLET ORAL at 09:00

## 2022-10-03 RX ADMIN — THERA TABS SCH UDTAB: TAB at 09:13

## 2022-10-03 RX ADMIN — VITAMIN D, TAB 1000IU (100/BT) SCH UNIT: 25 TAB at 09:13

## 2022-10-03 RX ADMIN — Medication SCH EACH: at 05:26

## 2022-10-03 RX ADMIN — ATENOLOL SCH MG: 50 TABLET ORAL at 09:00

## 2022-10-03 RX ADMIN — INSULIN HUMAN PRN UNITS: 100 INJECTION, SOLUTION PARENTERAL at 17:44

## 2022-10-03 RX ADMIN — HEPARIN SODIUM SCH UNITS: 5000 INJECTION INTRAVENOUS; SUBCUTANEOUS at 09:16

## 2022-10-03 RX ADMIN — MAGNESIUM OXIDE TAB 400 MG (241.3 MG ELEMENTAL MG) SCH MG: 400 (241.3 MG) TAB at 17:20

## 2022-10-03 RX ADMIN — HEPARIN SODIUM SCH UNITS: 5000 INJECTION INTRAVENOUS; SUBCUTANEOUS at 20:20

## 2022-10-03 RX ADMIN — INSULIN HUMAN PRN UNITS: 100 INJECTION, SOLUTION PARENTERAL at 00:25

## 2022-10-03 RX ADMIN — PANTOPRAZOLE SODIUM SCH MG: 40 GRANULE, DELAYED RELEASE ORAL at 09:12

## 2022-10-03 RX ADMIN — DEXTROSE MONOHYDRATE SCH MLS/HR: 50 INJECTION, SOLUTION INTRAVENOUS at 14:18

## 2022-10-03 RX ADMIN — MEROPENEM SCH MLS/HR: 1 INJECTION INTRAVENOUS at 17:20

## 2022-10-03 NOTE — NUR
RN NOTE



FAMILY REQUESTED A DIETARY CONSULT, PATIENT GLUCOSE IS HIGH WITH CURRENT FEEDING GLUCERNA, 
FAMILY SAID PATIENT IS ON DIABETIC SOURCE 45 ML/HR AT HOME. DIETARY CONSULT PLACED, CHARGE 
NURSE AWARE.

## 2022-10-03 NOTE — NUR
RN NOTE



TYLENOL WAS GIVEN TO PATIENT AS PER CAREGIVER REQUEST. CAREGIVER SAID WHEN PATIENT START 
PUTTING HER FINGER ON HER MOUTH OR START BITING HER LIPS IS BECAUSE PATIENT IS EXPERIENCING 
PAIN.

## 2022-10-03 NOTE — NUR
RN CLOSING NOTES



PT IN BED, ASLEEP, ON SIDE. NON-VERBAL AND OPENS EYES. ON NC 3LPM AND TOLERATING WELL. NO 
SOB NOTED. NO S/SX OF RESPIRATORY DISTRESS NOTED. TELE MONITOR DETECTS SINUS RHYTHM WITH 
RATE OF 90s. IV ACCESS IN RFA #20G RUNNING NS @75 ML/HR.  G-TUBE IN PLACE RUNNING GLUCERNA @ 
45 ML/HR. ALL ORDERS CARRIED OUT. ALL NEEDS MET. PT KEPT CLEAN AND DRY. SAFETY PRECAUTIONS 
IN PLACE: BED IN LOWEST, LOCKED POSITION, SIDERAILS UPx2, AND BRAKES ON. TABLE AND CALL 
LIGHT WITHIN REACH. WILL ENDORSE TO ONCOMING SHIFT FOR AVELINA.

## 2022-10-03 NOTE — NUR
WOUND CARE CONSULT: PT PRESENTS WITH SACRAL INTACT DEEP TISSUE INJURY AND INCONTINENCE 
ASSOCIATED SKIN DAMAGE TO GLUTEAL CREASE, PRESENT ON ADMISSION. NEPHROSTOMY TUBE NOTED. 
DEFER TO PMD/NEPHRO MD FOR TUBE. RECOMMENDATIONS MADE FOR SKIN PROTECTION. DISCUSSED WITH 
NURSING STAFF. PT PLACED ON DUNCAN ISOFLEX LOW AIRLOSS BED. M D IN AGREEMENT WITH PLAN OF 
CARE. 

-------------------------------------------------------------------------------

Addendum: 10/03/22 at 1106 by JASMINA HOLLAND WNDNU

-------------------------------------------------------------------------------

Amended: Links added.

## 2022-10-03 NOTE — NUR
RN CLOSING NOTE



PATIENT AWAKE IN BED RESTING. NON-VERBAL. NO S/S OF PAIN NOTED AT THIS TIME. ON 2L OXYGEN 
VIA NC, NO DISTRESS OR SHORTNESS OF BREATH NOTED. IV ACCESS RFA #20G INTACT, PATENT AND 
FLUSHING WELL. PATIENT WITH EXTERNAL CARDIAC MONITOR WITH CURRENT READING OF SR AND HR OF 
90, NO CARDIAC DISTRESS NOTED. PATIENT HAVE NEPHROSTOMY TUBE, IN PLACE, DRAINING WELL, 
OUTPUT 250ML. SCHEDULE MEDICATIONS ADMINISTERED. FALL AND SAFETY MEASURES IN PLACE, BED 
ALARM ON, BED IN LOW AND LOCK POSITION, CALL LIGHT AND TABLE WITHIN EASY REACH, SIDE RAILS 
UP X2. WILL ENDORSE TO NIGHT SHIFT.

## 2022-10-03 NOTE — NUR
RN OPENING NOTES



RECEIVED PT IN BED, SLEEPING ON SIDE. OPENS EYES AND NONVERBAL. ON NC 2LPM AND TOLERATING 
WELL. NO SOB NOTED. NO S/SX OF RESPIRATORY DISTRESS NOTED. IV ACCESS IN RFA #20G RUNNINS 1/2 
NS @ 75 ML/HR. SAFETY PRECAUTIONS IN PLACE: BED IN LOWEST, LOCKED POSITION, SIDERAILS UPx2, 
AND BRAKES ON. TABLE AND CALL LIGHT WITHIN REACH. ALL NEEDS MET AT THIS TIME.

## 2022-10-03 NOTE — NUR
RN OPENING NOTE



PATIENT AWAKE IN BED RESTING. NON-VERBAL. NO S/S OF PAIN NOTED AT THIS TIME. ON 2L OXYGEN 
VIA NC, NO DISTRESS OR SHORTNESS OF BREATH NOTED. IV ACCESS RFA #20G INTACT, PATENT AND 
FLUSHING WELL. PATIENT WITH EXTERNAL CARDIAC MONITOR WITH CURRENT READING OF SR AND HR OF 
90, NO CARDIAC DISTRESS NOTED. FALL AND SAFETY MEASURES IN PLACE, BED ALARM ON, BED IN LOW 
AND LOCK POSITION, CALL LIGHT AND TABLE WITHIN EASY REACH, SIDE RAILS UP X2. WILL CONTINUE 
TO MONITOR.

## 2022-10-04 VITALS — DIASTOLIC BLOOD PRESSURE: 70 MMHG | SYSTOLIC BLOOD PRESSURE: 112 MMHG

## 2022-10-04 VITALS — DIASTOLIC BLOOD PRESSURE: 66 MMHG | SYSTOLIC BLOOD PRESSURE: 136 MMHG

## 2022-10-04 VITALS — SYSTOLIC BLOOD PRESSURE: 107 MMHG | DIASTOLIC BLOOD PRESSURE: 40 MMHG

## 2022-10-04 VITALS — DIASTOLIC BLOOD PRESSURE: 61 MMHG | SYSTOLIC BLOOD PRESSURE: 106 MMHG

## 2022-10-04 VITALS — SYSTOLIC BLOOD PRESSURE: 122 MMHG | DIASTOLIC BLOOD PRESSURE: 76 MMHG

## 2022-10-04 VITALS — DIASTOLIC BLOOD PRESSURE: 76 MMHG | SYSTOLIC BLOOD PRESSURE: 122 MMHG

## 2022-10-04 VITALS — DIASTOLIC BLOOD PRESSURE: 33 MMHG | SYSTOLIC BLOOD PRESSURE: 98 MMHG

## 2022-10-04 VITALS — SYSTOLIC BLOOD PRESSURE: 127 MMHG | DIASTOLIC BLOOD PRESSURE: 96 MMHG

## 2022-10-04 LAB
BASOPHILS # BLD AUTO: 0 K/UL (ref 0–0.2)
BASOPHILS NFR BLD AUTO: 0.3 % (ref 0–2)
BUN SERPL-MCNC: 20 MG/DL (ref 7–18)
CALCIUM SERPL-MCNC: 8.7 MG/DL (ref 8.5–10.1)
CHLORIDE SERPL-SCNC: 111 MMOL/L (ref 98–107)
CO2 SERPL-SCNC: 32 MMOL/L (ref 21–32)
CREAT SERPL-MCNC: 0.3 MG/DL (ref 0.6–1.3)
EOSINOPHIL NFR BLD AUTO: 3.6 % (ref 0–6)
EOSINOPHIL NFR BLD MANUAL: 3 % (ref 0–4)
GLUCOSE SERPL-MCNC: 69 MG/DL (ref 74–106)
HCT VFR BLD AUTO: 27 % (ref 33–45)
HGB BLD-MCNC: 8.6 G/DL (ref 11.5–14.8)
LYMPHOCYTES NFR BLD AUTO: 3.4 K/UL (ref 0.8–4.8)
LYMPHOCYTES NFR BLD AUTO: 36.4 % (ref 20–44)
LYMPHOCYTES NFR BLD MANUAL: 28 % (ref 16–48)
MAGNESIUM SERPL-MCNC: 1.9 MG/DL (ref 1.8–2.4)
MCHC RBC AUTO-ENTMCNC: 32 G/DL (ref 31–36)
MCV RBC AUTO: 98 FL (ref 82–100)
MONOCYTES NFR BLD AUTO: 1.1 K/UL (ref 0.1–1.3)
MONOCYTES NFR BLD AUTO: 12 % (ref 2–12)
MONOCYTES NFR BLD MANUAL: 5 % (ref 0–11)
NEUTROPHILS # BLD AUTO: 4.4 K/UL (ref 1.8–8.9)
NEUTROPHILS NFR BLD AUTO: 47.7 % (ref 43–81)
NEUTS BAND NFR BLD MANUAL: 6 % (ref 0–5)
NEUTS SEG NFR BLD MANUAL: 58 % (ref 42–76)
PHOSPHATE SERPL-MCNC: 3.1 MG/DL (ref 2.5–4.9)
PLATELET # BLD AUTO: 410 K/UL (ref 150–450)
POTASSIUM SERPL-SCNC: 3.7 MMOL/L (ref 3.5–5.1)
RBC # BLD AUTO: 2.8 MIL/UL (ref 4–5.2)
SODIUM SERPL-SCNC: 144 MMOL/L (ref 136–145)
WBC NRBC COR # BLD AUTO: 9.3 K/UL (ref 4.3–11)

## 2022-10-04 RX ADMIN — HEPARIN SODIUM SCH UNITS: 5000 INJECTION INTRAVENOUS; SUBCUTANEOUS at 09:24

## 2022-10-04 RX ADMIN — Medication SCH EACH: at 05:25

## 2022-10-04 RX ADMIN — INSULIN HUMAN PRN UNITS: 100 INJECTION, SOLUTION PARENTERAL at 12:09

## 2022-10-04 RX ADMIN — Medication SCH EACH: at 09:19

## 2022-10-04 RX ADMIN — VITAMIN D, TAB 1000IU (100/BT) SCH UNIT: 25 TAB at 09:23

## 2022-10-04 RX ADMIN — LISINOPRIL SCH MG: 5 TABLET ORAL at 09:23

## 2022-10-04 RX ADMIN — Medication SCH EACH: at 00:34

## 2022-10-04 RX ADMIN — VALPROIC ACID SCH MG: 250 SOLUTION ORAL at 17:24

## 2022-10-04 RX ADMIN — MEROPENEM SCH MLS/HR: 1 INJECTION INTRAVENOUS at 05:19

## 2022-10-04 RX ADMIN — Medication SCH EACH: at 09:23

## 2022-10-04 RX ADMIN — DEXTROSE MONOHYDRATE SCH MLS/HR: 50 INJECTION, SOLUTION INTRAVENOUS at 01:19

## 2022-10-04 RX ADMIN — INSULIN HUMAN PRN UNITS: 100 INJECTION, SOLUTION PARENTERAL at 00:37

## 2022-10-04 RX ADMIN — THERA TABS SCH UDTAB: TAB at 09:22

## 2022-10-04 RX ADMIN — Medication SCH EACH: at 17:27

## 2022-10-04 RX ADMIN — SODIUM CHLORIDE PRN MLS/HR: 4.5 INJECTION, SOLUTION INTRAVENOUS at 12:32

## 2022-10-04 RX ADMIN — VALPROIC ACID SCH MG: 250 SOLUTION ORAL at 09:22

## 2022-10-04 RX ADMIN — Medication SCH EACH: at 17:50

## 2022-10-04 RX ADMIN — MEROPENEM SCH MLS/HR: 1 INJECTION INTRAVENOUS at 17:31

## 2022-10-04 RX ADMIN — PANTOPRAZOLE SODIUM SCH MG: 40 GRANULE, DELAYED RELEASE ORAL at 09:23

## 2022-10-04 RX ADMIN — INSULIN HUMAN PRN UNITS: 100 INJECTION, SOLUTION PARENTERAL at 17:51

## 2022-10-04 RX ADMIN — MAGNESIUM OXIDE TAB 400 MG (241.3 MG ELEMENTAL MG) SCH MG: 400 (241.3 MG) TAB at 17:24

## 2022-10-04 RX ADMIN — ATENOLOL SCH MG: 50 TABLET ORAL at 09:22

## 2022-10-04 RX ADMIN — HEPARIN SODIUM SCH UNITS: 5000 INJECTION INTRAVENOUS; SUBCUTANEOUS at 21:07

## 2022-10-04 RX ADMIN — Medication SCH EACH: at 12:19

## 2022-10-04 RX ADMIN — INSULIN HUMAN PRN UNITS: 100 INJECTION, SOLUTION PARENTERAL at 05:31

## 2022-10-04 RX ADMIN — MAGNESIUM OXIDE TAB 400 MG (241.3 MG ELEMENTAL MG) SCH MG: 400 (241.3 MG) TAB at 09:22

## 2022-10-04 NOTE — NUR
TELE RN OPENING NOTE

RECEIVED PATIENT IN BED; AWAKE, NON-VERBAL, OPENS EYES. WITH O2 INHALATION @ 2LPM VIA NASAL 
CANNULA; TOLERATING WELL. NOT IN ANY FORM OF RESPIRATORY DISTRESS. WITH IV ACCESS ON RIGHT 
FOREARM 20g; PATENT AND INTACT INFUSING WITH 1/2 NS 1L REGULATED @ 75 ML/HR; FLUSHING WELL. 
ON TELE MONITOR WITH CURRENT READING OF SR HR-85 BPM. WITH G-TUBE IN PLACE; PATENT AND 
INTACT INFUSING WITH GLUCERNA 1.2 REGULATED @ 39 ML/HR. WITH NEPHROSTOMY TUBE IN PLACE, 
DRAINING WELL. FALL AND SAFETY MEASURES IMPLEMENTED: CALL LIGHT AND TABLE WITHIN REACH, SIDE 
RAILS UP X 2, BED IN LOWEST LOCKED POSITION. WILL CONTINUE PLAN OF CARE. 

-------------------------------------------------------------------------------

Addendum: 10/05/22 at 0625 by DEVENDRA VELA RN

-------------------------------------------------------------------------------

WITH CAREGIVER @ BEDSIDE

## 2022-10-04 NOTE — NUR
RN CLOSING NOTE



PATIENT AWAKE IN BED RESTING. NON-VERBAL. NO S/S OF PAIN NOTED AT THIS TIME. ON 2L OXYGEN 
VIA NC, NO DISTRESS OR SHORTNESS OF BREATH NOTED. IV ACCESS RFA #20G INTACT, PATENT AND 
FLUSHING WELL. PATIENT WITH EXTERNAL CARDIAC MONITOR WITH CURRENT READING OF SR AND HR OF 
96, NO CARDIAC DISTRESS NOTED. PATIENT HAVE NEPHROSTOMY TUBE, IN PLACE, DRAINING WELL, 
OUTPUT 350ML. SCHEDULE MEDICATIONS ADMINISTERED. PATIENT WAS TURNED AND REPOSITIONED PER 
PROTOCOL. FALL AND SAFETY MEASURES IN PLACE, BED ALARM ON, BED IN LOW AND LOCK POSITION, 
CALL LIGHT AND TABLE WITHIN EASY REACH, SIDE RAILS UP X2. WILL ENDORSE TO NIGHT SHIFT.

## 2022-10-04 NOTE — NUR
RN OPENING NOTE



PATIENT AWAKE IN BED RESTING. NON-VERBAL. NO S/S OF PAIN NOTED AT THIS TIME. ON 2L OXYGEN 
VIA NC, NO DISTRESS OR SHORTNESS OF BREATH NOTED. IV ACCESS RFA #20G INTACT, PATENT AND 
FLUSHING WELL. PATIENT WITH EXTERNAL CARDIAC MONITOR WITH CURRENT READING OF SR AND HR OF 
80, NO CARDIAC DISTRESS NOTED. FALL AND SAFETY MEASURES IN PLACE, BED ALARM ON, BED IN LOW 
AND LOCK POSITION, CALL LIGHT AND TABLE WITHIN EASY REACH, SIDE RAILS UP X2. WILL CONTINUE 
TO MONITOR.

## 2022-10-04 NOTE — NUR
RN CLOSING NOTES



PT IN BED, SLEEPING ON SIDE, WITH CAREGIVER AT BEDSIDE. OPENS EYES AND NONVERBAL. ON NC 2LPM 
AND TOLERATING WELL. NO SOB NOTED. NO S/SX OF RESPIRATORY DISTRESS NOTED. IV ACCESS IN RFA 
#20G RUNNINS 1/2 NS @ 75 ML/HR. ALL ORDERS CARRIED OUT. ALL NEEDS MET. PT KEPT CLEAN AND 
DRY. SAFETY PRECAUTIONS IN PLACE: BED IN LOWEST, LOCKED POSITION, SIDERAILS UPx2, AND BRAKES 
ON. TABLE AND CALL LIGHT WITHIN REACH. WILL ENDORSE TO ONCOMING SHIFT FOR AVELINA.

## 2022-10-05 VITALS — SYSTOLIC BLOOD PRESSURE: 122 MMHG | DIASTOLIC BLOOD PRESSURE: 70 MMHG

## 2022-10-05 VITALS — DIASTOLIC BLOOD PRESSURE: 50 MMHG | SYSTOLIC BLOOD PRESSURE: 89 MMHG

## 2022-10-05 VITALS — SYSTOLIC BLOOD PRESSURE: 101 MMHG | DIASTOLIC BLOOD PRESSURE: 63 MMHG

## 2022-10-05 VITALS — SYSTOLIC BLOOD PRESSURE: 110 MMHG | DIASTOLIC BLOOD PRESSURE: 68 MMHG

## 2022-10-05 VITALS — SYSTOLIC BLOOD PRESSURE: 82 MMHG | DIASTOLIC BLOOD PRESSURE: 53 MMHG

## 2022-10-05 LAB
BASOPHILS # BLD AUTO: 0 K/UL (ref 0–0.2)
BASOPHILS NFR BLD AUTO: 0.2 % (ref 0–2)
BUN SERPL-MCNC: 21 MG/DL (ref 7–18)
CALCIUM SERPL-MCNC: 8.5 MG/DL (ref 8.5–10.1)
CHLORIDE SERPL-SCNC: 107 MMOL/L (ref 98–107)
CO2 SERPL-SCNC: 30 MMOL/L (ref 21–32)
CREAT SERPL-MCNC: 0.3 MG/DL (ref 0.6–1.3)
EOSINOPHIL NFR BLD AUTO: 4.4 % (ref 0–6)
GLUCOSE SERPL-MCNC: 141 MG/DL (ref 74–106)
HCT VFR BLD AUTO: 28 % (ref 33–45)
HGB BLD-MCNC: 9 G/DL (ref 11.5–14.8)
LYMPHOCYTES NFR BLD AUTO: 3.4 K/UL (ref 0.8–4.8)
LYMPHOCYTES NFR BLD AUTO: 42.4 % (ref 20–44)
MAGNESIUM SERPL-MCNC: 1.9 MG/DL (ref 1.8–2.4)
MCHC RBC AUTO-ENTMCNC: 32 G/DL (ref 31–36)
MCV RBC AUTO: 97 FL (ref 82–100)
MONOCYTES NFR BLD AUTO: 1 K/UL (ref 0.1–1.3)
MONOCYTES NFR BLD AUTO: 12.4 % (ref 2–12)
NEUTROPHILS # BLD AUTO: 3.2 K/UL (ref 1.8–8.9)
NEUTROPHILS NFR BLD AUTO: 40.6 % (ref 43–81)
PHOSPHATE SERPL-MCNC: 3.7 MG/DL (ref 2.5–4.9)
PLATELET # BLD AUTO: 378 K/UL (ref 150–450)
POTASSIUM SERPL-SCNC: 3.7 MMOL/L (ref 3.5–5.1)
RBC # BLD AUTO: 2.89 MIL/UL (ref 4–5.2)
SODIUM SERPL-SCNC: 141 MMOL/L (ref 136–145)
WBC NRBC COR # BLD AUTO: 8 K/UL (ref 4.3–11)

## 2022-10-05 RX ADMIN — Medication SCH EACH: at 17:01

## 2022-10-05 RX ADMIN — INSULIN HUMAN PRN UNITS: 100 INJECTION, SOLUTION PARENTERAL at 15:33

## 2022-10-05 RX ADMIN — HEPARIN SODIUM SCH UNITS: 5000 INJECTION INTRAVENOUS; SUBCUTANEOUS at 21:05

## 2022-10-05 RX ADMIN — Medication SCH EACH: at 08:28

## 2022-10-05 RX ADMIN — Medication SCH EACH: at 17:05

## 2022-10-05 RX ADMIN — THERA TABS SCH UDTAB: TAB at 08:25

## 2022-10-05 RX ADMIN — INSULIN HUMAN PRN UNITS: 100 INJECTION, SOLUTION PARENTERAL at 17:29

## 2022-10-05 RX ADMIN — ATENOLOL SCH MG: 50 TABLET ORAL at 08:27

## 2022-10-05 RX ADMIN — INSULIN HUMAN PRN UNITS: 100 INJECTION, SOLUTION PARENTERAL at 05:39

## 2022-10-05 RX ADMIN — MEROPENEM SCH MLS/HR: 1 INJECTION INTRAVENOUS at 17:05

## 2022-10-05 RX ADMIN — Medication SCH EACH: at 08:25

## 2022-10-05 RX ADMIN — Medication SCH EACH: at 12:19

## 2022-10-05 RX ADMIN — SODIUM CHLORIDE PRN MLS/HR: 4.5 INJECTION, SOLUTION INTRAVENOUS at 05:13

## 2022-10-05 RX ADMIN — VALPROIC ACID SCH MG: 250 SOLUTION ORAL at 17:01

## 2022-10-05 RX ADMIN — VITAMIN D, TAB 1000IU (100/BT) SCH UNIT: 25 TAB at 08:25

## 2022-10-05 RX ADMIN — ACETAMINOPHEN PRN MG: 160 SOLUTION ORAL at 02:50

## 2022-10-05 RX ADMIN — LISINOPRIL SCH MG: 5 TABLET ORAL at 08:26

## 2022-10-05 RX ADMIN — SODIUM CHLORIDE PRN MLS/HR: 4.5 INJECTION, SOLUTION INTRAVENOUS at 22:17

## 2022-10-05 RX ADMIN — Medication SCH EACH: at 05:25

## 2022-10-05 RX ADMIN — ACETAMINOPHEN PRN MG: 160 SOLUTION ORAL at 17:01

## 2022-10-05 RX ADMIN — HEPARIN SODIUM SCH UNITS: 5000 INJECTION INTRAVENOUS; SUBCUTANEOUS at 08:30

## 2022-10-05 RX ADMIN — PANTOPRAZOLE SODIUM SCH MG: 40 GRANULE, DELAYED RELEASE ORAL at 08:25

## 2022-10-05 RX ADMIN — MAGNESIUM OXIDE TAB 400 MG (241.3 MG ELEMENTAL MG) SCH MG: 400 (241.3 MG) TAB at 17:01

## 2022-10-05 RX ADMIN — INSULIN HUMAN PRN UNITS: 100 INJECTION, SOLUTION PARENTERAL at 00:19

## 2022-10-05 RX ADMIN — VALPROIC ACID SCH MG: 250 SOLUTION ORAL at 08:28

## 2022-10-05 RX ADMIN — Medication SCH EACH: at 00:12

## 2022-10-05 RX ADMIN — MAGNESIUM OXIDE TAB 400 MG (241.3 MG ELEMENTAL MG) SCH MG: 400 (241.3 MG) TAB at 08:25

## 2022-10-05 RX ADMIN — MEROPENEM SCH MLS/HR: 1 INJECTION INTRAVENOUS at 05:10

## 2022-10-05 NOTE — NUR
RN Closing Report. 

PT AOx4 able not able to express her own concerns, patient shows no signs of discomfort, 
using FLACC score pts pain is 0/10. Patient remained safe throughout shift. All safety 
precautions taken, call light and table within reach, bed at lowest position. IV fluids 
running as prescribed, feedings as ordered, provided NG Tube care as ordered, nephrostomy 
tube no signs dislodged. IV with no signs of infiltration, no pain at site reported.

## 2022-10-05 NOTE — NUR
RN OPENING NOTES





RECEIVED PT IN BED, EYES OPEN BUT NONVERBAL WITH SISTER AND CAREGIVER AT BEDSIDE. ON NC 2LPM 
AND TOLERATING WELL. NO SOB NOTED. NO S/SX OF RESPIRATORY DISTRESS NOTED. IV ACCESS IN RFA 
#20G 1/2 NS @ 75 ML/HR. SAFETY PRECAUTIONS IN PLACE: BED IN LOWEST, LOCKED POSITION, 
SIDERAILS UPx2, AND BRAKES ON. TABLE AND CALL LIGHT WITHIN REACH. ALL NEEDS MET AT THIS 
TIME.

## 2022-10-05 NOTE — NUR
RN NOTE

BLOOD SUGAR CHECKED - 175 MG/DL; 3U INSULIN GIVEN SQ PER SLIDING SCALE. WILL CONTINUE TO 
MONITOR.

## 2022-10-05 NOTE — NUR
TELE RN CLOSING NOTE

PATIENT IN BED; AWAKE, NON-VERBAL, OPENS EYES. WITH O2 INHALATION @ 2LPM VIA NASAL CANNULA; 
WELL TOLERATED. IN NO APPARENT DISTRESS. WITH IV ACCESS ON RIGHT FOREARM 20g; PATENT AND 
INTACT INFUSING WITH 1/2 NS 1L REGULATED @ 75 ML/HR; FLUSHING WELL. ON TELE MONITOR WITH 
CURRENT READING OF SR HR-86 BPM. WITH G-TUBE IN PLACE; PATENT AND INTACT INFUSING WITH 
GLUCERNA 1.2 REGULATED @ 39 ML/HR. WITH NEPHROSTOMY TUBE IN PLACE, DRAINING WELL. FALL AND 
SAFETY MEASURES MAINTAINED: HEAD OF BED ELEVATED,  CALL LIGHT AND TABLE WITHIN REACH, SIDE 
RAILS UP X 2, BED IN LOWEST LOCKED POSITION. ENDORSED TO SHANNAN FERNÁNDEZ FOR AVELINA.

## 2022-10-05 NOTE — NUR
RN NOTE

BLOOD SUGAR CHECKED - 145 MG/DL; 2U INSULIN GIVEN SQ PER SLIDING SCALE. WILL CONTINUE TO 
MONITOR.

## 2022-10-05 NOTE — NUR
RN Opening Note

Mot able to assess pts mental status, patient is non-verbal, opens eyes, seems to be aware 
of surroundings but no able to express concerns. Patient shows no signs of distress or 
discomfort able to cough, VSS on 2lt./min. IV running as ordered with no signs of 
infiltration. Will administer medications as prescribed, provide care as needed and monitor 
throughout shift. Personal nurse assistant at bedside providing .  All safety 
precautions taken, call light and table within reach, bed at lowest position.

## 2022-10-06 VITALS — SYSTOLIC BLOOD PRESSURE: 123 MMHG | DIASTOLIC BLOOD PRESSURE: 71 MMHG

## 2022-10-06 VITALS — SYSTOLIC BLOOD PRESSURE: 108 MMHG | DIASTOLIC BLOOD PRESSURE: 40 MMHG

## 2022-10-06 VITALS — SYSTOLIC BLOOD PRESSURE: 104 MMHG | DIASTOLIC BLOOD PRESSURE: 59 MMHG

## 2022-10-06 VITALS — SYSTOLIC BLOOD PRESSURE: 110 MMHG | DIASTOLIC BLOOD PRESSURE: 84 MMHG

## 2022-10-06 VITALS — SYSTOLIC BLOOD PRESSURE: 115 MMHG | DIASTOLIC BLOOD PRESSURE: 55 MMHG

## 2022-10-06 LAB
BASOPHILS # BLD AUTO: 0 K/UL (ref 0–0.2)
BASOPHILS NFR BLD AUTO: 0.5 % (ref 0–2)
BUN SERPL-MCNC: 19 MG/DL (ref 7–18)
CALCIUM SERPL-MCNC: 8.3 MG/DL (ref 8.5–10.1)
CHLORIDE SERPL-SCNC: 105 MMOL/L (ref 98–107)
CO2 SERPL-SCNC: 28 MMOL/L (ref 21–32)
CREAT SERPL-MCNC: 0.4 MG/DL (ref 0.6–1.3)
EOSINOPHIL NFR BLD AUTO: 4.8 % (ref 0–6)
EOSINOPHIL NFR BLD MANUAL: 4 % (ref 0–4)
GLUCOSE SERPL-MCNC: 152 MG/DL (ref 74–106)
HCT VFR BLD AUTO: 28 % (ref 33–45)
HGB BLD-MCNC: 8.9 G/DL (ref 11.5–14.8)
LYMPHOCYTES NFR BLD AUTO: 3.5 K/UL (ref 0.8–4.8)
LYMPHOCYTES NFR BLD AUTO: 39.1 % (ref 20–44)
LYMPHOCYTES NFR BLD MANUAL: 30 % (ref 16–48)
MAGNESIUM SERPL-MCNC: 1.7 MG/DL (ref 1.8–2.4)
MCHC RBC AUTO-ENTMCNC: 32 G/DL (ref 31–36)
MCV RBC AUTO: 97 FL (ref 82–100)
MONOCYTES NFR BLD AUTO: 1.4 K/UL (ref 0.1–1.3)
MONOCYTES NFR BLD AUTO: 16.1 % (ref 2–12)
MONOCYTES NFR BLD MANUAL: 20 % (ref 0–11)
MYELOCYTES NFR BLD MANUAL: 2 % (ref 0–0)
NEUTROPHILS # BLD AUTO: 3.5 K/UL (ref 1.8–8.9)
NEUTROPHILS NFR BLD AUTO: 39.5 % (ref 43–81)
NEUTS BAND NFR BLD MANUAL: 8 % (ref 0–5)
NEUTS SEG NFR BLD MANUAL: 36 % (ref 42–76)
PHOSPHATE SERPL-MCNC: 3.8 MG/DL (ref 2.5–4.9)
PLATELET # BLD AUTO: 417 K/UL (ref 150–450)
POTASSIUM SERPL-SCNC: 3.8 MMOL/L (ref 3.5–5.1)
RBC # BLD AUTO: 2.88 MIL/UL (ref 4–5.2)
SODIUM SERPL-SCNC: 139 MMOL/L (ref 136–145)
WBC NRBC COR # BLD AUTO: 8.9 K/UL (ref 4.3–11)

## 2022-10-06 RX ADMIN — Medication SCH EACH: at 17:07

## 2022-10-06 RX ADMIN — VALPROIC ACID SCH MG: 250 SOLUTION ORAL at 17:07

## 2022-10-06 RX ADMIN — Medication SCH EACH: at 17:08

## 2022-10-06 RX ADMIN — ACETAMINOPHEN PRN MG: 160 SOLUTION ORAL at 05:41

## 2022-10-06 RX ADMIN — HEPARIN SODIUM SCH UNITS: 5000 INJECTION INTRAVENOUS; SUBCUTANEOUS at 08:53

## 2022-10-06 RX ADMIN — INSULIN HUMAN PRN UNITS: 100 INJECTION, SOLUTION PARENTERAL at 18:26

## 2022-10-06 RX ADMIN — ACETAMINOPHEN PRN MG: 160 SOLUTION ORAL at 17:40

## 2022-10-06 RX ADMIN — MEROPENEM SCH MLS/HR: 1 INJECTION INTRAVENOUS at 05:20

## 2022-10-06 RX ADMIN — INSULIN HUMAN PRN UNITS: 100 INJECTION, SOLUTION PARENTERAL at 05:34

## 2022-10-06 RX ADMIN — MAGNESIUM SULFATE IN DEXTROSE SCH MLS/HR: 10 INJECTION, SOLUTION INTRAVENOUS at 13:16

## 2022-10-06 RX ADMIN — Medication SCH EACH: at 12:13

## 2022-10-06 RX ADMIN — MAGNESIUM SULFATE IN DEXTROSE SCH MLS/HR: 10 INJECTION, SOLUTION INTRAVENOUS at 12:13

## 2022-10-06 RX ADMIN — Medication SCH EACH: at 08:48

## 2022-10-06 RX ADMIN — VALPROIC ACID SCH MG: 250 SOLUTION ORAL at 08:52

## 2022-10-06 RX ADMIN — LISINOPRIL SCH MG: 5 TABLET ORAL at 08:51

## 2022-10-06 RX ADMIN — INSULIN HUMAN PRN UNITS: 100 INJECTION, SOLUTION PARENTERAL at 00:29

## 2022-10-06 RX ADMIN — VITAMIN D, TAB 1000IU (100/BT) SCH UNIT: 25 TAB at 08:49

## 2022-10-06 RX ADMIN — MEROPENEM SCH MLS/HR: 1 INJECTION INTRAVENOUS at 17:06

## 2022-10-06 RX ADMIN — ATENOLOL SCH MG: 50 TABLET ORAL at 08:50

## 2022-10-06 RX ADMIN — MAGNESIUM OXIDE TAB 400 MG (241.3 MG ELEMENTAL MG) SCH MG: 400 (241.3 MG) TAB at 17:07

## 2022-10-06 RX ADMIN — MAGNESIUM OXIDE TAB 400 MG (241.3 MG ELEMENTAL MG) SCH MG: 400 (241.3 MG) TAB at 08:49

## 2022-10-06 RX ADMIN — THERA TABS SCH UDTAB: TAB at 08:49

## 2022-10-06 RX ADMIN — Medication SCH EACH: at 05:33

## 2022-10-06 RX ADMIN — Medication SCH EACH: at 08:50

## 2022-10-06 RX ADMIN — Medication SCH EACH: at 00:25

## 2022-10-06 RX ADMIN — PANTOPRAZOLE SODIUM SCH MG: 40 GRANULE, DELAYED RELEASE ORAL at 08:48

## 2022-10-06 NOTE — NUR
RN CLOSING NOTES



PT IN BED, EYES OPEN BUT NONVERBAL WITH CAREGIVER AT BEDSIDE. ON NC 2LPM AND TOLERATING 
WELL. NO SOB NOTED. NO S/SX OF RESPIRATORY DISTRESS NOTED. IV ACCESS IN RFA #20G 1/2 NS @ 75 
ML/HR. ALL ORDERS CARRIED OUT. ALL NEEDS MET. PT KEPT CLEAN AND DRY. SAFETY PRECAUTIONS IN 
PLACE: BED IN LOWEST, LOCKED POSITION, SIDERAILS UPx2, AND BRAKES ON. TABLE AND CALL LIGHT 
WITHIN REACH. WILL ENDORSE TO ONCOMING SHIFT FOR AVELINA.

## 2022-10-06 NOTE — NUR
WOUND CARE CONSULT: PT SEEN FOR LEFT ANTERIOR SHOULDER EXCORIATIONS/LESIONS WHICH ARE PINK 
IN COLOR, NO DRAINAGE OR ERYTHEMA, UNKNOWN ETIOLOGY. DEFER TO PMD FOR LESIONS.

## 2022-10-06 NOTE — NUR
RN Closing Note

Not able to assess pts mental status. Sister at bedside stating pts face is a bit swollen, 
stating scratch on upper shoulder is better and eyes are better. I am not able to see any 
abnormalities on pts face, normal asymmetry noted. 

Called Pts board and care home [facility] [phone] and provided discharge report, per 
 IV antibiotics have been arranged with Home Health and facility has confirmed 
arrangement. Patient stable for discharge, per . no further evaluation needed, pt stable 
for discharge. 

Patient remained safe throughout shift, all safety precautions taken, call light and table 
within reach. Pt ready for discharge pending  from transportation crew. Sister signed 
discharge paperwork and agrees with discharge.

## 2022-10-06 NOTE — NUR
RN Receiving Report. 

Not able to evaluate pts mental status, pt is non-verbal. Using FLACC pts pain is 1/10. 
Patient shows no signs of distress or discomfort. Discussed plan of care and plan of care 
with caregiver at bedside. Feeding and fluids running as ordered. All safety precautions 
taken with patient, call light and table within reach, bed at lowest position. Will continue 
to monitor throughout shift and provide care as needed.

## 2022-10-06 NOTE — NUR
RN opening Note

 

pt awaiting  all safety precautions taken, call light and table within reach. Pt 
ready for discharge pending  from transportation crew. Sister signed discharge 
paperwork and agrees with discharge.

## 2022-11-05 ENCOUNTER — HOSPITAL ENCOUNTER (INPATIENT)
Dept: HOSPITAL 54 - ER | Age: 52
LOS: 5 days | Discharge: NURSING, REHAB OR LONG-TERM CARE | DRG: 720 | End: 2022-11-10
Attending: INTERNAL MEDICINE | Admitting: NURSE PRACTITIONER
Payer: MEDICAID

## 2022-11-05 VITALS — DIASTOLIC BLOOD PRESSURE: 40 MMHG | SYSTOLIC BLOOD PRESSURE: 70 MMHG

## 2022-11-05 VITALS — SYSTOLIC BLOOD PRESSURE: 115 MMHG | DIASTOLIC BLOOD PRESSURE: 73 MMHG

## 2022-11-05 VITALS — WEIGHT: 113 LBS | BODY MASS INDEX: 22.19 KG/M2 | HEIGHT: 60 IN

## 2022-11-05 VITALS — SYSTOLIC BLOOD PRESSURE: 116 MMHG | DIASTOLIC BLOOD PRESSURE: 70 MMHG

## 2022-11-05 VITALS — DIASTOLIC BLOOD PRESSURE: 67 MMHG | SYSTOLIC BLOOD PRESSURE: 110 MMHG

## 2022-11-05 VITALS — SYSTOLIC BLOOD PRESSURE: 111 MMHG | DIASTOLIC BLOOD PRESSURE: 69 MMHG

## 2022-11-05 VITALS — DIASTOLIC BLOOD PRESSURE: 54 MMHG | SYSTOLIC BLOOD PRESSURE: 102 MMHG

## 2022-11-05 VITALS — SYSTOLIC BLOOD PRESSURE: 74 MMHG | DIASTOLIC BLOOD PRESSURE: 40 MMHG

## 2022-11-05 VITALS — DIASTOLIC BLOOD PRESSURE: 59 MMHG | SYSTOLIC BLOOD PRESSURE: 100 MMHG

## 2022-11-05 VITALS — SYSTOLIC BLOOD PRESSURE: 90 MMHG | DIASTOLIC BLOOD PRESSURE: 57 MMHG

## 2022-11-05 VITALS — DIASTOLIC BLOOD PRESSURE: 59 MMHG | SYSTOLIC BLOOD PRESSURE: 96 MMHG

## 2022-11-05 VITALS — SYSTOLIC BLOOD PRESSURE: 106 MMHG | DIASTOLIC BLOOD PRESSURE: 62 MMHG

## 2022-11-05 VITALS — DIASTOLIC BLOOD PRESSURE: 76 MMHG | SYSTOLIC BLOOD PRESSURE: 108 MMHG

## 2022-11-05 VITALS — DIASTOLIC BLOOD PRESSURE: 55 MMHG | SYSTOLIC BLOOD PRESSURE: 99 MMHG

## 2022-11-05 VITALS — DIASTOLIC BLOOD PRESSURE: 75 MMHG | SYSTOLIC BLOOD PRESSURE: 118 MMHG

## 2022-11-05 VITALS — DIASTOLIC BLOOD PRESSURE: 64 MMHG | SYSTOLIC BLOOD PRESSURE: 104 MMHG

## 2022-11-05 VITALS — SYSTOLIC BLOOD PRESSURE: 86 MMHG | DIASTOLIC BLOOD PRESSURE: 39 MMHG

## 2022-11-05 VITALS — DIASTOLIC BLOOD PRESSURE: 53 MMHG | SYSTOLIC BLOOD PRESSURE: 91 MMHG

## 2022-11-05 DIAGNOSIS — Z96.652: ICD-10-CM

## 2022-11-05 DIAGNOSIS — F73: ICD-10-CM

## 2022-11-05 DIAGNOSIS — E87.1: ICD-10-CM

## 2022-11-05 DIAGNOSIS — S72.92XA: ICD-10-CM

## 2022-11-05 DIAGNOSIS — Z87.442: ICD-10-CM

## 2022-11-05 DIAGNOSIS — R65.21: ICD-10-CM

## 2022-11-05 DIAGNOSIS — J69.0: ICD-10-CM

## 2022-11-05 DIAGNOSIS — E87.0: ICD-10-CM

## 2022-11-05 DIAGNOSIS — E88.09: ICD-10-CM

## 2022-11-05 DIAGNOSIS — E87.6: ICD-10-CM

## 2022-11-05 DIAGNOSIS — R79.89: ICD-10-CM

## 2022-11-05 DIAGNOSIS — E78.5: ICD-10-CM

## 2022-11-05 DIAGNOSIS — E27.40: ICD-10-CM

## 2022-11-05 DIAGNOSIS — Z16.12: ICD-10-CM

## 2022-11-05 DIAGNOSIS — G40.909: ICD-10-CM

## 2022-11-05 DIAGNOSIS — N39.0: ICD-10-CM
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
## 2022-11-05 DIAGNOSIS — I10: ICD-10-CM

## 2022-11-05 DIAGNOSIS — E87.8: ICD-10-CM

## 2022-11-05 DIAGNOSIS — R00.1: ICD-10-CM

## 2022-11-05 DIAGNOSIS — Z79.4: ICD-10-CM

## 2022-11-05 DIAGNOSIS — G93.41: ICD-10-CM

## 2022-11-05 DIAGNOSIS — G80.9: ICD-10-CM

## 2022-11-05 DIAGNOSIS — Z20.822: ICD-10-CM

## 2022-11-05 DIAGNOSIS — J96.21: ICD-10-CM

## 2022-11-05 DIAGNOSIS — Z79.84: ICD-10-CM

## 2022-11-05 DIAGNOSIS — Z79.83: ICD-10-CM

## 2022-11-05 DIAGNOSIS — B96.1: ICD-10-CM
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
## 2022-11-05 DIAGNOSIS — Z93.1: ICD-10-CM

## 2022-11-05 DIAGNOSIS — A41.9: Primary | ICD-10-CM

## 2022-11-05 DIAGNOSIS — Z79.51: ICD-10-CM

## 2022-11-05 DIAGNOSIS — M81.0: ICD-10-CM

## 2022-11-05 DIAGNOSIS — E11.65: ICD-10-CM

## 2022-11-05 DIAGNOSIS — R13.10: ICD-10-CM

## 2022-11-05 DIAGNOSIS — J44.9: ICD-10-CM

## 2022-11-05 DIAGNOSIS — M41.9: ICD-10-CM

## 2022-11-05 DIAGNOSIS — B96.89: ICD-10-CM

## 2022-11-05 DIAGNOSIS — E86.0: ICD-10-CM

## 2022-11-05 LAB
ALBUMIN SERPL BCP-MCNC: 2.1 G/DL (ref 3.4–5)
ALP SERPL-CCNC: 125 U/L (ref 46–116)
ALT SERPL W P-5'-P-CCNC: 8 U/L (ref 12–78)
AST SERPL W P-5'-P-CCNC: 12 U/L (ref 15–37)
BASE EXCESS BLDA CALC-SCNC: 5.1 MMOL/L
BASOPHILS # BLD AUTO: 0.2 K/UL (ref 0–0.2)
BASOPHILS NFR BLD AUTO: 0.6 % (ref 0–2)
BILIRUB DIRECT SERPL-MCNC: 0.1 MG/DL (ref 0–0.2)
BILIRUB SERPL-MCNC: 0.3 MG/DL (ref 0.2–1)
BILIRUB UR QL STRIP: NEGATIVE
BLASTS NFR BLD MANUAL: 20 % (ref 0–0)
BUN SERPL-MCNC: 65 MG/DL (ref 7–18)
CALCIUM SERPL-MCNC: 10.7 MG/DL (ref 8.5–10.1)
CHLORIDE SERPL-SCNC: 108 MMOL/L (ref 98–107)
CO2 SERPL-SCNC: 33 MMOL/L (ref 21–32)
COLOR UR: YELLOW
CREAT SERPL-MCNC: 1 MG/DL (ref 0.6–1.3)
DEPRECATED SQUAMOUS URNS QL MICRO: (no result) /HPF
EOSINOPHIL NFR BLD AUTO: 0 % (ref 0–6)
GLUCOSE SERPL-MCNC: 364 MG/DL (ref 74–106)
GLUCOSE UR STRIP-MCNC: (no result) MG/DL
HCT VFR BLD AUTO: 37 % (ref 33–45)
HGB BLD-MCNC: 11.5 G/DL (ref 11.5–14.8)
INHALED O2 CONCENTRATION: 21 %
LEUKOCYTE ESTERASE UR QL STRIP: (no result)
LYMPHOCYTES NFR BLD AUTO: 16.3 % (ref 20–44)
LYMPHOCYTES NFR BLD AUTO: 5.2 K/UL (ref 0.8–4.8)
LYMPHOCYTES NFR BLD MANUAL: 12 % (ref 16–48)
MCHC RBC AUTO-ENTMCNC: 31 G/DL (ref 31–36)
MCV RBC AUTO: 97 FL (ref 82–100)
MONOCYTES NFR BLD AUTO: 2.3 K/UL (ref 0.1–1.3)
MONOCYTES NFR BLD AUTO: 7.4 % (ref 2–12)
MONOCYTES NFR BLD MANUAL: 6 % (ref 0–11)
NEUTROPHILS # BLD AUTO: 24 K/UL (ref 1.8–8.9)
NEUTROPHILS NFR BLD AUTO: 75.7 % (ref 43–81)
NEUTS SEG NFR BLD MANUAL: 62 % (ref 42–76)
NITRITE UR QL STRIP: NEGATIVE
PCO2 TEMP ADJ BLDA: 44.2 MMHG (ref 35–45)
PH TEMP ADJ BLDA: 7.45 [PH] (ref 7.35–7.45)
PH UR STRIP: 5.5 [PH] (ref 5–8)
PLATELET # BLD AUTO: 451 K/UL (ref 150–450)
PO2 TEMP ADJ BLDA: 335.3 MMHG (ref 75–100)
POTASSIUM SERPL-SCNC: 4 MMOL/L (ref 3.5–5.1)
PROT SERPL-MCNC: 8 G/DL (ref 6.4–8.2)
PROT UR QL STRIP: (no result) MG/DL
RBC # BLD AUTO: 3.78 MIL/UL (ref 4–5.2)
RBC #/AREA URNS HPF: (no result) /HPF (ref 0–2)
SODIUM SERPL-SCNC: 148 MMOL/L (ref 136–145)
UROBILINOGEN UR STRIP-MCNC: 0.2 EU/DL
WBC #/AREA URNS HPF: (no result) /HPF
WBC #/AREA URNS HPF: (no result) /HPF (ref 0–3)
WBC NRBC COR # BLD AUTO: 31.7 K/UL (ref 4.3–11)

## 2022-11-05 PROCEDURE — 02HV33Z INSERTION OF INFUSION DEVICE INTO SUPERIOR VENA CAVA, PERCUTANEOUS APPROACH: ICD-10-PCS | Performed by: NURSE PRACTITIONER

## 2022-11-05 PROCEDURE — C9113 INJ PANTOPRAZOLE SODIUM, VIA: HCPCS

## 2022-11-05 PROCEDURE — G0378 HOSPITAL OBSERVATION PER HR: HCPCS

## 2022-11-05 PROCEDURE — B548ZZA ULTRASONOGRAPHY OF SUPERIOR VENA CAVA, GUIDANCE: ICD-10-PCS | Performed by: NURSE PRACTITIONER

## 2022-11-05 RX ADMIN — ENOXAPARIN SODIUM SCH MG: 40 INJECTION SUBCUTANEOUS at 13:50

## 2022-11-05 RX ADMIN — MIDODRINE HYDROCHLORIDE SCH MG: 5 TABLET ORAL at 13:57

## 2022-11-05 RX ADMIN — Medication SCH EACH: at 19:03

## 2022-11-05 RX ADMIN — PIPERACILLIN SODIUM AND TAZOBACTAM SODIUM SCH MLS/HR: .375; 3 INJECTION, POWDER, LYOPHILIZED, FOR SOLUTION INTRAVENOUS at 20:33

## 2022-11-05 RX ADMIN — MAGNESIUM OXIDE TAB 400 MG (241.3 MG ELEMENTAL MG) SCH MG: 400 (241.3 MG) TAB at 17:00

## 2022-11-05 RX ADMIN — VALPROIC ACID SCH MG: 250 SOLUTION ORAL at 19:04

## 2022-11-05 RX ADMIN — CHLORHEXIDINE GLUCONATE 0.12% ORAL RINSE SCH ML: 1.2 LIQUID ORAL at 17:00

## 2022-11-05 RX ADMIN — DEXTROSE AND SODIUM CHLORIDE PRN MLS/HR: 5; 900 INJECTION, SOLUTION INTRAVENOUS at 18:41

## 2022-11-05 RX ADMIN — Medication SCH MG: at 11:30

## 2022-11-05 RX ADMIN — PIPERACILLIN SODIUM AND TAZOBACTAM SODIUM SCH MLS/HR: .375; 3 INJECTION, POWDER, LYOPHILIZED, FOR SOLUTION INTRAVENOUS at 12:30

## 2022-11-05 RX ADMIN — MIDODRINE HYDROCHLORIDE SCH MG: 5 TABLET ORAL at 17:00

## 2022-11-05 RX ADMIN — INSULIN HUMAN PRN UNITS: 100 INJECTION, SOLUTION PARENTERAL at 19:14

## 2022-11-05 RX ADMIN — Medication SCH MG: at 19:42

## 2022-11-05 RX ADMIN — Medication SCH MG: at 16:25

## 2022-11-05 RX ADMIN — Medication SCH MG: at 23:39

## 2022-11-05 NOTE — NUR
LINDA RA 8 FROM CARE FACILITY FOR LOW O2SAT. PT ARRIVED ON NONREBREATHER SATTING 
%. PT ATTACHED TO MONITOR, PT HYPOTENSIVE, MD AWARE. PT IS NONVERBAL. G 
TUBE AND QUEZADA CATHETER IN PLACE UPON ARRIVAL. DR HERNANDEZ AT BEDSIDE. AWAITING MD 
ORDERS.

## 2022-11-05 NOTE — NUR
ATTEMPTED TO CALL FAMILY TWICE FOR PICC LINE CONSENT, NO RESPONSE. DR HERNANDEZ 
SIGNED EMERGENCT CONSENT FORM. PICC LINE NURSE AT BEDSIDE.

## 2022-11-05 NOTE — NUR
PT TRANSFERRED  VIA [facility] ACLS PROTOCOL. RT AT BEDSIDE W/ PT. WARM 
HANDOFF GIVEN TO RN ASSIGNED.

## 2022-11-05 NOTE — NUR
RN NOTE



PT RECEIVED FROM ED. PT IS ON 6L O2 VIA NC SAT 98%. PT IS NONVERBAL A/OX1. PT HAS PEG AND 
CLAMPED AT THIS TIME. PIC OF [facility] TAKEN FOR REDNESS PIC PLACED IN CHART. IV ACCESS R UA PICC 
LINE INFUSING WITH D5 NS@ 100ML/HR. BED IS LOCKED IN LOWEST POSITION X2 BED RAILS UP AND ALL 
HOSPITAL SAFETY MEASURES ARE IN PLACE.

## 2022-11-05 NOTE — NUR
PER NURSE SUPV GIVE REPORT AND TRANSFER PATIENT AT 1630 BECAUSE NURSE IS AT [facility] 
ASSISTING FOR LUMBAR PUNCTURE

## 2022-11-05 NOTE — NUR
ICU/LVN

STARTED LEVO BY RN CHARGE NURSE FOR LOW BP, SEE IV SPREAD SHEET FOR TITRATION. WILL MONITOR 
THIS PT AND HER BLOOD PRESSURE.

## 2022-11-06 VITALS — DIASTOLIC BLOOD PRESSURE: 41 MMHG | SYSTOLIC BLOOD PRESSURE: 97 MMHG

## 2022-11-06 VITALS — SYSTOLIC BLOOD PRESSURE: 114 MMHG | DIASTOLIC BLOOD PRESSURE: 30 MMHG

## 2022-11-06 VITALS — DIASTOLIC BLOOD PRESSURE: 97 MMHG | SYSTOLIC BLOOD PRESSURE: 152 MMHG

## 2022-11-06 VITALS — SYSTOLIC BLOOD PRESSURE: 102 MMHG | DIASTOLIC BLOOD PRESSURE: 61 MMHG

## 2022-11-06 VITALS — DIASTOLIC BLOOD PRESSURE: 67 MMHG | SYSTOLIC BLOOD PRESSURE: 113 MMHG

## 2022-11-06 VITALS — DIASTOLIC BLOOD PRESSURE: 51 MMHG | SYSTOLIC BLOOD PRESSURE: 88 MMHG

## 2022-11-06 VITALS — DIASTOLIC BLOOD PRESSURE: 67 MMHG | SYSTOLIC BLOOD PRESSURE: 109 MMHG

## 2022-11-06 VITALS — DIASTOLIC BLOOD PRESSURE: 60 MMHG | SYSTOLIC BLOOD PRESSURE: 118 MMHG

## 2022-11-06 VITALS — SYSTOLIC BLOOD PRESSURE: 134 MMHG | DIASTOLIC BLOOD PRESSURE: 68 MMHG

## 2022-11-06 VITALS — DIASTOLIC BLOOD PRESSURE: 60 MMHG | SYSTOLIC BLOOD PRESSURE: 120 MMHG

## 2022-11-06 VITALS — SYSTOLIC BLOOD PRESSURE: 136 MMHG | DIASTOLIC BLOOD PRESSURE: 76 MMHG

## 2022-11-06 VITALS — SYSTOLIC BLOOD PRESSURE: 140 MMHG | DIASTOLIC BLOOD PRESSURE: 44 MMHG

## 2022-11-06 VITALS — SYSTOLIC BLOOD PRESSURE: 137 MMHG | DIASTOLIC BLOOD PRESSURE: 67 MMHG

## 2022-11-06 VITALS — SYSTOLIC BLOOD PRESSURE: 153 MMHG | DIASTOLIC BLOOD PRESSURE: 73 MMHG

## 2022-11-06 VITALS — SYSTOLIC BLOOD PRESSURE: 131 MMHG | DIASTOLIC BLOOD PRESSURE: 68 MMHG

## 2022-11-06 VITALS — SYSTOLIC BLOOD PRESSURE: 108 MMHG | DIASTOLIC BLOOD PRESSURE: 74 MMHG

## 2022-11-06 VITALS — SYSTOLIC BLOOD PRESSURE: 84 MMHG | DIASTOLIC BLOOD PRESSURE: 49 MMHG

## 2022-11-06 VITALS — DIASTOLIC BLOOD PRESSURE: 60 MMHG | SYSTOLIC BLOOD PRESSURE: 145 MMHG

## 2022-11-06 VITALS — DIASTOLIC BLOOD PRESSURE: 39 MMHG | SYSTOLIC BLOOD PRESSURE: 125 MMHG

## 2022-11-06 VITALS — DIASTOLIC BLOOD PRESSURE: 54 MMHG | SYSTOLIC BLOOD PRESSURE: 95 MMHG

## 2022-11-06 VITALS — DIASTOLIC BLOOD PRESSURE: 51 MMHG | SYSTOLIC BLOOD PRESSURE: 87 MMHG

## 2022-11-06 VITALS — SYSTOLIC BLOOD PRESSURE: 119 MMHG | DIASTOLIC BLOOD PRESSURE: 53 MMHG

## 2022-11-06 VITALS — SYSTOLIC BLOOD PRESSURE: 80 MMHG | DIASTOLIC BLOOD PRESSURE: 40 MMHG

## 2022-11-06 VITALS — SYSTOLIC BLOOD PRESSURE: 155 MMHG | DIASTOLIC BLOOD PRESSURE: 95 MMHG

## 2022-11-06 VITALS — SYSTOLIC BLOOD PRESSURE: 138 MMHG | DIASTOLIC BLOOD PRESSURE: 65 MMHG

## 2022-11-06 VITALS — SYSTOLIC BLOOD PRESSURE: 94 MMHG | DIASTOLIC BLOOD PRESSURE: 50 MMHG

## 2022-11-06 VITALS — DIASTOLIC BLOOD PRESSURE: 51 MMHG | SYSTOLIC BLOOD PRESSURE: 102 MMHG

## 2022-11-06 VITALS — SYSTOLIC BLOOD PRESSURE: 109 MMHG | DIASTOLIC BLOOD PRESSURE: 58 MMHG

## 2022-11-06 VITALS — SYSTOLIC BLOOD PRESSURE: 137 MMHG | DIASTOLIC BLOOD PRESSURE: 49 MMHG

## 2022-11-06 VITALS — SYSTOLIC BLOOD PRESSURE: 92 MMHG | DIASTOLIC BLOOD PRESSURE: 45 MMHG

## 2022-11-06 VITALS — DIASTOLIC BLOOD PRESSURE: 66 MMHG | SYSTOLIC BLOOD PRESSURE: 140 MMHG

## 2022-11-06 VITALS — SYSTOLIC BLOOD PRESSURE: 128 MMHG | DIASTOLIC BLOOD PRESSURE: 40 MMHG

## 2022-11-06 VITALS — DIASTOLIC BLOOD PRESSURE: 103 MMHG | SYSTOLIC BLOOD PRESSURE: 153 MMHG

## 2022-11-06 VITALS — SYSTOLIC BLOOD PRESSURE: 134 MMHG | DIASTOLIC BLOOD PRESSURE: 34 MMHG

## 2022-11-06 VITALS — SYSTOLIC BLOOD PRESSURE: 176 MMHG | DIASTOLIC BLOOD PRESSURE: 67 MMHG

## 2022-11-06 VITALS — DIASTOLIC BLOOD PRESSURE: 63 MMHG | SYSTOLIC BLOOD PRESSURE: 136 MMHG

## 2022-11-06 VITALS — DIASTOLIC BLOOD PRESSURE: 49 MMHG | SYSTOLIC BLOOD PRESSURE: 76 MMHG

## 2022-11-06 VITALS — SYSTOLIC BLOOD PRESSURE: 118 MMHG | DIASTOLIC BLOOD PRESSURE: 53 MMHG

## 2022-11-06 VITALS — SYSTOLIC BLOOD PRESSURE: 122 MMHG | DIASTOLIC BLOOD PRESSURE: 79 MMHG

## 2022-11-06 VITALS — SYSTOLIC BLOOD PRESSURE: 159 MMHG | DIASTOLIC BLOOD PRESSURE: 23 MMHG

## 2022-11-06 VITALS — DIASTOLIC BLOOD PRESSURE: 29 MMHG | SYSTOLIC BLOOD PRESSURE: 236 MMHG

## 2022-11-06 VITALS — SYSTOLIC BLOOD PRESSURE: 146 MMHG | DIASTOLIC BLOOD PRESSURE: 77 MMHG

## 2022-11-06 VITALS — SYSTOLIC BLOOD PRESSURE: 102 MMHG | DIASTOLIC BLOOD PRESSURE: 56 MMHG

## 2022-11-06 VITALS — DIASTOLIC BLOOD PRESSURE: 134 MMHG | SYSTOLIC BLOOD PRESSURE: 195 MMHG

## 2022-11-06 VITALS — SYSTOLIC BLOOD PRESSURE: 115 MMHG | DIASTOLIC BLOOD PRESSURE: 43 MMHG

## 2022-11-06 VITALS — SYSTOLIC BLOOD PRESSURE: 208 MMHG | DIASTOLIC BLOOD PRESSURE: 147 MMHG

## 2022-11-06 VITALS — SYSTOLIC BLOOD PRESSURE: 115 MMHG | DIASTOLIC BLOOD PRESSURE: 54 MMHG

## 2022-11-06 VITALS — SYSTOLIC BLOOD PRESSURE: 85 MMHG | DIASTOLIC BLOOD PRESSURE: 40 MMHG

## 2022-11-06 VITALS — SYSTOLIC BLOOD PRESSURE: 121 MMHG | DIASTOLIC BLOOD PRESSURE: 75 MMHG

## 2022-11-06 VITALS — SYSTOLIC BLOOD PRESSURE: 96 MMHG | DIASTOLIC BLOOD PRESSURE: 57 MMHG

## 2022-11-06 VITALS — DIASTOLIC BLOOD PRESSURE: 67 MMHG | SYSTOLIC BLOOD PRESSURE: 138 MMHG

## 2022-11-06 VITALS — SYSTOLIC BLOOD PRESSURE: 110 MMHG | DIASTOLIC BLOOD PRESSURE: 64 MMHG

## 2022-11-06 VITALS — DIASTOLIC BLOOD PRESSURE: 75 MMHG | SYSTOLIC BLOOD PRESSURE: 157 MMHG

## 2022-11-06 VITALS — SYSTOLIC BLOOD PRESSURE: 127 MMHG | DIASTOLIC BLOOD PRESSURE: 58 MMHG

## 2022-11-06 VITALS — SYSTOLIC BLOOD PRESSURE: 109 MMHG | DIASTOLIC BLOOD PRESSURE: 59 MMHG

## 2022-11-06 VITALS — SYSTOLIC BLOOD PRESSURE: 181 MMHG | DIASTOLIC BLOOD PRESSURE: 142 MMHG

## 2022-11-06 VITALS — DIASTOLIC BLOOD PRESSURE: 89 MMHG | SYSTOLIC BLOOD PRESSURE: 141 MMHG

## 2022-11-06 VITALS — SYSTOLIC BLOOD PRESSURE: 91 MMHG | DIASTOLIC BLOOD PRESSURE: 52 MMHG

## 2022-11-06 VITALS — SYSTOLIC BLOOD PRESSURE: 128 MMHG | DIASTOLIC BLOOD PRESSURE: 82 MMHG

## 2022-11-06 VITALS — DIASTOLIC BLOOD PRESSURE: 39 MMHG | SYSTOLIC BLOOD PRESSURE: 79 MMHG

## 2022-11-06 VITALS — SYSTOLIC BLOOD PRESSURE: 142 MMHG | DIASTOLIC BLOOD PRESSURE: 68 MMHG

## 2022-11-06 VITALS — DIASTOLIC BLOOD PRESSURE: 77 MMHG | SYSTOLIC BLOOD PRESSURE: 112 MMHG

## 2022-11-06 VITALS — SYSTOLIC BLOOD PRESSURE: 100 MMHG | DIASTOLIC BLOOD PRESSURE: 62 MMHG

## 2022-11-06 VITALS — DIASTOLIC BLOOD PRESSURE: 55 MMHG | SYSTOLIC BLOOD PRESSURE: 74 MMHG

## 2022-11-06 VITALS — DIASTOLIC BLOOD PRESSURE: 71 MMHG | SYSTOLIC BLOOD PRESSURE: 115 MMHG

## 2022-11-06 VITALS — DIASTOLIC BLOOD PRESSURE: 83 MMHG | SYSTOLIC BLOOD PRESSURE: 136 MMHG

## 2022-11-06 VITALS — SYSTOLIC BLOOD PRESSURE: 154 MMHG | DIASTOLIC BLOOD PRESSURE: 57 MMHG

## 2022-11-06 VITALS — SYSTOLIC BLOOD PRESSURE: 120 MMHG | DIASTOLIC BLOOD PRESSURE: 60 MMHG

## 2022-11-06 VITALS — SYSTOLIC BLOOD PRESSURE: 105 MMHG | DIASTOLIC BLOOD PRESSURE: 68 MMHG

## 2022-11-06 VITALS — SYSTOLIC BLOOD PRESSURE: 128 MMHG | DIASTOLIC BLOOD PRESSURE: 66 MMHG

## 2022-11-06 VITALS — DIASTOLIC BLOOD PRESSURE: 54 MMHG | SYSTOLIC BLOOD PRESSURE: 139 MMHG

## 2022-11-06 VITALS — DIASTOLIC BLOOD PRESSURE: 77 MMHG | SYSTOLIC BLOOD PRESSURE: 158 MMHG

## 2022-11-06 VITALS — DIASTOLIC BLOOD PRESSURE: 59 MMHG | SYSTOLIC BLOOD PRESSURE: 123 MMHG

## 2022-11-06 VITALS — SYSTOLIC BLOOD PRESSURE: 96 MMHG | DIASTOLIC BLOOD PRESSURE: 53 MMHG

## 2022-11-06 VITALS — DIASTOLIC BLOOD PRESSURE: 22 MMHG | SYSTOLIC BLOOD PRESSURE: 82 MMHG

## 2022-11-06 VITALS — DIASTOLIC BLOOD PRESSURE: 28 MMHG | SYSTOLIC BLOOD PRESSURE: 81 MMHG

## 2022-11-06 VITALS — SYSTOLIC BLOOD PRESSURE: 78 MMHG | DIASTOLIC BLOOD PRESSURE: 29 MMHG

## 2022-11-06 VITALS — DIASTOLIC BLOOD PRESSURE: 73 MMHG | SYSTOLIC BLOOD PRESSURE: 125 MMHG

## 2022-11-06 VITALS — DIASTOLIC BLOOD PRESSURE: 46 MMHG | SYSTOLIC BLOOD PRESSURE: 80 MMHG

## 2022-11-06 VITALS — SYSTOLIC BLOOD PRESSURE: 130 MMHG | DIASTOLIC BLOOD PRESSURE: 67 MMHG

## 2022-11-06 VITALS — SYSTOLIC BLOOD PRESSURE: 147 MMHG | DIASTOLIC BLOOD PRESSURE: 62 MMHG

## 2022-11-06 VITALS — DIASTOLIC BLOOD PRESSURE: 67 MMHG | SYSTOLIC BLOOD PRESSURE: 103 MMHG

## 2022-11-06 VITALS — SYSTOLIC BLOOD PRESSURE: 101 MMHG | DIASTOLIC BLOOD PRESSURE: 36 MMHG

## 2022-11-06 LAB
BASE EXCESS BLDA CALC-SCNC: 2.7 MMOL/L
BASOPHILS # BLD AUTO: 0 K/UL (ref 0–0.2)
BASOPHILS NFR BLD AUTO: 0.1 % (ref 0–2)
BUN SERPL-MCNC: 57 MG/DL (ref 7–18)
CALCIUM SERPL-MCNC: 9.4 MG/DL (ref 8.5–10.1)
CHLORIDE SERPL-SCNC: 116 MMOL/L (ref 98–107)
CHOLEST SERPL-MCNC: 101 MG/DL (ref ?–200)
CO2 SERPL-SCNC: 34 MMOL/L (ref 21–32)
CREAT SERPL-MCNC: 0.8 MG/DL (ref 0.6–1.3)
DO-HGB MFR BLDA: 21.2 MMHG
EOSINOPHIL NFR BLD AUTO: 0 % (ref 0–6)
GLUCOSE SERPL-MCNC: 323 MG/DL (ref 74–106)
HCT VFR BLD AUTO: 32 % (ref 33–45)
HDLC SERPL-MCNC: 14 MG/DL (ref 40–60)
HGB BLD-MCNC: 9.8 G/DL (ref 11.5–14.8)
INHALED O2 CONCENTRATION: 21 %
INHALED O2 FLOW RATE: 0 L/MIN (ref 0–30)
LDLC SERPL DIRECT ASSAY-MCNC: 32 MG/DL (ref 0–99)
LYMPHOCYTES NFR BLD AUTO: 12.6 % (ref 20–44)
LYMPHOCYTES NFR BLD AUTO: 2.5 K/UL (ref 0.8–4.8)
MAGNESIUM SERPL-MCNC: 2.6 MG/DL (ref 1.8–2.4)
MCHC RBC AUTO-ENTMCNC: 31 G/DL (ref 31–36)
MCV RBC AUTO: 98 FL (ref 82–100)
MONOCYTES NFR BLD AUTO: 0.3 K/UL (ref 0.1–1.3)
MONOCYTES NFR BLD AUTO: 1.7 % (ref 2–12)
NEUTROPHILS # BLD AUTO: 16.9 K/UL (ref 1.8–8.9)
NEUTROPHILS NFR BLD AUTO: 85.6 % (ref 43–81)
PCO2 TEMP ADJ BLDA: 49.8 MMHG (ref 35–45)
PH TEMP ADJ BLDA: 7.38 [PH] (ref 7.35–7.45)
PHOSPHATE SERPL-MCNC: 1.4 MG/DL (ref 2.5–4.9)
PLATELET # BLD AUTO: 428 K/UL (ref 150–450)
PO2 TEMP ADJ BLDA: 68.9 MMHG (ref 75–100)
POTASSIUM SERPL-SCNC: 3.2 MMOL/L (ref 3.5–5.1)
RBC # BLD AUTO: 3.24 MIL/UL (ref 4–5.2)
SAO2 % BLDA: 92.2 % (ref 92–98.5)
SODIUM SERPL-SCNC: 154 MMOL/L (ref 136–145)
TRIGL SERPL-MCNC: 439 MG/DL (ref 30–150)
VENTILATION MODE VENT: (no result)
WBC NRBC COR # BLD AUTO: 19.7 K/UL (ref 4.3–11)

## 2022-11-06 RX ADMIN — MIDODRINE HYDROCHLORIDE SCH MG: 5 TABLET ORAL at 12:18

## 2022-11-06 RX ADMIN — Medication SCH EACH: at 18:07

## 2022-11-06 RX ADMIN — FLUDROCORTISONE ACETATE SCH MG: 0.1 TABLET ORAL at 18:07

## 2022-11-06 RX ADMIN — Medication SCH EACH: at 12:05

## 2022-11-06 RX ADMIN — MIDODRINE HYDROCHLORIDE SCH MG: 5 TABLET ORAL at 09:02

## 2022-11-06 RX ADMIN — VALPROIC ACID SCH MG: 250 SOLUTION ORAL at 08:59

## 2022-11-06 RX ADMIN — PIPERACILLIN SODIUM AND TAZOBACTAM SODIUM SCH MLS/HR: .375; 3 INJECTION, POWDER, LYOPHILIZED, FOR SOLUTION INTRAVENOUS at 12:17

## 2022-11-06 RX ADMIN — DEXTROSE AND SODIUM CHLORIDE PRN MLS/HR: 5; 900 INJECTION, SOLUTION INTRAVENOUS at 17:00

## 2022-11-06 RX ADMIN — HYDROCORTISONE SODIUM SUCCINATE SCH MG: 100 INJECTION, POWDER, FOR SOLUTION INTRAMUSCULAR; INTRAVASCULAR at 12:18

## 2022-11-06 RX ADMIN — INSULIN HUMAN PRN UNITS: 100 INJECTION, SOLUTION PARENTERAL at 12:11

## 2022-11-06 RX ADMIN — HYDROCORTISONE SODIUM SUCCINATE SCH MG: 100 INJECTION, POWDER, FOR SOLUTION INTRAMUSCULAR; INTRAVASCULAR at 11:07

## 2022-11-06 RX ADMIN — Medication SCH MG: at 17:51

## 2022-11-06 RX ADMIN — Medication SCH MG: at 20:04

## 2022-11-06 RX ADMIN — Medication PRN TAB: at 18:35

## 2022-11-06 RX ADMIN — HYDROCORTISONE SODIUM SUCCINATE SCH MG: 100 INJECTION, POWDER, FOR SOLUTION INTRAMUSCULAR; INTRAVASCULAR at 21:40

## 2022-11-06 RX ADMIN — ENOXAPARIN SODIUM SCH MG: 40 INJECTION SUBCUTANEOUS at 09:04

## 2022-11-06 RX ADMIN — ALBUTEROL SULFATE PRN MG: 2.5 SOLUTION RESPIRATORY (INHALATION) at 14:28

## 2022-11-06 RX ADMIN — SODIUM CHLORIDE SCH MLS/HR: 9 INJECTION, SOLUTION INTRAVENOUS at 17:07

## 2022-11-06 RX ADMIN — FLUDROCORTISONE ACETATE SCH MG: 0.1 TABLET ORAL at 11:05

## 2022-11-06 RX ADMIN — CHLORHEXIDINE GLUCONATE 0.12% ORAL RINSE SCH ML: 1.2 LIQUID ORAL at 08:59

## 2022-11-06 RX ADMIN — CALCIUM SCH MG: 500 TABLET ORAL at 09:00

## 2022-11-06 RX ADMIN — Medication SCH MG: at 12:19

## 2022-11-06 RX ADMIN — PIPERACILLIN SODIUM AND TAZOBACTAM SODIUM SCH MLS/HR: .375; 3 INJECTION, POWDER, LYOPHILIZED, FOR SOLUTION INTRAVENOUS at 21:42

## 2022-11-06 RX ADMIN — THERA TABS SCH UDTAB: TAB at 09:00

## 2022-11-06 RX ADMIN — MIDODRINE HYDROCHLORIDE SCH MG: 5 TABLET ORAL at 18:07

## 2022-11-06 RX ADMIN — ALBUTEROL SULFATE PRN MG: 2.5 SOLUTION RESPIRATORY (INHALATION) at 12:04

## 2022-11-06 RX ADMIN — DEXTROSE AND SODIUM CHLORIDE PRN MLS/HR: 5; 900 INJECTION, SOLUTION INTRAVENOUS at 03:51

## 2022-11-06 RX ADMIN — Medication SCH EACH: at 06:43

## 2022-11-06 RX ADMIN — SODIUM CHLORIDE SCH MLS/HR: 9 INJECTION, SOLUTION INTRAVENOUS at 15:26

## 2022-11-06 RX ADMIN — MAGNESIUM OXIDE TAB 400 MG (241.3 MG ELEMENTAL MG) SCH MG: 400 (241.3 MG) TAB at 09:00

## 2022-11-06 RX ADMIN — INSULIN HUMAN PRN UNITS: 100 INJECTION, SOLUTION PARENTERAL at 00:52

## 2022-11-06 RX ADMIN — INSULIN HUMAN PRN UNITS: 100 INJECTION, SOLUTION PARENTERAL at 06:44

## 2022-11-06 RX ADMIN — PIPERACILLIN SODIUM AND TAZOBACTAM SODIUM SCH MLS/HR: .375; 3 INJECTION, POWDER, LYOPHILIZED, FOR SOLUTION INTRAVENOUS at 04:53

## 2022-11-06 RX ADMIN — MAGNESIUM OXIDE TAB 400 MG (241.3 MG ELEMENTAL MG) SCH MG: 400 (241.3 MG) TAB at 17:51

## 2022-11-06 RX ADMIN — Medication SCH MG: at 14:28

## 2022-11-06 RX ADMIN — Medication SCH EACH: at 00:50

## 2022-11-06 RX ADMIN — CHLORHEXIDINE GLUCONATE 0.12% ORAL RINSE SCH ML: 1.2 LIQUID ORAL at 12:20

## 2022-11-06 RX ADMIN — VALPROIC ACID SCH MG: 250 SOLUTION ORAL at 17:51

## 2022-11-06 RX ADMIN — CHLORHEXIDINE GLUCONATE 0.12% ORAL RINSE SCH ML: 1.2 LIQUID ORAL at 18:07

## 2022-11-06 RX ADMIN — Medication SCH MG: at 11:30

## 2022-11-06 RX ADMIN — Medication SCH MG: at 07:35

## 2022-11-06 RX ADMIN — Medication SCH MG: at 04:25

## 2022-11-06 RX ADMIN — Medication SCH MG: at 12:17

## 2022-11-06 NOTE — NUR
RN NOTES

 ADMINISTERED NARCO 5/ 325 MG VIA GT FOR GENERALIZED PAIN. /40, P-45, R-40, PM CARE 
DONE, ASSIST TURN AND REPOSTION Q 2 HR, DUE MEDICATION ADMINISTERED. PRIVET CARE GIVER NEXT 
TO THE BED, INFUSING D5NS@100ML/HR, AND POTASSIUM PHOSPHATE @34.16 MG/HR ON KALLI PICC LINE 
INTACT. NEPHROSTOMY DRAINING 225 ML/HR. PATIENT ROOM AIR, NO ACUTE RESPIRATORY DISTRESS. 
ENDORSED ONCOMING NURSE FOLLOW PLAN OF CARE.

## 2022-11-06 NOTE — NUR
ICU/LVN

ON CALL SOHAIL CALLED FOR LOW HEART RATE 30'S AND LOW BP. SAID TO CHANGE OVER TO SIDNEY DUE TO 
LEVO SOMETIMES MAKE HEART RATE LOW. ALSO EKG DONE AND PLACED IN CHART.  ORDERS RECIEVED AND 
CARRIED OUT. WILL MONITOR THIS PT AND HER BP

## 2022-11-06 NOTE — NUR
RN NOTES

RECEIVED PATIENT ON O2-2LNC GETTING BREATHING TREATMENT AT THIS TIME VIA RT, BEDSIDE MONITOR 
SHOWS 97%. NO ACUTE RESPIRATORY DISTRESS, PATIENT HR 39 TO 40. INFUSING NEOSYNEPHRINE 
0.7MCG/KG/MIN TITRATED PER PROTOCOL BP- 125/39 . PATIENT HAS NEPHROSTOMY TUBE, AND DRAINING 
LIGHT YELLOW OUTPUT, GT INTACT.  SKIN INTACT, PRIVET CARE GIVER NEXT TO THE BED. PATIENT 
TOTAL CARE. DUE MEDICATION ADMINISTERED VIA GT.  ASSIST TURN AND REPOSTION Q 2 HR.

## 2022-11-06 NOTE — NUR
ICU/LVN

SIDNEY WAS STATED AND TITRATED UP FOR LOW BP, ALSO ON CALL SOHAIL SAID TO KEEP SBP GREATER 
THAN 100. SEE IV SPREAD SHEET FOR TITRATION

## 2022-11-06 NOTE — NUR
RN NOTES

 GET ORDER VIA Dr BELCHER TO ADMINISTER NS-500 ML BOLUSIV FLUIDS, AND TITRATED NEOSYNEPHRINE.  
ALSO CARDIOLOGIST CONSULTATION  Dr BARRIOS. ORDER TAKEN AND CARRIED OUT.

## 2022-11-07 VITALS — DIASTOLIC BLOOD PRESSURE: 59 MMHG | SYSTOLIC BLOOD PRESSURE: 96 MMHG

## 2022-11-07 VITALS — DIASTOLIC BLOOD PRESSURE: 64 MMHG | SYSTOLIC BLOOD PRESSURE: 127 MMHG

## 2022-11-07 VITALS — DIASTOLIC BLOOD PRESSURE: 30 MMHG | SYSTOLIC BLOOD PRESSURE: 55 MMHG

## 2022-11-07 VITALS — DIASTOLIC BLOOD PRESSURE: 50 MMHG | SYSTOLIC BLOOD PRESSURE: 64 MMHG

## 2022-11-07 VITALS — DIASTOLIC BLOOD PRESSURE: 59 MMHG | SYSTOLIC BLOOD PRESSURE: 104 MMHG

## 2022-11-07 VITALS — SYSTOLIC BLOOD PRESSURE: 109 MMHG | DIASTOLIC BLOOD PRESSURE: 62 MMHG

## 2022-11-07 VITALS — DIASTOLIC BLOOD PRESSURE: 67 MMHG | SYSTOLIC BLOOD PRESSURE: 130 MMHG

## 2022-11-07 VITALS — SYSTOLIC BLOOD PRESSURE: 129 MMHG | DIASTOLIC BLOOD PRESSURE: 22 MMHG

## 2022-11-07 VITALS — DIASTOLIC BLOOD PRESSURE: 30 MMHG | SYSTOLIC BLOOD PRESSURE: 93 MMHG

## 2022-11-07 VITALS — SYSTOLIC BLOOD PRESSURE: 136 MMHG | DIASTOLIC BLOOD PRESSURE: 66 MMHG

## 2022-11-07 VITALS — DIASTOLIC BLOOD PRESSURE: 54 MMHG | SYSTOLIC BLOOD PRESSURE: 104 MMHG

## 2022-11-07 VITALS — DIASTOLIC BLOOD PRESSURE: 26 MMHG | SYSTOLIC BLOOD PRESSURE: 115 MMHG

## 2022-11-07 VITALS — DIASTOLIC BLOOD PRESSURE: 64 MMHG | SYSTOLIC BLOOD PRESSURE: 97 MMHG

## 2022-11-07 VITALS — DIASTOLIC BLOOD PRESSURE: 72 MMHG | SYSTOLIC BLOOD PRESSURE: 119 MMHG

## 2022-11-07 VITALS — DIASTOLIC BLOOD PRESSURE: 77 MMHG | SYSTOLIC BLOOD PRESSURE: 124 MMHG

## 2022-11-07 VITALS — SYSTOLIC BLOOD PRESSURE: 112 MMHG | DIASTOLIC BLOOD PRESSURE: 69 MMHG

## 2022-11-07 VITALS — SYSTOLIC BLOOD PRESSURE: 95 MMHG | DIASTOLIC BLOOD PRESSURE: 61 MMHG

## 2022-11-07 VITALS — DIASTOLIC BLOOD PRESSURE: 42 MMHG | SYSTOLIC BLOOD PRESSURE: 80 MMHG

## 2022-11-07 VITALS — SYSTOLIC BLOOD PRESSURE: 115 MMHG | DIASTOLIC BLOOD PRESSURE: 52 MMHG

## 2022-11-07 VITALS — SYSTOLIC BLOOD PRESSURE: 113 MMHG | DIASTOLIC BLOOD PRESSURE: 72 MMHG

## 2022-11-07 VITALS — DIASTOLIC BLOOD PRESSURE: 57 MMHG | SYSTOLIC BLOOD PRESSURE: 121 MMHG

## 2022-11-07 VITALS — DIASTOLIC BLOOD PRESSURE: 48 MMHG | SYSTOLIC BLOOD PRESSURE: 92 MMHG

## 2022-11-07 VITALS — SYSTOLIC BLOOD PRESSURE: 49 MMHG | DIASTOLIC BLOOD PRESSURE: 29 MMHG

## 2022-11-07 VITALS — SYSTOLIC BLOOD PRESSURE: 122 MMHG | DIASTOLIC BLOOD PRESSURE: 51 MMHG

## 2022-11-07 VITALS — SYSTOLIC BLOOD PRESSURE: 140 MMHG | DIASTOLIC BLOOD PRESSURE: 71 MMHG

## 2022-11-07 VITALS — DIASTOLIC BLOOD PRESSURE: 38 MMHG | SYSTOLIC BLOOD PRESSURE: 162 MMHG

## 2022-11-07 VITALS — SYSTOLIC BLOOD PRESSURE: 113 MMHG | DIASTOLIC BLOOD PRESSURE: 59 MMHG

## 2022-11-07 VITALS — SYSTOLIC BLOOD PRESSURE: 114 MMHG | DIASTOLIC BLOOD PRESSURE: 58 MMHG

## 2022-11-07 VITALS — DIASTOLIC BLOOD PRESSURE: 109 MMHG | SYSTOLIC BLOOD PRESSURE: 165 MMHG

## 2022-11-07 VITALS — DIASTOLIC BLOOD PRESSURE: 80 MMHG | SYSTOLIC BLOOD PRESSURE: 109 MMHG

## 2022-11-07 VITALS — SYSTOLIC BLOOD PRESSURE: 138 MMHG | DIASTOLIC BLOOD PRESSURE: 68 MMHG

## 2022-11-07 VITALS — SYSTOLIC BLOOD PRESSURE: 121 MMHG | DIASTOLIC BLOOD PRESSURE: 57 MMHG

## 2022-11-07 VITALS — SYSTOLIC BLOOD PRESSURE: 56 MMHG | DIASTOLIC BLOOD PRESSURE: 39 MMHG

## 2022-11-07 VITALS — DIASTOLIC BLOOD PRESSURE: 21 MMHG | SYSTOLIC BLOOD PRESSURE: 104 MMHG

## 2022-11-07 VITALS — SYSTOLIC BLOOD PRESSURE: 62 MMHG | DIASTOLIC BLOOD PRESSURE: 30 MMHG

## 2022-11-07 VITALS — SYSTOLIC BLOOD PRESSURE: 77 MMHG | DIASTOLIC BLOOD PRESSURE: 35 MMHG

## 2022-11-07 VITALS — SYSTOLIC BLOOD PRESSURE: 118 MMHG | DIASTOLIC BLOOD PRESSURE: 54 MMHG

## 2022-11-07 VITALS — DIASTOLIC BLOOD PRESSURE: 65 MMHG | SYSTOLIC BLOOD PRESSURE: 117 MMHG

## 2022-11-07 VITALS — SYSTOLIC BLOOD PRESSURE: 131 MMHG | DIASTOLIC BLOOD PRESSURE: 55 MMHG

## 2022-11-07 VITALS — SYSTOLIC BLOOD PRESSURE: 91 MMHG | DIASTOLIC BLOOD PRESSURE: 63 MMHG

## 2022-11-07 VITALS — SYSTOLIC BLOOD PRESSURE: 99 MMHG | DIASTOLIC BLOOD PRESSURE: 54 MMHG

## 2022-11-07 VITALS — DIASTOLIC BLOOD PRESSURE: 65 MMHG | SYSTOLIC BLOOD PRESSURE: 137 MMHG

## 2022-11-07 VITALS — DIASTOLIC BLOOD PRESSURE: 141 MMHG | SYSTOLIC BLOOD PRESSURE: 159 MMHG

## 2022-11-07 VITALS — SYSTOLIC BLOOD PRESSURE: 84 MMHG | DIASTOLIC BLOOD PRESSURE: 50 MMHG

## 2022-11-07 VITALS — DIASTOLIC BLOOD PRESSURE: 51 MMHG | SYSTOLIC BLOOD PRESSURE: 89 MMHG

## 2022-11-07 VITALS — DIASTOLIC BLOOD PRESSURE: 51 MMHG | SYSTOLIC BLOOD PRESSURE: 90 MMHG

## 2022-11-07 VITALS — DIASTOLIC BLOOD PRESSURE: 40 MMHG | SYSTOLIC BLOOD PRESSURE: 70 MMHG

## 2022-11-07 VITALS — SYSTOLIC BLOOD PRESSURE: 136 MMHG | DIASTOLIC BLOOD PRESSURE: 71 MMHG

## 2022-11-07 VITALS — SYSTOLIC BLOOD PRESSURE: 84 MMHG | DIASTOLIC BLOOD PRESSURE: 36 MMHG

## 2022-11-07 VITALS — DIASTOLIC BLOOD PRESSURE: 47 MMHG | SYSTOLIC BLOOD PRESSURE: 84 MMHG

## 2022-11-07 VITALS — DIASTOLIC BLOOD PRESSURE: 68 MMHG | SYSTOLIC BLOOD PRESSURE: 119 MMHG

## 2022-11-07 VITALS — DIASTOLIC BLOOD PRESSURE: 76 MMHG | SYSTOLIC BLOOD PRESSURE: 139 MMHG

## 2022-11-07 VITALS — DIASTOLIC BLOOD PRESSURE: 66 MMHG | SYSTOLIC BLOOD PRESSURE: 87 MMHG

## 2022-11-07 VITALS — DIASTOLIC BLOOD PRESSURE: 45 MMHG | SYSTOLIC BLOOD PRESSURE: 81 MMHG

## 2022-11-07 VITALS — SYSTOLIC BLOOD PRESSURE: 95 MMHG | DIASTOLIC BLOOD PRESSURE: 40 MMHG

## 2022-11-07 VITALS — DIASTOLIC BLOOD PRESSURE: 36 MMHG | SYSTOLIC BLOOD PRESSURE: 104 MMHG

## 2022-11-07 VITALS — DIASTOLIC BLOOD PRESSURE: 64 MMHG | SYSTOLIC BLOOD PRESSURE: 125 MMHG

## 2022-11-07 VITALS — SYSTOLIC BLOOD PRESSURE: 134 MMHG | DIASTOLIC BLOOD PRESSURE: 69 MMHG

## 2022-11-07 VITALS — SYSTOLIC BLOOD PRESSURE: 80 MMHG | DIASTOLIC BLOOD PRESSURE: 25 MMHG

## 2022-11-07 VITALS — DIASTOLIC BLOOD PRESSURE: 73 MMHG | SYSTOLIC BLOOD PRESSURE: 115 MMHG

## 2022-11-07 VITALS — DIASTOLIC BLOOD PRESSURE: 46 MMHG | SYSTOLIC BLOOD PRESSURE: 111 MMHG

## 2022-11-07 VITALS — SYSTOLIC BLOOD PRESSURE: 122 MMHG | DIASTOLIC BLOOD PRESSURE: 27 MMHG

## 2022-11-07 VITALS — DIASTOLIC BLOOD PRESSURE: 65 MMHG | SYSTOLIC BLOOD PRESSURE: 123 MMHG

## 2022-11-07 VITALS — DIASTOLIC BLOOD PRESSURE: 50 MMHG | SYSTOLIC BLOOD PRESSURE: 88 MMHG

## 2022-11-07 VITALS — SYSTOLIC BLOOD PRESSURE: 172 MMHG | DIASTOLIC BLOOD PRESSURE: 35 MMHG

## 2022-11-07 VITALS — SYSTOLIC BLOOD PRESSURE: 134 MMHG | DIASTOLIC BLOOD PRESSURE: 47 MMHG

## 2022-11-07 VITALS — DIASTOLIC BLOOD PRESSURE: 22 MMHG | SYSTOLIC BLOOD PRESSURE: 71 MMHG

## 2022-11-07 VITALS — SYSTOLIC BLOOD PRESSURE: 84 MMHG | DIASTOLIC BLOOD PRESSURE: 46 MMHG

## 2022-11-07 VITALS — DIASTOLIC BLOOD PRESSURE: 39 MMHG | SYSTOLIC BLOOD PRESSURE: 82 MMHG

## 2022-11-07 VITALS — SYSTOLIC BLOOD PRESSURE: 146 MMHG | DIASTOLIC BLOOD PRESSURE: 38 MMHG

## 2022-11-07 VITALS — DIASTOLIC BLOOD PRESSURE: 58 MMHG | SYSTOLIC BLOOD PRESSURE: 106 MMHG

## 2022-11-07 VITALS — SYSTOLIC BLOOD PRESSURE: 104 MMHG | DIASTOLIC BLOOD PRESSURE: 68 MMHG

## 2022-11-07 LAB
BASOPHILS # BLD AUTO: 0 K/UL (ref 0–0.2)
BASOPHILS NFR BLD AUTO: 0 % (ref 0–2)
BUN SERPL-MCNC: 38 MG/DL (ref 7–18)
CALCIUM SERPL-MCNC: 8.4 MG/DL (ref 8.5–10.1)
CHLORIDE SERPL-SCNC: 120 MMOL/L (ref 98–107)
CO2 SERPL-SCNC: 25 MMOL/L (ref 21–32)
CREAT SERPL-MCNC: 0.6 MG/DL (ref 0.6–1.3)
EOSINOPHIL NFR BLD AUTO: 0 % (ref 0–6)
GLUCOSE SERPL-MCNC: 362 MG/DL (ref 74–106)
HCT VFR BLD AUTO: 32 % (ref 33–45)
HGB BLD-MCNC: 9.8 G/DL (ref 11.5–14.8)
LYMPHOCYTES NFR BLD AUTO: 1.4 K/UL (ref 0.8–4.8)
LYMPHOCYTES NFR BLD AUTO: 11.6 % (ref 20–44)
MAGNESIUM SERPL-MCNC: 2.6 MG/DL (ref 1.8–2.4)
MCHC RBC AUTO-ENTMCNC: 30 G/DL (ref 31–36)
MCV RBC AUTO: 101 FL (ref 82–100)
MONOCYTES NFR BLD AUTO: 0.2 K/UL (ref 0.1–1.3)
MONOCYTES NFR BLD AUTO: 1.5 % (ref 2–12)
NEUTROPHILS # BLD AUTO: 10.6 K/UL (ref 1.8–8.9)
NEUTROPHILS NFR BLD AUTO: 86.9 % (ref 43–81)
PHOSPHATE SERPL-MCNC: 4 MG/DL (ref 2.5–4.9)
PLATELET # BLD AUTO: 320 K/UL (ref 150–450)
POTASSIUM SERPL-SCNC: 4 MMOL/L (ref 3.5–5.1)
RBC # BLD AUTO: 3.2 MIL/UL (ref 4–5.2)
SODIUM SERPL-SCNC: 155 MMOL/L (ref 136–145)
WBC NRBC COR # BLD AUTO: 12.2 K/UL (ref 4.3–11)

## 2022-11-07 RX ADMIN — Medication SCH MG: at 20:30

## 2022-11-07 RX ADMIN — INSULIN HUMAN PRN UNITS: 100 INJECTION, SOLUTION PARENTERAL at 06:15

## 2022-11-07 RX ADMIN — Medication SCH MG: at 15:19

## 2022-11-07 RX ADMIN — MAGNESIUM OXIDE TAB 400 MG (241.3 MG ELEMENTAL MG) SCH MG: 400 (241.3 MG) TAB at 09:23

## 2022-11-07 RX ADMIN — CALCIUM SCH MG: 500 TABLET ORAL at 09:24

## 2022-11-07 RX ADMIN — Medication SCH EACH: at 12:06

## 2022-11-07 RX ADMIN — FLUDROCORTISONE ACETATE SCH MG: 0.1 TABLET ORAL at 12:16

## 2022-11-07 RX ADMIN — Medication SCH EACH: at 17:18

## 2022-11-07 RX ADMIN — Medication SCH EACH: at 06:14

## 2022-11-07 RX ADMIN — INSULIN HUMAN PRN UNITS: 100 INJECTION, SOLUTION PARENTERAL at 17:39

## 2022-11-07 RX ADMIN — Medication SCH MG: at 09:01

## 2022-11-07 RX ADMIN — Medication SCH MG: at 12:16

## 2022-11-07 RX ADMIN — Medication SCH MG: at 00:07

## 2022-11-07 RX ADMIN — PIPERACILLIN SODIUM AND TAZOBACTAM SODIUM SCH MLS/HR: .375; 3 INJECTION, POWDER, LYOPHILIZED, FOR SOLUTION INTRAVENOUS at 12:17

## 2022-11-07 RX ADMIN — DEXTROSE AND SODIUM CHLORIDE PRN MLS/HR: 5; 900 INJECTION, SOLUTION INTRAVENOUS at 03:47

## 2022-11-07 RX ADMIN — HYDROCORTISONE SODIUM SUCCINATE SCH MG: 100 INJECTION, POWDER, FOR SOLUTION INTRAMUSCULAR; INTRAVASCULAR at 12:16

## 2022-11-07 RX ADMIN — FLUDROCORTISONE ACETATE SCH MG: 0.1 TABLET ORAL at 05:18

## 2022-11-07 RX ADMIN — CHLORHEXIDINE GLUCONATE 0.12% ORAL RINSE SCH ML: 1.2 LIQUID ORAL at 17:40

## 2022-11-07 RX ADMIN — CHLORHEXIDINE GLUCONATE 0.12% ORAL RINSE SCH ML: 1.2 LIQUID ORAL at 09:23

## 2022-11-07 RX ADMIN — MEROPENEM SCH MLS/HR: 500 INJECTION INTRAVENOUS at 21:28

## 2022-11-07 RX ADMIN — INSULIN HUMAN PRN UNITS: 100 INJECTION, SOLUTION PARENTERAL at 03:07

## 2022-11-07 RX ADMIN — MIDODRINE HYDROCHLORIDE SCH MG: 5 TABLET ORAL at 09:24

## 2022-11-07 RX ADMIN — MIDODRINE HYDROCHLORIDE SCH MG: 5 TABLET ORAL at 12:16

## 2022-11-07 RX ADMIN — Medication PRN TAB: at 12:44

## 2022-11-07 RX ADMIN — MAGNESIUM HYDROXIDE SCH ML: 400 SUSPENSION ORAL at 17:27

## 2022-11-07 RX ADMIN — HYDROCORTISONE SODIUM SUCCINATE SCH MG: 100 INJECTION, POWDER, FOR SOLUTION INTRAMUSCULAR; INTRAVASCULAR at 20:38

## 2022-11-07 RX ADMIN — VALPROIC ACID SCH MG: 250 SOLUTION ORAL at 17:27

## 2022-11-07 RX ADMIN — Medication SCH MG: at 12:30

## 2022-11-07 RX ADMIN — Medication SCH MG: at 17:27

## 2022-11-07 RX ADMIN — FLUDROCORTISONE ACETATE SCH MG: 0.1 TABLET ORAL at 17:29

## 2022-11-07 RX ADMIN — THERA TABS SCH UDTAB: TAB at 09:23

## 2022-11-07 RX ADMIN — HYDROCORTISONE SODIUM SUCCINATE SCH MG: 100 INJECTION, POWDER, FOR SOLUTION INTRAMUSCULAR; INTRAVASCULAR at 04:51

## 2022-11-07 RX ADMIN — Medication SCH MG: at 23:26

## 2022-11-07 RX ADMIN — Medication SCH MG: at 09:23

## 2022-11-07 RX ADMIN — DEXTROSE AND SODIUM CHLORIDE PRN MLS/HR: 5; 900 INJECTION, SOLUTION INTRAVENOUS at 14:10

## 2022-11-07 RX ADMIN — MIDODRINE HYDROCHLORIDE SCH MG: 5 TABLET ORAL at 17:28

## 2022-11-07 RX ADMIN — ENOXAPARIN SODIUM SCH MG: 40 INJECTION SUBCUTANEOUS at 09:27

## 2022-11-07 RX ADMIN — PIPERACILLIN SODIUM AND TAZOBACTAM SODIUM SCH MLS/HR: .375; 3 INJECTION, POWDER, LYOPHILIZED, FOR SOLUTION INTRAVENOUS at 04:51

## 2022-11-07 RX ADMIN — VALPROIC ACID SCH MG: 250 SOLUTION ORAL at 09:23

## 2022-11-07 RX ADMIN — INSULIN HUMAN PRN UNITS: 100 INJECTION, SOLUTION PARENTERAL at 12:08

## 2022-11-07 RX ADMIN — Medication SCH MG: at 03:55

## 2022-11-07 RX ADMIN — FLUDROCORTISONE ACETATE SCH MG: 0.1 TABLET ORAL at 00:53

## 2022-11-07 RX ADMIN — Medication PRN TAB: at 05:19

## 2022-11-07 RX ADMIN — CHLORHEXIDINE GLUCONATE 0.12% ORAL RINSE SCH ML: 1.2 LIQUID ORAL at 12:17

## 2022-11-07 RX ADMIN — PANTOPRAZOLE SODIUM SCH MG: 40 GRANULE, DELAYED RELEASE ORAL at 09:23

## 2022-11-07 RX ADMIN — Medication SCH EACH: at 00:51

## 2022-11-07 RX ADMIN — MAGNESIUM OXIDE TAB 400 MG (241.3 MG ELEMENTAL MG) SCH MG: 400 (241.3 MG) TAB at 17:41

## 2022-11-07 NOTE — NUR
RN NOTES

RECEIVED PATIENT ROOM AIR, NO ACUTE RESPIRATORY DISTRESS, NO HR-48 ON BEDSIDE MONITOR.  DUE 
MEDICATION ADMINISTERED VIA GT. RECHECKED  PLACEMENT. NO RESIDUAL. V/S STABLE,PATIENT OFF ON 
DOPAMINE, INFUSING D5NS@100ML/HR, AND ZOSYN 25ML/HR ON KALLI PICC LINE INTACT.  ASSIST TURN 
AND REPOSTION Q 2 HR. NEPHROSTOMY TUBE DRAINING WELL, PRIVET CARE GIVER NEXT TO THE BED. 
WILL FOLLOW UP.

## 2022-11-07 NOTE — NUR
rn notes 

 BS-190 MG/DL COVERAGE GIVEN, ALSO ADMINISTERED PAIN MEDICATION NARCO 5/325 MG VIA GT, DUE 
MEDICATION ADMINISTERED, INFUSING ZOSYN 25ML EXTENDED DOSE ON KALLI PICC LINE INTACT, ASSIST 
TURN AND REPOSTION Q 2 HR. WILL FOLLOW UP.

## 2022-11-07 NOTE — NUR
RN  NOTES 

BS-133 MG/DL COVERAGE GIVEN ,DUE MEDICATION ADMINISTERED VIA GT, INFUSING D5W@80ML/HR ON KALLI 
PICC LINE INTACT, ASSIST TURN AND REPOSTION Q 2 HR. PM ACRE GIVEN SUCTION, MOUTH CARE DONE. 
PRIVET CARE GIVER NEXT TO THE BED. TRANSFERRED PATIENT TO THE TELE UNIT STABLE CONDITION 
BEDSIDE REPORT GIVEN ONCOMING RN FOLLOW PLAN OF CARE.

## 2022-11-07 NOTE — NUR
ICU/LVN

PT WAS STARTED ON DOPA BY RN CHARGE NURSE FOR LOW BP 70'S FOR A FEW CYCLES. WILL MONITOR 
THIS PT AND HER BP.  SEE IV SPREAD SHEET FOR TITRATION

## 2022-11-08 VITALS — DIASTOLIC BLOOD PRESSURE: 83 MMHG | SYSTOLIC BLOOD PRESSURE: 147 MMHG

## 2022-11-08 VITALS — SYSTOLIC BLOOD PRESSURE: 118 MMHG | DIASTOLIC BLOOD PRESSURE: 67 MMHG

## 2022-11-08 VITALS — DIASTOLIC BLOOD PRESSURE: 83 MMHG | SYSTOLIC BLOOD PRESSURE: 176 MMHG

## 2022-11-08 VITALS — DIASTOLIC BLOOD PRESSURE: 77 MMHG | SYSTOLIC BLOOD PRESSURE: 153 MMHG

## 2022-11-08 VITALS — DIASTOLIC BLOOD PRESSURE: 63 MMHG | SYSTOLIC BLOOD PRESSURE: 100 MMHG

## 2022-11-08 VITALS — DIASTOLIC BLOOD PRESSURE: 66 MMHG | SYSTOLIC BLOOD PRESSURE: 136 MMHG

## 2022-11-08 LAB
BASOPHILS # BLD AUTO: 0 K/UL (ref 0–0.2)
BASOPHILS NFR BLD AUTO: 0.2 % (ref 0–2)
BUN SERPL-MCNC: 31 MG/DL (ref 7–18)
CALCIUM SERPL-MCNC: 7.5 MG/DL (ref 8.5–10.1)
CHLORIDE SERPL-SCNC: 108 MMOL/L (ref 98–107)
CO2 SERPL-SCNC: 28 MMOL/L (ref 21–32)
CREAT SERPL-MCNC: 0.7 MG/DL (ref 0.6–1.3)
EOSINOPHIL NFR BLD AUTO: 0 % (ref 0–6)
GLUCOSE SERPL-MCNC: 343 MG/DL (ref 74–106)
HCT VFR BLD AUTO: 28 % (ref 33–45)
HGB BLD-MCNC: 8.7 G/DL (ref 11.5–14.8)
LYMPHOCYTES NFR BLD AUTO: 1.4 K/UL (ref 0.8–4.8)
LYMPHOCYTES NFR BLD AUTO: 20.3 % (ref 20–44)
LYMPHOCYTES NFR BLD MANUAL: 25 % (ref 16–48)
MAGNESIUM SERPL-MCNC: 2.3 MG/DL (ref 1.8–2.4)
MCHC RBC AUTO-ENTMCNC: 31 G/DL (ref 31–36)
MCV RBC AUTO: 99 FL (ref 82–100)
MONOCYTES NFR BLD AUTO: 0.3 K/UL (ref 0.1–1.3)
MONOCYTES NFR BLD AUTO: 4.2 % (ref 2–12)
MONOCYTES NFR BLD MANUAL: 5 % (ref 0–11)
NEUTROPHILS # BLD AUTO: 5.2 K/UL (ref 1.8–8.9)
NEUTROPHILS NFR BLD AUTO: 75.3 % (ref 43–81)
NEUTS BAND NFR BLD MANUAL: 9 % (ref 0–5)
NEUTS SEG NFR BLD MANUAL: 61 % (ref 42–76)
PHOSPHATE SERPL-MCNC: 4.4 MG/DL (ref 2.5–4.9)
PLATELET # BLD AUTO: 235 K/UL (ref 150–450)
POTASSIUM SERPL-SCNC: 3.7 MMOL/L (ref 3.5–5.1)
RBC # BLD AUTO: 2.81 MIL/UL (ref 4–5.2)
SODIUM SERPL-SCNC: 139 MMOL/L (ref 136–145)
WBC NRBC COR # BLD AUTO: 6.9 K/UL (ref 4.3–11)

## 2022-11-08 RX ADMIN — Medication SCH MG: at 08:23

## 2022-11-08 RX ADMIN — CALCIUM SCH MG: 500 TABLET ORAL at 08:40

## 2022-11-08 RX ADMIN — Medication SCH EACH: at 00:06

## 2022-11-08 RX ADMIN — VALPROIC ACID SCH MG: 250 SOLUTION ORAL at 17:16

## 2022-11-08 RX ADMIN — CHLORHEXIDINE GLUCONATE 0.12% ORAL RINSE SCH ML: 1.2 LIQUID ORAL at 17:16

## 2022-11-08 RX ADMIN — Medication SCH EACH: at 05:24

## 2022-11-08 RX ADMIN — INSULIN HUMAN PRN UNITS: 100 INJECTION, SOLUTION PARENTERAL at 17:20

## 2022-11-08 RX ADMIN — Medication SCH MG: at 20:39

## 2022-11-08 RX ADMIN — Medication SCH MG: at 12:24

## 2022-11-08 RX ADMIN — Medication SCH MG: at 23:46

## 2022-11-08 RX ADMIN — Medication SCH EACH: at 17:16

## 2022-11-08 RX ADMIN — Medication SCH EACH: at 12:02

## 2022-11-08 RX ADMIN — CHLORHEXIDINE GLUCONATE 0.12% ORAL RINSE SCH ML: 1.2 LIQUID ORAL at 12:01

## 2022-11-08 RX ADMIN — CHLORHEXIDINE GLUCONATE 0.12% ORAL RINSE SCH ML: 1.2 LIQUID ORAL at 08:40

## 2022-11-08 RX ADMIN — FLUDROCORTISONE ACETATE SCH MG: 0.1 TABLET ORAL at 05:07

## 2022-11-08 RX ADMIN — PANTOPRAZOLE SODIUM SCH MG: 40 GRANULE, DELAYED RELEASE ORAL at 08:40

## 2022-11-08 RX ADMIN — ACETAMINOPHEN PRN MG: 160 SOLUTION ORAL at 03:45

## 2022-11-08 RX ADMIN — Medication SCH ML: at 16:45

## 2022-11-08 RX ADMIN — INSULIN HUMAN PRN UNITS: 100 INJECTION, SOLUTION PARENTERAL at 00:16

## 2022-11-08 RX ADMIN — HYDROCORTISONE SODIUM SUCCINATE SCH MG: 100 INJECTION, POWDER, FOR SOLUTION INTRAMUSCULAR; INTRAVASCULAR at 12:01

## 2022-11-08 RX ADMIN — MEROPENEM SCH MLS/HR: 500 INJECTION INTRAVENOUS at 12:01

## 2022-11-08 RX ADMIN — MIDODRINE HYDROCHLORIDE SCH MG: 5 TABLET ORAL at 17:16

## 2022-11-08 RX ADMIN — Medication SCH MG: at 17:16

## 2022-11-08 RX ADMIN — MAGNESIUM OXIDE TAB 400 MG (241.3 MG ELEMENTAL MG) SCH MG: 400 (241.3 MG) TAB at 08:40

## 2022-11-08 RX ADMIN — Medication SCH MG: at 04:27

## 2022-11-08 RX ADMIN — Medication SCH MG: at 16:08

## 2022-11-08 RX ADMIN — MIDODRINE HYDROCHLORIDE SCH MG: 5 TABLET ORAL at 12:16

## 2022-11-08 RX ADMIN — MEROPENEM SCH MLS/HR: 500 INJECTION INTRAVENOUS at 05:06

## 2022-11-08 RX ADMIN — Medication SCH MG: at 08:40

## 2022-11-08 RX ADMIN — Medication SCH MG: at 12:01

## 2022-11-08 RX ADMIN — MAGNESIUM OXIDE TAB 400 MG (241.3 MG ELEMENTAL MG) SCH MG: 400 (241.3 MG) TAB at 17:16

## 2022-11-08 RX ADMIN — MEROPENEM SCH MLS/HR: 500 INJECTION INTRAVENOUS at 21:15

## 2022-11-08 RX ADMIN — ENOXAPARIN SODIUM SCH MG: 40 INJECTION SUBCUTANEOUS at 08:41

## 2022-11-08 RX ADMIN — MIDODRINE HYDROCHLORIDE SCH MG: 5 TABLET ORAL at 09:00

## 2022-11-08 RX ADMIN — VALPROIC ACID SCH MG: 250 SOLUTION ORAL at 08:40

## 2022-11-08 RX ADMIN — INSULIN HUMAN PRN UNITS: 100 INJECTION, SOLUTION PARENTERAL at 05:26

## 2022-11-08 RX ADMIN — THERA TABS SCH UDTAB: TAB at 08:40

## 2022-11-08 RX ADMIN — HYDROCORTISONE SODIUM SUCCINATE SCH MG: 100 INJECTION, POWDER, FOR SOLUTION INTRAMUSCULAR; INTRAVASCULAR at 05:07

## 2022-11-08 RX ADMIN — FLUDROCORTISONE ACETATE SCH MG: 0.1 TABLET ORAL at 00:13

## 2022-11-08 RX ADMIN — ACETAMINOPHEN PRN MG: 160 SOLUTION ORAL at 15:10

## 2022-11-08 RX ADMIN — FLUDROCORTISONE ACETATE SCH MG: 0.1 TABLET ORAL at 12:01

## 2022-11-08 NOTE — NUR
RN NOTE

AFTER CLEANING PT, PT NOTED TO HAVE FACIAL GRIMACING AND MAKING CRYING NOISES. PT 
ADMINISTERED TYLENOL 650 MG FOR COMFORT.

## 2022-11-08 NOTE — NUR
RN NOTE



PT LYING IN BED, IN ROOM AIR. NOT IN RESPIRATORY DISTRESS.  PT NOTED SB @46 ON TELE MONITOR. 
PT IS RESPONSIVE TO STIMULI. R SIDE NEPHROSTOMY IN PLACE DRAINING WELL. KALLI PICC  WITH  NO 
IVF RUNNING.STARTED ON GT FEEDING GLUCERNA 1.2 @40 CC/HR. TOLERATING WELL. ASPIRATION PREC 
MAINTAINED. DUE MEDICATIONS GIVEN, AM/PM CARE DONE. TURNED AND REPOSITION Q2H.  SAFETY 
MEASURES FOLLOWED. WILL CONTINUE TO MONITOR.

## 2022-11-08 NOTE — NUR
RN NOTE



RECEIVED PT LYING IN BED, IN ROOM AIR. NOT IN RESPIRATORY DISTRESS.  PT NOTED SB @49 ON TELE 
MONITOR. PT IS RESPONSIVE TO STIMULI. R SIDE NEPHROSTOMY IN PLACE DRAINING WELL. KALLI PICC  
WITH D5W RUNNING @80/HR. SAFETY MEASURES FOLLOWED. WILL CONTINUE TO MONITOR.

## 2022-11-08 NOTE — NUR
CHIDI RN CLOSING NOTE

PT REMAINS IN BED, AWAKE, NON-VERBAL, OPENS EYES; MAKES CRYING NOISES AND FACIAL GRIMACES AT 
TIMES. CONTINUES TO BE ON RA WITH O2SAT RANGING FROM 94%- 96%; HAS OCCASIONAL NON-PRODUCTIVE 
COUGH; NO OTHER S/S OF RESP DISTRESS, NO SOB, NON-LABORED AND EQUAL BREATHING. ATTACHED TO 
EXTERNAL MONITOR SB-SR WITH HR OF 44 AS LOWEST AND 96 AS HIGHEST. RIGHT SIDE NEPHROSTOMY 
INTACT AND PATENT, DRAINING CLEAR AND YELLOW URINE. KALLI PICC INTACT AND PATENT, FLUSHES 
EASILY WITH NO RESISTANCE; D5W AT 80 ML/HR. ALL DUE MEDS ADMINISTERED DURING THE NIGHT. BED 
IN LOWEST POSITION, CALL LIGHT WITHIN REACH, SIDE RAILS UP X3. WILL ENDORSE TO DAYSHIFT 
NURSE TO CONTINUE CARE.

## 2022-11-08 NOTE — NUR
RN OPENING NOTES:



RECEIVED PT IN BED, AWAKE, NON-VERBAL, OPENS BOTH EYES. ON AND OFF NOTED WITH FACIAL 
GRIMACING. ON ROOM AIR AND PT TOLERATED WELL. NO SOB, BREATHING EVEN AND UNLABORED. KALLI PICC 
INTACT AND PATENT,NO ACTIVE BLEEDING NOTED. GTUBE FEEDING WELL TOLERATED. RIGHT SIDE 
NEPHROSTOMY INTACT AND PATENT. PUREWICK IN PLACE. DRAINING CLEAR AND YELLOW URINE. ALL 
SAFETY MEASURES IN PLACE. BED IN LOWEST POSITION AND LOCKED. PLACE CALL LIGHT WITHIN REACH, 
SIDE RAILS UP X3. WILL CONTINUE TO MONITOR

## 2022-11-09 VITALS — DIASTOLIC BLOOD PRESSURE: 81 MMHG | SYSTOLIC BLOOD PRESSURE: 135 MMHG

## 2022-11-09 VITALS — DIASTOLIC BLOOD PRESSURE: 92 MMHG | SYSTOLIC BLOOD PRESSURE: 121 MMHG

## 2022-11-09 VITALS — DIASTOLIC BLOOD PRESSURE: 90 MMHG | SYSTOLIC BLOOD PRESSURE: 163 MMHG

## 2022-11-09 VITALS — SYSTOLIC BLOOD PRESSURE: 146 MMHG | DIASTOLIC BLOOD PRESSURE: 87 MMHG

## 2022-11-09 VITALS — DIASTOLIC BLOOD PRESSURE: 90 MMHG | SYSTOLIC BLOOD PRESSURE: 155 MMHG

## 2022-11-09 VITALS — DIASTOLIC BLOOD PRESSURE: 56 MMHG | SYSTOLIC BLOOD PRESSURE: 127 MMHG

## 2022-11-09 LAB
BASOPHILS # BLD AUTO: 0 K/UL (ref 0–0.2)
BASOPHILS NFR BLD AUTO: 0.4 % (ref 0–2)
BASOPHILS NFR BLD MANUAL: 0 % (ref 0–2)
BUN SERPL-MCNC: 30 MG/DL (ref 7–18)
CALCIUM SERPL-MCNC: 8.5 MG/DL (ref 8.5–10.1)
CHLORIDE SERPL-SCNC: 106 MMOL/L (ref 98–107)
CO2 SERPL-SCNC: 28 MMOL/L (ref 21–32)
CREAT SERPL-MCNC: 0.6 MG/DL (ref 0.6–1.3)
EOSINOPHIL NFR BLD AUTO: 0 % (ref 0–6)
EOSINOPHIL NFR BLD MANUAL: 0 % (ref 0–4)
GLUCOSE SERPL-MCNC: 219 MG/DL (ref 74–106)
HCT VFR BLD AUTO: 32 % (ref 33–45)
HGB BLD-MCNC: 10.3 G/DL (ref 11.5–14.8)
LYMPHOCYTES NFR BLD AUTO: 1.5 K/UL (ref 0.8–4.8)
LYMPHOCYTES NFR BLD AUTO: 25.4 % (ref 20–44)
LYMPHOCYTES NFR BLD MANUAL: 21 % (ref 16–48)
MAGNESIUM SERPL-MCNC: 2.5 MG/DL (ref 1.8–2.4)
MCHC RBC AUTO-ENTMCNC: 33 G/DL (ref 31–36)
MCV RBC AUTO: 96 FL (ref 82–100)
MONOCYTES NFR BLD AUTO: 0.5 K/UL (ref 0.1–1.3)
MONOCYTES NFR BLD AUTO: 9.3 % (ref 2–12)
MONOCYTES NFR BLD MANUAL: 7 % (ref 0–11)
NEUTROPHILS # BLD AUTO: 3.7 K/UL (ref 1.8–8.9)
NEUTROPHILS NFR BLD AUTO: 64.9 % (ref 43–81)
NEUTS BAND NFR BLD MANUAL: 11 % (ref 0–5)
NEUTS SEG NFR BLD MANUAL: 61 % (ref 42–76)
PHOSPHATE SERPL-MCNC: 4.4 MG/DL (ref 2.5–4.9)
PLATELET # BLD AUTO: 281 K/UL (ref 150–450)
POTASSIUM SERPL-SCNC: 3.7 MMOL/L (ref 3.5–5.1)
RBC # BLD AUTO: 3.3 MIL/UL (ref 4–5.2)
SODIUM SERPL-SCNC: 140 MMOL/L (ref 136–145)
WBC NRBC COR # BLD AUTO: 5.7 K/UL (ref 4.3–11)

## 2022-11-09 RX ADMIN — Medication SCH MG: at 10:51

## 2022-11-09 RX ADMIN — VALPROIC ACID SCH MG: 250 SOLUTION ORAL at 09:16

## 2022-11-09 RX ADMIN — MEROPENEM SCH MLS/HR: 500 INJECTION INTRAVENOUS at 13:44

## 2022-11-09 RX ADMIN — Medication SCH MG: at 03:51

## 2022-11-09 RX ADMIN — CALCIUM SCH MG: 500 TABLET ORAL at 09:17

## 2022-11-09 RX ADMIN — MAGNESIUM OXIDE TAB 400 MG (241.3 MG ELEMENTAL MG) SCH MG: 400 (241.3 MG) TAB at 17:01

## 2022-11-09 RX ADMIN — MIDODRINE HYDROCHLORIDE SCH MG: 5 TABLET ORAL at 17:00

## 2022-11-09 RX ADMIN — Medication SCH MG: at 20:19

## 2022-11-09 RX ADMIN — MAGNESIUM OXIDE TAB 400 MG (241.3 MG ELEMENTAL MG) SCH MG: 400 (241.3 MG) TAB at 09:16

## 2022-11-09 RX ADMIN — Medication SCH MG: at 09:15

## 2022-11-09 RX ADMIN — MIDODRINE HYDROCHLORIDE SCH MG: 5 TABLET ORAL at 13:00

## 2022-11-09 RX ADMIN — MIDODRINE HYDROCHLORIDE SCH MG: 5 TABLET ORAL at 09:20

## 2022-11-09 RX ADMIN — INSULIN HUMAN PRN UNITS: 100 INJECTION, SOLUTION PARENTERAL at 05:47

## 2022-11-09 RX ADMIN — Medication SCH MG: at 23:26

## 2022-11-09 RX ADMIN — VALPROIC ACID SCH MG: 250 SOLUTION ORAL at 17:05

## 2022-11-09 RX ADMIN — Medication SCH MG: at 13:38

## 2022-11-09 RX ADMIN — THERA TABS SCH UDTAB: TAB at 09:22

## 2022-11-09 RX ADMIN — Medication SCH MG: at 15:39

## 2022-11-09 RX ADMIN — ENOXAPARIN SODIUM SCH MG: 40 INJECTION SUBCUTANEOUS at 09:15

## 2022-11-09 RX ADMIN — Medication SCH EACH: at 12:40

## 2022-11-09 RX ADMIN — Medication SCH EACH: at 17:32

## 2022-11-09 RX ADMIN — CHLORHEXIDINE GLUCONATE 0.12% ORAL RINSE SCH ML: 1.2 LIQUID ORAL at 13:38

## 2022-11-09 RX ADMIN — Medication SCH MG: at 07:34

## 2022-11-09 RX ADMIN — Medication SCH EACH: at 00:48

## 2022-11-09 RX ADMIN — MEROPENEM SCH MLS/HR: 500 INJECTION INTRAVENOUS at 05:35

## 2022-11-09 RX ADMIN — Medication SCH EACH: at 05:45

## 2022-11-09 RX ADMIN — PANTOPRAZOLE SODIUM SCH MG: 40 GRANULE, DELAYED RELEASE ORAL at 09:16

## 2022-11-09 RX ADMIN — MAGNESIUM HYDROXIDE SCH ML: 400 SUSPENSION ORAL at 15:30

## 2022-11-09 RX ADMIN — Medication SCH MG: at 17:01

## 2022-11-09 RX ADMIN — INSULIN HUMAN PRN UNITS: 100 INJECTION, SOLUTION PARENTERAL at 00:50

## 2022-11-09 RX ADMIN — MEROPENEM SCH MLS/HR: 500 INJECTION INTRAVENOUS at 21:30

## 2022-11-09 RX ADMIN — CHLORHEXIDINE GLUCONATE 0.12% ORAL RINSE SCH ML: 1.2 LIQUID ORAL at 17:01

## 2022-11-09 RX ADMIN — CHLORHEXIDINE GLUCONATE 0.12% ORAL RINSE SCH ML: 1.2 LIQUID ORAL at 09:16

## 2022-11-09 RX ADMIN — INSULIN HUMAN PRN UNITS: 100 INJECTION, SOLUTION PARENTERAL at 17:59

## 2022-11-09 RX ADMIN — ACETAMINOPHEN PRN MG: 160 SOLUTION ORAL at 05:50

## 2022-11-09 RX ADMIN — INSULIN HUMAN PRN UNITS: 100 INJECTION, SOLUTION PARENTERAL at 12:42

## 2022-11-09 NOTE — NUR
CHIDI RN OPENING NOTE

PT RECEIVED IN BED WITH CAREGIVER AT BEDSIDE, AWAKE, NON-VERBAL, OPENS EYES. PT ON NASAL 
CANNULA 3 L/MIN WITH CURRENT O2SAT OF 99%; NO S/S OF RESP DISTRESS, NO SOB OR COUGH, 
NON-LABORED AND EQUAL BREATHING; APPEARS COMFORTABLE OVERALL. PT ATTACHED TO EXTERNAL 
MONITOR SB WITH HR OF 74. PT NOTED TO HAVE RIGHT SIDE NEPHROSTOMY THAT'S DRAINING CLEAR AND 
YELLOW URINE. KALLI PICC INTACT AND PATENT, FLUSHES EASILY WITH NO RESISTANCE. CALL LIGHT 
WITHIN REACH, SIDE RAILS UP X3. WILL  MONITOR

## 2022-11-09 NOTE — NUR
PT. IS AWAKE BUT UNABLE TO FOLLOW COMMANDS PLACED INTO ROOM AIR FOR O2 TIRATION.



SPO2 30 MINUTES IN ROOM AIR IS 96%

-------------------------------------------------------------------------------

Addendum: 11/09/22 at 1053 by KEVIN GRANADO RT

-------------------------------------------------------------------------------

Amended: Links added.

## 2022-11-09 NOTE — NUR
[facility]I RN CLOSING NOTE

PT REMAINS IN BED, AWAKE, NON-VERBAL, OPENS EYES. CONTINUES TO BE ON RA WITH O2SAT RANGING 
FROM 97%. NO OTHER S/S OF RESP DISTRESS, NO SOB, NON-LABORED AND EQUAL BREATHING. ATTACHED 
TO EXTERNAL MONITOR SR WITH HR OF 89-74 . RIGHT SIDE NEPHROSTOMY INTACT AND PATENT, DRAINING 
CLEAR AND YELLOW URINE. KALLI PICC INTACT AND PATENT, FLUSHES EASILY WITH NO RESISTANCE. ALL 
DUE MEDS ADMINISTERED DURING THE NIGHT. BED IN LOWEST POSITION, CALL LIGHT WITHIN REACH, 
SIDE RAILS UP X3. WILL ENDORSE TO NIGHTSHIFT NURSE TO CONTINUE CARE.

## 2022-11-09 NOTE — NUR
RN NOTES:



PT'S BLOOD SUGAR 227. 4 UNITS OF REGULAR INSULIN GIVEN. NO S/S OF HYPER/HYPOGLYCEMIA. WILL 
CONTINUE TO MONITOR

## 2022-11-09 NOTE — NUR
RN CLOSING NOTES:



PT IN BED, AWAKE, NON-VERBAL, OPENS BOTH EYES. ON AND OFF NOTED WITH FACIAL GRIMACING. ON 
ROOM AIR AND PT TOLERATED WELL. NO SOB, BREATHING EVEN AND UNLABORED. KALLI PICC INTACT AND 
PATENT,NO ACTIVE BLEEDING NOTED. GTUBE FEEDING WELL TOLERATED. RIGHT SIDE NEPHROSTOMY INTACT 
AND PATENT. PUREWICK IN PLACE. DRAINING CLEAR AND YELLOW URINE. ALL DUE MEDS GIVEN AS 
ORDERED. CAREGIVER AT BEDSIDE. ALL SAFETY MEASURES IN PLACE. BED IN LOWEST POSITION AND 
LOCKED. PLACE CALL LIGHT WITHIN REACH, SIDE RAILS UP X3. WILL ENDORSE TO MORNING SHIFT 
NURSE.

## 2022-11-09 NOTE — NUR
PT RECEIVED IN BED WITH CAREGIVER AT BEDSIDE, AWAKE, NON-VERBAL, OPENS EYES. PT ON NASAL 
CANNULA 3 L/MIN WITH CURRENT O2SAT OF 99%; NO S/S OF RESP DISTRESS, NO SOB OR COUGH, 
NON-LABORED AND EQUAL BREATHING; APPEARS COMFORTABLE OVERALL. PT ATTACHED TO EXTERNAL 
MONITOR SB WITH HR OF 74. GT GLUCERNA INFUSING AT 35ML/HR WITH A GOAL OF 40ML/HR. PT NOTED 
TO HAVE RIGHT SIDE NEPHROSTOMY THAT'S DRAINING CLEAR AND YELLOW URINE. KALLI PICC NOTED. CALL 
LIGHT WITHIN REACH, SIDE RAILS UP X3. WILL  CONTINUE PLAN OF CARE.

## 2022-11-10 VITALS — SYSTOLIC BLOOD PRESSURE: 134 MMHG | DIASTOLIC BLOOD PRESSURE: 74 MMHG

## 2022-11-10 VITALS — DIASTOLIC BLOOD PRESSURE: 56 MMHG | SYSTOLIC BLOOD PRESSURE: 140 MMHG

## 2022-11-10 VITALS — SYSTOLIC BLOOD PRESSURE: 114 MMHG | DIASTOLIC BLOOD PRESSURE: 85 MMHG

## 2022-11-10 VITALS — DIASTOLIC BLOOD PRESSURE: 81 MMHG | SYSTOLIC BLOOD PRESSURE: 138 MMHG

## 2022-11-10 LAB
BASOPHILS # BLD AUTO: 0 K/UL (ref 0–0.2)
BASOPHILS NFR BLD AUTO: 0.3 % (ref 0–2)
BUN SERPL-MCNC: 28 MG/DL (ref 7–18)
CALCIUM SERPL-MCNC: 8.2 MG/DL (ref 8.5–10.1)
CHLORIDE SERPL-SCNC: 109 MMOL/L (ref 98–107)
CO2 SERPL-SCNC: 29 MMOL/L (ref 21–32)
CREAT SERPL-MCNC: 0.6 MG/DL (ref 0.6–1.3)
EOSINOPHIL NFR BLD AUTO: 0 % (ref 0–6)
GLUCOSE SERPL-MCNC: 265 MG/DL (ref 74–106)
HCT VFR BLD AUTO: 29 % (ref 33–45)
HGB BLD-MCNC: 9.6 G/DL (ref 11.5–14.8)
LYMPHOCYTES NFR BLD AUTO: 1.4 K/UL (ref 0.8–4.8)
LYMPHOCYTES NFR BLD AUTO: 30.6 % (ref 20–44)
LYMPHOCYTES NFR BLD MANUAL: 36 % (ref 16–48)
MAGNESIUM SERPL-MCNC: 2.2 MG/DL (ref 1.8–2.4)
MCHC RBC AUTO-ENTMCNC: 33 G/DL (ref 31–36)
MCV RBC AUTO: 95 FL (ref 82–100)
MONOCYTES NFR BLD AUTO: 0.6 K/UL (ref 0.1–1.3)
MONOCYTES NFR BLD AUTO: 12.8 % (ref 2–12)
MONOCYTES NFR BLD MANUAL: 4 % (ref 0–11)
NEUTROPHILS # BLD AUTO: 2.7 K/UL (ref 1.8–8.9)
NEUTROPHILS NFR BLD AUTO: 56.3 % (ref 43–81)
NEUTS BAND NFR BLD MANUAL: 8 % (ref 0–5)
NEUTS SEG NFR BLD MANUAL: 52 % (ref 42–76)
PHOSPHATE SERPL-MCNC: 3.5 MG/DL (ref 2.5–4.9)
PLATELET # BLD AUTO: 272 K/UL (ref 150–450)
POTASSIUM SERPL-SCNC: 3.6 MMOL/L (ref 3.5–5.1)
RBC # BLD AUTO: 3.11 MIL/UL (ref 4–5.2)
SODIUM SERPL-SCNC: 144 MMOL/L (ref 136–145)
WBC NRBC COR # BLD AUTO: 4.7 K/UL (ref 4.3–11)

## 2022-11-10 RX ADMIN — Medication SCH EACH: at 05:00

## 2022-11-10 RX ADMIN — PANTOPRAZOLE SODIUM SCH MG: 40 GRANULE, DELAYED RELEASE ORAL at 08:40

## 2022-11-10 RX ADMIN — Medication SCH EACH: at 00:10

## 2022-11-10 RX ADMIN — MIDODRINE HYDROCHLORIDE SCH MG: 5 TABLET ORAL at 08:42

## 2022-11-10 RX ADMIN — INSULIN HUMAN PRN UNITS: 100 INJECTION, SOLUTION PARENTERAL at 00:32

## 2022-11-10 RX ADMIN — THERA TABS SCH UDTAB: TAB at 08:40

## 2022-11-10 RX ADMIN — ENOXAPARIN SODIUM SCH MG: 40 INJECTION SUBCUTANEOUS at 08:45

## 2022-11-10 RX ADMIN — CALCIUM SCH MG: 500 TABLET ORAL at 08:41

## 2022-11-10 RX ADMIN — Medication SCH MG: at 08:40

## 2022-11-10 RX ADMIN — MAGNESIUM OXIDE TAB 400 MG (241.3 MG ELEMENTAL MG) SCH MG: 400 (241.3 MG) TAB at 08:41

## 2022-11-10 RX ADMIN — CHLORHEXIDINE GLUCONATE 0.12% ORAL RINSE SCH ML: 1.2 LIQUID ORAL at 08:40

## 2022-11-10 RX ADMIN — Medication SCH ML: at 02:37

## 2022-11-10 RX ADMIN — INSULIN HUMAN PRN UNITS: 100 INJECTION, SOLUTION PARENTERAL at 11:51

## 2022-11-10 RX ADMIN — MEROPENEM SCH MLS/HR: 500 INJECTION INTRAVENOUS at 04:56

## 2022-11-10 RX ADMIN — Medication SCH EACH: at 11:32

## 2022-11-10 RX ADMIN — Medication SCH MG: at 07:43

## 2022-11-10 RX ADMIN — VALPROIC ACID SCH MG: 250 SOLUTION ORAL at 08:40

## 2022-11-10 RX ADMIN — Medication SCH MG: at 02:54

## 2022-11-10 NOTE — NUR
TD RN AM 

NOTES

PT RECEIVED IN BED WITH CAREGIVER AT BEDSIDE, AWAKE, NON-VERBAL, OPENS EYES. PT ON ROOM AIR 
O2 SAT 99%. NO DISTRESS RESPIRATION UNLABORED. APPEARS COMFORTABLE OVERALL. NSR ON MONITOR. 
NO SIGNS OF PAIN/GRIMACING. KALLI PICC LINE FLUSHES WELL, SITE CLEAR, CDI DRESSINGS. GT 
GLUCERNA INFUSING AT 40ML/HR. CHECKED FOR PLACEMENT. 5 ML RESIDUAL. PT NOTED TO HAVE RIGHT 
SIDE NEPHROSTOMY THAT'S DRAINING CLEAR AND YELLOW URINE.  PERINEAL REDNESS NOTED. ON 
PUREWICK. CALL LIGHT WITHIN REACH, SIDE RAILS UP X3. WILL  CONT TO MONITOR

## 2022-11-10 NOTE — NUR
RN NOTES



PATIENT DISCHARGED TO [facility] PER MD IN STABLE CONDITION. 
PROVIDED DC INSTRUCTIONS AND MEDICATION LIST. PATIENT TO CONTINUE IV ANTIBIOTIC WITH IV 
LEAGUE, PT WITH KALLI PICC LINE INTACT AND PATENT WITH CDI DRESSING. NO BELONGINGS. RAPID TEST 
NEGATIVE RESULT SENT TO FACILITY. SISTER REQUESTED TO SKIP PICTURE TO AVOID PT FROM BEING 
UPSET AND HAVE TANTRUMS. NO BELONGINGS. ALL PAPER SIGNED BY 2 NURSES. REPORT GIVEN TO 
AMBULANCE CREW AND WILL BE TRANSPORTED TO FACILITY BY AMBULANCE

## 2022-11-10 NOTE — NUR
PT REMAINS IN BED, AWAKE, NON-VERBAL, OPENS EYES. CONTINUES TO BE ON RA WITH O2SAT RANGING 
FROM %. NO OTHER S/S OF RESP DISTRESS, NO SOB, NON-LABORED AND EQUAL BREATHING. 
ATTACHED TO EXTERNAL MONITOR SR WITH HR OF AT 80's . RIGHT SIDE NEPHROSTOMY INTACT AND 
PATENT, DRAINING CLEAR AND YELLOW URINE. KALLI PICC INTACT AND PATENT, FLUSHES EASILY WITH NO 
RESISTANCE. ALL DUE MEDS ADMINISTERED DURING THE NIGHT. BED IN LOWEST POSITION, CALL LIGHT 
WITHIN REACH, SIDE RAILS UP X3. WILL ENDORSE TO NEXT SHIFT NURSE FOR CONTINUITY CARE.

## 2022-11-10 NOTE — NUR
YASMINE RN AM NOTE



PT RECEIVED IN BED WITH CAREGIVER AT BEDSIDE, AWAKE, NON-VERBAL, OPENS EYES. PT ON ZULY,AIR.  
 CURRENT O2SAT OF 99%; NO S/S OF RESP DISTRESS, NO SOB OR COUGH, NON-LABORED AND EQUAL 
BREATHING; APPEARS COMFORTABLE OVERALL. PT ATTACHED TO EXTERNAL MONITOR SB WITH HR OF 78. PT 
NOTED TO HAVE RIGHT SIDE NEPHROSTOMY THAT'S DRAINING CLEAR AND YELLOW URINE. KALLI PICC INTACT 
AND PATENT, FLUSHES EASILY WITH NO RESISTANCE. ON GLUCERNA 1.2 AT 40 ML/HR. HOB UP X 30 
DEG.CALL LIGHT WITHIN REACH, SIDE RAILS UP X3. WILL  MONITOR 

-------------------------------------------------------------------------------

Addendum: 11/10/22 at 1243 by KERLINE TA RN

-------------------------------------------------------------------------------

correction:



Time input should be 0730

## 2022-11-16 ENCOUNTER — HOSPITAL ENCOUNTER (INPATIENT)
Dept: HOSPITAL 12 - ER | Age: 52
LOS: 6 days | Discharge: HOME | DRG: 720 | End: 2022-11-22
Payer: MEDICAID

## 2022-11-16 VITALS — BODY MASS INDEX: 22.08 KG/M2 | HEIGHT: 62 IN | WEIGHT: 120 LBS

## 2022-11-16 VITALS — DIASTOLIC BLOOD PRESSURE: 79 MMHG | SYSTOLIC BLOOD PRESSURE: 133 MMHG

## 2022-11-16 VITALS — SYSTOLIC BLOOD PRESSURE: 125 MMHG | DIASTOLIC BLOOD PRESSURE: 62 MMHG

## 2022-11-16 VITALS — SYSTOLIC BLOOD PRESSURE: 125 MMHG | DIASTOLIC BLOOD PRESSURE: 87 MMHG

## 2022-11-16 DIAGNOSIS — E87.5: ICD-10-CM

## 2022-11-16 DIAGNOSIS — J91.8: ICD-10-CM

## 2022-11-16 DIAGNOSIS — R13.10: ICD-10-CM

## 2022-11-16 DIAGNOSIS — E88.09: ICD-10-CM

## 2022-11-16 DIAGNOSIS — A41.9: Primary | ICD-10-CM

## 2022-11-16 DIAGNOSIS — R65.20: ICD-10-CM

## 2022-11-16 DIAGNOSIS — G80.9: ICD-10-CM

## 2022-11-16 DIAGNOSIS — Z93.1: ICD-10-CM

## 2022-11-16 DIAGNOSIS — B37.49: ICD-10-CM

## 2022-11-16 DIAGNOSIS — Z20.822: ICD-10-CM

## 2022-11-16 DIAGNOSIS — G40.909: ICD-10-CM

## 2022-11-16 DIAGNOSIS — E44.0: ICD-10-CM

## 2022-11-16 DIAGNOSIS — B95.2: ICD-10-CM

## 2022-11-16 DIAGNOSIS — R06.03: ICD-10-CM

## 2022-11-16 DIAGNOSIS — N17.9: ICD-10-CM

## 2022-11-16 LAB
ALP SERPL-CCNC: 226 U/L (ref 50–136)
ALT SERPL W/O P-5'-P-CCNC: 41 U/L (ref 14–59)
APPEARANCE UR: (no result)
AST SERPL-CCNC: 31 U/L (ref 15–37)
BILIRUB DIRECT SERPL-MCNC: 0.1 MG/DL (ref 0–0.2)
BILIRUB SERPL-MCNC: 0.2 MG/DL (ref 0.2–1)
BILIRUB UR QL STRIP: NEGATIVE
BUN SERPL-MCNC: 16 MG/DL (ref 7–18)
BUN SERPL-MCNC: 26 MG/DL (ref 7–18)
CHLORIDE SERPL-SCNC: 103 MMOL/L (ref 98–107)
CHLORIDE SERPL-SCNC: 114 MMOL/L (ref 98–107)
CO2 SERPL-SCNC: 22 MMOL/L (ref 21–32)
CO2 SERPL-SCNC: 28 MMOL/L (ref 21–32)
COLOR UR: YELLOW
CREAT SERPL-MCNC: 0.2 MG/DL (ref 0.6–1.3)
CREAT SERPL-MCNC: 0.4 MG/DL (ref 0.6–1.3)
DEPRECATED SQUAMOUS URNS QL MICRO: (no result) /HPF
GLUCOSE SERPL-MCNC: 108 MG/DL (ref 74–106)
GLUCOSE SERPL-MCNC: 108 MG/DL (ref 74–106)
GLUCOSE UR STRIP-MCNC: (no result) MG/DL
HCT VFR BLD AUTO: 34.8 % (ref 31.2–41.9)
HGB UR QL STRIP: (no result)
KETONES UR STRIP-MCNC: NEGATIVE MG/DL
LEUKOCYTE ESTERASE UR QL STRIP: (no result)
MCH RBC QN AUTO: 32 UUG (ref 24.7–32.8)
MCV RBC AUTO: 97.8 FL (ref 75.5–95.3)
NITRITE UR QL STRIP: NEGATIVE
PH UR STRIP: 8.5 [PH] (ref 5–8)
PLATELET # BLD AUTO: 482 K/UL (ref 179–408)
POTASSIUM SERPL-SCNC: 3 MMOL/L (ref 3.5–5.1)
POTASSIUM SERPL-SCNC: 5.2 MMOL/L (ref 3.5–5.1)
RBC #/AREA URNS HPF: (no result) /HPF (ref 0–3)
SP GR UR STRIP: 1.02 (ref 1–1.03)
UROBILINOGEN UR STRIP-MCNC: 0.2 E.U./DL
WBC #/AREA URNS HPF: (no result) /HPF
WBC #/AREA URNS HPF: (no result) /HPF (ref 0–3)
WS STN SPEC: 7.2 G/DL (ref 6.4–8.2)
YEAST URNS QL MICRO: (no result) /HPF

## 2022-11-16 PROCEDURE — A6209 FOAM DRSG <=16 SQ IN W/O BDR: HCPCS

## 2022-11-16 PROCEDURE — A6213 FOAM DRG >16<=48 SQ IN W/BDR: HCPCS

## 2022-11-16 PROCEDURE — A4663 DIALYSIS BLOOD PRESSURE CUFF: HCPCS

## 2022-11-16 PROCEDURE — G0378 HOSPITAL OBSERVATION PER HR: HCPCS

## 2022-11-16 RX ADMIN — Medication SCH MG: at 14:22

## 2022-11-16 RX ADMIN — METFORMIN HYDROCHLORIDE SCH MG: 500 TABLET ORAL at 14:21

## 2022-11-16 RX ADMIN — PANTOPRAZOLE SODIUM SCH MG: 40 GRANULE, DELAYED RELEASE ORAL at 14:14

## 2022-11-16 RX ADMIN — SENNOSIDES SCH TAB: 8.6 TABLET, COATED ORAL at 21:55

## 2022-11-16 RX ADMIN — Medication SCH MG: at 13:00

## 2022-11-16 RX ADMIN — Medication SCH ML: at 17:57

## 2022-11-16 RX ADMIN — Medication SCH ML: at 12:37

## 2022-11-16 RX ADMIN — Medication SCH MG: at 21:55

## 2022-11-16 RX ADMIN — Medication SCH MG: at 14:20

## 2022-11-16 RX ADMIN — Medication SCH EACH: at 14:21

## 2022-11-16 RX ADMIN — ENOXAPARIN SODIUM SCH MG: 40 INJECTION SUBCUTANEOUS at 09:39

## 2022-11-16 RX ADMIN — METFORMIN HYDROCHLORIDE SCH MG: 500 TABLET ORAL at 18:07

## 2022-11-16 RX ADMIN — DEXTROSE SCH MLS/HR: 50 INJECTION, SOLUTION INTRAVENOUS at 12:37

## 2022-11-16 RX ADMIN — FENOFIBRATE SCH MG: 145 TABLET ORAL at 14:21

## 2022-11-16 RX ADMIN — VALPROIC ACID SCH MG: 250 SOLUTION ORAL at 21:55

## 2022-11-16 RX ADMIN — DEXTROSE SCH MLS/HR: 50 INJECTION, SOLUTION INTRAVENOUS at 22:45

## 2022-11-16 RX ADMIN — ATORVASTATIN CALCIUM SCH MG: 20 TABLET, FILM COATED ORAL at 21:55

## 2022-11-16 RX ADMIN — Medication SCH EACH: at 18:07

## 2022-11-16 RX ADMIN — VALPROIC ACID SCH MG: 250 SOLUTION ORAL at 14:23

## 2022-11-16 RX ADMIN — Medication SCH EACH: at 21:55

## 2022-11-16 RX ADMIN — ZINC SULFATE CAP 220 MG (50 MG ELEMENTAL ZN) SCH MG: 220 (50 ZN) CAP at 14:23

## 2022-11-16 RX ADMIN — SODIUM CHLORIDE PRN UNIT: 9 INJECTION, SOLUTION INTRAVENOUS at 12:43

## 2022-11-16 RX ADMIN — Medication SCH EACH: at 12:41

## 2022-11-16 RX ADMIN — CHLORHEXIDINE GLUCONATE SCH ML: 1.2 RINSE ORAL at 15:13

## 2022-11-16 RX ADMIN — CHLORHEXIDINE GLUCONATE SCH ML: 1.2 RINSE ORAL at 17:57

## 2022-11-16 RX ADMIN — THERA TABS SCH UDTAB: TAB at 14:21

## 2022-11-16 RX ADMIN — SODIUM CHLORIDE PRN MLS/HR: 0.9 INJECTION, SOLUTION INTRAVENOUS at 12:36

## 2022-11-16 RX ADMIN — VITAMIN D, TAB 1000IU (100/BT) SCH UNIT: 25 TAB at 14:21

## 2022-11-16 NOTE — NUR
Received report from night shift. Pt resting with NAD noted at this time. 
Pending admission to Tele.

## 2022-11-16 NOTE — NUR
Awaiting antibiotic. Medication not available.  Notified charge nurse.  Charge nurse 
attempting to retrieve at this time.

## 2022-11-17 VITALS — DIASTOLIC BLOOD PRESSURE: 55 MMHG | SYSTOLIC BLOOD PRESSURE: 112 MMHG

## 2022-11-17 VITALS — DIASTOLIC BLOOD PRESSURE: 79 MMHG | SYSTOLIC BLOOD PRESSURE: 125 MMHG

## 2022-11-17 VITALS — DIASTOLIC BLOOD PRESSURE: 79 MMHG | SYSTOLIC BLOOD PRESSURE: 133 MMHG

## 2022-11-17 VITALS — DIASTOLIC BLOOD PRESSURE: 64 MMHG | SYSTOLIC BLOOD PRESSURE: 97 MMHG

## 2022-11-17 VITALS — DIASTOLIC BLOOD PRESSURE: 69 MMHG | SYSTOLIC BLOOD PRESSURE: 121 MMHG

## 2022-11-17 LAB
BUN SERPL-MCNC: 17 MG/DL (ref 7–18)
CHLORIDE SERPL-SCNC: 108 MMOL/L (ref 98–107)
CO2 SERPL-SCNC: 27 MMOL/L (ref 21–32)
CREAT SERPL-MCNC: 0.4 MG/DL (ref 0.6–1.3)
GLUCOSE SERPL-MCNC: 100 MG/DL (ref 74–106)
HCT VFR BLD AUTO: 30.1 % (ref 31.2–41.9)
MAGNESIUM SERPL-MCNC: 2 MG/DL (ref 1.8–2.4)
MCH RBC QN AUTO: 32 UUG (ref 24.7–32.8)
MCV RBC AUTO: 99 FL (ref 75.5–95.3)
PHOSPHATE SERPL-MCNC: 4.6 MG/DL (ref 2.5–4.9)
PLATELET # BLD AUTO: 376 K/UL (ref 179–408)
POTASSIUM SERPL-SCNC: 3.9 MMOL/L (ref 3.5–5.1)

## 2022-11-17 RX ADMIN — FENOFIBRATE SCH MG: 145 TABLET ORAL at 08:18

## 2022-11-17 RX ADMIN — CHLORHEXIDINE GLUCONATE SCH ML: 1.2 RINSE ORAL at 16:54

## 2022-11-17 RX ADMIN — METFORMIN HYDROCHLORIDE SCH MG: 500 TABLET ORAL at 17:05

## 2022-11-17 RX ADMIN — SENNOSIDES SCH TAB: 8.6 TABLET, COATED ORAL at 20:32

## 2022-11-17 RX ADMIN — DEXTROSE SCH MLS/HR: 50 INJECTION, SOLUTION INTRAVENOUS at 20:21

## 2022-11-17 RX ADMIN — DEXTROSE SCH MLS/HR: 50 INJECTION, SOLUTION INTRAVENOUS at 12:01

## 2022-11-17 RX ADMIN — Medication SCH EACH: at 07:00

## 2022-11-17 RX ADMIN — CHLORHEXIDINE GLUCONATE SCH ML: 1.2 RINSE ORAL at 08:21

## 2022-11-17 RX ADMIN — VITAMIN D, TAB 1000IU (100/BT) SCH UNIT: 25 TAB at 08:18

## 2022-11-17 RX ADMIN — Medication SCH ML: at 16:54

## 2022-11-17 RX ADMIN — ACETAMINOPHEN PRN MG: 325 TABLET ORAL at 18:49

## 2022-11-17 RX ADMIN — METFORMIN HYDROCHLORIDE SCH MG: 500 TABLET ORAL at 08:11

## 2022-11-17 RX ADMIN — Medication SCH ML: at 08:18

## 2022-11-17 RX ADMIN — VALPROIC ACID SCH MG: 250 SOLUTION ORAL at 08:11

## 2022-11-17 RX ADMIN — Medication SCH MG: at 08:11

## 2022-11-17 RX ADMIN — DEXTROSE SCH MLS/HR: 50 INJECTION, SOLUTION INTRAVENOUS at 04:00

## 2022-11-17 RX ADMIN — Medication SCH EACH: at 11:54

## 2022-11-17 RX ADMIN — SODIUM CHLORIDE PRN MLS/HR: 0.9 INJECTION, SOLUTION INTRAVENOUS at 19:14

## 2022-11-17 RX ADMIN — ATORVASTATIN CALCIUM SCH MG: 20 TABLET, FILM COATED ORAL at 20:31

## 2022-11-17 RX ADMIN — Medication SCH MG: at 08:17

## 2022-11-17 RX ADMIN — SODIUM CHLORIDE PRN UNIT: 9 INJECTION, SOLUTION INTRAVENOUS at 11:55

## 2022-11-17 RX ADMIN — Medication SCH MG: at 16:54

## 2022-11-17 RX ADMIN — Medication SCH EACH: at 08:17

## 2022-11-17 RX ADMIN — Medication SCH MG: at 08:18

## 2022-11-17 RX ADMIN — FLUCONAZOLE SCH MLS/HR: 400 INJECTION, SOLUTION INTRAVENOUS at 13:16

## 2022-11-17 RX ADMIN — VALPROIC ACID SCH MG: 250 SOLUTION ORAL at 20:31

## 2022-11-17 RX ADMIN — THERA TABS SCH UDTAB: TAB at 08:11

## 2022-11-17 RX ADMIN — Medication SCH EACH: at 17:12

## 2022-11-17 RX ADMIN — PANTOPRAZOLE SODIUM SCH MG: 40 GRANULE, DELAYED RELEASE ORAL at 08:11

## 2022-11-17 RX ADMIN — Medication SCH EACH: at 01:10

## 2022-11-17 RX ADMIN — Medication SCH EACH: at 20:31

## 2022-11-17 RX ADMIN — Medication SCH ML: at 12:08

## 2022-11-17 RX ADMIN — CHLORHEXIDINE GLUCONATE SCH ML: 1.2 RINSE ORAL at 13:16

## 2022-11-17 RX ADMIN — SODIUM CHLORIDE PRN MLS/HR: 0.9 INJECTION, SOLUTION INTRAVENOUS at 01:56

## 2022-11-17 RX ADMIN — ENOXAPARIN SODIUM SCH MG: 40 INJECTION SUBCUTANEOUS at 08:19

## 2022-11-17 RX ADMIN — ACETAMINOPHEN PRN MG: 325 TABLET ORAL at 09:27

## 2022-11-17 RX ADMIN — ZINC SULFATE CAP 220 MG (50 MG ELEMENTAL ZN) SCH MG: 220 (50 ZN) CAP at 08:18

## 2022-11-17 NOTE — NUR
Notified by float nurse that ivpb Maxepime 2g is not available. "[facility] will have to give 
it when pharmacy arrives."

## 2022-11-17 NOTE — NUR
Increased pt's feeding by 10 ml more. 6 hours has passed since the last time I increased it. 
Patient is tolerating feeding well so far.

## 2022-11-18 VITALS — SYSTOLIC BLOOD PRESSURE: 160 MMHG | DIASTOLIC BLOOD PRESSURE: 81 MMHG

## 2022-11-18 VITALS — SYSTOLIC BLOOD PRESSURE: 110 MMHG | DIASTOLIC BLOOD PRESSURE: 83 MMHG

## 2022-11-18 VITALS — SYSTOLIC BLOOD PRESSURE: 102 MMHG | DIASTOLIC BLOOD PRESSURE: 45 MMHG

## 2022-11-18 VITALS — DIASTOLIC BLOOD PRESSURE: 47 MMHG | SYSTOLIC BLOOD PRESSURE: 112 MMHG

## 2022-11-18 VITALS — SYSTOLIC BLOOD PRESSURE: 130 MMHG | DIASTOLIC BLOOD PRESSURE: 81 MMHG

## 2022-11-18 LAB
BUN SERPL-MCNC: 12 MG/DL (ref 7–18)
CHLORIDE SERPL-SCNC: 108 MMOL/L (ref 98–107)
CO2 SERPL-SCNC: 27 MMOL/L (ref 21–32)
CREAT SERPL-MCNC: 0.4 MG/DL (ref 0.6–1.3)
GLUCOSE SERPL-MCNC: 176 MG/DL (ref 74–106)
HCT VFR BLD AUTO: 26.5 % (ref 31.2–41.9)
MAGNESIUM SERPL-MCNC: 2 MG/DL (ref 1.8–2.4)
MCH RBC QN AUTO: 31.9 UUG (ref 24.7–32.8)
MCV RBC AUTO: 98.2 FL (ref 75.5–95.3)
PHOSPHATE SERPL-MCNC: 3.2 MG/DL (ref 2.5–4.9)
PLATELET # BLD AUTO: 339 K/UL (ref 179–408)
POTASSIUM SERPL-SCNC: 3.7 MMOL/L (ref 3.5–5.1)

## 2022-11-18 RX ADMIN — CHLORHEXIDINE GLUCONATE SCH ML: 1.2 RINSE ORAL at 10:08

## 2022-11-18 RX ADMIN — Medication SCH EACH: at 08:00

## 2022-11-18 RX ADMIN — Medication SCH MG: at 08:01

## 2022-11-18 RX ADMIN — Medication SCH MG: at 08:00

## 2022-11-18 RX ADMIN — ZINC SULFATE CAP 220 MG (50 MG ELEMENTAL ZN) SCH MG: 220 (50 ZN) CAP at 08:01

## 2022-11-18 RX ADMIN — Medication SCH EACH: at 23:13

## 2022-11-18 RX ADMIN — VALPROIC ACID SCH MG: 250 SOLUTION ORAL at 08:00

## 2022-11-18 RX ADMIN — Medication SCH ML: at 16:43

## 2022-11-18 RX ADMIN — Medication SCH EACH: at 11:38

## 2022-11-18 RX ADMIN — Medication SCH EACH: at 17:17

## 2022-11-18 RX ADMIN — Medication SCH EACH: at 05:35

## 2022-11-18 RX ADMIN — Medication SCH MG: at 16:43

## 2022-11-18 RX ADMIN — SODIUM CHLORIDE PRN UNIT: 9 INJECTION, SOLUTION INTRAVENOUS at 00:53

## 2022-11-18 RX ADMIN — Medication SCH EACH: at 20:00

## 2022-11-18 RX ADMIN — Medication SCH ML: at 11:38

## 2022-11-18 RX ADMIN — CHLORHEXIDINE GLUCONATE SCH ML: 1.2 RINSE ORAL at 16:44

## 2022-11-18 RX ADMIN — METFORMIN HYDROCHLORIDE SCH MG: 500 TABLET ORAL at 08:00

## 2022-11-18 RX ADMIN — ACETAMINOPHEN PRN MG: 325 TABLET ORAL at 00:50

## 2022-11-18 RX ADMIN — VITAMIN D, TAB 1000IU (100/BT) SCH UNIT: 25 TAB at 08:00

## 2022-11-18 RX ADMIN — ENOXAPARIN SODIUM SCH MG: 40 INJECTION SUBCUTANEOUS at 08:02

## 2022-11-18 RX ADMIN — SODIUM CHLORIDE PRN UNIT: 9 INJECTION, SOLUTION INTRAVENOUS at 11:39

## 2022-11-18 RX ADMIN — ATORVASTATIN CALCIUM SCH MG: 20 TABLET, FILM COATED ORAL at 20:00

## 2022-11-18 RX ADMIN — Medication SCH ML: at 08:21

## 2022-11-18 RX ADMIN — SENNOSIDES SCH TAB: 8.6 TABLET, COATED ORAL at 20:00

## 2022-11-18 RX ADMIN — THERA TABS SCH UDTAB: TAB at 08:00

## 2022-11-18 RX ADMIN — FLUCONAZOLE SCH MLS/HR: 400 INJECTION, SOLUTION INTRAVENOUS at 12:49

## 2022-11-18 RX ADMIN — Medication SCH EACH: at 00:43

## 2022-11-18 RX ADMIN — METFORMIN HYDROCHLORIDE SCH MG: 500 TABLET ORAL at 17:03

## 2022-11-18 RX ADMIN — SODIUM CHLORIDE PRN UNIT: 9 INJECTION, SOLUTION INTRAVENOUS at 05:37

## 2022-11-18 RX ADMIN — FENOFIBRATE SCH MG: 145 TABLET ORAL at 08:00

## 2022-11-18 RX ADMIN — DEXTROSE SCH MLS/HR: 50 INJECTION, SOLUTION INTRAVENOUS at 03:14

## 2022-11-18 RX ADMIN — PANTOPRAZOLE SODIUM SCH MG: 40 GRANULE, DELAYED RELEASE ORAL at 08:00

## 2022-11-18 RX ADMIN — Medication PRN ML: at 16:55

## 2022-11-18 RX ADMIN — DEXTROSE SCH MLS/HR: 50 INJECTION, SOLUTION INTRAVENOUS at 11:02

## 2022-11-18 RX ADMIN — SODIUM CHLORIDE PRN MLS/HR: 0.9 INJECTION, SOLUTION INTRAVENOUS at 10:31

## 2022-11-18 RX ADMIN — CHLORHEXIDINE GLUCONATE SCH ML: 1.2 RINSE ORAL at 12:49

## 2022-11-18 RX ADMIN — VALPROIC ACID SCH MG: 250 SOLUTION ORAL at 20:00

## 2022-11-18 RX ADMIN — DEXTROSE SCH MLS/HR: 50 INJECTION, SOLUTION INTRAVENOUS at 19:46

## 2022-11-18 NOTE — NUR
Pt rested well in between care; no acute distress; suctioned oral secretions; GTF stopped; 
sitter at bedside; no residual per GTF; repositioned; continue to monitor; continue plan of 
care.

## 2022-11-19 VITALS — SYSTOLIC BLOOD PRESSURE: 152 MMHG | DIASTOLIC BLOOD PRESSURE: 88 MMHG

## 2022-11-19 VITALS — DIASTOLIC BLOOD PRESSURE: 73 MMHG | SYSTOLIC BLOOD PRESSURE: 135 MMHG

## 2022-11-19 VITALS — DIASTOLIC BLOOD PRESSURE: 94 MMHG | SYSTOLIC BLOOD PRESSURE: 142 MMHG

## 2022-11-19 VITALS — DIASTOLIC BLOOD PRESSURE: 64 MMHG | SYSTOLIC BLOOD PRESSURE: 140 MMHG

## 2022-11-19 VITALS — SYSTOLIC BLOOD PRESSURE: 126 MMHG | DIASTOLIC BLOOD PRESSURE: 70 MMHG

## 2022-11-19 RX ADMIN — Medication SCH MG: at 09:50

## 2022-11-19 RX ADMIN — VALPROIC ACID SCH MG: 250 SOLUTION ORAL at 09:47

## 2022-11-19 RX ADMIN — NYSTATIN SCH GM: 100000 OINTMENT TOPICAL at 21:17

## 2022-11-19 RX ADMIN — FLUCONAZOLE SCH MLS/HR: 400 INJECTION, SOLUTION INTRAVENOUS at 12:13

## 2022-11-19 RX ADMIN — CHLORHEXIDINE GLUCONATE SCH ML: 1.2 RINSE ORAL at 12:10

## 2022-11-19 RX ADMIN — VALPROIC ACID SCH MG: 250 SOLUTION ORAL at 21:00

## 2022-11-19 RX ADMIN — Medication SCH EACH: at 21:00

## 2022-11-19 RX ADMIN — PANTOPRAZOLE SODIUM SCH MG: 40 GRANULE, DELAYED RELEASE ORAL at 09:47

## 2022-11-19 RX ADMIN — VITAMIN D, TAB 1000IU (100/BT) SCH UNIT: 25 TAB at 09:50

## 2022-11-19 RX ADMIN — Medication SCH EACH: at 17:14

## 2022-11-19 RX ADMIN — ZINC SULFATE CAP 220 MG (50 MG ELEMENTAL ZN) SCH MG: 220 (50 ZN) CAP at 09:49

## 2022-11-19 RX ADMIN — METFORMIN HYDROCHLORIDE SCH MG: 500 TABLET ORAL at 09:55

## 2022-11-19 RX ADMIN — Medication SCH ML: at 16:39

## 2022-11-19 RX ADMIN — SODIUM CHLORIDE PRN MLS/HR: 0.9 INJECTION, SOLUTION INTRAVENOUS at 06:19

## 2022-11-19 RX ADMIN — Medication SCH EACH: at 09:50

## 2022-11-19 RX ADMIN — CHLORHEXIDINE GLUCONATE SCH ML: 1.2 RINSE ORAL at 16:39

## 2022-11-19 RX ADMIN — FENOFIBRATE SCH MG: 145 TABLET ORAL at 09:50

## 2022-11-19 RX ADMIN — Medication SCH MG: at 09:51

## 2022-11-19 RX ADMIN — SODIUM CHLORIDE PRN UNIT: 9 INJECTION, SOLUTION INTRAVENOUS at 05:08

## 2022-11-19 RX ADMIN — SENNOSIDES SCH TAB: 8.6 TABLET, COATED ORAL at 21:00

## 2022-11-19 RX ADMIN — METFORMIN HYDROCHLORIDE SCH MG: 500 TABLET ORAL at 17:44

## 2022-11-19 RX ADMIN — CHLORHEXIDINE GLUCONATE SCH ML: 1.2 RINSE ORAL at 09:48

## 2022-11-19 RX ADMIN — ACETAMINOPHEN PRN MG: 325 TABLET ORAL at 10:06

## 2022-11-19 RX ADMIN — Medication SCH ML: at 09:51

## 2022-11-19 RX ADMIN — Medication SCH MG: at 16:40

## 2022-11-19 RX ADMIN — Medication SCH EACH: at 05:07

## 2022-11-19 RX ADMIN — Medication SCH EACH: at 12:10

## 2022-11-19 RX ADMIN — ENOXAPARIN SODIUM SCH MG: 40 INJECTION SUBCUTANEOUS at 09:48

## 2022-11-19 RX ADMIN — Medication SCH ML: at 12:08

## 2022-11-19 RX ADMIN — ATORVASTATIN CALCIUM SCH MG: 20 TABLET, FILM COATED ORAL at 21:00

## 2022-11-19 RX ADMIN — THERA TABS SCH UDTAB: TAB at 09:50

## 2022-11-19 NOTE — NUR
RESTING COMFORTABLY IN BED NO SS OF PAIN OR DISTRESS,  NEPHROSTOMY TUBE RIDE SIDE DRAINING 
CLEAR URINE. RESTARTED FEEDING AS ORDERED. PLAN DC TODAY. SEE CM NOTES

## 2022-11-20 VITALS — SYSTOLIC BLOOD PRESSURE: 100 MMHG | DIASTOLIC BLOOD PRESSURE: 83 MMHG

## 2022-11-20 VITALS — DIASTOLIC BLOOD PRESSURE: 68 MMHG | SYSTOLIC BLOOD PRESSURE: 128 MMHG

## 2022-11-20 VITALS — DIASTOLIC BLOOD PRESSURE: 69 MMHG | SYSTOLIC BLOOD PRESSURE: 122 MMHG

## 2022-11-20 VITALS — DIASTOLIC BLOOD PRESSURE: 92 MMHG | SYSTOLIC BLOOD PRESSURE: 147 MMHG

## 2022-11-20 LAB
BUN SERPL-MCNC: 9 MG/DL (ref 7–18)
CHLORIDE SERPL-SCNC: 108 MMOL/L (ref 98–107)
CO2 SERPL-SCNC: 26 MMOL/L (ref 21–32)
CREAT SERPL-MCNC: 0.4 MG/DL (ref 0.6–1.3)
GLUCOSE SERPL-MCNC: 164 MG/DL (ref 74–106)
HCT VFR BLD AUTO: 25.4 % (ref 31.2–41.9)
MAGNESIUM SERPL-MCNC: 1.6 MG/DL (ref 1.8–2.4)
MCH RBC QN AUTO: 32.1 UUG (ref 24.7–32.8)
MCV RBC AUTO: 97 FL (ref 75.5–95.3)
PHOSPHATE SERPL-MCNC: 3 MG/DL (ref 2.5–4.9)
PLATELET # BLD AUTO: 316 K/UL (ref 179–408)
POTASSIUM SERPL-SCNC: 3.6 MMOL/L (ref 3.5–5.1)

## 2022-11-20 RX ADMIN — FLUCONAZOLE SCH MLS/HR: 400 INJECTION, SOLUTION INTRAVENOUS at 12:15

## 2022-11-20 RX ADMIN — Medication SCH EACH: at 08:02

## 2022-11-20 RX ADMIN — Medication SCH EACH: at 17:15

## 2022-11-20 RX ADMIN — NYSTATIN SCH GM: 100000 OINTMENT TOPICAL at 20:10

## 2022-11-20 RX ADMIN — CHLORHEXIDINE GLUCONATE SCH ML: 1.2 RINSE ORAL at 17:15

## 2022-11-20 RX ADMIN — SODIUM CHLORIDE PRN UNIT: 9 INJECTION, SOLUTION INTRAVENOUS at 06:10

## 2022-11-20 RX ADMIN — VALPROIC ACID SCH MG: 250 SOLUTION ORAL at 20:46

## 2022-11-20 RX ADMIN — CHLORHEXIDINE GLUCONATE SCH ML: 1.2 RINSE ORAL at 13:22

## 2022-11-20 RX ADMIN — SODIUM CHLORIDE PRN MLS/HR: 0.9 INJECTION, SOLUTION INTRAVENOUS at 00:30

## 2022-11-20 RX ADMIN — SODIUM CHLORIDE PRN MLS/HR: 0.9 INJECTION, SOLUTION INTRAVENOUS at 16:57

## 2022-11-20 RX ADMIN — Medication SCH ML: at 11:15

## 2022-11-20 RX ADMIN — Medication SCH MG: at 17:14

## 2022-11-20 RX ADMIN — SENNOSIDES SCH TAB: 8.6 TABLET, COATED ORAL at 20:45

## 2022-11-20 RX ADMIN — Medication SCH MG: at 08:02

## 2022-11-20 RX ADMIN — Medication PRN ML: at 07:59

## 2022-11-20 RX ADMIN — FENOFIBRATE SCH MG: 145 TABLET ORAL at 08:03

## 2022-11-20 RX ADMIN — Medication SCH MG: at 08:33

## 2022-11-20 RX ADMIN — METFORMIN HYDROCHLORIDE SCH MG: 500 TABLET ORAL at 17:14

## 2022-11-20 RX ADMIN — Medication SCH EACH: at 20:45

## 2022-11-20 RX ADMIN — Medication SCH EACH: at 06:04

## 2022-11-20 RX ADMIN — MAGNESIUM SULFATE IN DEXTROSE SCH MLS/HR: 10 INJECTION, SOLUTION INTRAVENOUS at 09:42

## 2022-11-20 RX ADMIN — VALPROIC ACID SCH MG: 250 SOLUTION ORAL at 08:02

## 2022-11-20 RX ADMIN — NYSTATIN SCH GM: 100000 OINTMENT TOPICAL at 08:33

## 2022-11-20 RX ADMIN — PANTOPRAZOLE SODIUM SCH MG: 40 GRANULE, DELAYED RELEASE ORAL at 08:02

## 2022-11-20 RX ADMIN — MAGNESIUM SULFATE IN DEXTROSE SCH MLS/HR: 10 INJECTION, SOLUTION INTRAVENOUS at 10:22

## 2022-11-20 RX ADMIN — THERA TABS SCH UDTAB: TAB at 08:02

## 2022-11-20 RX ADMIN — Medication SCH ML: at 08:33

## 2022-11-20 RX ADMIN — ZINC SULFATE CAP 220 MG (50 MG ELEMENTAL ZN) SCH MG: 220 (50 ZN) CAP at 08:03

## 2022-11-20 RX ADMIN — Medication SCH ML: at 17:14

## 2022-11-20 RX ADMIN — SODIUM CHLORIDE PRN UNIT: 9 INJECTION, SOLUTION INTRAVENOUS at 23:01

## 2022-11-20 RX ADMIN — CHLORHEXIDINE GLUCONATE SCH ML: 1.2 RINSE ORAL at 08:33

## 2022-11-20 RX ADMIN — ENOXAPARIN SODIUM SCH MG: 40 INJECTION SUBCUTANEOUS at 08:04

## 2022-11-20 RX ADMIN — Medication SCH EACH: at 11:15

## 2022-11-20 RX ADMIN — SODIUM CHLORIDE PRN UNIT: 9 INJECTION, SOLUTION INTRAVENOUS at 00:24

## 2022-11-20 RX ADMIN — SODIUM CHLORIDE PRN UNIT: 9 INJECTION, SOLUTION INTRAVENOUS at 12:00

## 2022-11-20 RX ADMIN — METFORMIN HYDROCHLORIDE SCH MG: 500 TABLET ORAL at 08:02

## 2022-11-20 RX ADMIN — ACETAMINOPHEN PRN MG: 325 TABLET ORAL at 05:01

## 2022-11-20 RX ADMIN — VITAMIN D, TAB 1000IU (100/BT) SCH UNIT: 25 TAB at 08:03

## 2022-11-20 RX ADMIN — Medication SCH EACH: at 00:23

## 2022-11-20 RX ADMIN — Medication SCH EACH: at 23:01

## 2022-11-20 RX ADMIN — ATORVASTATIN CALCIUM SCH MG: 20 TABLET, FILM COATED ORAL at 20:45

## 2022-11-21 VITALS — DIASTOLIC BLOOD PRESSURE: 80 MMHG | SYSTOLIC BLOOD PRESSURE: 150 MMHG

## 2022-11-21 VITALS — SYSTOLIC BLOOD PRESSURE: 118 MMHG | DIASTOLIC BLOOD PRESSURE: 80 MMHG

## 2022-11-21 VITALS — SYSTOLIC BLOOD PRESSURE: 131 MMHG | DIASTOLIC BLOOD PRESSURE: 62 MMHG

## 2022-11-21 VITALS — SYSTOLIC BLOOD PRESSURE: 142 MMHG | DIASTOLIC BLOOD PRESSURE: 82 MMHG

## 2022-11-21 LAB
BUN SERPL-MCNC: 6 MG/DL (ref 7–18)
CHLORIDE SERPL-SCNC: 110 MMOL/L (ref 98–107)
CO2 SERPL-SCNC: 25 MMOL/L (ref 21–32)
CREAT SERPL-MCNC: 0.3 MG/DL (ref 0.6–1.3)
GLUCOSE SERPL-MCNC: 133 MG/DL (ref 74–106)
MAGNESIUM SERPL-MCNC: 1.8 MG/DL (ref 1.8–2.4)
POTASSIUM SERPL-SCNC: 3.7 MMOL/L (ref 3.5–5.1)

## 2022-11-21 RX ADMIN — Medication SCH EACH: at 21:52

## 2022-11-21 RX ADMIN — Medication PRN ML: at 07:17

## 2022-11-21 RX ADMIN — CHLORHEXIDINE GLUCONATE SCH ML: 1.2 RINSE ORAL at 13:28

## 2022-11-21 RX ADMIN — FENOFIBRATE SCH MG: 145 TABLET ORAL at 08:15

## 2022-11-21 RX ADMIN — ENOXAPARIN SODIUM SCH MG: 40 INJECTION SUBCUTANEOUS at 08:19

## 2022-11-21 RX ADMIN — Medication SCH EACH: at 08:14

## 2022-11-21 RX ADMIN — VALPROIC ACID SCH MG: 250 SOLUTION ORAL at 08:14

## 2022-11-21 RX ADMIN — Medication SCH EACH: at 17:17

## 2022-11-21 RX ADMIN — CHLORHEXIDINE GLUCONATE SCH ML: 1.2 RINSE ORAL at 16:10

## 2022-11-21 RX ADMIN — Medication SCH EACH: at 05:38

## 2022-11-21 RX ADMIN — Medication SCH EACH: at 23:44

## 2022-11-21 RX ADMIN — ZINC SULFATE CAP 220 MG (50 MG ELEMENTAL ZN) SCH MG: 220 (50 ZN) CAP at 08:15

## 2022-11-21 RX ADMIN — METFORMIN HYDROCHLORIDE SCH MG: 500 TABLET ORAL at 17:17

## 2022-11-21 RX ADMIN — SODIUM CHLORIDE PRN MLS/HR: 0.9 INJECTION, SOLUTION INTRAVENOUS at 05:38

## 2022-11-21 RX ADMIN — SENNOSIDES SCH TAB: 8.6 TABLET, COATED ORAL at 21:55

## 2022-11-21 RX ADMIN — THERA TABS SCH UDTAB: TAB at 08:15

## 2022-11-21 RX ADMIN — VITAMIN D, TAB 1000IU (100/BT) SCH UNIT: 25 TAB at 08:15

## 2022-11-21 RX ADMIN — NYSTATIN SCH GM: 100000 OINTMENT TOPICAL at 21:58

## 2022-11-21 RX ADMIN — Medication SCH ML: at 11:17

## 2022-11-21 RX ADMIN — VALPROIC ACID SCH MG: 250 SOLUTION ORAL at 21:52

## 2022-11-21 RX ADMIN — Medication SCH MG: at 08:16

## 2022-11-21 RX ADMIN — SODIUM CHLORIDE PRN MLS/HR: 0.9 INJECTION, SOLUTION INTRAVENOUS at 17:30

## 2022-11-21 RX ADMIN — PANTOPRAZOLE SODIUM SCH MG: 40 GRANULE, DELAYED RELEASE ORAL at 08:14

## 2022-11-21 RX ADMIN — SODIUM CHLORIDE PRN UNIT: 9 INJECTION, SOLUTION INTRAVENOUS at 05:39

## 2022-11-21 RX ADMIN — Medication SCH EACH: at 11:14

## 2022-11-21 RX ADMIN — NYSTATIN SCH GM: 100000 OINTMENT TOPICAL at 08:25

## 2022-11-21 RX ADMIN — FLUCONAZOLE SCH MG: 200 TABLET ORAL at 13:28

## 2022-11-21 RX ADMIN — Medication SCH ML: at 08:16

## 2022-11-21 RX ADMIN — METFORMIN HYDROCHLORIDE SCH MG: 500 TABLET ORAL at 08:14

## 2022-11-21 RX ADMIN — ATORVASTATIN CALCIUM SCH MG: 20 TABLET, FILM COATED ORAL at 21:54

## 2022-11-21 RX ADMIN — SODIUM CHLORIDE PRN UNIT: 9 INJECTION, SOLUTION INTRAVENOUS at 11:23

## 2022-11-21 RX ADMIN — Medication SCH MG: at 08:14

## 2022-11-21 RX ADMIN — Medication SCH MG: at 16:09

## 2022-11-21 RX ADMIN — Medication SCH ML: at 16:09

## 2022-11-21 RX ADMIN — CHLORHEXIDINE GLUCONATE SCH ML: 1.2 RINSE ORAL at 08:24

## 2022-11-21 RX ADMIN — SODIUM CHLORIDE PRN UNIT: 9 INJECTION, SOLUTION INTRAVENOUS at 23:35

## 2022-11-22 VITALS — SYSTOLIC BLOOD PRESSURE: 146 MMHG | DIASTOLIC BLOOD PRESSURE: 86 MMHG

## 2022-11-22 VITALS — DIASTOLIC BLOOD PRESSURE: 64 MMHG | SYSTOLIC BLOOD PRESSURE: 124 MMHG

## 2022-11-22 RX ADMIN — CHLORHEXIDINE GLUCONATE SCH ML: 1.2 RINSE ORAL at 16:06

## 2022-11-22 RX ADMIN — PANTOPRAZOLE SODIUM SCH MG: 40 GRANULE, DELAYED RELEASE ORAL at 08:51

## 2022-11-22 RX ADMIN — Medication SCH EACH: at 08:53

## 2022-11-22 RX ADMIN — FENOFIBRATE SCH MG: 145 TABLET ORAL at 08:39

## 2022-11-22 RX ADMIN — ENOXAPARIN SODIUM SCH MG: 40 INJECTION SUBCUTANEOUS at 08:49

## 2022-11-22 RX ADMIN — Medication SCH ML: at 16:05

## 2022-11-22 RX ADMIN — CHLORHEXIDINE GLUCONATE SCH ML: 1.2 RINSE ORAL at 12:26

## 2022-11-22 RX ADMIN — CHLORHEXIDINE GLUCONATE SCH ML: 1.2 RINSE ORAL at 08:50

## 2022-11-22 RX ADMIN — Medication SCH MG: at 08:39

## 2022-11-22 RX ADMIN — VITAMIN D, TAB 1000IU (100/BT) SCH UNIT: 25 TAB at 08:39

## 2022-11-22 RX ADMIN — NYSTATIN SCH GM: 100000 OINTMENT TOPICAL at 08:53

## 2022-11-22 RX ADMIN — Medication SCH ML: at 08:15

## 2022-11-22 RX ADMIN — VALPROIC ACID SCH MG: 250 SOLUTION ORAL at 08:53

## 2022-11-22 RX ADMIN — METFORMIN HYDROCHLORIDE SCH MG: 500 TABLET ORAL at 08:15

## 2022-11-22 RX ADMIN — Medication SCH EACH: at 06:49

## 2022-11-22 RX ADMIN — Medication SCH MG: at 08:49

## 2022-11-22 RX ADMIN — Medication SCH MG: at 16:05

## 2022-11-22 RX ADMIN — Medication SCH EACH: at 11:48

## 2022-11-22 RX ADMIN — FLUCONAZOLE SCH MG: 200 TABLET ORAL at 12:25

## 2022-11-22 RX ADMIN — ZINC SULFATE CAP 220 MG (50 MG ELEMENTAL ZN) SCH MG: 220 (50 ZN) CAP at 08:54

## 2022-11-22 RX ADMIN — THERA TABS SCH UDTAB: TAB at 08:39

## 2022-11-22 RX ADMIN — Medication SCH ML: at 12:26

## 2022-11-22 NOTE — NUR
PATIENT DISCHARGING TO [facility], INSTRUCTION GIVEN TO DAUGHTER AND CAREGIVER AS WELL, 
NEPHROSTOMY IN PLACE, G-TUBE IN PLACE, NO SOB, RESPIRATIONS ARE EVEN NONLABORED, SKIN WARM 
AND DRY TO TOUCH, NO SKIN ISSUES NOTED, PATIENT IS ON 1-2 LITER OF O2 AS NEEDED, [facility] 
[facility] [place] HAS O2 AS NEEDED AVAILABLE. ID REMOVED, IV REMOVED. BELONGINGS ARE ACCOUNTED 
AND SIGNED, CAREGIVER WILL TAKE WITH PATIENT.

## 2022-11-22 NOTE — NUR
Patient daughter and  from [place] called and said they can not accept her. case 
manager Herson, daughter and emileeum on the phone talking to each other, and trying to fix 
the problem. patient daughter stated that patient is cleared to go to back.

## 2022-11-22 NOTE — NUR
patient discharged

-------------------------------------------------------------------------------

Addendum: 11/22/22 at 1705 by YG MOHAMUD RN, RN

-------------------------------------------------------------------------------

Amended: Links added.

## 2022-11-22 NOTE — NUR
patient was cleared to go to [place] by  jean iglesias, and daughter, patient 
left facility at 1637, stable condition, no distress noted.

## 2022-12-06 ENCOUNTER — HOSPITAL ENCOUNTER (INPATIENT)
Dept: HOSPITAL 12 - ER | Age: 52
LOS: 16 days | Discharge: NURSING, REHAB OR LONG-TERM CARE | DRG: 720 | End: 2022-12-22
Payer: MEDICAID

## 2022-12-06 VITALS — BODY MASS INDEX: 25.4 KG/M2 | HEIGHT: 62 IN | WEIGHT: 138 LBS

## 2022-12-06 DIAGNOSIS — E87.6: ICD-10-CM

## 2022-12-06 DIAGNOSIS — Z87.440: ICD-10-CM

## 2022-12-06 DIAGNOSIS — Z93.1: ICD-10-CM

## 2022-12-06 DIAGNOSIS — N20.0: ICD-10-CM

## 2022-12-06 DIAGNOSIS — G80.9: ICD-10-CM

## 2022-12-06 DIAGNOSIS — E11.9: ICD-10-CM

## 2022-12-06 DIAGNOSIS — E86.0: ICD-10-CM

## 2022-12-06 DIAGNOSIS — E88.09: ICD-10-CM

## 2022-12-06 DIAGNOSIS — Z79.4: ICD-10-CM

## 2022-12-06 DIAGNOSIS — B96.1: ICD-10-CM

## 2022-12-06 DIAGNOSIS — R65.21: ICD-10-CM

## 2022-12-06 DIAGNOSIS — G40.909: ICD-10-CM

## 2022-12-06 DIAGNOSIS — E44.0: ICD-10-CM

## 2022-12-06 DIAGNOSIS — R79.89: ICD-10-CM

## 2022-12-06 DIAGNOSIS — D64.9: ICD-10-CM

## 2022-12-06 DIAGNOSIS — G93.41: ICD-10-CM

## 2022-12-06 DIAGNOSIS — Z79.84: ICD-10-CM

## 2022-12-06 DIAGNOSIS — E87.0: ICD-10-CM

## 2022-12-06 DIAGNOSIS — A41.9: Primary | ICD-10-CM

## 2022-12-06 DIAGNOSIS — E86.1: ICD-10-CM

## 2022-12-06 DIAGNOSIS — Z87.01: ICD-10-CM

## 2022-12-06 DIAGNOSIS — R13.10: ICD-10-CM

## 2022-12-06 DIAGNOSIS — N39.0: ICD-10-CM

## 2022-12-06 DIAGNOSIS — E83.9: ICD-10-CM

## 2022-12-06 DIAGNOSIS — Z87.442: ICD-10-CM

## 2022-12-06 DIAGNOSIS — Z93.6: ICD-10-CM

## 2022-12-06 DIAGNOSIS — Z74.01: ICD-10-CM

## 2022-12-06 DIAGNOSIS — J90: ICD-10-CM

## 2022-12-06 LAB
ALP SERPL-CCNC: 133 U/L (ref 50–136)
ALT SERPL W/O P-5'-P-CCNC: 11 U/L (ref 14–59)
APPEARANCE UR: (no result)
AST SERPL-CCNC: 14 U/L (ref 15–37)
BILIRUB DIRECT SERPL-MCNC: 0.2 MG/DL (ref 0–0.2)
BILIRUB SERPL-MCNC: 0.3 MG/DL (ref 0.2–1)
BILIRUB UR QL STRIP: NEGATIVE
BUN SERPL-MCNC: 50 MG/DL (ref 7–18)
CHLORIDE SERPL-SCNC: 120 MMOL/L (ref 98–107)
CO2 SERPL-SCNC: 29 MMOL/L (ref 21–32)
COLOR UR: (no result)
CREAT SERPL-MCNC: 0.7 MG/DL (ref 0.6–1.3)
GLUCOSE SERPL-MCNC: 265 MG/DL (ref 74–106)
GLUCOSE UR STRIP-MCNC: (no result) MG/DL
HCT VFR BLD AUTO: 30.2 % (ref 31.2–41.9)
HGB UR QL STRIP: (no result)
KETONES UR STRIP-MCNC: NEGATIVE MG/DL
LEUKOCYTE ESTERASE UR QL STRIP: (no result)
MCH RBC QN AUTO: 31 UUG (ref 24.7–32.8)
MCV RBC AUTO: 97.1 FL (ref 75.5–95.3)
NEUTS SEG NFR BLD MANUAL: 0 % (ref 42–75)
NITRITE UR QL STRIP: NEGATIVE
PH UR STRIP: 5.5 [PH] (ref 5–8)
PLATELET # BLD AUTO: 465 K/UL (ref 179–408)
POTASSIUM SERPL-SCNC: 2.8 MMOL/L (ref 3.5–5.1)
SP GR UR STRIP: 1.01 (ref 1–1.03)
UROBILINOGEN UR STRIP-MCNC: 0.2 E.U./DL
WS STN SPEC: 6.9 G/DL (ref 6.4–8.2)

## 2022-12-06 PROCEDURE — A4663 DIALYSIS BLOOD PRESSURE CUFF: HCPCS

## 2022-12-06 PROCEDURE — G0378 HOSPITAL OBSERVATION PER HR: HCPCS

## 2022-12-06 PROCEDURE — P9016 RBC LEUKOCYTES REDUCED: HCPCS

## 2022-12-06 PROCEDURE — A9150 MISC/EXPER NON-PRESCRIPT DRU: HCPCS

## 2022-12-06 PROCEDURE — A6213 FOAM DRG >16<=48 SQ IN W/BDR: HCPCS

## 2022-12-06 PROCEDURE — A6209 FOAM DRSG <=16 SQ IN W/O BDR: HCPCS

## 2022-12-06 RX ADMIN — POTASSIUM CHLORIDE SCH MLS/HR: 14.9 INJECTION, SOLUTION INTRAVENOUS at 18:45

## 2022-12-06 RX ADMIN — POTASSIUM CHLORIDE SCH MLS/HR: 14.9 INJECTION, SOLUTION INTRAVENOUS at 19:45

## 2022-12-07 VITALS — DIASTOLIC BLOOD PRESSURE: 64 MMHG | SYSTOLIC BLOOD PRESSURE: 103 MMHG

## 2022-12-07 VITALS — SYSTOLIC BLOOD PRESSURE: 107 MMHG | DIASTOLIC BLOOD PRESSURE: 60 MMHG

## 2022-12-07 VITALS — SYSTOLIC BLOOD PRESSURE: 94 MMHG | DIASTOLIC BLOOD PRESSURE: 46 MMHG

## 2022-12-07 VITALS — SYSTOLIC BLOOD PRESSURE: 98 MMHG | DIASTOLIC BLOOD PRESSURE: 59 MMHG

## 2022-12-07 LAB
BUN SERPL-MCNC: 42 MG/DL (ref 7–18)
BUN SERPL-MCNC: 47 MG/DL (ref 7–18)
CHLORIDE SERPL-SCNC: 131 MMOL/L (ref 98–107)
CHLORIDE SERPL-SCNC: 135 MMOL/L (ref 98–107)
CO2 SERPL-SCNC: 22 MMOL/L (ref 21–32)
CO2 SERPL-SCNC: 23 MMOL/L (ref 21–32)
CREAT SERPL-MCNC: 0.7 MG/DL (ref 0.6–1.3)
CREAT SERPL-MCNC: 0.7 MG/DL (ref 0.6–1.3)
DEPRECATED SQUAMOUS URNS QL MICRO: (no result) /HPF
GLUCOSE SERPL-MCNC: 157 MG/DL (ref 74–106)
GLUCOSE SERPL-MCNC: 171 MG/DL (ref 74–106)
HCT VFR BLD AUTO: 30.4 % (ref 31.2–41.9)
MCH RBC QN AUTO: 31.6 UUG (ref 24.7–32.8)
MCV RBC AUTO: 97 FL (ref 75.5–95.3)
PLATELET # BLD AUTO: 515 K/UL (ref 179–408)
POTASSIUM SERPL-SCNC: 2.6 MMOL/L (ref 3.5–5.1)
POTASSIUM SERPL-SCNC: 3 MMOL/L (ref 3.5–5.1)
RBC #/AREA URNS HPF: (no result) /HPF (ref 0–3)
WBC #/AREA URNS HPF: (no result) /HPF
WBC #/AREA URNS HPF: (no result) /HPF (ref 0–3)

## 2022-12-07 RX ADMIN — Medication SCH MG: at 20:38

## 2022-12-07 RX ADMIN — Medication SCH MG: at 12:00

## 2022-12-07 RX ADMIN — Medication SCH EACH: at 11:37

## 2022-12-07 RX ADMIN — LINEZOLID SCH MLS/HR: 600 INJECTION, SOLUTION INTRAVENOUS at 21:23

## 2022-12-07 RX ADMIN — POTASSIUM CHLORIDE SCH MLS/HR: 14.9 INJECTION, SOLUTION INTRAVENOUS at 21:23

## 2022-12-07 RX ADMIN — Medication SCH MG: at 11:59

## 2022-12-07 RX ADMIN — Medication SCH EACH: at 18:29

## 2022-12-07 RX ADMIN — SODIUM CHLORIDE PRN UNIT: 9 INJECTION, SOLUTION INTRAVENOUS at 11:37

## 2022-12-07 RX ADMIN — Medication SCH MG: at 10:23

## 2022-12-07 RX ADMIN — SENNOSIDES SCH TAB: 8.6 TABLET, COATED ORAL at 20:37

## 2022-12-07 RX ADMIN — VITAMIN D, TAB 1000IU (100/BT) SCH UNIT: 25 TAB at 11:44

## 2022-12-07 RX ADMIN — Medication SCH EACH: at 10:23

## 2022-12-07 RX ADMIN — VALPROIC ACID SCH MG: 250 SOLUTION ORAL at 20:38

## 2022-12-07 RX ADMIN — POTASSIUM CHLORIDE SCH MLS/HR: 14.9 INJECTION, SOLUTION INTRAVENOUS at 20:17

## 2022-12-07 RX ADMIN — ZINC SULFATE CAP 220 MG (50 MG ELEMENTAL ZN) SCH MG: 220 (50 ZN) CAP at 10:33

## 2022-12-07 RX ADMIN — DEXTROSE SCH MLS/HR: 50 INJECTION, SOLUTION INTRAVENOUS at 22:35

## 2022-12-07 RX ADMIN — Medication SCH ML: at 17:14

## 2022-12-07 RX ADMIN — LINEZOLID SCH MLS/HR: 600 INJECTION, SOLUTION INTRAVENOUS at 10:30

## 2022-12-07 RX ADMIN — Medication SCH MG: at 10:30

## 2022-12-07 RX ADMIN — Medication SCH ML: at 13:07

## 2022-12-07 RX ADMIN — Medication SCH MG: at 10:24

## 2022-12-07 RX ADMIN — SODIUM CHLORIDE PRN UNIT: 9 INJECTION, SOLUTION INTRAVENOUS at 23:09

## 2022-12-07 RX ADMIN — Medication PRN ML: at 21:00

## 2022-12-07 RX ADMIN — SODIUM CHLORIDE PRN UNIT: 9 INJECTION, SOLUTION INTRAVENOUS at 18:32

## 2022-12-07 RX ADMIN — VALPROIC ACID SCH MG: 250 SOLUTION ORAL at 11:43

## 2022-12-07 RX ADMIN — Medication SCH EACH: at 20:37

## 2022-12-07 RX ADMIN — FENOFIBRATE SCH MG: 145 TABLET ORAL at 10:26

## 2022-12-07 RX ADMIN — ATORVASTATIN CALCIUM SCH MG: 20 TABLET, FILM COATED ORAL at 20:38

## 2022-12-07 RX ADMIN — CHLORHEXIDINE GLUCONATE SCH ML: 1.2 RINSE ORAL at 17:14

## 2022-12-07 RX ADMIN — CHLORHEXIDINE GLUCONATE SCH ML: 1.2 RINSE ORAL at 12:21

## 2022-12-07 RX ADMIN — Medication SCH EACH: at 23:08

## 2022-12-08 VITALS — DIASTOLIC BLOOD PRESSURE: 70 MMHG | SYSTOLIC BLOOD PRESSURE: 105 MMHG

## 2022-12-08 VITALS — SYSTOLIC BLOOD PRESSURE: 102 MMHG | DIASTOLIC BLOOD PRESSURE: 64 MMHG

## 2022-12-08 VITALS — SYSTOLIC BLOOD PRESSURE: 129 MMHG | DIASTOLIC BLOOD PRESSURE: 62 MMHG

## 2022-12-08 VITALS — SYSTOLIC BLOOD PRESSURE: 111 MMHG | DIASTOLIC BLOOD PRESSURE: 70 MMHG

## 2022-12-08 VITALS — DIASTOLIC BLOOD PRESSURE: 74 MMHG | SYSTOLIC BLOOD PRESSURE: 115 MMHG

## 2022-12-08 VITALS — SYSTOLIC BLOOD PRESSURE: 107 MMHG | DIASTOLIC BLOOD PRESSURE: 71 MMHG

## 2022-12-08 VITALS — DIASTOLIC BLOOD PRESSURE: 57 MMHG | SYSTOLIC BLOOD PRESSURE: 85 MMHG

## 2022-12-08 VITALS — DIASTOLIC BLOOD PRESSURE: 58 MMHG | SYSTOLIC BLOOD PRESSURE: 94 MMHG

## 2022-12-08 VITALS — SYSTOLIC BLOOD PRESSURE: 115 MMHG | DIASTOLIC BLOOD PRESSURE: 74 MMHG

## 2022-12-08 VITALS — DIASTOLIC BLOOD PRESSURE: 35 MMHG | SYSTOLIC BLOOD PRESSURE: 82 MMHG

## 2022-12-08 VITALS — DIASTOLIC BLOOD PRESSURE: 60 MMHG | SYSTOLIC BLOOD PRESSURE: 100 MMHG

## 2022-12-08 VITALS — SYSTOLIC BLOOD PRESSURE: 93 MMHG | DIASTOLIC BLOOD PRESSURE: 56 MMHG

## 2022-12-08 VITALS — SYSTOLIC BLOOD PRESSURE: 112 MMHG | DIASTOLIC BLOOD PRESSURE: 54 MMHG

## 2022-12-08 VITALS — DIASTOLIC BLOOD PRESSURE: 65 MMHG | SYSTOLIC BLOOD PRESSURE: 99 MMHG

## 2022-12-08 VITALS — DIASTOLIC BLOOD PRESSURE: 66 MMHG | SYSTOLIC BLOOD PRESSURE: 108 MMHG

## 2022-12-08 VITALS — SYSTOLIC BLOOD PRESSURE: 83 MMHG | DIASTOLIC BLOOD PRESSURE: 50 MMHG

## 2022-12-08 VITALS — DIASTOLIC BLOOD PRESSURE: 63 MMHG | SYSTOLIC BLOOD PRESSURE: 102 MMHG

## 2022-12-08 VITALS — SYSTOLIC BLOOD PRESSURE: 75 MMHG | DIASTOLIC BLOOD PRESSURE: 39 MMHG

## 2022-12-08 VITALS — SYSTOLIC BLOOD PRESSURE: 76 MMHG | DIASTOLIC BLOOD PRESSURE: 39 MMHG

## 2022-12-08 VITALS — SYSTOLIC BLOOD PRESSURE: 108 MMHG | DIASTOLIC BLOOD PRESSURE: 66 MMHG

## 2022-12-08 VITALS — SYSTOLIC BLOOD PRESSURE: 96 MMHG | DIASTOLIC BLOOD PRESSURE: 68 MMHG

## 2022-12-08 VITALS — SYSTOLIC BLOOD PRESSURE: 109 MMHG | DIASTOLIC BLOOD PRESSURE: 69 MMHG

## 2022-12-08 VITALS — DIASTOLIC BLOOD PRESSURE: 62 MMHG | SYSTOLIC BLOOD PRESSURE: 100 MMHG

## 2022-12-08 VITALS — DIASTOLIC BLOOD PRESSURE: 72 MMHG | SYSTOLIC BLOOD PRESSURE: 106 MMHG

## 2022-12-08 VITALS — DIASTOLIC BLOOD PRESSURE: 40 MMHG | SYSTOLIC BLOOD PRESSURE: 89 MMHG

## 2022-12-08 VITALS — SYSTOLIC BLOOD PRESSURE: 105 MMHG | DIASTOLIC BLOOD PRESSURE: 68 MMHG

## 2022-12-08 VITALS — DIASTOLIC BLOOD PRESSURE: 66 MMHG | SYSTOLIC BLOOD PRESSURE: 128 MMHG

## 2022-12-08 VITALS — DIASTOLIC BLOOD PRESSURE: 51 MMHG | SYSTOLIC BLOOD PRESSURE: 130 MMHG

## 2022-12-08 VITALS — SYSTOLIC BLOOD PRESSURE: 96 MMHG | DIASTOLIC BLOOD PRESSURE: 53 MMHG

## 2022-12-08 VITALS — DIASTOLIC BLOOD PRESSURE: 65 MMHG | SYSTOLIC BLOOD PRESSURE: 106 MMHG

## 2022-12-08 VITALS — DIASTOLIC BLOOD PRESSURE: 66 MMHG | SYSTOLIC BLOOD PRESSURE: 101 MMHG

## 2022-12-08 VITALS — DIASTOLIC BLOOD PRESSURE: 34 MMHG | SYSTOLIC BLOOD PRESSURE: 105 MMHG

## 2022-12-08 VITALS — DIASTOLIC BLOOD PRESSURE: 68 MMHG | SYSTOLIC BLOOD PRESSURE: 111 MMHG

## 2022-12-08 VITALS — DIASTOLIC BLOOD PRESSURE: 68 MMHG | SYSTOLIC BLOOD PRESSURE: 124 MMHG

## 2022-12-08 VITALS — SYSTOLIC BLOOD PRESSURE: 129 MMHG | DIASTOLIC BLOOD PRESSURE: 73 MMHG

## 2022-12-08 VITALS — SYSTOLIC BLOOD PRESSURE: 102 MMHG | DIASTOLIC BLOOD PRESSURE: 60 MMHG

## 2022-12-08 VITALS — DIASTOLIC BLOOD PRESSURE: 73 MMHG | SYSTOLIC BLOOD PRESSURE: 101 MMHG

## 2022-12-08 VITALS — DIASTOLIC BLOOD PRESSURE: 68 MMHG | SYSTOLIC BLOOD PRESSURE: 127 MMHG

## 2022-12-08 VITALS — DIASTOLIC BLOOD PRESSURE: 67 MMHG | SYSTOLIC BLOOD PRESSURE: 107 MMHG

## 2022-12-08 VITALS — SYSTOLIC BLOOD PRESSURE: 119 MMHG | DIASTOLIC BLOOD PRESSURE: 72 MMHG

## 2022-12-08 VITALS — DIASTOLIC BLOOD PRESSURE: 40 MMHG | SYSTOLIC BLOOD PRESSURE: 75 MMHG

## 2022-12-08 VITALS — SYSTOLIC BLOOD PRESSURE: 96 MMHG | DIASTOLIC BLOOD PRESSURE: 61 MMHG

## 2022-12-08 LAB
BUN SERPL-MCNC: 42 MG/DL (ref 7–18)
CHLORIDE SERPL-SCNC: 127 MMOL/L (ref 98–107)
CHOLEST SERPL-MCNC: 99 MG/DL (ref ?–200)
CO2 SERPL-SCNC: 22 MMOL/L (ref 21–32)
CREAT SERPL-MCNC: 0.8 MG/DL (ref 0.6–1.3)
GLUCOSE SERPL-MCNC: 246 MG/DL (ref 74–106)
HCT VFR BLD AUTO: 26 % (ref 31.2–41.9)
HDLC SERPL-MCNC: 16 MG/DL (ref 40–60)
MAGNESIUM SERPL-MCNC: 2.2 MG/DL (ref 1.8–2.4)
MCH RBC QN AUTO: 31.8 UUG (ref 24.7–32.8)
MCV RBC AUTO: 97.9 FL (ref 75.5–95.3)
PHOSPHATE SERPL-MCNC: 1.7 MG/DL (ref 2.5–4.9)
PLATELET # BLD AUTO: 537 K/UL (ref 179–408)
POTASSIUM SERPL-SCNC: 3.3 MMOL/L (ref 3.5–5.1)
TRIGL SERPL-MCNC: 238 MG/DL (ref 30–150)

## 2022-12-08 PROCEDURE — B546ZZA ULTRASONOGRAPHY OF RIGHT SUBCLAVIAN VEIN, GUIDANCE: ICD-10-PCS

## 2022-12-08 PROCEDURE — 05H533Z INSERTION OF INFUSION DEVICE INTO RIGHT SUBCLAVIAN VEIN, PERCUTANEOUS APPROACH: ICD-10-PCS

## 2022-12-08 RX ADMIN — VITAMIN D, TAB 1000IU (100/BT) SCH UNIT: 25 TAB at 08:49

## 2022-12-08 RX ADMIN — VALPROIC ACID SCH MG: 250 SOLUTION ORAL at 13:57

## 2022-12-08 RX ADMIN — VALPROIC ACID SCH MG: 250 SOLUTION ORAL at 21:34

## 2022-12-08 RX ADMIN — ATORVASTATIN CALCIUM SCH MG: 20 TABLET, FILM COATED ORAL at 21:28

## 2022-12-08 RX ADMIN — Medication SCH EACH: at 06:23

## 2022-12-08 RX ADMIN — FENOFIBRATE SCH MG: 145 TABLET ORAL at 13:56

## 2022-12-08 RX ADMIN — CHLORHEXIDINE GLUCONATE SCH ML: 1.2 RINSE ORAL at 17:15

## 2022-12-08 RX ADMIN — DEXTROSE SCH MLS/HR: 50 INJECTION, SOLUTION INTRAVENOUS at 06:19

## 2022-12-08 RX ADMIN — Medication SCH MG: at 13:56

## 2022-12-08 RX ADMIN — SODIUM CHLORIDE SCH MLS/HR: 9 INJECTION, SOLUTION INTRAVENOUS at 21:45

## 2022-12-08 RX ADMIN — Medication PRN ML: at 16:08

## 2022-12-08 RX ADMIN — Medication SCH ML: at 17:16

## 2022-12-08 RX ADMIN — THERA TABS SCH UDTAB: TAB at 13:56

## 2022-12-08 RX ADMIN — CHLORHEXIDINE GLUCONATE SCH ML: 1.2 RINSE ORAL at 13:52

## 2022-12-08 RX ADMIN — METOCLOPRAMIDE SCH MG: 5 INJECTION, SOLUTION INTRAMUSCULAR; INTRAVENOUS at 21:44

## 2022-12-08 RX ADMIN — SODIUM CHLORIDE PRN MLS/HR: 0.9 INJECTION, SOLUTION INTRAVENOUS at 03:40

## 2022-12-08 RX ADMIN — METOCLOPRAMIDE SCH MG: 5 INJECTION, SOLUTION INTRAMUSCULAR; INTRAVENOUS at 06:18

## 2022-12-08 RX ADMIN — DEXTROSE PRN MLS/HR: 5 SOLUTION INTRAVENOUS at 19:00

## 2022-12-08 RX ADMIN — Medication SCH EACH: at 13:48

## 2022-12-08 RX ADMIN — Medication SCH MG: at 08:49

## 2022-12-08 RX ADMIN — Medication SCH EACH: at 12:00

## 2022-12-08 RX ADMIN — SENNOSIDES SCH TAB: 8.6 TABLET, COATED ORAL at 21:28

## 2022-12-08 RX ADMIN — ACETAMINOPHEN PRN MG: 325 TABLET ORAL at 00:31

## 2022-12-08 RX ADMIN — METOCLOPRAMIDE SCH MG: 5 INJECTION, SOLUTION INTRAMUSCULAR; INTRAVENOUS at 02:28

## 2022-12-08 RX ADMIN — SODIUM CHLORIDE PRN UNIT: 9 INJECTION, SOLUTION INTRAVENOUS at 17:20

## 2022-12-08 RX ADMIN — ZINC SULFATE CAP 220 MG (50 MG ELEMENTAL ZN) SCH MG: 220 (50 ZN) CAP at 13:47

## 2022-12-08 RX ADMIN — Medication SCH MG: at 17:15

## 2022-12-08 RX ADMIN — Medication SCH EACH: at 17:21

## 2022-12-08 RX ADMIN — SODIUM CHLORIDE SCH MLS/HR: 9 INJECTION, SOLUTION INTRAVENOUS at 13:49

## 2022-12-08 RX ADMIN — DEXTROSE PRN MLS/HR: 5 SOLUTION INTRAVENOUS at 10:15

## 2022-12-08 RX ADMIN — Medication SCH EACH: at 21:28

## 2022-12-08 RX ADMIN — PANTOPRAZOLE SODIUM SCH MG: 40 GRANULE, DELAYED RELEASE ORAL at 13:48

## 2022-12-08 RX ADMIN — VALPROIC ACID SCH MG: 250 SOLUTION ORAL at 21:40

## 2022-12-08 RX ADMIN — LINEZOLID SCH MLS/HR: 600 INJECTION, SOLUTION INTRAVENOUS at 08:51

## 2022-12-08 RX ADMIN — METOCLOPRAMIDE SCH MG: 5 INJECTION, SOLUTION INTRAMUSCULAR; INTRAVENOUS at 13:48

## 2022-12-08 RX ADMIN — Medication SCH ML: at 13:50

## 2022-12-08 RX ADMIN — Medication SCH ML: at 13:51

## 2022-12-08 RX ADMIN — VALPROIC ACID SCH MG: 250 SOLUTION ORAL at 21:00

## 2022-12-08 RX ADMIN — VALPROIC ACID SCH MG: 250 SOLUTION ORAL at 21:27

## 2022-12-08 RX ADMIN — CHLORHEXIDINE GLUCONATE SCH ML: 1.2 RINSE ORAL at 13:49

## 2022-12-09 VITALS — SYSTOLIC BLOOD PRESSURE: 106 MMHG | DIASTOLIC BLOOD PRESSURE: 68 MMHG

## 2022-12-09 VITALS — SYSTOLIC BLOOD PRESSURE: 107 MMHG | DIASTOLIC BLOOD PRESSURE: 70 MMHG

## 2022-12-09 VITALS — SYSTOLIC BLOOD PRESSURE: 95 MMHG | DIASTOLIC BLOOD PRESSURE: 57 MMHG

## 2022-12-09 VITALS — DIASTOLIC BLOOD PRESSURE: 61 MMHG | SYSTOLIC BLOOD PRESSURE: 97 MMHG

## 2022-12-09 VITALS — SYSTOLIC BLOOD PRESSURE: 101 MMHG | DIASTOLIC BLOOD PRESSURE: 62 MMHG

## 2022-12-09 VITALS — SYSTOLIC BLOOD PRESSURE: 99 MMHG | DIASTOLIC BLOOD PRESSURE: 61 MMHG

## 2022-12-09 VITALS — DIASTOLIC BLOOD PRESSURE: 64 MMHG | SYSTOLIC BLOOD PRESSURE: 110 MMHG

## 2022-12-09 VITALS — DIASTOLIC BLOOD PRESSURE: 56 MMHG | SYSTOLIC BLOOD PRESSURE: 88 MMHG

## 2022-12-09 VITALS — SYSTOLIC BLOOD PRESSURE: 105 MMHG | DIASTOLIC BLOOD PRESSURE: 65 MMHG

## 2022-12-09 VITALS — SYSTOLIC BLOOD PRESSURE: 92 MMHG | DIASTOLIC BLOOD PRESSURE: 49 MMHG

## 2022-12-09 VITALS — SYSTOLIC BLOOD PRESSURE: 92 MMHG | DIASTOLIC BLOOD PRESSURE: 56 MMHG

## 2022-12-09 VITALS — DIASTOLIC BLOOD PRESSURE: 68 MMHG | SYSTOLIC BLOOD PRESSURE: 90 MMHG

## 2022-12-09 VITALS — DIASTOLIC BLOOD PRESSURE: 59 MMHG | SYSTOLIC BLOOD PRESSURE: 93 MMHG

## 2022-12-09 VITALS — SYSTOLIC BLOOD PRESSURE: 89 MMHG | DIASTOLIC BLOOD PRESSURE: 64 MMHG

## 2022-12-09 VITALS — DIASTOLIC BLOOD PRESSURE: 57 MMHG | SYSTOLIC BLOOD PRESSURE: 95 MMHG

## 2022-12-09 VITALS — DIASTOLIC BLOOD PRESSURE: 67 MMHG | SYSTOLIC BLOOD PRESSURE: 99 MMHG

## 2022-12-09 VITALS — DIASTOLIC BLOOD PRESSURE: 51 MMHG | SYSTOLIC BLOOD PRESSURE: 86 MMHG

## 2022-12-09 VITALS — DIASTOLIC BLOOD PRESSURE: 68 MMHG | SYSTOLIC BLOOD PRESSURE: 94 MMHG

## 2022-12-09 VITALS — DIASTOLIC BLOOD PRESSURE: 55 MMHG | SYSTOLIC BLOOD PRESSURE: 95 MMHG

## 2022-12-09 VITALS — SYSTOLIC BLOOD PRESSURE: 93 MMHG | DIASTOLIC BLOOD PRESSURE: 50 MMHG

## 2022-12-09 VITALS — SYSTOLIC BLOOD PRESSURE: 82 MMHG | DIASTOLIC BLOOD PRESSURE: 52 MMHG

## 2022-12-09 VITALS — SYSTOLIC BLOOD PRESSURE: 82 MMHG | DIASTOLIC BLOOD PRESSURE: 50 MMHG

## 2022-12-09 VITALS — DIASTOLIC BLOOD PRESSURE: 61 MMHG | SYSTOLIC BLOOD PRESSURE: 99 MMHG

## 2022-12-09 VITALS — DIASTOLIC BLOOD PRESSURE: 57 MMHG | SYSTOLIC BLOOD PRESSURE: 93 MMHG

## 2022-12-09 VITALS — DIASTOLIC BLOOD PRESSURE: 52 MMHG | SYSTOLIC BLOOD PRESSURE: 84 MMHG

## 2022-12-09 VITALS — DIASTOLIC BLOOD PRESSURE: 67 MMHG | SYSTOLIC BLOOD PRESSURE: 107 MMHG

## 2022-12-09 VITALS — SYSTOLIC BLOOD PRESSURE: 96 MMHG | DIASTOLIC BLOOD PRESSURE: 56 MMHG

## 2022-12-09 VITALS — SYSTOLIC BLOOD PRESSURE: 91 MMHG | DIASTOLIC BLOOD PRESSURE: 56 MMHG

## 2022-12-09 VITALS — SYSTOLIC BLOOD PRESSURE: 94 MMHG | DIASTOLIC BLOOD PRESSURE: 56 MMHG

## 2022-12-09 VITALS — DIASTOLIC BLOOD PRESSURE: 62 MMHG | SYSTOLIC BLOOD PRESSURE: 98 MMHG

## 2022-12-09 VITALS — SYSTOLIC BLOOD PRESSURE: 99 MMHG | DIASTOLIC BLOOD PRESSURE: 60 MMHG

## 2022-12-09 VITALS — DIASTOLIC BLOOD PRESSURE: 60 MMHG | SYSTOLIC BLOOD PRESSURE: 89 MMHG

## 2022-12-09 VITALS — DIASTOLIC BLOOD PRESSURE: 61 MMHG | SYSTOLIC BLOOD PRESSURE: 96 MMHG

## 2022-12-09 VITALS — DIASTOLIC BLOOD PRESSURE: 63 MMHG | SYSTOLIC BLOOD PRESSURE: 91 MMHG

## 2022-12-09 VITALS — SYSTOLIC BLOOD PRESSURE: 86 MMHG | DIASTOLIC BLOOD PRESSURE: 52 MMHG

## 2022-12-09 VITALS — SYSTOLIC BLOOD PRESSURE: 90 MMHG | DIASTOLIC BLOOD PRESSURE: 59 MMHG

## 2022-12-09 VITALS — SYSTOLIC BLOOD PRESSURE: 98 MMHG | DIASTOLIC BLOOD PRESSURE: 59 MMHG

## 2022-12-09 VITALS — SYSTOLIC BLOOD PRESSURE: 112 MMHG | DIASTOLIC BLOOD PRESSURE: 61 MMHG

## 2022-12-09 VITALS — DIASTOLIC BLOOD PRESSURE: 49 MMHG | SYSTOLIC BLOOD PRESSURE: 92 MMHG

## 2022-12-09 VITALS — SYSTOLIC BLOOD PRESSURE: 89 MMHG | DIASTOLIC BLOOD PRESSURE: 47 MMHG

## 2022-12-09 VITALS — DIASTOLIC BLOOD PRESSURE: 60 MMHG | SYSTOLIC BLOOD PRESSURE: 98 MMHG

## 2022-12-09 VITALS — DIASTOLIC BLOOD PRESSURE: 67 MMHG | SYSTOLIC BLOOD PRESSURE: 121 MMHG

## 2022-12-09 VITALS — SYSTOLIC BLOOD PRESSURE: 98 MMHG | DIASTOLIC BLOOD PRESSURE: 64 MMHG

## 2022-12-09 VITALS — SYSTOLIC BLOOD PRESSURE: 93 MMHG | DIASTOLIC BLOOD PRESSURE: 62 MMHG

## 2022-12-09 VITALS — DIASTOLIC BLOOD PRESSURE: 57 MMHG | SYSTOLIC BLOOD PRESSURE: 83 MMHG

## 2022-12-09 VITALS — SYSTOLIC BLOOD PRESSURE: 90 MMHG | DIASTOLIC BLOOD PRESSURE: 53 MMHG

## 2022-12-09 VITALS — SYSTOLIC BLOOD PRESSURE: 96 MMHG | DIASTOLIC BLOOD PRESSURE: 54 MMHG

## 2022-12-09 VITALS — SYSTOLIC BLOOD PRESSURE: 93 MMHG | DIASTOLIC BLOOD PRESSURE: 58 MMHG

## 2022-12-09 VITALS — SYSTOLIC BLOOD PRESSURE: 92 MMHG | DIASTOLIC BLOOD PRESSURE: 52 MMHG

## 2022-12-09 VITALS — SYSTOLIC BLOOD PRESSURE: 88 MMHG | DIASTOLIC BLOOD PRESSURE: 58 MMHG

## 2022-12-09 VITALS — SYSTOLIC BLOOD PRESSURE: 94 MMHG | DIASTOLIC BLOOD PRESSURE: 57 MMHG

## 2022-12-09 VITALS — SYSTOLIC BLOOD PRESSURE: 92 MMHG | DIASTOLIC BLOOD PRESSURE: 61 MMHG

## 2022-12-09 VITALS — DIASTOLIC BLOOD PRESSURE: 53 MMHG | SYSTOLIC BLOOD PRESSURE: 87 MMHG

## 2022-12-09 VITALS — DIASTOLIC BLOOD PRESSURE: 57 MMHG | SYSTOLIC BLOOD PRESSURE: 98 MMHG

## 2022-12-09 VITALS — SYSTOLIC BLOOD PRESSURE: 96 MMHG | DIASTOLIC BLOOD PRESSURE: 52 MMHG

## 2022-12-09 VITALS — DIASTOLIC BLOOD PRESSURE: 57 MMHG | SYSTOLIC BLOOD PRESSURE: 90 MMHG

## 2022-12-09 VITALS — SYSTOLIC BLOOD PRESSURE: 100 MMHG | DIASTOLIC BLOOD PRESSURE: 63 MMHG

## 2022-12-09 VITALS — SYSTOLIC BLOOD PRESSURE: 91 MMHG | DIASTOLIC BLOOD PRESSURE: 52 MMHG

## 2022-12-09 VITALS — SYSTOLIC BLOOD PRESSURE: 93 MMHG | DIASTOLIC BLOOD PRESSURE: 57 MMHG

## 2022-12-09 VITALS — DIASTOLIC BLOOD PRESSURE: 63 MMHG | SYSTOLIC BLOOD PRESSURE: 97 MMHG

## 2022-12-09 LAB
BUN SERPL-MCNC: 50 MG/DL (ref 7–18)
CHLORIDE SERPL-SCNC: 121 MMOL/L (ref 98–107)
CO2 SERPL-SCNC: 21 MMOL/L (ref 21–32)
CREAT SERPL-MCNC: 0.9 MG/DL (ref 0.6–1.3)
GLUCOSE SERPL-MCNC: 390 MG/DL (ref 74–106)
HCT VFR BLD AUTO: 27.4 % (ref 31.2–41.9)
MCH RBC QN AUTO: 31.8 UUG (ref 24.7–32.8)
MCV RBC AUTO: 100.7 FL (ref 75.5–95.3)
PHOSPHATE SERPL-MCNC: 3.3 MG/DL (ref 2.5–4.9)
PLATELET # BLD AUTO: 354 K/UL (ref 179–408)
POTASSIUM SERPL-SCNC: 3.2 MMOL/L (ref 3.5–5.1)

## 2022-12-09 RX ADMIN — SENNOSIDES SCH TAB: 8.6 TABLET, COATED ORAL at 21:00

## 2022-12-09 RX ADMIN — Medication SCH MG: at 09:00

## 2022-12-09 RX ADMIN — Medication SCH MG: at 09:15

## 2022-12-09 RX ADMIN — Medication SCH EACH: at 11:49

## 2022-12-09 RX ADMIN — SODIUM CHLORIDE SCH MLS/HR: 9 INJECTION, SOLUTION INTRAVENOUS at 15:51

## 2022-12-09 RX ADMIN — SODIUM CHLORIDE PRN UNIT: 9 INJECTION, SOLUTION INTRAVENOUS at 02:49

## 2022-12-09 RX ADMIN — PANTOPRAZOLE SODIUM SCH MG: 40 GRANULE, DELAYED RELEASE ORAL at 07:39

## 2022-12-09 RX ADMIN — METOCLOPRAMIDE SCH MG: 5 INJECTION, SOLUTION INTRAMUSCULAR; INTRAVENOUS at 06:31

## 2022-12-09 RX ADMIN — Medication SCH EACH: at 00:00

## 2022-12-09 RX ADMIN — VALPROIC ACID SCH MG: 250 SOLUTION ORAL at 21:30

## 2022-12-09 RX ADMIN — DEXTROSE PRN MLS/HR: 5 SOLUTION INTRAVENOUS at 09:24

## 2022-12-09 RX ADMIN — Medication SCH ML: at 13:39

## 2022-12-09 RX ADMIN — Medication SCH ML: at 09:12

## 2022-12-09 RX ADMIN — SODIUM CHLORIDE SCH MLS/HR: 9 INJECTION, SOLUTION INTRAVENOUS at 06:31

## 2022-12-09 RX ADMIN — CHLORHEXIDINE GLUCONATE SCH ML: 1.2 RINSE ORAL at 09:10

## 2022-12-09 RX ADMIN — Medication SCH EACH: at 09:15

## 2022-12-09 RX ADMIN — Medication SCH MG: at 16:20

## 2022-12-09 RX ADMIN — METOCLOPRAMIDE SCH MG: 5 INJECTION, SOLUTION INTRAMUSCULAR; INTRAVENOUS at 22:39

## 2022-12-09 RX ADMIN — ZINC SULFATE CAP 220 MG (50 MG ELEMENTAL ZN) SCH MG: 220 (50 ZN) CAP at 09:15

## 2022-12-09 RX ADMIN — SODIUM CHLORIDE PRN UNIT: 9 INJECTION, SOLUTION INTRAVENOUS at 17:58

## 2022-12-09 RX ADMIN — FENOFIBRATE SCH MG: 145 TABLET ORAL at 09:11

## 2022-12-09 RX ADMIN — VITAMIN D, TAB 1000IU (100/BT) SCH UNIT: 25 TAB at 09:12

## 2022-12-09 RX ADMIN — VALPROIC ACID SCH MG: 250 SOLUTION ORAL at 09:11

## 2022-12-09 RX ADMIN — Medication SCH EACH: at 17:56

## 2022-12-09 RX ADMIN — ATORVASTATIN CALCIUM SCH MG: 20 TABLET, FILM COATED ORAL at 21:31

## 2022-12-09 RX ADMIN — METOCLOPRAMIDE SCH MG: 5 INJECTION, SOLUTION INTRAMUSCULAR; INTRAVENOUS at 15:50

## 2022-12-09 RX ADMIN — SODIUM CHLORIDE SCH MLS/HR: 9 INJECTION, SOLUTION INTRAVENOUS at 22:39

## 2022-12-09 RX ADMIN — SODIUM CHLORIDE PRN UNIT: 9 INJECTION, SOLUTION INTRAVENOUS at 12:14

## 2022-12-09 RX ADMIN — THERA TABS SCH UDTAB: TAB at 09:15

## 2022-12-09 RX ADMIN — SODIUM CHLORIDE PRN UNIT: 9 INJECTION, SOLUTION INTRAVENOUS at 07:38

## 2022-12-09 RX ADMIN — CHLORHEXIDINE GLUCONATE SCH ML: 1.2 RINSE ORAL at 16:19

## 2022-12-09 RX ADMIN — Medication SCH EACH: at 21:31

## 2022-12-09 RX ADMIN — CHLORHEXIDINE GLUCONATE SCH ML: 1.2 RINSE ORAL at 13:00

## 2022-12-09 RX ADMIN — Medication SCH EACH: at 06:00

## 2022-12-09 RX ADMIN — Medication SCH ML: at 16:19

## 2022-12-10 VITALS — DIASTOLIC BLOOD PRESSURE: 66 MMHG | SYSTOLIC BLOOD PRESSURE: 120 MMHG

## 2022-12-10 VITALS — DIASTOLIC BLOOD PRESSURE: 56 MMHG | SYSTOLIC BLOOD PRESSURE: 96 MMHG

## 2022-12-10 VITALS — SYSTOLIC BLOOD PRESSURE: 130 MMHG | DIASTOLIC BLOOD PRESSURE: 69 MMHG

## 2022-12-10 VITALS — DIASTOLIC BLOOD PRESSURE: 69 MMHG | SYSTOLIC BLOOD PRESSURE: 110 MMHG

## 2022-12-10 VITALS — SYSTOLIC BLOOD PRESSURE: 106 MMHG | DIASTOLIC BLOOD PRESSURE: 70 MMHG

## 2022-12-10 VITALS — DIASTOLIC BLOOD PRESSURE: 75 MMHG | SYSTOLIC BLOOD PRESSURE: 125 MMHG

## 2022-12-10 VITALS — SYSTOLIC BLOOD PRESSURE: 116 MMHG | DIASTOLIC BLOOD PRESSURE: 65 MMHG

## 2022-12-10 VITALS — SYSTOLIC BLOOD PRESSURE: 122 MMHG | DIASTOLIC BLOOD PRESSURE: 70 MMHG

## 2022-12-10 VITALS — SYSTOLIC BLOOD PRESSURE: 110 MMHG | DIASTOLIC BLOOD PRESSURE: 72 MMHG

## 2022-12-10 VITALS — DIASTOLIC BLOOD PRESSURE: 50 MMHG | SYSTOLIC BLOOD PRESSURE: 121 MMHG

## 2022-12-10 VITALS — SYSTOLIC BLOOD PRESSURE: 108 MMHG | DIASTOLIC BLOOD PRESSURE: 72 MMHG

## 2022-12-10 VITALS — SYSTOLIC BLOOD PRESSURE: 103 MMHG | DIASTOLIC BLOOD PRESSURE: 71 MMHG

## 2022-12-10 VITALS — DIASTOLIC BLOOD PRESSURE: 65 MMHG | SYSTOLIC BLOOD PRESSURE: 103 MMHG

## 2022-12-10 VITALS — SYSTOLIC BLOOD PRESSURE: 105 MMHG | DIASTOLIC BLOOD PRESSURE: 64 MMHG

## 2022-12-10 VITALS — DIASTOLIC BLOOD PRESSURE: 71 MMHG | SYSTOLIC BLOOD PRESSURE: 121 MMHG

## 2022-12-10 VITALS — DIASTOLIC BLOOD PRESSURE: 68 MMHG | SYSTOLIC BLOOD PRESSURE: 129 MMHG

## 2022-12-10 VITALS — DIASTOLIC BLOOD PRESSURE: 71 MMHG | SYSTOLIC BLOOD PRESSURE: 123 MMHG

## 2022-12-10 VITALS — DIASTOLIC BLOOD PRESSURE: 47 MMHG | SYSTOLIC BLOOD PRESSURE: 93 MMHG

## 2022-12-10 VITALS — SYSTOLIC BLOOD PRESSURE: 112 MMHG | DIASTOLIC BLOOD PRESSURE: 75 MMHG

## 2022-12-10 VITALS — DIASTOLIC BLOOD PRESSURE: 76 MMHG | SYSTOLIC BLOOD PRESSURE: 121 MMHG

## 2022-12-10 VITALS — DIASTOLIC BLOOD PRESSURE: 58 MMHG | SYSTOLIC BLOOD PRESSURE: 101 MMHG

## 2022-12-10 VITALS — SYSTOLIC BLOOD PRESSURE: 124 MMHG | DIASTOLIC BLOOD PRESSURE: 74 MMHG

## 2022-12-10 VITALS — DIASTOLIC BLOOD PRESSURE: 78 MMHG | SYSTOLIC BLOOD PRESSURE: 125 MMHG

## 2022-12-10 VITALS — SYSTOLIC BLOOD PRESSURE: 125 MMHG | DIASTOLIC BLOOD PRESSURE: 75 MMHG

## 2022-12-10 VITALS — SYSTOLIC BLOOD PRESSURE: 111 MMHG | DIASTOLIC BLOOD PRESSURE: 63 MMHG

## 2022-12-10 VITALS — DIASTOLIC BLOOD PRESSURE: 52 MMHG | SYSTOLIC BLOOD PRESSURE: 99 MMHG

## 2022-12-10 VITALS — DIASTOLIC BLOOD PRESSURE: 69 MMHG | SYSTOLIC BLOOD PRESSURE: 126 MMHG

## 2022-12-10 VITALS — DIASTOLIC BLOOD PRESSURE: 74 MMHG | SYSTOLIC BLOOD PRESSURE: 119 MMHG

## 2022-12-10 VITALS — DIASTOLIC BLOOD PRESSURE: 64 MMHG | SYSTOLIC BLOOD PRESSURE: 108 MMHG

## 2022-12-10 VITALS — SYSTOLIC BLOOD PRESSURE: 108 MMHG | DIASTOLIC BLOOD PRESSURE: 86 MMHG

## 2022-12-10 VITALS — SYSTOLIC BLOOD PRESSURE: 113 MMHG | DIASTOLIC BLOOD PRESSURE: 66 MMHG

## 2022-12-10 VITALS — DIASTOLIC BLOOD PRESSURE: 73 MMHG | SYSTOLIC BLOOD PRESSURE: 117 MMHG

## 2022-12-10 VITALS — DIASTOLIC BLOOD PRESSURE: 49 MMHG | SYSTOLIC BLOOD PRESSURE: 86 MMHG

## 2022-12-10 VITALS — SYSTOLIC BLOOD PRESSURE: 112 MMHG | DIASTOLIC BLOOD PRESSURE: 59 MMHG

## 2022-12-10 VITALS — SYSTOLIC BLOOD PRESSURE: 124 MMHG | DIASTOLIC BLOOD PRESSURE: 70 MMHG

## 2022-12-10 VITALS — SYSTOLIC BLOOD PRESSURE: 121 MMHG | DIASTOLIC BLOOD PRESSURE: 74 MMHG

## 2022-12-10 VITALS — DIASTOLIC BLOOD PRESSURE: 71 MMHG | SYSTOLIC BLOOD PRESSURE: 110 MMHG

## 2022-12-10 VITALS — DIASTOLIC BLOOD PRESSURE: 61 MMHG | SYSTOLIC BLOOD PRESSURE: 110 MMHG

## 2022-12-10 VITALS — SYSTOLIC BLOOD PRESSURE: 128 MMHG | DIASTOLIC BLOOD PRESSURE: 75 MMHG

## 2022-12-10 VITALS — DIASTOLIC BLOOD PRESSURE: 49 MMHG | SYSTOLIC BLOOD PRESSURE: 93 MMHG

## 2022-12-10 VITALS — DIASTOLIC BLOOD PRESSURE: 64 MMHG | SYSTOLIC BLOOD PRESSURE: 110 MMHG

## 2022-12-10 VITALS — SYSTOLIC BLOOD PRESSURE: 125 MMHG | DIASTOLIC BLOOD PRESSURE: 72 MMHG

## 2022-12-10 VITALS — SYSTOLIC BLOOD PRESSURE: 106 MMHG | DIASTOLIC BLOOD PRESSURE: 56 MMHG

## 2022-12-10 VITALS — SYSTOLIC BLOOD PRESSURE: 111 MMHG | DIASTOLIC BLOOD PRESSURE: 68 MMHG

## 2022-12-10 VITALS — DIASTOLIC BLOOD PRESSURE: 57 MMHG | SYSTOLIC BLOOD PRESSURE: 105 MMHG

## 2022-12-10 VITALS — DIASTOLIC BLOOD PRESSURE: 67 MMHG | SYSTOLIC BLOOD PRESSURE: 111 MMHG

## 2022-12-10 VITALS — SYSTOLIC BLOOD PRESSURE: 124 MMHG | DIASTOLIC BLOOD PRESSURE: 64 MMHG

## 2022-12-10 VITALS — DIASTOLIC BLOOD PRESSURE: 77 MMHG | SYSTOLIC BLOOD PRESSURE: 125 MMHG

## 2022-12-10 VITALS — SYSTOLIC BLOOD PRESSURE: 93 MMHG | DIASTOLIC BLOOD PRESSURE: 64 MMHG

## 2022-12-10 VITALS — SYSTOLIC BLOOD PRESSURE: 110 MMHG | DIASTOLIC BLOOD PRESSURE: 60 MMHG

## 2022-12-10 VITALS — DIASTOLIC BLOOD PRESSURE: 62 MMHG | SYSTOLIC BLOOD PRESSURE: 127 MMHG

## 2022-12-10 VITALS — DIASTOLIC BLOOD PRESSURE: 74 MMHG | SYSTOLIC BLOOD PRESSURE: 124 MMHG

## 2022-12-10 VITALS — SYSTOLIC BLOOD PRESSURE: 115 MMHG | DIASTOLIC BLOOD PRESSURE: 73 MMHG

## 2022-12-10 VITALS — DIASTOLIC BLOOD PRESSURE: 49 MMHG | SYSTOLIC BLOOD PRESSURE: 87 MMHG

## 2022-12-10 VITALS — SYSTOLIC BLOOD PRESSURE: 101 MMHG | DIASTOLIC BLOOD PRESSURE: 57 MMHG

## 2022-12-10 VITALS — DIASTOLIC BLOOD PRESSURE: 65 MMHG | SYSTOLIC BLOOD PRESSURE: 132 MMHG

## 2022-12-10 VITALS — DIASTOLIC BLOOD PRESSURE: 48 MMHG | SYSTOLIC BLOOD PRESSURE: 85 MMHG

## 2022-12-10 VITALS — SYSTOLIC BLOOD PRESSURE: 118 MMHG | DIASTOLIC BLOOD PRESSURE: 71 MMHG

## 2022-12-10 VITALS — SYSTOLIC BLOOD PRESSURE: 105 MMHG | DIASTOLIC BLOOD PRESSURE: 71 MMHG

## 2022-12-10 VITALS — SYSTOLIC BLOOD PRESSURE: 114 MMHG | DIASTOLIC BLOOD PRESSURE: 65 MMHG

## 2022-12-10 VITALS — SYSTOLIC BLOOD PRESSURE: 119 MMHG | DIASTOLIC BLOOD PRESSURE: 65 MMHG

## 2022-12-10 VITALS — SYSTOLIC BLOOD PRESSURE: 135 MMHG | DIASTOLIC BLOOD PRESSURE: 82 MMHG

## 2022-12-10 VITALS — DIASTOLIC BLOOD PRESSURE: 73 MMHG | SYSTOLIC BLOOD PRESSURE: 112 MMHG

## 2022-12-10 VITALS — SYSTOLIC BLOOD PRESSURE: 112 MMHG | DIASTOLIC BLOOD PRESSURE: 74 MMHG

## 2022-12-10 VITALS — SYSTOLIC BLOOD PRESSURE: 116 MMHG | DIASTOLIC BLOOD PRESSURE: 75 MMHG

## 2022-12-10 LAB
BUN SERPL-MCNC: 27 MG/DL (ref 7–18)
CHLORIDE SERPL-SCNC: 108 MMOL/L (ref 98–107)
CO2 SERPL-SCNC: 21 MMOL/L (ref 21–32)
CREAT SERPL-MCNC: 0.6 MG/DL (ref 0.6–1.3)
GLUCOSE SERPL-MCNC: 387 MG/DL (ref 74–106)
HCT VFR BLD AUTO: 24.6 % (ref 31.2–41.9)
MAGNESIUM SERPL-MCNC: 2.1 MG/DL (ref 1.8–2.4)
MCH RBC QN AUTO: 30.9 UUG (ref 24.7–32.8)
MCV RBC AUTO: 98.2 FL (ref 75.5–95.3)
PHOSPHATE SERPL-MCNC: 2 MG/DL (ref 2.5–4.9)
PLATELET # BLD AUTO: 234 K/UL (ref 179–408)
POTASSIUM SERPL-SCNC: 3.6 MMOL/L (ref 3.5–5.1)

## 2022-12-10 PROCEDURE — B548ZZA ULTRASONOGRAPHY OF SUPERIOR VENA CAVA, GUIDANCE: ICD-10-PCS

## 2022-12-10 PROCEDURE — 02HV33Z INSERTION OF INFUSION DEVICE INTO SUPERIOR VENA CAVA, PERCUTANEOUS APPROACH: ICD-10-PCS

## 2022-12-10 RX ADMIN — METOCLOPRAMIDE SCH MG: 5 INJECTION, SOLUTION INTRAMUSCULAR; INTRAVENOUS at 06:37

## 2022-12-10 RX ADMIN — VITAMIN D, TAB 1000IU (100/BT) SCH UNIT: 25 TAB at 09:08

## 2022-12-10 RX ADMIN — Medication SCH EACH: at 06:00

## 2022-12-10 RX ADMIN — Medication SCH EACH: at 12:07

## 2022-12-10 RX ADMIN — FENOFIBRATE SCH MG: 145 TABLET ORAL at 09:14

## 2022-12-10 RX ADMIN — METOCLOPRAMIDE SCH MG: 5 INJECTION, SOLUTION INTRAMUSCULAR; INTRAVENOUS at 13:14

## 2022-12-10 RX ADMIN — ZINC SULFATE CAP 220 MG (50 MG ELEMENTAL ZN) SCH MG: 220 (50 ZN) CAP at 09:10

## 2022-12-10 RX ADMIN — THERA TABS SCH UDTAB: TAB at 09:08

## 2022-12-10 RX ADMIN — SODIUM CHLORIDE SCH MLS/HR: 9 INJECTION, SOLUTION INTRAVENOUS at 22:00

## 2022-12-10 RX ADMIN — SODIUM CHLORIDE SCH MLS/HR: 9 INJECTION, SOLUTION INTRAVENOUS at 13:12

## 2022-12-10 RX ADMIN — Medication SCH ML: at 16:35

## 2022-12-10 RX ADMIN — Medication SCH EACH: at 09:08

## 2022-12-10 RX ADMIN — Medication SCH MG: at 09:08

## 2022-12-10 RX ADMIN — PANTOPRAZOLE SODIUM SCH MG: 40 GRANULE, DELAYED RELEASE ORAL at 07:00

## 2022-12-10 RX ADMIN — Medication SCH MG: at 09:09

## 2022-12-10 RX ADMIN — Medication SCH EACH: at 21:36

## 2022-12-10 RX ADMIN — Medication SCH ML: at 08:56

## 2022-12-10 RX ADMIN — Medication SCH MG: at 08:56

## 2022-12-10 RX ADMIN — CHLORHEXIDINE GLUCONATE SCH ML: 1.2 RINSE ORAL at 13:11

## 2022-12-10 RX ADMIN — VALPROIC ACID SCH MG: 250 SOLUTION ORAL at 21:00

## 2022-12-10 RX ADMIN — VALPROIC ACID SCH MG: 250 SOLUTION ORAL at 09:11

## 2022-12-10 RX ADMIN — Medication SCH MG: at 16:34

## 2022-12-10 RX ADMIN — ATORVASTATIN CALCIUM SCH MG: 20 TABLET, FILM COATED ORAL at 21:36

## 2022-12-10 RX ADMIN — SODIUM CHLORIDE SCH MLS/HR: 9 INJECTION, SOLUTION INTRAVENOUS at 06:37

## 2022-12-10 RX ADMIN — ALBUTEROL SULFATE PRN MG: 2.5 SOLUTION RESPIRATORY (INHALATION) at 22:28

## 2022-12-10 RX ADMIN — CHLORHEXIDINE GLUCONATE SCH ML: 1.2 RINSE ORAL at 16:35

## 2022-12-10 RX ADMIN — ACETAMINOPHEN PRN MG: 325 TABLET ORAL at 16:44

## 2022-12-10 RX ADMIN — CHLORHEXIDINE GLUCONATE SCH ML: 1.2 RINSE ORAL at 09:12

## 2022-12-10 RX ADMIN — SODIUM CHLORIDE PRN UNIT: 9 INJECTION, SOLUTION INTRAVENOUS at 01:01

## 2022-12-10 RX ADMIN — Medication SCH EACH: at 00:58

## 2022-12-10 RX ADMIN — SENNOSIDES SCH TAB: 8.6 TABLET, COATED ORAL at 21:00

## 2022-12-10 RX ADMIN — Medication SCH ML: at 13:03

## 2022-12-10 RX ADMIN — ACETAMINOPHEN PRN MG: 325 TABLET ORAL at 04:54

## 2022-12-10 RX ADMIN — Medication SCH EACH: at 18:09

## 2022-12-10 RX ADMIN — SODIUM CHLORIDE PRN UNIT: 9 INJECTION, SOLUTION INTRAVENOUS at 12:08

## 2022-12-10 RX ADMIN — SODIUM CHLORIDE PRN MLS/HR: 0.9 INJECTION, SOLUTION INTRAVENOUS at 15:43

## 2022-12-10 RX ADMIN — DEXTROSE PRN MLS/HR: 5 SOLUTION INTRAVENOUS at 02:14

## 2022-12-10 RX ADMIN — METOCLOPRAMIDE SCH MG: 5 INJECTION, SOLUTION INTRAMUSCULAR; INTRAVENOUS at 22:00

## 2022-12-11 VITALS — SYSTOLIC BLOOD PRESSURE: 123 MMHG | DIASTOLIC BLOOD PRESSURE: 71 MMHG

## 2022-12-11 VITALS — SYSTOLIC BLOOD PRESSURE: 114 MMHG | DIASTOLIC BLOOD PRESSURE: 70 MMHG

## 2022-12-11 VITALS — SYSTOLIC BLOOD PRESSURE: 118 MMHG | DIASTOLIC BLOOD PRESSURE: 65 MMHG

## 2022-12-11 VITALS — SYSTOLIC BLOOD PRESSURE: 110 MMHG | DIASTOLIC BLOOD PRESSURE: 65 MMHG

## 2022-12-11 VITALS — SYSTOLIC BLOOD PRESSURE: 119 MMHG | DIASTOLIC BLOOD PRESSURE: 75 MMHG

## 2022-12-11 VITALS — SYSTOLIC BLOOD PRESSURE: 122 MMHG | DIASTOLIC BLOOD PRESSURE: 69 MMHG

## 2022-12-11 VITALS — DIASTOLIC BLOOD PRESSURE: 74 MMHG | SYSTOLIC BLOOD PRESSURE: 137 MMHG

## 2022-12-11 VITALS — DIASTOLIC BLOOD PRESSURE: 69 MMHG | SYSTOLIC BLOOD PRESSURE: 123 MMHG

## 2022-12-11 VITALS — SYSTOLIC BLOOD PRESSURE: 123 MMHG | DIASTOLIC BLOOD PRESSURE: 69 MMHG

## 2022-12-11 VITALS — DIASTOLIC BLOOD PRESSURE: 72 MMHG | SYSTOLIC BLOOD PRESSURE: 123 MMHG

## 2022-12-11 VITALS — DIASTOLIC BLOOD PRESSURE: 73 MMHG | SYSTOLIC BLOOD PRESSURE: 141 MMHG

## 2022-12-11 VITALS — SYSTOLIC BLOOD PRESSURE: 128 MMHG | DIASTOLIC BLOOD PRESSURE: 69 MMHG

## 2022-12-11 VITALS — SYSTOLIC BLOOD PRESSURE: 120 MMHG | DIASTOLIC BLOOD PRESSURE: 66 MMHG

## 2022-12-11 VITALS — DIASTOLIC BLOOD PRESSURE: 71 MMHG | SYSTOLIC BLOOD PRESSURE: 130 MMHG

## 2022-12-11 VITALS — SYSTOLIC BLOOD PRESSURE: 118 MMHG | DIASTOLIC BLOOD PRESSURE: 71 MMHG

## 2022-12-11 VITALS — DIASTOLIC BLOOD PRESSURE: 73 MMHG | SYSTOLIC BLOOD PRESSURE: 130 MMHG

## 2022-12-11 VITALS — SYSTOLIC BLOOD PRESSURE: 121 MMHG | DIASTOLIC BLOOD PRESSURE: 70 MMHG

## 2022-12-11 VITALS — SYSTOLIC BLOOD PRESSURE: 103 MMHG | DIASTOLIC BLOOD PRESSURE: 57 MMHG

## 2022-12-11 VITALS — SYSTOLIC BLOOD PRESSURE: 143 MMHG | DIASTOLIC BLOOD PRESSURE: 78 MMHG

## 2022-12-11 VITALS — DIASTOLIC BLOOD PRESSURE: 48 MMHG | SYSTOLIC BLOOD PRESSURE: 92 MMHG

## 2022-12-11 VITALS — DIASTOLIC BLOOD PRESSURE: 78 MMHG | SYSTOLIC BLOOD PRESSURE: 126 MMHG

## 2022-12-11 VITALS — DIASTOLIC BLOOD PRESSURE: 64 MMHG | SYSTOLIC BLOOD PRESSURE: 126 MMHG

## 2022-12-11 VITALS — SYSTOLIC BLOOD PRESSURE: 113 MMHG | DIASTOLIC BLOOD PRESSURE: 65 MMHG

## 2022-12-11 VITALS — SYSTOLIC BLOOD PRESSURE: 135 MMHG | DIASTOLIC BLOOD PRESSURE: 70 MMHG

## 2022-12-11 LAB
BUN SERPL-MCNC: 15 MG/DL (ref 7–18)
CHLORIDE SERPL-SCNC: 109 MMOL/L (ref 98–107)
CO2 SERPL-SCNC: 21 MMOL/L (ref 21–32)
CREAT SERPL-MCNC: 0.6 MG/DL (ref 0.6–1.3)
GLUCOSE SERPL-MCNC: 164 MG/DL (ref 74–106)
HCT VFR BLD AUTO: 22.3 % (ref 31.2–41.9)
MAGNESIUM SERPL-MCNC: 2 MG/DL (ref 1.8–2.4)
MCH RBC QN AUTO: 31.2 UUG (ref 24.7–32.8)
MCV RBC AUTO: 94.6 FL (ref 75.5–95.3)
PHOSPHATE SERPL-MCNC: 3.4 MG/DL (ref 2.5–4.9)
PLATELET # BLD AUTO: 208 K/UL (ref 179–408)
POTASSIUM SERPL-SCNC: 3.3 MMOL/L (ref 3.5–5.1)

## 2022-12-11 RX ADMIN — PANTOPRAZOLE SODIUM SCH MG: 40 GRANULE, DELAYED RELEASE ORAL at 07:00

## 2022-12-11 RX ADMIN — SENNOSIDES SCH TAB: 8.6 TABLET, COATED ORAL at 21:35

## 2022-12-11 RX ADMIN — SODIUM CHLORIDE SCH MLS/HR: 9 INJECTION, SOLUTION INTRAVENOUS at 14:59

## 2022-12-11 RX ADMIN — CHLORHEXIDINE GLUCONATE SCH ML: 1.2 RINSE ORAL at 13:00

## 2022-12-11 RX ADMIN — Medication SCH ML: at 13:00

## 2022-12-11 RX ADMIN — DEXTROSE SCH MLS/HR: 50 INJECTION, SOLUTION INTRAVENOUS at 11:00

## 2022-12-11 RX ADMIN — CHLORHEXIDINE GLUCONATE SCH ML: 1.2 RINSE ORAL at 17:32

## 2022-12-11 RX ADMIN — SODIUM CHLORIDE SCH MLS/HR: 9 INJECTION, SOLUTION INTRAVENOUS at 21:35

## 2022-12-11 RX ADMIN — Medication SCH EACH: at 12:00

## 2022-12-11 RX ADMIN — Medication SCH EACH: at 06:18

## 2022-12-11 RX ADMIN — CHLORHEXIDINE GLUCONATE SCH ML: 1.2 RINSE ORAL at 09:00

## 2022-12-11 RX ADMIN — Medication SCH EACH: at 18:25

## 2022-12-11 RX ADMIN — ZINC SULFATE CAP 220 MG (50 MG ELEMENTAL ZN) SCH MG: 220 (50 ZN) CAP at 09:00

## 2022-12-11 RX ADMIN — Medication SCH EACH: at 09:00

## 2022-12-11 RX ADMIN — FENOFIBRATE SCH MG: 145 TABLET ORAL at 09:00

## 2022-12-11 RX ADMIN — ATORVASTATIN CALCIUM SCH MG: 20 TABLET, FILM COATED ORAL at 21:34

## 2022-12-11 RX ADMIN — METOCLOPRAMIDE SCH MG: 5 INJECTION, SOLUTION INTRAMUSCULAR; INTRAVENOUS at 14:00

## 2022-12-11 RX ADMIN — Medication SCH ML: at 17:00

## 2022-12-11 RX ADMIN — THERA TABS SCH UDTAB: TAB at 09:00

## 2022-12-11 RX ADMIN — SODIUM CHLORIDE SCH MLS/HR: 9 INJECTION, SOLUTION INTRAVENOUS at 06:17

## 2022-12-11 RX ADMIN — Medication SCH MG: at 09:00

## 2022-12-11 RX ADMIN — METOCLOPRAMIDE SCH MG: 5 INJECTION, SOLUTION INTRAMUSCULAR; INTRAVENOUS at 21:36

## 2022-12-11 RX ADMIN — VALPROIC ACID SCH MG: 250 SOLUTION ORAL at 21:38

## 2022-12-11 RX ADMIN — Medication SCH MG: at 17:00

## 2022-12-11 RX ADMIN — SODIUM CHLORIDE PRN UNIT: 9 INJECTION, SOLUTION INTRAVENOUS at 06:20

## 2022-12-11 RX ADMIN — Medication SCH ML: at 09:00

## 2022-12-11 RX ADMIN — VALPROIC ACID SCH MG: 250 SOLUTION ORAL at 09:00

## 2022-12-11 RX ADMIN — METOCLOPRAMIDE SCH MG: 5 INJECTION, SOLUTION INTRAMUSCULAR; INTRAVENOUS at 06:18

## 2022-12-11 RX ADMIN — Medication SCH EACH: at 00:00

## 2022-12-11 RX ADMIN — VITAMIN D, TAB 1000IU (100/BT) SCH UNIT: 25 TAB at 09:00

## 2022-12-11 RX ADMIN — SODIUM CHLORIDE PRN UNIT: 9 INJECTION, SOLUTION INTRAVENOUS at 02:25

## 2022-12-11 RX ADMIN — Medication SCH EACH: at 21:34

## 2022-12-12 VITALS — DIASTOLIC BLOOD PRESSURE: 71 MMHG | SYSTOLIC BLOOD PRESSURE: 137 MMHG

## 2022-12-12 VITALS — DIASTOLIC BLOOD PRESSURE: 60 MMHG | SYSTOLIC BLOOD PRESSURE: 129 MMHG

## 2022-12-12 VITALS — SYSTOLIC BLOOD PRESSURE: 139 MMHG | DIASTOLIC BLOOD PRESSURE: 70 MMHG

## 2022-12-12 VITALS — SYSTOLIC BLOOD PRESSURE: 132 MMHG | DIASTOLIC BLOOD PRESSURE: 66 MMHG

## 2022-12-12 VITALS — SYSTOLIC BLOOD PRESSURE: 134 MMHG | DIASTOLIC BLOOD PRESSURE: 62 MMHG

## 2022-12-12 VITALS — DIASTOLIC BLOOD PRESSURE: 69 MMHG | SYSTOLIC BLOOD PRESSURE: 120 MMHG

## 2022-12-12 VITALS — DIASTOLIC BLOOD PRESSURE: 66 MMHG | SYSTOLIC BLOOD PRESSURE: 115 MMHG

## 2022-12-12 VITALS — DIASTOLIC BLOOD PRESSURE: 75 MMHG | SYSTOLIC BLOOD PRESSURE: 131 MMHG

## 2022-12-12 VITALS — DIASTOLIC BLOOD PRESSURE: 71 MMHG | SYSTOLIC BLOOD PRESSURE: 126 MMHG

## 2022-12-12 VITALS — SYSTOLIC BLOOD PRESSURE: 128 MMHG | DIASTOLIC BLOOD PRESSURE: 65 MMHG

## 2022-12-12 VITALS — DIASTOLIC BLOOD PRESSURE: 73 MMHG | SYSTOLIC BLOOD PRESSURE: 146 MMHG

## 2022-12-12 VITALS — SYSTOLIC BLOOD PRESSURE: 141 MMHG | DIASTOLIC BLOOD PRESSURE: 68 MMHG

## 2022-12-12 VITALS — DIASTOLIC BLOOD PRESSURE: 69 MMHG | SYSTOLIC BLOOD PRESSURE: 126 MMHG

## 2022-12-12 VITALS — SYSTOLIC BLOOD PRESSURE: 118 MMHG | DIASTOLIC BLOOD PRESSURE: 71 MMHG

## 2022-12-12 VITALS — SYSTOLIC BLOOD PRESSURE: 122 MMHG | DIASTOLIC BLOOD PRESSURE: 70 MMHG

## 2022-12-12 VITALS — DIASTOLIC BLOOD PRESSURE: 71 MMHG | SYSTOLIC BLOOD PRESSURE: 120 MMHG

## 2022-12-12 VITALS — DIASTOLIC BLOOD PRESSURE: 66 MMHG | SYSTOLIC BLOOD PRESSURE: 116 MMHG

## 2022-12-12 VITALS — SYSTOLIC BLOOD PRESSURE: 123 MMHG | DIASTOLIC BLOOD PRESSURE: 72 MMHG

## 2022-12-12 VITALS — DIASTOLIC BLOOD PRESSURE: 71 MMHG | SYSTOLIC BLOOD PRESSURE: 114 MMHG

## 2022-12-12 VITALS — SYSTOLIC BLOOD PRESSURE: 153 MMHG | DIASTOLIC BLOOD PRESSURE: 90 MMHG

## 2022-12-12 VITALS — DIASTOLIC BLOOD PRESSURE: 68 MMHG | SYSTOLIC BLOOD PRESSURE: 114 MMHG

## 2022-12-12 VITALS — DIASTOLIC BLOOD PRESSURE: 84 MMHG | SYSTOLIC BLOOD PRESSURE: 155 MMHG

## 2022-12-12 VITALS — DIASTOLIC BLOOD PRESSURE: 72 MMHG | SYSTOLIC BLOOD PRESSURE: 139 MMHG

## 2022-12-12 LAB
BASE EXCESS BLDA CALC-SCNC: -3.6 MMOL/L
BUN SERPL-MCNC: 8 MG/DL (ref 7–18)
CHLORIDE SERPL-SCNC: 110 MMOL/L (ref 98–107)
CO2 SERPL-SCNC: 25 MMOL/L (ref 21–32)
CREAT SERPL-MCNC: 0.6 MG/DL (ref 0.6–1.3)
GLUCOSE SERPL-MCNC: 229 MG/DL (ref 74–106)
HCO3 BLDA-SCNC: 19.2 MMOL/L
HCT VFR BLD AUTO: 22.1 % (ref 31.2–41.9)
HGB BLDA OXIMETRY-MCNC: 8.5 G/DL (ref 12–16)
INHALED O2 CONCENTRATION: 28 %
MAGNESIUM SERPL-MCNC: 1.8 MG/DL (ref 1.8–2.4)
MCH RBC QN AUTO: 31.6 UUG (ref 24.7–32.8)
MCV RBC AUTO: 95.3 FL (ref 75.5–95.3)
PCO2 TEMP ADJ BLDA: 26.8 MMHG (ref 35–45)
PH TEMP ADJ BLDA: 7.47 [PH] (ref 7.35–7.45)
PHOSPHATE SERPL-MCNC: 3.8 MG/DL (ref 2.5–4.9)
PLATELET # BLD AUTO: 191 K/UL (ref 179–408)
PO2 TEMP ADJ BLDA: 111.2 MMHG (ref 75–100)
POTASSIUM SERPL-SCNC: 3.6 MMOL/L (ref 3.5–5.1)
SPECIMEN DRAWN FROM PATIENT: (no result)

## 2022-12-12 RX ADMIN — VALPROIC ACID SCH MG: 250 SOLUTION ORAL at 20:54

## 2022-12-12 RX ADMIN — VALPROIC ACID SCH MG: 250 SOLUTION ORAL at 08:20

## 2022-12-12 RX ADMIN — VITAMIN D, TAB 1000IU (100/BT) SCH UNIT: 25 TAB at 09:13

## 2022-12-12 RX ADMIN — SODIUM CHLORIDE SCH MLS/HR: 9 INJECTION, SOLUTION INTRAVENOUS at 21:44

## 2022-12-12 RX ADMIN — Medication SCH MG: at 08:18

## 2022-12-12 RX ADMIN — SODIUM CHLORIDE PRN UNIT: 9 INJECTION, SOLUTION INTRAVENOUS at 01:39

## 2022-12-12 RX ADMIN — CHLORHEXIDINE GLUCONATE SCH ML: 1.2 RINSE ORAL at 13:52

## 2022-12-12 RX ADMIN — CHLORHEXIDINE GLUCONATE SCH ML: 1.2 RINSE ORAL at 17:47

## 2022-12-12 RX ADMIN — Medication SCH EACH: at 20:54

## 2022-12-12 RX ADMIN — SODIUM CHLORIDE PRN UNIT: 9 INJECTION, SOLUTION INTRAVENOUS at 19:12

## 2022-12-12 RX ADMIN — ACETAMINOPHEN PRN MG: 325 TABLET ORAL at 05:39

## 2022-12-12 RX ADMIN — THERA TABS SCH UDTAB: TAB at 08:19

## 2022-12-12 RX ADMIN — SODIUM CHLORIDE SCH MLS/HR: 9 INJECTION, SOLUTION INTRAVENOUS at 13:54

## 2022-12-12 RX ADMIN — ZINC SULFATE CAP 220 MG (50 MG ELEMENTAL ZN) SCH MG: 220 (50 ZN) CAP at 08:18

## 2022-12-12 RX ADMIN — ATORVASTATIN CALCIUM SCH MG: 20 TABLET, FILM COATED ORAL at 20:54

## 2022-12-12 RX ADMIN — Medication SCH ML: at 17:52

## 2022-12-12 RX ADMIN — Medication SCH EACH: at 17:57

## 2022-12-12 RX ADMIN — SENNOSIDES SCH TAB: 8.6 TABLET, COATED ORAL at 20:54

## 2022-12-12 RX ADMIN — METOCLOPRAMIDE SCH MG: 5 INJECTION, SOLUTION INTRAMUSCULAR; INTRAVENOUS at 21:44

## 2022-12-12 RX ADMIN — SODIUM CHLORIDE SCH MLS/HR: 9 INJECTION, SOLUTION INTRAVENOUS at 05:39

## 2022-12-12 RX ADMIN — Medication SCH MG: at 08:19

## 2022-12-12 RX ADMIN — Medication SCH MG: at 17:49

## 2022-12-12 RX ADMIN — Medication SCH ML: at 08:22

## 2022-12-12 RX ADMIN — ALBUTEROL SULFATE PRN MG: 2.5 SOLUTION RESPIRATORY (INHALATION) at 21:21

## 2022-12-12 RX ADMIN — FENOFIBRATE SCH MG: 145 TABLET ORAL at 08:23

## 2022-12-12 RX ADMIN — METOCLOPRAMIDE SCH MG: 5 INJECTION, SOLUTION INTRAMUSCULAR; INTRAVENOUS at 05:38

## 2022-12-12 RX ADMIN — Medication SCH EACH: at 08:23

## 2022-12-12 RX ADMIN — Medication SCH EACH: at 00:00

## 2022-12-12 RX ADMIN — METOCLOPRAMIDE SCH MG: 5 INJECTION, SOLUTION INTRAMUSCULAR; INTRAVENOUS at 13:52

## 2022-12-12 RX ADMIN — ACETAMINOPHEN PRN MG: 325 TABLET ORAL at 15:46

## 2022-12-12 RX ADMIN — Medication SCH EACH: at 05:38

## 2022-12-12 RX ADMIN — DEXTROSE SCH MLS/HR: 50 INJECTION, SOLUTION INTRAVENOUS at 06:42

## 2022-12-12 RX ADMIN — PANTOPRAZOLE SODIUM SCH MG: 40 GRANULE, DELAYED RELEASE ORAL at 06:49

## 2022-12-12 RX ADMIN — CHLORHEXIDINE GLUCONATE SCH ML: 1.2 RINSE ORAL at 08:20

## 2022-12-12 RX ADMIN — Medication SCH EACH: at 06:49

## 2022-12-13 VITALS — SYSTOLIC BLOOD PRESSURE: 139 MMHG | DIASTOLIC BLOOD PRESSURE: 75 MMHG

## 2022-12-13 VITALS — DIASTOLIC BLOOD PRESSURE: 66 MMHG | SYSTOLIC BLOOD PRESSURE: 121 MMHG

## 2022-12-13 VITALS — DIASTOLIC BLOOD PRESSURE: 60 MMHG | SYSTOLIC BLOOD PRESSURE: 122 MMHG

## 2022-12-13 VITALS — DIASTOLIC BLOOD PRESSURE: 74 MMHG | SYSTOLIC BLOOD PRESSURE: 150 MMHG

## 2022-12-13 VITALS — SYSTOLIC BLOOD PRESSURE: 157 MMHG | DIASTOLIC BLOOD PRESSURE: 76 MMHG

## 2022-12-13 VITALS — SYSTOLIC BLOOD PRESSURE: 134 MMHG | DIASTOLIC BLOOD PRESSURE: 66 MMHG

## 2022-12-13 VITALS — SYSTOLIC BLOOD PRESSURE: 149 MMHG | DIASTOLIC BLOOD PRESSURE: 75 MMHG

## 2022-12-13 VITALS — DIASTOLIC BLOOD PRESSURE: 66 MMHG | SYSTOLIC BLOOD PRESSURE: 125 MMHG

## 2022-12-13 VITALS — DIASTOLIC BLOOD PRESSURE: 79 MMHG | SYSTOLIC BLOOD PRESSURE: 140 MMHG

## 2022-12-13 VITALS — DIASTOLIC BLOOD PRESSURE: 52 MMHG | SYSTOLIC BLOOD PRESSURE: 106 MMHG

## 2022-12-13 VITALS — SYSTOLIC BLOOD PRESSURE: 154 MMHG | DIASTOLIC BLOOD PRESSURE: 75 MMHG

## 2022-12-13 VITALS — SYSTOLIC BLOOD PRESSURE: 119 MMHG | DIASTOLIC BLOOD PRESSURE: 52 MMHG

## 2022-12-13 VITALS — DIASTOLIC BLOOD PRESSURE: 83 MMHG | SYSTOLIC BLOOD PRESSURE: 154 MMHG

## 2022-12-13 VITALS — SYSTOLIC BLOOD PRESSURE: 132 MMHG | DIASTOLIC BLOOD PRESSURE: 66 MMHG

## 2022-12-13 VITALS — SYSTOLIC BLOOD PRESSURE: 145 MMHG | DIASTOLIC BLOOD PRESSURE: 75 MMHG

## 2022-12-13 VITALS — DIASTOLIC BLOOD PRESSURE: 65 MMHG | SYSTOLIC BLOOD PRESSURE: 122 MMHG

## 2022-12-13 VITALS — SYSTOLIC BLOOD PRESSURE: 141 MMHG | DIASTOLIC BLOOD PRESSURE: 77 MMHG

## 2022-12-13 VITALS — DIASTOLIC BLOOD PRESSURE: 73 MMHG | SYSTOLIC BLOOD PRESSURE: 143 MMHG

## 2022-12-13 LAB
BUN SERPL-MCNC: 10 MG/DL (ref 7–18)
CHLORIDE SERPL-SCNC: 111 MMOL/L (ref 98–107)
CO2 SERPL-SCNC: 25 MMOL/L (ref 21–32)
CREAT SERPL-MCNC: 0.5 MG/DL (ref 0.6–1.3)
GLUCOSE SERPL-MCNC: 118 MG/DL (ref 74–106)
HCT VFR BLD AUTO: 21.1 % (ref 31.2–41.9)
MAGNESIUM SERPL-MCNC: 1.6 MG/DL (ref 1.8–2.4)
MCH RBC QN AUTO: 29.6 UUG (ref 24.7–32.8)
MCV RBC AUTO: 94.1 FL (ref 75.5–95.3)
PHOSPHATE SERPL-MCNC: 3.8 MG/DL (ref 2.5–4.9)
PLATELET # BLD AUTO: 215 K/UL (ref 179–408)
POTASSIUM SERPL-SCNC: 3.1 MMOL/L (ref 3.5–5.1)

## 2022-12-13 PROCEDURE — 30233N1 TRANSFUSION OF NONAUTOLOGOUS RED BLOOD CELLS INTO PERIPHERAL VEIN, PERCUTANEOUS APPROACH: ICD-10-PCS

## 2022-12-13 RX ADMIN — SODIUM CHLORIDE SCH MLS/HR: 9 INJECTION, SOLUTION INTRAVENOUS at 06:41

## 2022-12-13 RX ADMIN — SODIUM CHLORIDE SCH MLS/HR: 9 INJECTION, SOLUTION INTRAVENOUS at 15:49

## 2022-12-13 RX ADMIN — CHLORHEXIDINE GLUCONATE SCH ML: 1.2 RINSE ORAL at 12:45

## 2022-12-13 RX ADMIN — Medication SCH ML: at 13:00

## 2022-12-13 RX ADMIN — Medication SCH MG: at 09:57

## 2022-12-13 RX ADMIN — SODIUM CHLORIDE SCH MLS/HR: 9 INJECTION, SOLUTION INTRAVENOUS at 22:59

## 2022-12-13 RX ADMIN — METOCLOPRAMIDE SCH MG: 5 INJECTION, SOLUTION INTRAMUSCULAR; INTRAVENOUS at 06:47

## 2022-12-13 RX ADMIN — PANTOPRAZOLE SODIUM SCH MG: 40 GRANULE, DELAYED RELEASE ORAL at 06:46

## 2022-12-13 RX ADMIN — THERA TABS SCH UDTAB: TAB at 09:55

## 2022-12-13 RX ADMIN — SENNOSIDES SCH TAB: 8.6 TABLET, COATED ORAL at 21:00

## 2022-12-13 RX ADMIN — ACETAMINOPHEN PRN MG: 325 TABLET ORAL at 15:47

## 2022-12-13 RX ADMIN — Medication SCH MG: at 17:34

## 2022-12-13 RX ADMIN — ZINC SULFATE CAP 220 MG (50 MG ELEMENTAL ZN) SCH MG: 220 (50 ZN) CAP at 09:55

## 2022-12-13 RX ADMIN — CHLORHEXIDINE GLUCONATE SCH ML: 1.2 RINSE ORAL at 17:34

## 2022-12-13 RX ADMIN — Medication SCH MG: at 09:58

## 2022-12-13 RX ADMIN — DEXTROSE SCH MLS/HR: 50 INJECTION, SOLUTION INTRAVENOUS at 02:38

## 2022-12-13 RX ADMIN — SODIUM CHLORIDE PRN UNIT: 9 INJECTION, SOLUTION INTRAVENOUS at 01:24

## 2022-12-13 RX ADMIN — CLOTRIMAZOLE SCH GM: 1 CREAM TOPICAL at 17:32

## 2022-12-13 RX ADMIN — CHLORHEXIDINE GLUCONATE SCH ML: 1.2 RINSE ORAL at 09:54

## 2022-12-13 RX ADMIN — Medication SCH EACH: at 21:00

## 2022-12-13 RX ADMIN — MAGNESIUM SULFATE IN DEXTROSE SCH MLS/HR: 10 INJECTION, SOLUTION INTRAVENOUS at 15:47

## 2022-12-13 RX ADMIN — POTASSIUM CHLORIDE SCH MLS/HR: 14.9 INJECTION, SOLUTION INTRAVENOUS at 15:22

## 2022-12-13 RX ADMIN — FENOFIBRATE SCH MG: 145 TABLET ORAL at 09:56

## 2022-12-13 RX ADMIN — METOCLOPRAMIDE SCH MG: 5 INJECTION, SOLUTION INTRAMUSCULAR; INTRAVENOUS at 22:59

## 2022-12-13 RX ADMIN — Medication SCH EACH: at 06:00

## 2022-12-13 RX ADMIN — Medication SCH EACH: at 17:38

## 2022-12-13 RX ADMIN — Medication SCH ML: at 09:00

## 2022-12-13 RX ADMIN — ACETAMINOPHEN PRN MG: 325 TABLET ORAL at 03:17

## 2022-12-13 RX ADMIN — Medication SCH MG: at 09:00

## 2022-12-13 RX ADMIN — Medication SCH ML: at 17:00

## 2022-12-13 RX ADMIN — POTASSIUM CHLORIDE SCH MLS/HR: 14.9 INJECTION, SOLUTION INTRAVENOUS at 17:40

## 2022-12-13 RX ADMIN — CLOTRIMAZOLE SCH GM: 1 CREAM TOPICAL at 09:56

## 2022-12-13 RX ADMIN — Medication SCH EACH: at 09:54

## 2022-12-13 RX ADMIN — SODIUM CHLORIDE PRN UNIT: 9 INJECTION, SOLUTION INTRAVENOUS at 06:44

## 2022-12-13 RX ADMIN — VALPROIC ACID SCH MG: 250 SOLUTION ORAL at 21:00

## 2022-12-13 RX ADMIN — POTASSIUM CHLORIDE SCH MLS/HR: 14.9 INJECTION, SOLUTION INTRAVENOUS at 17:36

## 2022-12-13 RX ADMIN — POTASSIUM CHLORIDE SCH MLS/HR: 14.9 INJECTION, SOLUTION INTRAVENOUS at 12:28

## 2022-12-13 RX ADMIN — MAGNESIUM SULFATE IN DEXTROSE SCH MLS/HR: 10 INJECTION, SOLUTION INTRAVENOUS at 12:28

## 2022-12-13 RX ADMIN — ANORECTAL OINTMENT SCH GM: 15.7; .44; 24; 20.6 OINTMENT TOPICAL at 21:00

## 2022-12-13 RX ADMIN — VITAMIN D, TAB 1000IU (100/BT) SCH UNIT: 25 TAB at 10:09

## 2022-12-13 RX ADMIN — ATORVASTATIN CALCIUM SCH MG: 20 TABLET, FILM COATED ORAL at 21:00

## 2022-12-13 RX ADMIN — Medication SCH EACH: at 00:00

## 2022-12-13 RX ADMIN — VALPROIC ACID SCH MG: 250 SOLUTION ORAL at 09:58

## 2022-12-13 RX ADMIN — Medication SCH EACH: at 12:00

## 2022-12-13 RX ADMIN — ANORECTAL OINTMENT SCH GM: 15.7; .44; 24; 20.6 OINTMENT TOPICAL at 09:56

## 2022-12-13 RX ADMIN — METOCLOPRAMIDE SCH MG: 5 INJECTION, SOLUTION INTRAMUSCULAR; INTRAVENOUS at 15:48

## 2022-12-14 VITALS — DIASTOLIC BLOOD PRESSURE: 73 MMHG | SYSTOLIC BLOOD PRESSURE: 143 MMHG

## 2022-12-14 VITALS — DIASTOLIC BLOOD PRESSURE: 70 MMHG | SYSTOLIC BLOOD PRESSURE: 123 MMHG

## 2022-12-14 VITALS — SYSTOLIC BLOOD PRESSURE: 144 MMHG | DIASTOLIC BLOOD PRESSURE: 74 MMHG

## 2022-12-14 VITALS — SYSTOLIC BLOOD PRESSURE: 122 MMHG | DIASTOLIC BLOOD PRESSURE: 68 MMHG

## 2022-12-14 VITALS — DIASTOLIC BLOOD PRESSURE: 46 MMHG | SYSTOLIC BLOOD PRESSURE: 100 MMHG

## 2022-12-14 VITALS — SYSTOLIC BLOOD PRESSURE: 110 MMHG | DIASTOLIC BLOOD PRESSURE: 55 MMHG

## 2022-12-14 VITALS — SYSTOLIC BLOOD PRESSURE: 145 MMHG | DIASTOLIC BLOOD PRESSURE: 69 MMHG

## 2022-12-14 VITALS — SYSTOLIC BLOOD PRESSURE: 149 MMHG | DIASTOLIC BLOOD PRESSURE: 77 MMHG

## 2022-12-14 VITALS — SYSTOLIC BLOOD PRESSURE: 143 MMHG | DIASTOLIC BLOOD PRESSURE: 73 MMHG

## 2022-12-14 VITALS — SYSTOLIC BLOOD PRESSURE: 115 MMHG | DIASTOLIC BLOOD PRESSURE: 65 MMHG

## 2022-12-14 VITALS — SYSTOLIC BLOOD PRESSURE: 127 MMHG | DIASTOLIC BLOOD PRESSURE: 75 MMHG

## 2022-12-14 VITALS — SYSTOLIC BLOOD PRESSURE: 114 MMHG | DIASTOLIC BLOOD PRESSURE: 70 MMHG

## 2022-12-14 VITALS — SYSTOLIC BLOOD PRESSURE: 126 MMHG | DIASTOLIC BLOOD PRESSURE: 75 MMHG

## 2022-12-14 LAB
ALP SERPL-CCNC: 133 U/L (ref 50–136)
ALT SERPL W/O P-5'-P-CCNC: 13 U/L (ref 14–59)
AST SERPL-CCNC: 32 U/L (ref 15–37)
BILIRUB SERPL-MCNC: 0.2 MG/DL (ref 0.2–1)
BUN SERPL-MCNC: 9 MG/DL (ref 7–18)
CHLORIDE SERPL-SCNC: 114 MMOL/L (ref 98–107)
CO2 SERPL-SCNC: 26 MMOL/L (ref 21–32)
CREAT SERPL-MCNC: 0.5 MG/DL (ref 0.6–1.3)
FERRITIN SERPL-MCNC: 224 NG/ML (ref 8–252)
GLUCOSE SERPL-MCNC: 158 MG/DL (ref 74–106)
HCT VFR BLD AUTO: 27.9 % (ref 31.2–41.9)
IRON SERPL-MCNC: 13 UG/DL (ref 50–175)
MAGNESIUM SERPL-MCNC: 2.2 MG/DL (ref 1.8–2.4)
MCH RBC QN AUTO: 30.8 UUG (ref 24.7–32.8)
MCV RBC AUTO: 93.4 FL (ref 75.5–95.3)
PLATELET # BLD AUTO: 319 K/UL (ref 179–408)
POTASSIUM SERPL-SCNC: 4.1 MMOL/L (ref 3.5–5.1)
TSH SERPL DL<=0.005 MIU/L-ACNC: 3.86 MIU/ML (ref 0.36–3.74)
WS STN SPEC: 4.8 G/DL (ref 6.4–8.2)

## 2022-12-14 RX ADMIN — CLOTRIMAZOLE SCH GM: 1 CREAM TOPICAL at 09:12

## 2022-12-14 RX ADMIN — CLOTRIMAZOLE SCH GM: 1 CREAM TOPICAL at 17:00

## 2022-12-14 RX ADMIN — ACETAMINOPHEN PRN MG: 325 TABLET ORAL at 00:35

## 2022-12-14 RX ADMIN — THERA TABS SCH UDTAB: TAB at 09:16

## 2022-12-14 RX ADMIN — ATORVASTATIN CALCIUM SCH MG: 20 TABLET, FILM COATED ORAL at 21:00

## 2022-12-14 RX ADMIN — METOCLOPRAMIDE SCH MG: 5 INJECTION, SOLUTION INTRAMUSCULAR; INTRAVENOUS at 08:45

## 2022-12-14 RX ADMIN — VALPROIC ACID SCH MG: 250 SOLUTION ORAL at 09:02

## 2022-12-14 RX ADMIN — VALPROIC ACID SCH MG: 250 SOLUTION ORAL at 21:00

## 2022-12-14 RX ADMIN — CHLORHEXIDINE GLUCONATE SCH ML: 1.2 RINSE ORAL at 13:00

## 2022-12-14 RX ADMIN — ZINC SULFATE CAP 220 MG (50 MG ELEMENTAL ZN) SCH MG: 220 (50 ZN) CAP at 09:26

## 2022-12-14 RX ADMIN — SODIUM CHLORIDE SCH MLS/HR: 9 INJECTION, SOLUTION INTRAVENOUS at 14:00

## 2022-12-14 RX ADMIN — CHLORHEXIDINE GLUCONATE SCH ML: 1.2 RINSE ORAL at 17:00

## 2022-12-14 RX ADMIN — Medication SCH EACH: at 06:00

## 2022-12-14 RX ADMIN — PANTOPRAZOLE SODIUM SCH MG: 40 GRANULE, DELAYED RELEASE ORAL at 07:00

## 2022-12-14 RX ADMIN — Medication SCH ML: at 13:00

## 2022-12-14 RX ADMIN — CHLORHEXIDINE GLUCONATE SCH ML: 1.2 RINSE ORAL at 09:26

## 2022-12-14 RX ADMIN — Medication SCH EACH: at 00:00

## 2022-12-14 RX ADMIN — Medication SCH EACH: at 21:00

## 2022-12-14 RX ADMIN — Medication SCH MG: at 09:04

## 2022-12-14 RX ADMIN — ANORECTAL OINTMENT SCH GM: 15.7; .44; 24; 20.6 OINTMENT TOPICAL at 21:00

## 2022-12-14 RX ADMIN — Medication SCH MG: at 17:00

## 2022-12-14 RX ADMIN — Medication SCH MG: at 09:11

## 2022-12-14 RX ADMIN — ANORECTAL OINTMENT SCH GM: 15.7; .44; 24; 20.6 OINTMENT TOPICAL at 09:12

## 2022-12-14 RX ADMIN — SODIUM CHLORIDE PRN UNIT: 9 INJECTION, SOLUTION INTRAVENOUS at 00:27

## 2022-12-14 RX ADMIN — SODIUM CHLORIDE SCH MLS/HR: 9 INJECTION, SOLUTION INTRAVENOUS at 22:00

## 2022-12-14 RX ADMIN — VITAMIN D, TAB 1000IU (100/BT) SCH UNIT: 25 TAB at 09:26

## 2022-12-14 RX ADMIN — METOCLOPRAMIDE HYDROCHLORIDE SCH MG: 5 SOLUTION ORAL at 22:00

## 2022-12-14 RX ADMIN — METOCLOPRAMIDE HYDROCHLORIDE SCH MG: 5 SOLUTION ORAL at 14:00

## 2022-12-14 RX ADMIN — SENNOSIDES SCH TAB: 8.6 TABLET, COATED ORAL at 21:00

## 2022-12-14 RX ADMIN — Medication SCH ML: at 17:00

## 2022-12-14 RX ADMIN — Medication SCH MG: at 09:05

## 2022-12-14 RX ADMIN — FENOFIBRATE SCH MG: 145 TABLET ORAL at 09:11

## 2022-12-14 RX ADMIN — Medication SCH MG: at 09:16

## 2022-12-14 RX ADMIN — Medication SCH EACH: at 08:30

## 2022-12-14 RX ADMIN — DEXTROSE SCH MLS/HR: 50 INJECTION, SOLUTION INTRAVENOUS at 00:00

## 2022-12-14 RX ADMIN — Medication SCH ML: at 09:25

## 2022-12-14 RX ADMIN — DEXTROSE SCH MLS/HR: 50 INJECTION, SOLUTION INTRAVENOUS at 16:00

## 2022-12-14 RX ADMIN — Medication SCH EACH: at 09:16

## 2022-12-14 RX ADMIN — SODIUM CHLORIDE SCH MLS/HR: 9 INJECTION, SOLUTION INTRAVENOUS at 06:00

## 2022-12-14 RX ADMIN — SODIUM CHLORIDE PRN UNIT: 9 INJECTION, SOLUTION INTRAVENOUS at 08:30

## 2022-12-15 VITALS — DIASTOLIC BLOOD PRESSURE: 78 MMHG | SYSTOLIC BLOOD PRESSURE: 132 MMHG

## 2022-12-15 VITALS — DIASTOLIC BLOOD PRESSURE: 83 MMHG | SYSTOLIC BLOOD PRESSURE: 147 MMHG

## 2022-12-15 VITALS — SYSTOLIC BLOOD PRESSURE: 147 MMHG | DIASTOLIC BLOOD PRESSURE: 81 MMHG

## 2022-12-15 VITALS — SYSTOLIC BLOOD PRESSURE: 122 MMHG | DIASTOLIC BLOOD PRESSURE: 64 MMHG

## 2022-12-15 VITALS — DIASTOLIC BLOOD PRESSURE: 72 MMHG | SYSTOLIC BLOOD PRESSURE: 138 MMHG

## 2022-12-15 VITALS — SYSTOLIC BLOOD PRESSURE: 130 MMHG | DIASTOLIC BLOOD PRESSURE: 73 MMHG

## 2022-12-15 LAB
BUN SERPL-MCNC: 12 MG/DL (ref 7–18)
CHLORIDE SERPL-SCNC: 117 MMOL/L (ref 98–107)
CO2 SERPL-SCNC: 26 MMOL/L (ref 21–32)
CREAT SERPL-MCNC: 0.4 MG/DL (ref 0.6–1.3)
GLUCOSE SERPL-MCNC: 115 MG/DL (ref 74–106)
HCT VFR BLD AUTO: 26.2 % (ref 31.2–41.9)
MAGNESIUM SERPL-MCNC: 1.8 MG/DL (ref 1.8–2.4)
MCH RBC QN AUTO: 30.8 UUG (ref 24.7–32.8)
MCV RBC AUTO: 94.3 FL (ref 75.5–95.3)
PHOSPHATE SERPL-MCNC: 5.1 MG/DL (ref 2.5–4.9)
PLATELET # BLD AUTO: 425 K/UL (ref 179–408)
POTASSIUM SERPL-SCNC: 3.3 MMOL/L (ref 3.5–5.1)

## 2022-12-15 RX ADMIN — Medication SCH EACH: at 21:42

## 2022-12-15 RX ADMIN — Medication SCH EACH: at 00:13

## 2022-12-15 RX ADMIN — Medication SCH EACH: at 06:24

## 2022-12-15 RX ADMIN — VALPROIC ACID SCH MG: 250 SOLUTION ORAL at 09:00

## 2022-12-15 RX ADMIN — PANTOPRAZOLE SODIUM SCH MG: 40 GRANULE, DELAYED RELEASE ORAL at 06:25

## 2022-12-15 RX ADMIN — DEXTROSE SCH MLS/HR: 50 INJECTION, SOLUTION INTRAVENOUS at 08:00

## 2022-12-15 RX ADMIN — ANORECTAL OINTMENT SCH GM: 15.7; .44; 24; 20.6 OINTMENT TOPICAL at 21:42

## 2022-12-15 RX ADMIN — ATORVASTATIN CALCIUM SCH MG: 20 TABLET, FILM COATED ORAL at 21:42

## 2022-12-15 RX ADMIN — CHLORHEXIDINE GLUCONATE SCH ML: 1.2 RINSE ORAL at 09:40

## 2022-12-15 RX ADMIN — Medication SCH MG: at 09:00

## 2022-12-15 RX ADMIN — SODIUM CHLORIDE SCH MLS/HR: 9 INJECTION, SOLUTION INTRAVENOUS at 13:37

## 2022-12-15 RX ADMIN — Medication SCH EACH: at 11:46

## 2022-12-15 RX ADMIN — DEXTROSE PRN MLS/HR: 5 SOLUTION INTRAVENOUS at 09:57

## 2022-12-15 RX ADMIN — SODIUM CHLORIDE SCH MLS/HR: 9 INJECTION, SOLUTION INTRAVENOUS at 21:51

## 2022-12-15 RX ADMIN — VITAMIN D, TAB 1000IU (100/BT) SCH UNIT: 25 TAB at 09:00

## 2022-12-15 RX ADMIN — ANORECTAL OINTMENT SCH GM: 15.7; .44; 24; 20.6 OINTMENT TOPICAL at 09:40

## 2022-12-15 RX ADMIN — CLOTRIMAZOLE SCH GM: 1 CREAM TOPICAL at 09:40

## 2022-12-15 RX ADMIN — Medication SCH ML: at 09:00

## 2022-12-15 RX ADMIN — Medication SCH MG: at 16:18

## 2022-12-15 RX ADMIN — VALPROIC ACID SCH MG: 250 SOLUTION ORAL at 21:41

## 2022-12-15 RX ADMIN — CLOTRIMAZOLE SCH GM: 1 CREAM TOPICAL at 16:19

## 2022-12-15 RX ADMIN — ZINC SULFATE CAP 220 MG (50 MG ELEMENTAL ZN) SCH MG: 220 (50 ZN) CAP at 09:00

## 2022-12-15 RX ADMIN — METOCLOPRAMIDE HYDROCHLORIDE SCH MG: 5 SOLUTION ORAL at 13:38

## 2022-12-15 RX ADMIN — Medication SCH ML: at 16:18

## 2022-12-15 RX ADMIN — ALBUTEROL SULFATE PRN MG: 2.5 SOLUTION RESPIRATORY (INHALATION) at 23:41

## 2022-12-15 RX ADMIN — Medication PRN ML: at 16:19

## 2022-12-15 RX ADMIN — FENOFIBRATE SCH MG: 145 TABLET ORAL at 09:00

## 2022-12-15 RX ADMIN — THERA TABS SCH UDTAB: TAB at 09:00

## 2022-12-15 RX ADMIN — POTASSIUM CHLORIDE SCH MLS/HR: 14.9 INJECTION, SOLUTION INTRAVENOUS at 11:08

## 2022-12-15 RX ADMIN — Medication SCH EACH: at 17:43

## 2022-12-15 RX ADMIN — SODIUM CHLORIDE SCH MLS/HR: 9 INJECTION, SOLUTION INTRAVENOUS at 05:52

## 2022-12-15 RX ADMIN — Medication SCH ML: at 13:00

## 2022-12-15 RX ADMIN — SENNOSIDES SCH TAB: 8.6 TABLET, COATED ORAL at 21:42

## 2022-12-15 RX ADMIN — METOCLOPRAMIDE HYDROCHLORIDE SCH MG: 5 SOLUTION ORAL at 06:24

## 2022-12-15 RX ADMIN — METOCLOPRAMIDE HYDROCHLORIDE SCH MG: 5 SOLUTION ORAL at 06:00

## 2022-12-15 RX ADMIN — POTASSIUM CHLORIDE SCH MLS/HR: 14.9 INJECTION, SOLUTION INTRAVENOUS at 10:11

## 2022-12-15 RX ADMIN — CHLORHEXIDINE GLUCONATE SCH ML: 1.2 RINSE ORAL at 13:37

## 2022-12-15 RX ADMIN — SODIUM CHLORIDE PRN UNIT: 9 INJECTION, SOLUTION INTRAVENOUS at 17:57

## 2022-12-15 RX ADMIN — METOCLOPRAMIDE HYDROCHLORIDE SCH MG: 5 SOLUTION ORAL at 21:51

## 2022-12-15 RX ADMIN — DEXTROSE SCH MLS/HR: 50 INJECTION, SOLUTION INTRAVENOUS at 23:53

## 2022-12-15 RX ADMIN — Medication SCH EACH: at 09:00

## 2022-12-15 RX ADMIN — CHLORHEXIDINE GLUCONATE SCH ML: 1.2 RINSE ORAL at 16:18

## 2022-12-16 VITALS — DIASTOLIC BLOOD PRESSURE: 86 MMHG | SYSTOLIC BLOOD PRESSURE: 146 MMHG

## 2022-12-16 VITALS — SYSTOLIC BLOOD PRESSURE: 127 MMHG | DIASTOLIC BLOOD PRESSURE: 86 MMHG

## 2022-12-16 VITALS — SYSTOLIC BLOOD PRESSURE: 119 MMHG | DIASTOLIC BLOOD PRESSURE: 69 MMHG

## 2022-12-16 VITALS — DIASTOLIC BLOOD PRESSURE: 78 MMHG | SYSTOLIC BLOOD PRESSURE: 148 MMHG

## 2022-12-16 LAB
BUN SERPL-MCNC: 9 MG/DL (ref 7–18)
CHLORIDE SERPL-SCNC: 111 MMOL/L (ref 98–107)
CO2 SERPL-SCNC: 26 MMOL/L (ref 21–32)
CREAT SERPL-MCNC: 0.5 MG/DL (ref 0.6–1.3)
GLUCOSE SERPL-MCNC: 201 MG/DL (ref 74–106)
HCT VFR BLD AUTO: 24.8 % (ref 31.2–41.9)
MAGNESIUM SERPL-MCNC: 1.6 MG/DL (ref 1.8–2.4)
MCH RBC QN AUTO: 30.6 UUG (ref 24.7–32.8)
MCV RBC AUTO: 94.8 FL (ref 75.5–95.3)
PHOSPHATE SERPL-MCNC: 4.1 MG/DL (ref 2.5–4.9)
PLATELET # BLD AUTO: 468 K/UL (ref 179–408)
POTASSIUM SERPL-SCNC: 3.2 MMOL/L (ref 3.5–5.1)

## 2022-12-16 RX ADMIN — Medication SCH EACH: at 06:14

## 2022-12-16 RX ADMIN — SODIUM CHLORIDE PRN UNIT: 9 INJECTION, SOLUTION INTRAVENOUS at 06:19

## 2022-12-16 RX ADMIN — METOCLOPRAMIDE HYDROCHLORIDE SCH MG: 5 SOLUTION ORAL at 21:12

## 2022-12-16 RX ADMIN — CHLORHEXIDINE GLUCONATE SCH ML: 1.2 RINSE ORAL at 12:57

## 2022-12-16 RX ADMIN — Medication SCH ML: at 12:57

## 2022-12-16 RX ADMIN — Medication SCH EACH: at 20:43

## 2022-12-16 RX ADMIN — Medication SCH MG: at 10:03

## 2022-12-16 RX ADMIN — SENNOSIDES SCH TAB: 8.6 TABLET, COATED ORAL at 20:43

## 2022-12-16 RX ADMIN — ANORECTAL OINTMENT SCH GM: 15.7; .44; 24; 20.6 OINTMENT TOPICAL at 10:05

## 2022-12-16 RX ADMIN — SODIUM CHLORIDE SCH MLS/HR: 9 INJECTION, SOLUTION INTRAVENOUS at 14:13

## 2022-12-16 RX ADMIN — SODIUM CHLORIDE SCH MLS/HR: 9 INJECTION, SOLUTION INTRAVENOUS at 14:14

## 2022-12-16 RX ADMIN — CLOTRIMAZOLE SCH GM: 1 CREAM TOPICAL at 16:46

## 2022-12-16 RX ADMIN — SODIUM CHLORIDE SCH MLS/HR: 9 INJECTION, SOLUTION INTRAVENOUS at 06:14

## 2022-12-16 RX ADMIN — ACETAMINOPHEN PRN MG: 325 TABLET ORAL at 23:00

## 2022-12-16 RX ADMIN — VALPROIC ACID SCH MG: 250 SOLUTION ORAL at 21:16

## 2022-12-16 RX ADMIN — METOCLOPRAMIDE HYDROCHLORIDE SCH MG: 5 SOLUTION ORAL at 16:41

## 2022-12-16 RX ADMIN — Medication SCH EACH: at 12:57

## 2022-12-16 RX ADMIN — CHLORHEXIDINE GLUCONATE SCH ML: 1.2 RINSE ORAL at 10:05

## 2022-12-16 RX ADMIN — Medication SCH EACH: at 10:04

## 2022-12-16 RX ADMIN — SODIUM CHLORIDE PRN UNIT: 9 INJECTION, SOLUTION INTRAVENOUS at 19:28

## 2022-12-16 RX ADMIN — FENOFIBRATE SCH MG: 145 TABLET ORAL at 09:53

## 2022-12-16 RX ADMIN — Medication SCH ML: at 09:00

## 2022-12-16 RX ADMIN — SODIUM CHLORIDE SCH MLS/HR: 9 INJECTION, SOLUTION INTRAVENOUS at 21:12

## 2022-12-16 RX ADMIN — THERA TABS SCH UDTAB: TAB at 10:04

## 2022-12-16 RX ADMIN — CLOTRIMAZOLE SCH GM: 1 CREAM TOPICAL at 10:06

## 2022-12-16 RX ADMIN — Medication SCH ML: at 16:47

## 2022-12-16 RX ADMIN — Medication SCH EACH: at 18:00

## 2022-12-16 RX ADMIN — ZINC SULFATE CAP 220 MG (50 MG ELEMENTAL ZN) SCH MG: 220 (50 ZN) CAP at 10:04

## 2022-12-16 RX ADMIN — METOCLOPRAMIDE HYDROCHLORIDE SCH MG: 5 SOLUTION ORAL at 06:14

## 2022-12-16 RX ADMIN — ATORVASTATIN CALCIUM SCH MG: 20 TABLET, FILM COATED ORAL at 20:43

## 2022-12-16 RX ADMIN — PANTOPRAZOLE SODIUM SCH MG: 40 GRANULE, DELAYED RELEASE ORAL at 06:21

## 2022-12-16 RX ADMIN — Medication SCH MG: at 10:04

## 2022-12-16 RX ADMIN — ACETAMINOPHEN PRN MG: 325 TABLET ORAL at 03:31

## 2022-12-16 RX ADMIN — DEXTROSE SCH MLS/HR: 50 INJECTION, SOLUTION INTRAVENOUS at 17:45

## 2022-12-16 RX ADMIN — ANORECTAL OINTMENT SCH APPLIC: 15.7; .44; 24; 20.6 OINTMENT TOPICAL at 20:44

## 2022-12-16 RX ADMIN — SODIUM CHLORIDE PRN UNIT: 9 INJECTION, SOLUTION INTRAVENOUS at 00:23

## 2022-12-16 RX ADMIN — VITAMIN D, TAB 1000IU (100/BT) SCH UNIT: 25 TAB at 10:03

## 2022-12-16 RX ADMIN — DEXTROSE PRN MLS/HR: 5 SOLUTION INTRAVENOUS at 06:36

## 2022-12-16 RX ADMIN — Medication SCH EACH: at 00:17

## 2022-12-16 RX ADMIN — VALPROIC ACID SCH MG: 250 SOLUTION ORAL at 09:53

## 2022-12-16 RX ADMIN — Medication SCH MG: at 16:45

## 2022-12-16 RX ADMIN — ACETAMINOPHEN PRN MG: 325 TABLET ORAL at 12:04

## 2022-12-16 RX ADMIN — CHLORHEXIDINE GLUCONATE SCH ML: 1.2 RINSE ORAL at 16:46

## 2022-12-17 VITALS — DIASTOLIC BLOOD PRESSURE: 71 MMHG | SYSTOLIC BLOOD PRESSURE: 143 MMHG

## 2022-12-17 VITALS — DIASTOLIC BLOOD PRESSURE: 84 MMHG | SYSTOLIC BLOOD PRESSURE: 139 MMHG

## 2022-12-17 VITALS — SYSTOLIC BLOOD PRESSURE: 113 MMHG | DIASTOLIC BLOOD PRESSURE: 59 MMHG

## 2022-12-17 VITALS — DIASTOLIC BLOOD PRESSURE: 84 MMHG | SYSTOLIC BLOOD PRESSURE: 135 MMHG

## 2022-12-17 LAB
BUN SERPL-MCNC: 7 MG/DL (ref 7–18)
CHLORIDE SERPL-SCNC: 110 MMOL/L (ref 98–107)
CO2 SERPL-SCNC: 28 MMOL/L (ref 21–32)
CREAT SERPL-MCNC: 0.4 MG/DL (ref 0.6–1.3)
GLUCOSE SERPL-MCNC: 199 MG/DL (ref 74–106)
HCT VFR BLD AUTO: 26.3 % (ref 31.2–41.9)
MAGNESIUM SERPL-MCNC: 1.6 MG/DL (ref 1.8–2.4)
MCH RBC QN AUTO: 30.7 UUG (ref 24.7–32.8)
MCV RBC AUTO: 95.3 FL (ref 75.5–95.3)
PHOSPHATE SERPL-MCNC: 4 MG/DL (ref 2.5–4.9)
PLATELET # BLD AUTO: 513 K/UL (ref 179–408)
POTASSIUM SERPL-SCNC: 3.9 MMOL/L (ref 3.5–5.1)

## 2022-12-17 RX ADMIN — Medication SCH MG: at 09:27

## 2022-12-17 RX ADMIN — Medication SCH MG: at 12:42

## 2022-12-17 RX ADMIN — SODIUM CHLORIDE SCH MLS/HR: 9 INJECTION, SOLUTION INTRAVENOUS at 14:18

## 2022-12-17 RX ADMIN — FENOFIBRATE SCH MG: 145 TABLET ORAL at 09:17

## 2022-12-17 RX ADMIN — Medication SCH MG: at 18:03

## 2022-12-17 RX ADMIN — SODIUM CHLORIDE SCH MLS/HR: 9 INJECTION, SOLUTION INTRAVENOUS at 21:19

## 2022-12-17 RX ADMIN — Medication SCH ML: at 12:43

## 2022-12-17 RX ADMIN — Medication SCH MG: at 09:18

## 2022-12-17 RX ADMIN — SODIUM CHLORIDE SCH MLS/HR: 9 INJECTION, SOLUTION INTRAVENOUS at 05:54

## 2022-12-17 RX ADMIN — PANTOPRAZOLE SODIUM SCH MG: 40 GRANULE, DELAYED RELEASE ORAL at 05:59

## 2022-12-17 RX ADMIN — DEXTROSE SCH MLS/HR: 50 INJECTION, SOLUTION INTRAVENOUS at 09:16

## 2022-12-17 RX ADMIN — MAGNESIUM SULFATE IN DEXTROSE SCH MLS/HR: 10 INJECTION, SOLUTION INTRAVENOUS at 13:47

## 2022-12-17 RX ADMIN — VALPROIC ACID SCH MG: 250 SOLUTION ORAL at 09:20

## 2022-12-17 RX ADMIN — MAGNESIUM SULFATE IN DEXTROSE SCH MLS/HR: 10 INJECTION, SOLUTION INTRAVENOUS at 12:20

## 2022-12-17 RX ADMIN — VALPROIC ACID SCH MG: 250 SOLUTION ORAL at 21:22

## 2022-12-17 RX ADMIN — Medication SCH EACH: at 18:15

## 2022-12-17 RX ADMIN — ZINC SULFATE CAP 220 MG (50 MG ELEMENTAL ZN) SCH MG: 220 (50 ZN) CAP at 09:18

## 2022-12-17 RX ADMIN — Medication SCH EACH: at 21:19

## 2022-12-17 RX ADMIN — SENNOSIDES SCH TAB: 8.6 TABLET, COATED ORAL at 21:19

## 2022-12-17 RX ADMIN — Medication SCH EACH: at 00:55

## 2022-12-17 RX ADMIN — SODIUM CHLORIDE PRN UNIT: 9 INJECTION, SOLUTION INTRAVENOUS at 18:19

## 2022-12-17 RX ADMIN — ANORECTAL OINTMENT SCH APPLIC: 15.7; .44; 24; 20.6 OINTMENT TOPICAL at 09:38

## 2022-12-17 RX ADMIN — METOCLOPRAMIDE HYDROCHLORIDE SCH MG: 5 SOLUTION ORAL at 05:53

## 2022-12-17 RX ADMIN — Medication SCH EACH: at 12:19

## 2022-12-17 RX ADMIN — VITAMIN D, TAB 1000IU (100/BT) SCH UNIT: 25 TAB at 09:16

## 2022-12-17 RX ADMIN — CHLORHEXIDINE GLUCONATE SCH ML: 1.2 RINSE ORAL at 09:20

## 2022-12-17 RX ADMIN — Medication SCH EACH: at 05:56

## 2022-12-17 RX ADMIN — Medication PRN ML: at 02:48

## 2022-12-17 RX ADMIN — Medication SCH MG: at 09:17

## 2022-12-17 RX ADMIN — CLOTRIMAZOLE SCH GM: 1 CREAM TOPICAL at 18:00

## 2022-12-17 RX ADMIN — ANORECTAL OINTMENT SCH APPLIC: 15.7; .44; 24; 20.6 OINTMENT TOPICAL at 21:19

## 2022-12-17 RX ADMIN — CHLORHEXIDINE GLUCONATE SCH ML: 1.2 RINSE ORAL at 18:06

## 2022-12-17 RX ADMIN — SODIUM CHLORIDE SCH MLS/HR: 9 INJECTION, SOLUTION INTRAVENOUS at 15:27

## 2022-12-17 RX ADMIN — Medication SCH EACH: at 09:37

## 2022-12-17 RX ADMIN — METOCLOPRAMIDE HYDROCHLORIDE SCH MG: 5 SOLUTION ORAL at 13:01

## 2022-12-17 RX ADMIN — METOCLOPRAMIDE HYDROCHLORIDE SCH MG: 5 SOLUTION ORAL at 21:19

## 2022-12-17 RX ADMIN — Medication SCH MG: at 09:19

## 2022-12-17 RX ADMIN — Medication SCH ML: at 09:38

## 2022-12-17 RX ADMIN — CHLORHEXIDINE GLUCONATE SCH ML: 1.2 RINSE ORAL at 12:42

## 2022-12-17 RX ADMIN — ATORVASTATIN CALCIUM SCH MG: 20 TABLET, FILM COATED ORAL at 09:18

## 2022-12-17 RX ADMIN — CLOTRIMAZOLE SCH GM: 1 CREAM TOPICAL at 09:38

## 2022-12-17 RX ADMIN — THERA TABS SCH UDTAB: TAB at 09:18

## 2022-12-17 RX ADMIN — Medication SCH ML: at 18:03

## 2022-12-17 RX ADMIN — SODIUM CHLORIDE PRN UNIT: 9 INJECTION, SOLUTION INTRAVENOUS at 12:19

## 2022-12-18 VITALS — SYSTOLIC BLOOD PRESSURE: 131 MMHG | DIASTOLIC BLOOD PRESSURE: 75 MMHG

## 2022-12-18 VITALS — SYSTOLIC BLOOD PRESSURE: 149 MMHG | DIASTOLIC BLOOD PRESSURE: 84 MMHG

## 2022-12-18 VITALS — DIASTOLIC BLOOD PRESSURE: 70 MMHG | SYSTOLIC BLOOD PRESSURE: 120 MMHG

## 2022-12-18 VITALS — DIASTOLIC BLOOD PRESSURE: 78 MMHG | SYSTOLIC BLOOD PRESSURE: 130 MMHG

## 2022-12-18 VITALS — DIASTOLIC BLOOD PRESSURE: 62 MMHG | SYSTOLIC BLOOD PRESSURE: 113 MMHG

## 2022-12-18 LAB
ALP SERPL-CCNC: 184 U/L (ref 50–136)
ALT SERPL W/O P-5'-P-CCNC: 9 U/L (ref 14–59)
AST SERPL-CCNC: 14 U/L (ref 15–37)
BILIRUB DIRECT SERPL-MCNC: 0.2 MG/DL (ref 0–0.2)
BILIRUB SERPL-MCNC: 0.2 MG/DL (ref 0.2–1)
BUN SERPL-MCNC: 11 MG/DL (ref 7–18)
CHLORIDE SERPL-SCNC: 106 MMOL/L (ref 98–107)
CO2 SERPL-SCNC: 27 MMOL/L (ref 21–32)
CREAT SERPL-MCNC: 0.5 MG/DL (ref 0.6–1.3)
GLUCOSE SERPL-MCNC: 243 MG/DL (ref 74–106)
HCT VFR BLD AUTO: 24.2 % (ref 31.2–41.9)
LIPASE SERPL-CCNC: 134 U/L (ref 73–393)
MAGNESIUM SERPL-MCNC: 1.8 MG/DL (ref 1.8–2.4)
MCH RBC QN AUTO: 31.4 UUG (ref 24.7–32.8)
MCV RBC AUTO: 94.8 FL (ref 75.5–95.3)
PHOSPHATE SERPL-MCNC: 4.7 MG/DL (ref 2.5–4.9)
PLATELET # BLD AUTO: 470 K/UL (ref 179–408)
POTASSIUM SERPL-SCNC: 3.4 MMOL/L (ref 3.5–5.1)
WS STN SPEC: 5.4 G/DL (ref 6.4–8.2)

## 2022-12-18 RX ADMIN — CHLORHEXIDINE GLUCONATE SCH ML: 1.2 RINSE ORAL at 16:28

## 2022-12-18 RX ADMIN — Medication SCH EACH: at 10:02

## 2022-12-18 RX ADMIN — DEXTROSE SCH MLS/HR: 50 INJECTION, SOLUTION INTRAVENOUS at 00:45

## 2022-12-18 RX ADMIN — Medication SCH EACH: at 23:18

## 2022-12-18 RX ADMIN — Medication SCH MG: at 10:01

## 2022-12-18 RX ADMIN — VITAMIN D, TAB 1000IU (100/BT) SCH UNIT: 25 TAB at 10:01

## 2022-12-18 RX ADMIN — FENOFIBRATE SCH MG: 145 TABLET ORAL at 10:01

## 2022-12-18 RX ADMIN — SODIUM CHLORIDE SCH MLS/HR: 9 INJECTION, SOLUTION INTRAVENOUS at 14:01

## 2022-12-18 RX ADMIN — DEXTROSE PRN MLS/HR: 5 SOLUTION INTRAVENOUS at 23:14

## 2022-12-18 RX ADMIN — Medication SCH ML: at 16:28

## 2022-12-18 RX ADMIN — Medication SCH EACH: at 20:19

## 2022-12-18 RX ADMIN — ANORECTAL OINTMENT SCH APPLIC: 15.7; .44; 24; 20.6 OINTMENT TOPICAL at 20:19

## 2022-12-18 RX ADMIN — Medication SCH ML: at 10:05

## 2022-12-18 RX ADMIN — Medication PRN ML: at 13:34

## 2022-12-18 RX ADMIN — CLOTRIMAZOLE SCH GM: 1 CREAM TOPICAL at 10:53

## 2022-12-18 RX ADMIN — ACETAMINOPHEN PRN MG: 325 TABLET ORAL at 10:04

## 2022-12-18 RX ADMIN — ALBUTEROL SULFATE PRN MG: 2.5 SOLUTION RESPIRATORY (INHALATION) at 11:54

## 2022-12-18 RX ADMIN — Medication SCH ML: at 12:57

## 2022-12-18 RX ADMIN — Medication SCH MG: at 10:03

## 2022-12-18 RX ADMIN — DEXTROSE PRN MLS/HR: 5 SOLUTION INTRAVENOUS at 05:11

## 2022-12-18 RX ADMIN — ANORECTAL OINTMENT SCH APPLIC: 15.7; .44; 24; 20.6 OINTMENT TOPICAL at 10:52

## 2022-12-18 RX ADMIN — METOCLOPRAMIDE HYDROCHLORIDE SCH MG: 5 SOLUTION ORAL at 13:38

## 2022-12-18 RX ADMIN — METOCLOPRAMIDE HYDROCHLORIDE SCH MG: 5 SOLUTION ORAL at 05:10

## 2022-12-18 RX ADMIN — SODIUM CHLORIDE PRN UNIT: 9 INJECTION, SOLUTION INTRAVENOUS at 00:43

## 2022-12-18 RX ADMIN — VALPROIC ACID SCH MG: 250 SOLUTION ORAL at 10:00

## 2022-12-18 RX ADMIN — ZINC SULFATE CAP 220 MG (50 MG ELEMENTAL ZN) SCH MG: 220 (50 ZN) CAP at 10:01

## 2022-12-18 RX ADMIN — SODIUM CHLORIDE PRN UNIT: 9 INJECTION, SOLUTION INTRAVENOUS at 23:27

## 2022-12-18 RX ADMIN — ACETAMINOPHEN PRN MG: 325 TABLET ORAL at 00:44

## 2022-12-18 RX ADMIN — Medication SCH EACH: at 12:00

## 2022-12-18 RX ADMIN — Medication SCH EACH: at 06:22

## 2022-12-18 RX ADMIN — ATORVASTATIN CALCIUM SCH MG: 20 TABLET, FILM COATED ORAL at 20:19

## 2022-12-18 RX ADMIN — SENNOSIDES SCH TAB: 8.6 TABLET, COATED ORAL at 20:19

## 2022-12-18 RX ADMIN — VALPROIC ACID SCH MG: 250 SOLUTION ORAL at 20:18

## 2022-12-18 RX ADMIN — Medication SCH MG: at 10:02

## 2022-12-18 RX ADMIN — DEXTROSE SCH MLS/HR: 50 INJECTION, SOLUTION INTRAVENOUS at 16:27

## 2022-12-18 RX ADMIN — Medication SCH EACH: at 17:34

## 2022-12-18 RX ADMIN — SODIUM CHLORIDE PRN UNIT: 9 INJECTION, SOLUTION INTRAVENOUS at 17:37

## 2022-12-18 RX ADMIN — PANTOPRAZOLE SODIUM SCH MG: 40 GRANULE, DELAYED RELEASE ORAL at 06:26

## 2022-12-18 RX ADMIN — CHLORHEXIDINE GLUCONATE SCH ML: 1.2 RINSE ORAL at 10:06

## 2022-12-18 RX ADMIN — ACETAMINOPHEN PRN MG: 325 TABLET ORAL at 21:01

## 2022-12-18 RX ADMIN — SODIUM CHLORIDE SCH MLS/HR: 9 INJECTION, SOLUTION INTRAVENOUS at 22:14

## 2022-12-18 RX ADMIN — Medication SCH MG: at 16:27

## 2022-12-18 RX ADMIN — METOCLOPRAMIDE HYDROCHLORIDE SCH MG: 5 SOLUTION ORAL at 21:02

## 2022-12-18 RX ADMIN — SODIUM CHLORIDE SCH MLS/HR: 9 INJECTION, SOLUTION INTRAVENOUS at 13:36

## 2022-12-18 RX ADMIN — SODIUM CHLORIDE PRN UNIT: 9 INJECTION, SOLUTION INTRAVENOUS at 13:36

## 2022-12-18 RX ADMIN — Medication SCH EACH: at 00:41

## 2022-12-18 RX ADMIN — CHLORHEXIDINE GLUCONATE SCH ML: 1.2 RINSE ORAL at 12:51

## 2022-12-18 RX ADMIN — SODIUM CHLORIDE SCH MLS/HR: 9 INJECTION, SOLUTION INTRAVENOUS at 05:10

## 2022-12-18 RX ADMIN — THERA TABS SCH UDTAB: TAB at 10:03

## 2022-12-18 RX ADMIN — CLOTRIMAZOLE SCH GM: 1 CREAM TOPICAL at 16:50

## 2022-12-18 RX ADMIN — Medication SCH MG: at 10:09

## 2022-12-19 VITALS — DIASTOLIC BLOOD PRESSURE: 90 MMHG | SYSTOLIC BLOOD PRESSURE: 157 MMHG

## 2022-12-19 VITALS — SYSTOLIC BLOOD PRESSURE: 137 MMHG | DIASTOLIC BLOOD PRESSURE: 78 MMHG

## 2022-12-19 VITALS — SYSTOLIC BLOOD PRESSURE: 149 MMHG | DIASTOLIC BLOOD PRESSURE: 83 MMHG

## 2022-12-19 VITALS — SYSTOLIC BLOOD PRESSURE: 149 MMHG | DIASTOLIC BLOOD PRESSURE: 82 MMHG

## 2022-12-19 VITALS — SYSTOLIC BLOOD PRESSURE: 173 MMHG | DIASTOLIC BLOOD PRESSURE: 80 MMHG

## 2022-12-19 LAB
BUN SERPL-MCNC: 12 MG/DL (ref 7–18)
CHLORIDE SERPL-SCNC: 108 MMOL/L (ref 98–107)
CO2 SERPL-SCNC: 29 MMOL/L (ref 21–32)
CREAT SERPL-MCNC: 0.4 MG/DL (ref 0.6–1.3)
GLUCOSE SERPL-MCNC: 185 MG/DL (ref 74–106)
HCT VFR BLD AUTO: 23.7 % (ref 31.2–41.9)
MAGNESIUM SERPL-MCNC: 1.6 MG/DL (ref 1.8–2.4)
MCH RBC QN AUTO: 31 UUG (ref 24.7–32.8)
MCV RBC AUTO: 95 FL (ref 75.5–95.3)
PHOSPHATE SERPL-MCNC: 4.2 MG/DL (ref 2.5–4.9)
PLATELET # BLD AUTO: 414 K/UL (ref 179–408)
POTASSIUM SERPL-SCNC: 3.5 MMOL/L (ref 3.5–5.1)

## 2022-12-19 RX ADMIN — PANTOPRAZOLE SODIUM SCH MG: 40 GRANULE, DELAYED RELEASE ORAL at 06:12

## 2022-12-19 RX ADMIN — ANORECTAL OINTMENT SCH APPLIC: 15.7; .44; 24; 20.6 OINTMENT TOPICAL at 08:34

## 2022-12-19 RX ADMIN — CLOTRIMAZOLE SCH GM: 1 CREAM TOPICAL at 08:34

## 2022-12-19 RX ADMIN — DEXTROSE SCH MLS/HR: 50 INJECTION, SOLUTION INTRAVENOUS at 08:26

## 2022-12-19 RX ADMIN — Medication SCH EACH: at 20:56

## 2022-12-19 RX ADMIN — Medication PRN ML: at 22:00

## 2022-12-19 RX ADMIN — SODIUM CHLORIDE PRN UNIT: 9 INJECTION, SOLUTION INTRAVENOUS at 06:13

## 2022-12-19 RX ADMIN — Medication SCH MG: at 08:31

## 2022-12-19 RX ADMIN — CHLORHEXIDINE GLUCONATE SCH ML: 1.2 RINSE ORAL at 08:34

## 2022-12-19 RX ADMIN — Medication SCH MG: at 08:33

## 2022-12-19 RX ADMIN — ACETAMINOPHEN PRN MG: 325 TABLET ORAL at 06:00

## 2022-12-19 RX ADMIN — ANORECTAL OINTMENT SCH APPLIC: 15.7; .44; 24; 20.6 OINTMENT TOPICAL at 21:14

## 2022-12-19 RX ADMIN — SODIUM CHLORIDE PRN UNIT: 9 INJECTION, SOLUTION INTRAVENOUS at 18:51

## 2022-12-19 RX ADMIN — Medication SCH ML: at 08:34

## 2022-12-19 RX ADMIN — SENNOSIDES SCH TAB: 8.6 TABLET, COATED ORAL at 21:00

## 2022-12-19 RX ADMIN — SODIUM CHLORIDE SCH MLS/HR: 9 INJECTION, SOLUTION INTRAVENOUS at 13:54

## 2022-12-19 RX ADMIN — HYDROCODONE BITARTRATE AND ACETAMINOPHEN PRN TAB: 5; 325 TABLET ORAL at 20:56

## 2022-12-19 RX ADMIN — SODIUM CHLORIDE SCH MLS/HR: 9 INJECTION, SOLUTION INTRAVENOUS at 05:56

## 2022-12-19 RX ADMIN — THERA TABS SCH UDTAB: TAB at 08:34

## 2022-12-19 RX ADMIN — ATORVASTATIN CALCIUM SCH MG: 20 TABLET, FILM COATED ORAL at 20:56

## 2022-12-19 RX ADMIN — Medication SCH EACH: at 18:48

## 2022-12-19 RX ADMIN — Medication SCH EACH: at 12:20

## 2022-12-19 RX ADMIN — CHLORHEXIDINE GLUCONATE SCH ML: 1.2 RINSE ORAL at 17:21

## 2022-12-19 RX ADMIN — ACETAMINOPHEN PRN MG: 325 TABLET ORAL at 14:33

## 2022-12-19 RX ADMIN — METOCLOPRAMIDE HYDROCHLORIDE SCH MG: 5 SOLUTION ORAL at 22:38

## 2022-12-19 RX ADMIN — ZINC SULFATE CAP 220 MG (50 MG ELEMENTAL ZN) SCH MG: 220 (50 ZN) CAP at 08:34

## 2022-12-19 RX ADMIN — METOCLOPRAMIDE HYDROCHLORIDE SCH MG: 5 SOLUTION ORAL at 14:19

## 2022-12-19 RX ADMIN — Medication SCH EACH: at 06:08

## 2022-12-19 RX ADMIN — Medication SCH ML: at 13:00

## 2022-12-19 RX ADMIN — VALPROIC ACID SCH MG: 250 SOLUTION ORAL at 08:26

## 2022-12-19 RX ADMIN — Medication SCH MG: at 17:21

## 2022-12-19 RX ADMIN — VALPROIC ACID SCH MG: 250 SOLUTION ORAL at 20:58

## 2022-12-19 RX ADMIN — VITAMIN D, TAB 1000IU (100/BT) SCH UNIT: 25 TAB at 08:33

## 2022-12-19 RX ADMIN — FENOFIBRATE SCH MG: 145 TABLET ORAL at 08:33

## 2022-12-19 RX ADMIN — CHLORHEXIDINE GLUCONATE SCH ML: 1.2 RINSE ORAL at 14:20

## 2022-12-19 RX ADMIN — METOCLOPRAMIDE HYDROCHLORIDE SCH MG: 5 SOLUTION ORAL at 05:57

## 2022-12-19 RX ADMIN — Medication SCH ML: at 16:42

## 2022-12-19 RX ADMIN — CLOTRIMAZOLE SCH GM: 1 CREAM TOPICAL at 16:41

## 2022-12-19 RX ADMIN — Medication SCH EACH: at 08:33

## 2022-12-20 VITALS — SYSTOLIC BLOOD PRESSURE: 129 MMHG | DIASTOLIC BLOOD PRESSURE: 75 MMHG

## 2022-12-20 VITALS — DIASTOLIC BLOOD PRESSURE: 77 MMHG | SYSTOLIC BLOOD PRESSURE: 124 MMHG

## 2022-12-20 VITALS — SYSTOLIC BLOOD PRESSURE: 140 MMHG | DIASTOLIC BLOOD PRESSURE: 85 MMHG

## 2022-12-20 VITALS — DIASTOLIC BLOOD PRESSURE: 68 MMHG | SYSTOLIC BLOOD PRESSURE: 118 MMHG

## 2022-12-20 LAB
BUN SERPL-MCNC: 12 MG/DL (ref 7–18)
CHLORIDE SERPL-SCNC: 108 MMOL/L (ref 98–107)
CO2 SERPL-SCNC: 29 MMOL/L (ref 21–32)
CREAT SERPL-MCNC: 0.3 MG/DL (ref 0.6–1.3)
GLUCOSE SERPL-MCNC: 182 MG/DL (ref 74–106)
HCT VFR BLD AUTO: 23.5 % (ref 31.2–41.9)
MAGNESIUM SERPL-MCNC: 1.6 MG/DL (ref 1.8–2.4)
MCH RBC QN AUTO: 31.7 UUG (ref 24.7–32.8)
MCV RBC AUTO: 95.9 FL (ref 75.5–95.3)
PHOSPHATE SERPL-MCNC: 4.2 MG/DL (ref 2.5–4.9)
PLATELET # BLD AUTO: 480 K/UL (ref 179–408)
POTASSIUM SERPL-SCNC: 3.6 MMOL/L (ref 3.5–5.1)

## 2022-12-20 RX ADMIN — CHLORHEXIDINE GLUCONATE SCH ML: 1.2 RINSE ORAL at 17:19

## 2022-12-20 RX ADMIN — SENNOSIDES SCH TAB: 8.6 TABLET, COATED ORAL at 21:00

## 2022-12-20 RX ADMIN — CLOTRIMAZOLE SCH GM: 1 CREAM TOPICAL at 09:26

## 2022-12-20 RX ADMIN — ACETAMINOPHEN PRN MG: 325 TABLET ORAL at 09:46

## 2022-12-20 RX ADMIN — CLOTRIMAZOLE SCH GM: 1 CREAM TOPICAL at 17:19

## 2022-12-20 RX ADMIN — METOCLOPRAMIDE HYDROCHLORIDE SCH MG: 5 SOLUTION ORAL at 05:58

## 2022-12-20 RX ADMIN — Medication SCH EACH: at 05:58

## 2022-12-20 RX ADMIN — HYDROCODONE BITARTRATE AND ACETAMINOPHEN PRN TAB: 5; 325 TABLET ORAL at 05:58

## 2022-12-20 RX ADMIN — CHLORHEXIDINE GLUCONATE SCH ML: 1.2 RINSE ORAL at 13:25

## 2022-12-20 RX ADMIN — MAGNESIUM SULFATE IN DEXTROSE SCH MLS/HR: 10 INJECTION, SOLUTION INTRAVENOUS at 12:09

## 2022-12-20 RX ADMIN — Medication SCH EACH: at 00:26

## 2022-12-20 RX ADMIN — Medication SCH MG: at 17:19

## 2022-12-20 RX ADMIN — SODIUM CHLORIDE PRN UNIT: 9 INJECTION, SOLUTION INTRAVENOUS at 06:34

## 2022-12-20 RX ADMIN — Medication SCH ML: at 17:19

## 2022-12-20 RX ADMIN — Medication SCH EACH: at 20:49

## 2022-12-20 RX ADMIN — VALPROIC ACID SCH MG: 250 SOLUTION ORAL at 09:26

## 2022-12-20 RX ADMIN — METOCLOPRAMIDE HYDROCHLORIDE SCH MG: 5 SOLUTION ORAL at 14:37

## 2022-12-20 RX ADMIN — VITAMIN D, TAB 1000IU (100/BT) SCH UNIT: 25 TAB at 09:16

## 2022-12-20 RX ADMIN — VALPROIC ACID SCH MG: 250 SOLUTION ORAL at 20:49

## 2022-12-20 RX ADMIN — Medication SCH ML: at 09:26

## 2022-12-20 RX ADMIN — Medication SCH MG: at 09:16

## 2022-12-20 RX ADMIN — ANORECTAL OINTMENT SCH APPLIC: 15.7; .44; 24; 20.6 OINTMENT TOPICAL at 09:26

## 2022-12-20 RX ADMIN — HYDROCODONE BITARTRATE AND ACETAMINOPHEN PRN TAB: 5; 325 TABLET ORAL at 20:50

## 2022-12-20 RX ADMIN — Medication SCH EACH: at 12:12

## 2022-12-20 RX ADMIN — THERA TABS SCH UDTAB: TAB at 09:16

## 2022-12-20 RX ADMIN — ZINC SULFATE CAP 220 MG (50 MG ELEMENTAL ZN) SCH MG: 220 (50 ZN) CAP at 09:16

## 2022-12-20 RX ADMIN — MAGNESIUM SULFATE IN DEXTROSE SCH MLS/HR: 10 INJECTION, SOLUTION INTRAVENOUS at 11:05

## 2022-12-20 RX ADMIN — SODIUM CHLORIDE PRN UNIT: 9 INJECTION, SOLUTION INTRAVENOUS at 12:15

## 2022-12-20 RX ADMIN — Medication SCH MG: at 09:20

## 2022-12-20 RX ADMIN — Medication SCH EACH: at 09:16

## 2022-12-20 RX ADMIN — ANORECTAL OINTMENT SCH APPLIC: 15.7; .44; 24; 20.6 OINTMENT TOPICAL at 21:24

## 2022-12-20 RX ADMIN — Medication SCH ML: at 13:25

## 2022-12-20 RX ADMIN — CHLORHEXIDINE GLUCONATE SCH ML: 1.2 RINSE ORAL at 09:26

## 2022-12-20 RX ADMIN — METOCLOPRAMIDE HYDROCHLORIDE SCH MG: 5 SOLUTION ORAL at 21:37

## 2022-12-20 RX ADMIN — ATORVASTATIN CALCIUM SCH MG: 20 TABLET, FILM COATED ORAL at 20:49

## 2022-12-20 RX ADMIN — FENOFIBRATE SCH MG: 145 TABLET ORAL at 09:16

## 2022-12-20 RX ADMIN — Medication SCH EACH: at 18:10

## 2022-12-20 RX ADMIN — PANTOPRAZOLE SODIUM SCH MG: 40 GRANULE, DELAYED RELEASE ORAL at 05:58

## 2022-12-21 VITALS — SYSTOLIC BLOOD PRESSURE: 135 MMHG | DIASTOLIC BLOOD PRESSURE: 83 MMHG

## 2022-12-21 VITALS — DIASTOLIC BLOOD PRESSURE: 90 MMHG | SYSTOLIC BLOOD PRESSURE: 140 MMHG

## 2022-12-21 VITALS — DIASTOLIC BLOOD PRESSURE: 75 MMHG | SYSTOLIC BLOOD PRESSURE: 129 MMHG

## 2022-12-21 LAB
BUN SERPL-MCNC: 13 MG/DL (ref 7–18)
CHLORIDE SERPL-SCNC: 108 MMOL/L (ref 98–107)
CO2 SERPL-SCNC: 28 MMOL/L (ref 21–32)
CREAT SERPL-MCNC: 0.4 MG/DL (ref 0.6–1.3)
GLUCOSE SERPL-MCNC: 134 MG/DL (ref 74–106)
HCT VFR BLD AUTO: 22.4 % (ref 31.2–41.9)
MAGNESIUM SERPL-MCNC: 1.9 MG/DL (ref 1.8–2.4)
MCH RBC QN AUTO: 31.9 UUG (ref 24.7–32.8)
MCV RBC AUTO: 96.1 FL (ref 75.5–95.3)
PHOSPHATE SERPL-MCNC: 4.8 MG/DL (ref 2.5–4.9)
PLATELET # BLD AUTO: 460 K/UL (ref 179–408)
POTASSIUM SERPL-SCNC: 3.8 MMOL/L (ref 3.5–5.1)

## 2022-12-21 RX ADMIN — CHLORHEXIDINE GLUCONATE SCH ML: 1.2 RINSE ORAL at 13:00

## 2022-12-21 RX ADMIN — Medication SCH MG: at 10:48

## 2022-12-21 RX ADMIN — Medication SCH EACH: at 21:14

## 2022-12-21 RX ADMIN — Medication SCH MG: at 17:00

## 2022-12-21 RX ADMIN — Medication SCH ML: at 09:00

## 2022-12-21 RX ADMIN — Medication SCH EACH: at 05:19

## 2022-12-21 RX ADMIN — PANTOPRAZOLE SODIUM SCH MG: 40 GRANULE, DELAYED RELEASE ORAL at 06:00

## 2022-12-21 RX ADMIN — METOCLOPRAMIDE HYDROCHLORIDE SCH MG: 5 SOLUTION ORAL at 14:00

## 2022-12-21 RX ADMIN — THERA TABS SCH UDTAB: TAB at 10:46

## 2022-12-21 RX ADMIN — ZINC SULFATE CAP 220 MG (50 MG ELEMENTAL ZN) SCH MG: 220 (50 ZN) CAP at 10:46

## 2022-12-21 RX ADMIN — HYDROCODONE BITARTRATE AND ACETAMINOPHEN PRN TAB: 5; 325 TABLET ORAL at 16:13

## 2022-12-21 RX ADMIN — Medication SCH MG: at 10:46

## 2022-12-21 RX ADMIN — HYDROCODONE BITARTRATE AND ACETAMINOPHEN PRN TAB: 5; 325 TABLET ORAL at 06:12

## 2022-12-21 RX ADMIN — CLOTRIMAZOLE SCH GM: 1 CREAM TOPICAL at 17:00

## 2022-12-21 RX ADMIN — Medication SCH ML: at 17:00

## 2022-12-21 RX ADMIN — CHLORHEXIDINE GLUCONATE SCH ML: 1.2 RINSE ORAL at 10:46

## 2022-12-21 RX ADMIN — CHLORHEXIDINE GLUCONATE SCH ML: 1.2 RINSE ORAL at 17:00

## 2022-12-21 RX ADMIN — ACETAMINOPHEN PRN MG: 325 TABLET ORAL at 11:03

## 2022-12-21 RX ADMIN — VITAMIN D, TAB 1000IU (100/BT) SCH UNIT: 25 TAB at 10:47

## 2022-12-21 RX ADMIN — SODIUM CHLORIDE PRN UNIT: 9 INJECTION, SOLUTION INTRAVENOUS at 00:48

## 2022-12-21 RX ADMIN — SENNOSIDES SCH TAB: 8.6 TABLET, COATED ORAL at 21:14

## 2022-12-21 RX ADMIN — Medication SCH EACH: at 12:00

## 2022-12-21 RX ADMIN — ANORECTAL OINTMENT SCH APPLIC: 15.7; .44; 24; 20.6 OINTMENT TOPICAL at 10:49

## 2022-12-21 RX ADMIN — FENOFIBRATE SCH MG: 145 TABLET ORAL at 10:46

## 2022-12-21 RX ADMIN — METOCLOPRAMIDE HYDROCHLORIDE SCH MG: 5 SOLUTION ORAL at 22:08

## 2022-12-21 RX ADMIN — Medication SCH ML: at 13:00

## 2022-12-21 RX ADMIN — Medication SCH MG: at 10:47

## 2022-12-21 RX ADMIN — Medication SCH EACH: at 19:00

## 2022-12-21 RX ADMIN — VALPROIC ACID SCH MG: 250 SOLUTION ORAL at 10:45

## 2022-12-21 RX ADMIN — ATORVASTATIN CALCIUM SCH MG: 20 TABLET, FILM COATED ORAL at 21:14

## 2022-12-21 RX ADMIN — Medication SCH EACH: at 10:46

## 2022-12-21 RX ADMIN — CLOTRIMAZOLE SCH GM: 1 CREAM TOPICAL at 10:49

## 2022-12-21 RX ADMIN — Medication SCH EACH: at 00:26

## 2022-12-21 RX ADMIN — ANORECTAL OINTMENT SCH APPLIC: 15.7; .44; 24; 20.6 OINTMENT TOPICAL at 21:14

## 2022-12-21 RX ADMIN — VALPROIC ACID SCH MG: 250 SOLUTION ORAL at 21:14

## 2022-12-21 RX ADMIN — METOCLOPRAMIDE HYDROCHLORIDE SCH MG: 5 SOLUTION ORAL at 06:00

## 2022-12-21 RX ADMIN — Medication SCH MG: at 11:03

## 2022-12-22 ENCOUNTER — HOSPITAL ENCOUNTER (INPATIENT)
Dept: HOSPITAL 12 - ER | Age: 52
LOS: 15 days | Discharge: NURSING, REHAB OR LONG-TERM CARE | DRG: 720 | End: 2023-01-06
Payer: MEDICAID

## 2022-12-22 VITALS — HEIGHT: 62 IN | WEIGHT: 120 LBS | BODY MASS INDEX: 22.08 KG/M2

## 2022-12-22 VITALS — DIASTOLIC BLOOD PRESSURE: 70 MMHG | SYSTOLIC BLOOD PRESSURE: 120 MMHG

## 2022-12-22 VITALS — SYSTOLIC BLOOD PRESSURE: 126 MMHG | DIASTOLIC BLOOD PRESSURE: 71 MMHG

## 2022-12-22 VITALS — DIASTOLIC BLOOD PRESSURE: 76 MMHG | SYSTOLIC BLOOD PRESSURE: 150 MMHG

## 2022-12-22 VITALS — DIASTOLIC BLOOD PRESSURE: 71 MMHG | SYSTOLIC BLOOD PRESSURE: 124 MMHG

## 2022-12-22 DIAGNOSIS — E87.6: ICD-10-CM

## 2022-12-22 DIAGNOSIS — I21.A1: ICD-10-CM

## 2022-12-22 DIAGNOSIS — N39.0: ICD-10-CM

## 2022-12-22 DIAGNOSIS — R19.7: ICD-10-CM

## 2022-12-22 DIAGNOSIS — J96.01: ICD-10-CM

## 2022-12-22 DIAGNOSIS — G80.9: ICD-10-CM

## 2022-12-22 DIAGNOSIS — E11.9: ICD-10-CM

## 2022-12-22 DIAGNOSIS — Z99.11: ICD-10-CM

## 2022-12-22 DIAGNOSIS — T17.990A: ICD-10-CM
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
## 2022-12-22 DIAGNOSIS — I50.31: ICD-10-CM

## 2022-12-22 DIAGNOSIS — M89.8X9: ICD-10-CM

## 2022-12-22 DIAGNOSIS — E83.9: ICD-10-CM

## 2022-12-22 DIAGNOSIS — Z93.1: ICD-10-CM

## 2022-12-22 DIAGNOSIS — N17.9: ICD-10-CM

## 2022-12-22 DIAGNOSIS — J98.11: ICD-10-CM

## 2022-12-22 DIAGNOSIS — Z87.440: ICD-10-CM

## 2022-12-22 DIAGNOSIS — G92.8: ICD-10-CM

## 2022-12-22 DIAGNOSIS — E44.0: ICD-10-CM

## 2022-12-22 DIAGNOSIS — R65.20: ICD-10-CM

## 2022-12-22 DIAGNOSIS — R13.10: ICD-10-CM

## 2022-12-22 DIAGNOSIS — Z79.4: ICD-10-CM

## 2022-12-22 DIAGNOSIS — Z87.442: ICD-10-CM

## 2022-12-22 DIAGNOSIS — Z87.01: ICD-10-CM

## 2022-12-22 DIAGNOSIS — D68.59: ICD-10-CM

## 2022-12-22 DIAGNOSIS — G40.909: ICD-10-CM

## 2022-12-22 DIAGNOSIS — E83.42: ICD-10-CM

## 2022-12-22 DIAGNOSIS — Z93.6: ICD-10-CM

## 2022-12-22 DIAGNOSIS — Z74.09: ICD-10-CM
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
## 2022-12-22 DIAGNOSIS — F73: ICD-10-CM

## 2022-12-22 DIAGNOSIS — B96.89: ICD-10-CM
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
## 2022-12-22 DIAGNOSIS — A41.9: Primary | ICD-10-CM

## 2022-12-22 DIAGNOSIS — D64.9: ICD-10-CM

## 2022-12-22 DIAGNOSIS — E87.0: ICD-10-CM

## 2022-12-22 DIAGNOSIS — J18.9: ICD-10-CM

## 2022-12-22 DIAGNOSIS — I46.9: ICD-10-CM

## 2022-12-22 DIAGNOSIS — Z79.84: ICD-10-CM

## 2022-12-22 DIAGNOSIS — E88.09: ICD-10-CM

## 2022-12-22 DIAGNOSIS — J90: ICD-10-CM

## 2022-12-22 LAB
BUN SERPL-MCNC: 12 MG/DL (ref 7–18)
CHLORIDE SERPL-SCNC: 110 MMOL/L (ref 98–107)
CO2 SERPL-SCNC: 29 MMOL/L (ref 21–32)
CREAT SERPL-MCNC: 0.4 MG/DL (ref 0.6–1.3)
GLUCOSE SERPL-MCNC: 130 MG/DL (ref 74–106)
HCT VFR BLD AUTO: 21.8 % (ref 31.2–41.9)
HCT VFR BLD AUTO: 22.8 % (ref 31.2–41.9)
MAGNESIUM SERPL-MCNC: 1.7 MG/DL (ref 1.8–2.4)
MCH RBC QN AUTO: 31.8 UUG (ref 24.7–32.8)
MCH RBC QN AUTO: 32.5 UUG (ref 24.7–32.8)
MCV RBC AUTO: 96.9 FL (ref 75.5–95.3)
MCV RBC AUTO: 97.2 FL (ref 75.5–95.3)
PHOSPHATE SERPL-MCNC: 4.2 MG/DL (ref 2.5–4.9)
PLATELET # BLD AUTO: 372 K/UL (ref 179–408)
PLATELET # BLD AUTO: 422 K/UL (ref 179–408)
POTASSIUM SERPL-SCNC: 4.3 MMOL/L (ref 3.5–5.1)

## 2022-12-22 PROCEDURE — A6209 FOAM DRSG <=16 SQ IN W/O BDR: HCPCS

## 2022-12-22 PROCEDURE — B548ZZA ULTRASONOGRAPHY OF SUPERIOR VENA CAVA, GUIDANCE: ICD-10-PCS

## 2022-12-22 PROCEDURE — A4663 DIALYSIS BLOOD PRESSURE CUFF: HCPCS

## 2022-12-22 PROCEDURE — 02HV33Z INSERTION OF INFUSION DEVICE INTO SUPERIOR VENA CAVA, PERCUTANEOUS APPROACH: ICD-10-PCS

## 2022-12-22 PROCEDURE — G0378 HOSPITAL OBSERVATION PER HR: HCPCS

## 2022-12-22 PROCEDURE — P9016 RBC LEUKOCYTES REDUCED: HCPCS

## 2022-12-22 RX ADMIN — CHLORHEXIDINE GLUCONATE SCH ML: 1.2 RINSE ORAL at 14:18

## 2022-12-22 RX ADMIN — ZINC SULFATE CAP 220 MG (50 MG ELEMENTAL ZN) SCH MG: 220 (50 ZN) CAP at 10:14

## 2022-12-22 RX ADMIN — CLOTRIMAZOLE SCH GM: 1 CREAM TOPICAL at 09:00

## 2022-12-22 RX ADMIN — Medication SCH ML: at 13:00

## 2022-12-22 RX ADMIN — ANORECTAL OINTMENT SCH APPLIC: 15.7; .44; 24; 20.6 OINTMENT TOPICAL at 09:00

## 2022-12-22 RX ADMIN — ACETAMINOPHEN PRN MG: 325 TABLET ORAL at 01:27

## 2022-12-22 RX ADMIN — VALPROIC ACID SCH MG: 250 SOLUTION ORAL at 09:00

## 2022-12-22 RX ADMIN — Medication SCH EACH: at 00:00

## 2022-12-22 RX ADMIN — METOCLOPRAMIDE HYDROCHLORIDE SCH MG: 5 SOLUTION ORAL at 06:26

## 2022-12-22 RX ADMIN — SODIUM CHLORIDE PRN UNIT: 9 INJECTION, SOLUTION INTRAVENOUS at 01:26

## 2022-12-22 RX ADMIN — METOCLOPRAMIDE HYDROCHLORIDE SCH MG: 5 SOLUTION ORAL at 14:19

## 2022-12-22 RX ADMIN — THERA TABS SCH UDTAB: TAB at 10:14

## 2022-12-22 RX ADMIN — Medication SCH EACH: at 10:14

## 2022-12-22 RX ADMIN — FENOFIBRATE SCH MG: 145 TABLET ORAL at 10:14

## 2022-12-22 RX ADMIN — Medication SCH ML: at 09:00

## 2022-12-22 RX ADMIN — PANTOPRAZOLE SODIUM SCH MG: 40 GRANULE, DELAYED RELEASE ORAL at 06:26

## 2022-12-22 RX ADMIN — VITAMIN D, TAB 1000IU (100/BT) SCH UNIT: 25 TAB at 10:12

## 2022-12-22 RX ADMIN — Medication SCH MG: at 10:14

## 2022-12-22 RX ADMIN — Medication SCH MG: at 10:12

## 2022-12-22 RX ADMIN — Medication SCH MG: at 09:00

## 2022-12-22 RX ADMIN — CHLORHEXIDINE GLUCONATE SCH ML: 1.2 RINSE ORAL at 09:00

## 2022-12-22 RX ADMIN — Medication SCH EACH: at 12:00

## 2022-12-22 RX ADMIN — Medication SCH EACH: at 06:19

## 2022-12-22 NOTE — NUR
PATIENT ARRIVED VIA AMBULANCE FROM NURSING FACILITY, PLACED INTO ROOM #5 AND 
PLACED ON MONITOR. EYES OPEN, UNABLE TO TRACK, NON VERBAL WITH NO S/S OF ANY 
DISTRESS NOTED AT THIS TIME. 02 2 LITERS IN USE, RIGHT NECK WITH SMALL BRUISED 
AREA NOTED, ABD SOFT, G-TUBE INTACT, NEPHOSTOMY TUBE INTACT WITH CLEAR YELLOW 
URINE NOTED AT THIS TIME. RIGHT ARM WITH DRSG INTACT AND HAND SWELLING, BLE 
EDEMA NOTED. SIDE RAILS ARE UP , REPOSITIONED FOR COMFORT, WILL CONTINUE TO 
MONITOR. 125/-%

## 2022-12-22 NOTE — NUR
ASSISTED MD AT BEDSIDE FOR BLOOD DRAW, FAMILY REMAINS AT BEDSIDE, NO CHANGE IN 
PRIOR ASSESSMENT. 115/[id number]% ON 3 LITERS.

## 2022-12-22 NOTE — NUR
TECH HERE TO PLACE PICC-LINE, PATIENT MEDICATED AS PER ORDER WITH MORPHINE. 
FAMILY REMAINS AT BEDSIDE.

## 2022-12-23 VITALS — DIASTOLIC BLOOD PRESSURE: 75 MMHG | SYSTOLIC BLOOD PRESSURE: 127 MMHG

## 2022-12-23 VITALS — SYSTOLIC BLOOD PRESSURE: 114 MMHG | DIASTOLIC BLOOD PRESSURE: 68 MMHG

## 2022-12-23 VITALS — SYSTOLIC BLOOD PRESSURE: 133 MMHG | DIASTOLIC BLOOD PRESSURE: 73 MMHG

## 2022-12-23 VITALS — DIASTOLIC BLOOD PRESSURE: 59 MMHG | SYSTOLIC BLOOD PRESSURE: 107 MMHG

## 2022-12-23 VITALS — DIASTOLIC BLOOD PRESSURE: 70 MMHG | SYSTOLIC BLOOD PRESSURE: 118 MMHG

## 2022-12-23 VITALS — SYSTOLIC BLOOD PRESSURE: 123 MMHG | DIASTOLIC BLOOD PRESSURE: 75 MMHG

## 2022-12-23 VITALS — SYSTOLIC BLOOD PRESSURE: 101 MMHG | DIASTOLIC BLOOD PRESSURE: 51 MMHG

## 2022-12-23 VITALS — SYSTOLIC BLOOD PRESSURE: 123 MMHG | DIASTOLIC BLOOD PRESSURE: 79 MMHG

## 2022-12-23 LAB
ALP SERPL-CCNC: 198 U/L (ref 50–136)
ALP SERPL-CCNC: 208 U/L (ref 50–136)
ALT SERPL W/O P-5'-P-CCNC: 11 U/L (ref 14–59)
ALT SERPL W/O P-5'-P-CCNC: 14 U/L (ref 14–59)
AST SERPL-CCNC: 27 U/L (ref 15–37)
AST SERPL-CCNC: 40 U/L (ref 15–37)
BASE EXCESS BLDA CALC-SCNC: 2.8 MMOL/L
BILIRUB DIRECT SERPL-MCNC: 0.2 MG/DL (ref 0–0.2)
BILIRUB SERPL-MCNC: 0.1 MG/DL (ref 0.2–1)
BILIRUB SERPL-MCNC: 0.2 MG/DL (ref 0.2–1)
BUN SERPL-MCNC: 12 MG/DL (ref 7–18)
BUN SERPL-MCNC: 13 MG/DL (ref 7–18)
BUN SERPL-MCNC: 13 MG/DL (ref 7–18)
CHLORIDE SERPL-SCNC: 110 MMOL/L (ref 98–107)
CHLORIDE SERPL-SCNC: 114 MMOL/L (ref 98–107)
CHLORIDE SERPL-SCNC: 115 MMOL/L (ref 98–107)
CO2 SERPL-SCNC: 27 MMOL/L (ref 21–32)
CO2 SERPL-SCNC: 29 MMOL/L (ref 21–32)
CO2 SERPL-SCNC: 30 MMOL/L (ref 21–32)
CREAT SERPL-MCNC: 0.3 MG/DL (ref 0.6–1.3)
CREAT SERPL-MCNC: 0.3 MG/DL (ref 0.6–1.3)
CREAT SERPL-MCNC: 0.4 MG/DL (ref 0.6–1.3)
GLUCOSE SERPL-MCNC: 189 MG/DL (ref 74–106)
GLUCOSE SERPL-MCNC: 71 MG/DL (ref 74–106)
GLUCOSE SERPL-MCNC: 87 MG/DL (ref 74–106)
HCO3 BLDA-SCNC: 24.9 MMOL/L
HCT VFR BLD AUTO: 22.7 % (ref 31.2–41.9)
HCT VFR BLD AUTO: 23 % (ref 31.2–41.9)
HGB BLDA OXIMETRY-MCNC: 8.6 G/DL (ref 12–16)
INHALED O2 CONCENTRATION: 100 %
INTRINSIC PEEP RESPIRATORY: 5 CMH20
MAGNESIUM SERPL-MCNC: 1.7 MG/DL (ref 1.8–2.4)
MAGNESIUM SERPL-MCNC: 1.7 MG/DL (ref 1.8–2.4)
MCH RBC QN AUTO: 31.8 UUG (ref 24.7–32.8)
MCH RBC QN AUTO: 32.1 UUG (ref 24.7–32.8)
MCV RBC AUTO: 97.2 FL (ref 75.5–95.3)
MCV RBC AUTO: 97.4 FL (ref 75.5–95.3)
PCO2 TEMP ADJ BLDA: 28.9 MMHG (ref 35–45)
PEEP SETTING VENT: 400 ML
PH TEMP ADJ BLDA: 7.55 [PH] (ref 7.35–7.45)
PHOSPHATE SERPL-MCNC: 4.9 MG/DL (ref 2.5–4.9)
PHOSPHATE SERPL-MCNC: 5.4 MG/DL (ref 2.5–4.9)
PLATELET # BLD AUTO: 352 K/UL (ref 179–408)
PLATELET # BLD AUTO: 363 K/UL (ref 179–408)
PO2 TEMP ADJ BLDA: 318.8 MMHG (ref 75–100)
POTASSIUM SERPL-SCNC: 3.2 MMOL/L (ref 3.5–5.1)
POTASSIUM SERPL-SCNC: 3.6 MMOL/L (ref 3.5–5.1)
POTASSIUM SERPL-SCNC: 4.2 MMOL/L (ref 3.5–5.1)
SET RATE, BG: 20
SPECIMEN DRAWN FROM PATIENT: (no result)
VENTILATION MODE VENT: (no result)
WS STN SPEC: 5.6 G/DL (ref 6.4–8.2)
WS STN SPEC: 5.7 G/DL (ref 6.4–8.2)

## 2022-12-23 PROCEDURE — 5A1955Z RESPIRATORY VENTILATION, GREATER THAN 96 CONSECUTIVE HOURS: ICD-10-PCS

## 2022-12-23 PROCEDURE — 5A12012 PERFORMANCE OF CARDIAC OUTPUT, SINGLE, MANUAL: ICD-10-PCS

## 2022-12-23 PROCEDURE — 0BH18EZ INSERTION OF ENDOTRACHEAL AIRWAY INTO TRACHEA, VIA NATURAL OR ARTIFICIAL OPENING ENDOSCOPIC: ICD-10-PCS

## 2022-12-23 RX ADMIN — MAGNESIUM SULFATE IN DEXTROSE SCH MLS/HR: 10 INJECTION, SOLUTION INTRAVENOUS at 22:42

## 2022-12-23 RX ADMIN — METFORMIN HYDROCHLORIDE SCH MG: 500 TABLET ORAL at 08:22

## 2022-12-23 RX ADMIN — MAGNESIUM SULFATE IN DEXTROSE SCH MLS/HR: 10 INJECTION, SOLUTION INTRAVENOUS at 22:40

## 2022-12-23 RX ADMIN — METFORMIN HYDROCHLORIDE SCH MG: 500 TABLET ORAL at 18:00

## 2022-12-23 RX ADMIN — DEXTROSE SCH MLS/HR: 50 INJECTION, SOLUTION INTRAVENOUS at 20:29

## 2022-12-23 RX ADMIN — MAGNESIUM SULFATE IN DEXTROSE SCH MLS/HR: 10 INJECTION, SOLUTION INTRAVENOUS at 22:41

## 2022-12-23 RX ADMIN — Medication SCH MG: at 08:21

## 2022-12-23 RX ADMIN — ATORVASTATIN CALCIUM SCH MG: 10 TABLET, FILM COATED ORAL at 21:10

## 2022-12-23 RX ADMIN — VALPROIC ACID SCH MG: 250 SOLUTION ORAL at 08:57

## 2022-12-23 RX ADMIN — VALPROIC ACID SCH MG: 250 SOLUTION ORAL at 18:00

## 2022-12-23 RX ADMIN — Medication SCH MG: at 08:20

## 2022-12-23 RX ADMIN — PROPOFOL PRN MLS/HR: 10 INJECTION, EMULSION INTRAVENOUS at 18:29

## 2022-12-23 RX ADMIN — Medication PRN ML: at 03:40

## 2022-12-23 RX ADMIN — MAGNESIUM SULFATE IN DEXTROSE SCH MLS/HR: 10 INJECTION, SOLUTION INTRAVENOUS at 21:31

## 2022-12-23 RX ADMIN — SENNOSIDES SCH TAB: 8.6 TABLET, COATED ORAL at 20:33

## 2022-12-23 RX ADMIN — Medication SCH MG: at 08:22

## 2022-12-23 NOTE — NUR
MOIRA spoke to Janette Moise ([phone]) from the [facility] and she states 
the patient does not have a conservator.

## 2022-12-23 NOTE — NUR
RECEIVED REPORT FROM KELSEA, CRITICAL LAB [facility] 115 REPORTED AT 1912 AND CALLED INTO [facility] 
AT 1922.

## 2022-12-23 NOTE — NUR
Received Pt from AMEE Godoy. Pt arrived to M/S unit at 0242. Caregiver arrived with 
Pt during unit transfer. Pt is a 52 yr old female weighing at 120 lbs. Pt is A&Ox0 (but not 
unconscious), eyes are uncoordinated, and is mute. Pt on 2L O2 NC. Current vitals are BP: 
123/79, P: 98%, RR: 19, T: 97.8, and SpO2: 99%. G-tube is intact. Pt is on Glucerna 1000mL @ 
45 mL/hr. Nephrostomy is intact, showcasing clear, yellow urine. Skin assessment was done 
showcasing redness in both vaginal area and sacrum/buttocks area. Pictures have been placed 
in Pt folder. Safety measures in place. Will continue to monitor.

-------------------------------------------------------------------------------

Addendum: 12/23/22 at 0530 by ADRIANNE MCLEAN RN

-------------------------------------------------------------------------------

Pulse: 98 bpm*

## 2022-12-23 NOTE — NUR
MOIRA called the patients conservator Janette Moise ([phone]) from the [facility] and left a voicemail requesting that she call this SW back. MOIRA will continue to 
follow up.

## 2022-12-23 NOTE — NUR
THIRD CALL AT 2048 TO [facility] TO REPORT CRITICAL LAB, MD RETURNED CALL AT 2050 AND IS ORDERING 
ANOTHER TROPONIN AT 2300.

## 2022-12-23 NOTE — NUR
1200-ROUTINE ROUNDS DONE, PATIENT LAYING TOWARDS RT SIDE, RESP. EVEN AND UNLABORED; HOB 
ELEVATED, NO PHYSICAL OR RESPIRATORY DISTRESS NOTED. PL TO RIA 3 LUMEN FLUSHING WELL. GTF & 
FLUIDS ADMINISTERED AS ORDERED. CG AT BEDSIDE. ALL SAFETY MEASURES IN PLACE.



1400-NO UNUSUAL OBSERVATIONS, PATIENT ASLEEP, ABLE TO WAKE UP, NON VERBAL.  CG AT BEDSIDE.



1548-CARE PROVIDED, PATIENT WAS TURNED AND REPOSITIONED BY ASSIGNED CNA AND CG. CNA REPORTED 
TO THIS WRITER PATIENT HAD ORAL SECRETIONS AND APPEARED TO NEED ATTENTION; ARRIVED 
IMMEDIATELY TO BEDSIDE, NOTED PATIENT PALE AND BECOMING NON RESPONSIVE. CPR INITIATED, RAPID 
RESPONSE TEAM ACTIVATED AND ARRIVED IMMEDIATELY, CPR/CHEST COMPRESSIONS CONTINUED, OTHER 
RAPID PROTOCOLS FOLLOWED, PATIENT WAS INTUBATED BY Dr. WEBER, PATIENT PLACED ON VENTILATOR 
AND TELE MONITOR WITH SYNUS TACHY -108 AND STRONG. 

1615-Dr. WEBER SPOKE TO BAN FREGOSO NP COVERING PATIENT, PATIENT TO BE TRANSFER TO ICU 
FOR FURTHER MEDICAL INTERVENTION AND TREATMENT.

1650-SPOKE TO SONIA PATIENT'S SISTER AND INFORMED ABOUT PATIENT'S ANNAMARIE/INTUBATION/TRANSFER 
TO ICU, PER SONIA, "OMG, THAT HAPPENED YESTERDAY, SHE WAS NON RESPONSIVE, EYES WERE NOT 
FOLLOWING & I TOLD THE DOCTOR" PER SONIA, "THANK YOU SO MUCH FOR LETTING ME KNOW, I WILL BE 
THERE AS SOON AS I CAN":



1715-PATIENT WAS TRANSFERRED TO ICU, CHART AND PROTER ENDORSEMENT GIVEN TO RECEIVING RN AT 
ICU UNIT.

## 2022-12-23 NOTE — NUR
0730-Rec'd patient in bed, HOB elevated. No respiratory distress, no moaning or facial 
grimaces; skin W/D to the touch, contracture of multiple extremities/limbs. GT (+) placement 
and patentcy with GTF running as ordered, GT fluids provided as ordered. NO N/V noted. 
Sitter/CG at bedside. Call light within reach.



0900-Scheduled/due medications administered with no ASE noted; no seizure episodes noted at 
this time. Seizure precautions observed. 

0930-ALB. 1.1 critical/abnormals values relayed to APRIL Laboy with no new orders at this 
time.

## 2022-12-23 NOTE — NUR
SW spoke with , Claudette De La Torre ([phone]) and spoke to her about 
possibly placement at a board and care. MOIRA will continue to follow up.

## 2022-12-24 VITALS — DIASTOLIC BLOOD PRESSURE: 71 MMHG | SYSTOLIC BLOOD PRESSURE: 133 MMHG

## 2022-12-24 VITALS — SYSTOLIC BLOOD PRESSURE: 146 MMHG | DIASTOLIC BLOOD PRESSURE: 75 MMHG

## 2022-12-24 VITALS — DIASTOLIC BLOOD PRESSURE: 82 MMHG | SYSTOLIC BLOOD PRESSURE: 151 MMHG

## 2022-12-24 VITALS — SYSTOLIC BLOOD PRESSURE: 166 MMHG | DIASTOLIC BLOOD PRESSURE: 82 MMHG

## 2022-12-24 VITALS — DIASTOLIC BLOOD PRESSURE: 77 MMHG | SYSTOLIC BLOOD PRESSURE: 152 MMHG

## 2022-12-24 VITALS — SYSTOLIC BLOOD PRESSURE: 139 MMHG | DIASTOLIC BLOOD PRESSURE: 83 MMHG

## 2022-12-24 VITALS — SYSTOLIC BLOOD PRESSURE: 131 MMHG | DIASTOLIC BLOOD PRESSURE: 71 MMHG

## 2022-12-24 VITALS — DIASTOLIC BLOOD PRESSURE: 67 MMHG | SYSTOLIC BLOOD PRESSURE: 125 MMHG

## 2022-12-24 VITALS — SYSTOLIC BLOOD PRESSURE: 127 MMHG | DIASTOLIC BLOOD PRESSURE: 72 MMHG

## 2022-12-24 VITALS — DIASTOLIC BLOOD PRESSURE: 99 MMHG | SYSTOLIC BLOOD PRESSURE: 173 MMHG

## 2022-12-24 VITALS — SYSTOLIC BLOOD PRESSURE: 163 MMHG | DIASTOLIC BLOOD PRESSURE: 93 MMHG

## 2022-12-24 VITALS — DIASTOLIC BLOOD PRESSURE: 97 MMHG | SYSTOLIC BLOOD PRESSURE: 158 MMHG

## 2022-12-24 VITALS — DIASTOLIC BLOOD PRESSURE: 69 MMHG | SYSTOLIC BLOOD PRESSURE: 122 MMHG

## 2022-12-24 VITALS — DIASTOLIC BLOOD PRESSURE: 102 MMHG | SYSTOLIC BLOOD PRESSURE: 170 MMHG

## 2022-12-24 VITALS — DIASTOLIC BLOOD PRESSURE: 75 MMHG | SYSTOLIC BLOOD PRESSURE: 139 MMHG

## 2022-12-24 VITALS — SYSTOLIC BLOOD PRESSURE: 160 MMHG | DIASTOLIC BLOOD PRESSURE: 83 MMHG

## 2022-12-24 VITALS — SYSTOLIC BLOOD PRESSURE: 111 MMHG | DIASTOLIC BLOOD PRESSURE: 65 MMHG

## 2022-12-24 VITALS — DIASTOLIC BLOOD PRESSURE: 83 MMHG | SYSTOLIC BLOOD PRESSURE: 145 MMHG

## 2022-12-24 VITALS — SYSTOLIC BLOOD PRESSURE: 136 MMHG | DIASTOLIC BLOOD PRESSURE: 84 MMHG

## 2022-12-24 VITALS — SYSTOLIC BLOOD PRESSURE: 168 MMHG | DIASTOLIC BLOOD PRESSURE: 102 MMHG

## 2022-12-24 VITALS — SYSTOLIC BLOOD PRESSURE: 143 MMHG | DIASTOLIC BLOOD PRESSURE: 81 MMHG

## 2022-12-24 VITALS — SYSTOLIC BLOOD PRESSURE: 146 MMHG | DIASTOLIC BLOOD PRESSURE: 87 MMHG

## 2022-12-24 VITALS — DIASTOLIC BLOOD PRESSURE: 70 MMHG | SYSTOLIC BLOOD PRESSURE: 145 MMHG

## 2022-12-24 VITALS — DIASTOLIC BLOOD PRESSURE: 91 MMHG | SYSTOLIC BLOOD PRESSURE: 172 MMHG

## 2022-12-24 LAB
ALP SERPL-CCNC: 190 U/L (ref 50–136)
ALT SERPL W/O P-5'-P-CCNC: 17 U/L (ref 14–59)
AST SERPL-CCNC: 37 U/L (ref 15–37)
BILIRUB SERPL-MCNC: 0.2 MG/DL (ref 0.2–1)
BUN SERPL-MCNC: 13 MG/DL (ref 7–18)
CHLORIDE SERPL-SCNC: 114 MMOL/L (ref 98–107)
CO2 SERPL-SCNC: 23 MMOL/L (ref 21–32)
CREAT SERPL-MCNC: 0.4 MG/DL (ref 0.6–1.3)
GLUCOSE SERPL-MCNC: 209 MG/DL (ref 74–106)
HCT VFR BLD AUTO: 22.9 % (ref 31.2–41.9)
MAGNESIUM SERPL-MCNC: 2.3 MG/DL (ref 1.8–2.4)
MCH RBC QN AUTO: 31 UUG (ref 24.7–32.8)
MCV RBC AUTO: 96.4 FL (ref 75.5–95.3)
PHOSPHATE SERPL-MCNC: 4.1 MG/DL (ref 2.5–4.9)
PLATELET # BLD AUTO: 363 K/UL (ref 179–408)
POTASSIUM SERPL-SCNC: 3.1 MMOL/L (ref 3.5–5.1)
WS STN SPEC: 5.9 G/DL (ref 6.4–8.2)

## 2022-12-24 RX ADMIN — METFORMIN HYDROCHLORIDE SCH MG: 500 TABLET ORAL at 18:23

## 2022-12-24 RX ADMIN — DEXTROSE PRN MLS/HR: 5 SOLUTION INTRAVENOUS at 08:47

## 2022-12-24 RX ADMIN — SENNOSIDES SCH TAB: 8.6 TABLET, COATED ORAL at 21:01

## 2022-12-24 RX ADMIN — POTASSIUM CHLORIDE SCH MLS/HR: 14.9 INJECTION, SOLUTION INTRAVENOUS at 14:00

## 2022-12-24 RX ADMIN — Medication SCH MG: at 09:28

## 2022-12-24 RX ADMIN — FENOFIBRATE SCH MG: 145 TABLET ORAL at 09:26

## 2022-12-24 RX ADMIN — POTASSIUM CHLORIDE SCH MLS/HR: 14.9 INJECTION, SOLUTION INTRAVENOUS at 13:00

## 2022-12-24 RX ADMIN — VALPROIC ACID SCH MG: 250 SOLUTION ORAL at 18:23

## 2022-12-24 RX ADMIN — Medication SCH MG: at 09:27

## 2022-12-24 RX ADMIN — POTASSIUM CHLORIDE SCH MLS/HR: 14.9 INJECTION, SOLUTION INTRAVENOUS at 09:45

## 2022-12-24 RX ADMIN — DEXTROSE SCH MLS/HR: 50 INJECTION, SOLUTION INTRAVENOUS at 14:00

## 2022-12-24 RX ADMIN — ACETAMINOPHEN PRN MG: 325 TABLET ORAL at 08:44

## 2022-12-24 RX ADMIN — POTASSIUM CHLORIDE SCH MLS/HR: 14.9 INJECTION, SOLUTION INTRAVENOUS at 10:45

## 2022-12-24 RX ADMIN — ATORVASTATIN CALCIUM SCH MG: 10 TABLET, FILM COATED ORAL at 21:02

## 2022-12-24 RX ADMIN — DEXTROSE SCH MLS/HR: 50 INJECTION, SOLUTION INTRAVENOUS at 05:26

## 2022-12-24 RX ADMIN — VALPROIC ACID SCH MG: 250 SOLUTION ORAL at 09:28

## 2022-12-24 RX ADMIN — METFORMIN HYDROCHLORIDE SCH MG: 500 TABLET ORAL at 08:43

## 2022-12-24 RX ADMIN — DEXTROSE SCH MLS/HR: 50 INJECTION, SOLUTION INTRAVENOUS at 21:02

## 2022-12-25 VITALS — DIASTOLIC BLOOD PRESSURE: 75 MMHG | SYSTOLIC BLOOD PRESSURE: 129 MMHG

## 2022-12-25 VITALS — SYSTOLIC BLOOD PRESSURE: 115 MMHG | DIASTOLIC BLOOD PRESSURE: 71 MMHG

## 2022-12-25 VITALS — SYSTOLIC BLOOD PRESSURE: 130 MMHG | DIASTOLIC BLOOD PRESSURE: 75 MMHG

## 2022-12-25 VITALS — SYSTOLIC BLOOD PRESSURE: 113 MMHG | DIASTOLIC BLOOD PRESSURE: 70 MMHG

## 2022-12-25 VITALS — SYSTOLIC BLOOD PRESSURE: 128 MMHG | DIASTOLIC BLOOD PRESSURE: 79 MMHG

## 2022-12-25 VITALS — SYSTOLIC BLOOD PRESSURE: 116 MMHG | DIASTOLIC BLOOD PRESSURE: 65 MMHG

## 2022-12-25 VITALS — SYSTOLIC BLOOD PRESSURE: 147 MMHG | DIASTOLIC BLOOD PRESSURE: 86 MMHG

## 2022-12-25 VITALS — SYSTOLIC BLOOD PRESSURE: 135 MMHG | DIASTOLIC BLOOD PRESSURE: 79 MMHG

## 2022-12-25 VITALS — DIASTOLIC BLOOD PRESSURE: 77 MMHG | SYSTOLIC BLOOD PRESSURE: 127 MMHG

## 2022-12-25 VITALS — DIASTOLIC BLOOD PRESSURE: 88 MMHG | SYSTOLIC BLOOD PRESSURE: 154 MMHG

## 2022-12-25 VITALS — DIASTOLIC BLOOD PRESSURE: 90 MMHG | SYSTOLIC BLOOD PRESSURE: 139 MMHG

## 2022-12-25 VITALS — DIASTOLIC BLOOD PRESSURE: 70 MMHG | SYSTOLIC BLOOD PRESSURE: 117 MMHG

## 2022-12-25 VITALS — SYSTOLIC BLOOD PRESSURE: 133 MMHG | DIASTOLIC BLOOD PRESSURE: 85 MMHG

## 2022-12-25 VITALS — SYSTOLIC BLOOD PRESSURE: 139 MMHG | DIASTOLIC BLOOD PRESSURE: 72 MMHG

## 2022-12-25 VITALS — DIASTOLIC BLOOD PRESSURE: 78 MMHG | SYSTOLIC BLOOD PRESSURE: 133 MMHG

## 2022-12-25 VITALS — SYSTOLIC BLOOD PRESSURE: 112 MMHG | DIASTOLIC BLOOD PRESSURE: 65 MMHG

## 2022-12-25 VITALS — DIASTOLIC BLOOD PRESSURE: 76 MMHG | SYSTOLIC BLOOD PRESSURE: 133 MMHG

## 2022-12-25 VITALS — SYSTOLIC BLOOD PRESSURE: 133 MMHG | DIASTOLIC BLOOD PRESSURE: 77 MMHG

## 2022-12-25 VITALS — SYSTOLIC BLOOD PRESSURE: 107 MMHG | DIASTOLIC BLOOD PRESSURE: 70 MMHG

## 2022-12-25 VITALS — DIASTOLIC BLOOD PRESSURE: 77 MMHG | SYSTOLIC BLOOD PRESSURE: 133 MMHG

## 2022-12-25 VITALS — SYSTOLIC BLOOD PRESSURE: 159 MMHG | DIASTOLIC BLOOD PRESSURE: 88 MMHG

## 2022-12-25 VITALS — DIASTOLIC BLOOD PRESSURE: 63 MMHG | SYSTOLIC BLOOD PRESSURE: 110 MMHG

## 2022-12-25 VITALS — DIASTOLIC BLOOD PRESSURE: 79 MMHG | SYSTOLIC BLOOD PRESSURE: 159 MMHG

## 2022-12-25 VITALS — SYSTOLIC BLOOD PRESSURE: 136 MMHG | DIASTOLIC BLOOD PRESSURE: 73 MMHG

## 2022-12-25 LAB
BASE EXCESS BLDA CALC-SCNC: -3.4 MMOL/L
BUN SERPL-MCNC: 11 MG/DL (ref 7–18)
CHLORIDE SERPL-SCNC: 107 MMOL/L (ref 98–107)
CO2 SERPL-SCNC: 24 MMOL/L (ref 21–32)
CREAT SERPL-MCNC: 0.4 MG/DL (ref 0.6–1.3)
EOSINOPHIL NFR BLD MANUAL: 3 % (ref 0–8)
GLUCOSE SERPL-MCNC: 195 MG/DL (ref 74–106)
HCO3 BLDA-SCNC: 19.5 MMOL/L
HCT VFR BLD AUTO: 21 % (ref 31.2–41.9)
HGB BLDA OXIMETRY-MCNC: 7.8 G/DL (ref 12–16)
INHALED O2 CONCENTRATION: 30 %
INTRINSIC PEEP RESPIRATORY: 5 CMH20
LYMPHOCYTES NFR BLD MANUAL: 31 % (ref 20–40)
MAGNESIUM SERPL-MCNC: 1.9 MG/DL (ref 1.8–2.4)
MCH RBC QN AUTO: 32 UUG (ref 24.7–32.8)
MCV RBC AUTO: 96.8 FL (ref 75.5–95.3)
MONOCYTES NFR BLD MANUAL: 4 % (ref 2–10)
NEUTS SEG NFR BLD MANUAL: 62 % (ref 42–75)
PCO2 TEMP ADJ BLDA: 26.6 MMHG (ref 35–45)
PEEP SETTING VENT: 350 ML
PH TEMP ADJ BLDA: 7.48 [PH] (ref 7.35–7.45)
PLATELET # BLD AUTO: 303 K/UL (ref 179–408)
PO2 TEMP ADJ BLDA: 135.7 MMHG (ref 75–100)
POTASSIUM SERPL-SCNC: 3.9 MMOL/L (ref 3.5–5.1)
SET RATE, BG: 20
SPECIMEN DRAWN FROM PATIENT: (no result)
VENTILATION MODE VENT: (no result)

## 2022-12-25 PROCEDURE — 30233N1 TRANSFUSION OF NONAUTOLOGOUS RED BLOOD CELLS INTO PERIPHERAL VEIN, PERCUTANEOUS APPROACH: ICD-10-PCS

## 2022-12-25 RX ADMIN — ATORVASTATIN CALCIUM SCH MG: 10 TABLET, FILM COATED ORAL at 20:46

## 2022-12-25 RX ADMIN — DEXTROSE PRN MLS/HR: 5 SOLUTION INTRAVENOUS at 01:53

## 2022-12-25 RX ADMIN — VALPROIC ACID SCH MG: 250 SOLUTION ORAL at 09:00

## 2022-12-25 RX ADMIN — Medication PRN ML: at 15:02

## 2022-12-25 RX ADMIN — DEXTROSE SCH MLS/HR: 50 INJECTION, SOLUTION INTRAVENOUS at 22:00

## 2022-12-25 RX ADMIN — METFORMIN HYDROCHLORIDE SCH MG: 500 TABLET ORAL at 17:43

## 2022-12-25 RX ADMIN — ACETAMINOPHEN PRN MG: 325 TABLET ORAL at 20:48

## 2022-12-25 RX ADMIN — PROPOFOL PRN MLS/HR: 10 INJECTION, EMULSION INTRAVENOUS at 15:03

## 2022-12-25 RX ADMIN — Medication SCH MG: at 09:00

## 2022-12-25 RX ADMIN — DEXTROSE SCH MLS/HR: 50 INJECTION, SOLUTION INTRAVENOUS at 14:46

## 2022-12-25 RX ADMIN — DEXTROSE PRN MLS/HR: 5 SOLUTION INTRAVENOUS at 17:34

## 2022-12-25 RX ADMIN — VALPROIC ACID SCH MG: 250 SOLUTION ORAL at 17:54

## 2022-12-25 RX ADMIN — FENOFIBRATE SCH MG: 145 TABLET ORAL at 09:00

## 2022-12-25 RX ADMIN — METFORMIN HYDROCHLORIDE SCH MG: 500 TABLET ORAL at 08:00

## 2022-12-25 RX ADMIN — FAMOTIDINE SCH MG: 10 INJECTION INTRAVENOUS at 09:00

## 2022-12-25 RX ADMIN — DEXTROSE SCH MLS/HR: 50 INJECTION, SOLUTION INTRAVENOUS at 05:35

## 2022-12-25 RX ADMIN — FAMOTIDINE SCH MG: 10 INJECTION INTRAVENOUS at 20:47

## 2022-12-25 RX ADMIN — SENNOSIDES SCH TAB: 8.6 TABLET, COATED ORAL at 20:47

## 2022-12-26 VITALS — SYSTOLIC BLOOD PRESSURE: 161 MMHG | DIASTOLIC BLOOD PRESSURE: 97 MMHG

## 2022-12-26 VITALS — DIASTOLIC BLOOD PRESSURE: 78 MMHG | SYSTOLIC BLOOD PRESSURE: 157 MMHG

## 2022-12-26 VITALS — DIASTOLIC BLOOD PRESSURE: 86 MMHG | SYSTOLIC BLOOD PRESSURE: 140 MMHG

## 2022-12-26 VITALS — SYSTOLIC BLOOD PRESSURE: 153 MMHG | DIASTOLIC BLOOD PRESSURE: 89 MMHG

## 2022-12-26 VITALS — SYSTOLIC BLOOD PRESSURE: 140 MMHG | DIASTOLIC BLOOD PRESSURE: 86 MMHG

## 2022-12-26 VITALS — DIASTOLIC BLOOD PRESSURE: 86 MMHG | SYSTOLIC BLOOD PRESSURE: 159 MMHG

## 2022-12-26 VITALS — DIASTOLIC BLOOD PRESSURE: 79 MMHG | SYSTOLIC BLOOD PRESSURE: 133 MMHG

## 2022-12-26 VITALS — DIASTOLIC BLOOD PRESSURE: 89 MMHG | SYSTOLIC BLOOD PRESSURE: 142 MMHG

## 2022-12-26 VITALS — DIASTOLIC BLOOD PRESSURE: 87 MMHG | SYSTOLIC BLOOD PRESSURE: 147 MMHG

## 2022-12-26 VITALS — DIASTOLIC BLOOD PRESSURE: 83 MMHG | SYSTOLIC BLOOD PRESSURE: 147 MMHG

## 2022-12-26 VITALS — DIASTOLIC BLOOD PRESSURE: 58 MMHG | SYSTOLIC BLOOD PRESSURE: 100 MMHG

## 2022-12-26 VITALS — DIASTOLIC BLOOD PRESSURE: 80 MMHG | SYSTOLIC BLOOD PRESSURE: 142 MMHG

## 2022-12-26 VITALS — DIASTOLIC BLOOD PRESSURE: 87 MMHG | SYSTOLIC BLOOD PRESSURE: 151 MMHG

## 2022-12-26 VITALS — SYSTOLIC BLOOD PRESSURE: 108 MMHG | DIASTOLIC BLOOD PRESSURE: 62 MMHG

## 2022-12-26 VITALS — SYSTOLIC BLOOD PRESSURE: 117 MMHG | DIASTOLIC BLOOD PRESSURE: 65 MMHG

## 2022-12-26 VITALS — DIASTOLIC BLOOD PRESSURE: 78 MMHG | SYSTOLIC BLOOD PRESSURE: 123 MMHG

## 2022-12-26 VITALS — SYSTOLIC BLOOD PRESSURE: 153 MMHG | DIASTOLIC BLOOD PRESSURE: 88 MMHG

## 2022-12-26 VITALS — SYSTOLIC BLOOD PRESSURE: 143 MMHG | DIASTOLIC BLOOD PRESSURE: 88 MMHG

## 2022-12-26 VITALS — SYSTOLIC BLOOD PRESSURE: 116 MMHG | DIASTOLIC BLOOD PRESSURE: 67 MMHG

## 2022-12-26 VITALS — SYSTOLIC BLOOD PRESSURE: 147 MMHG | DIASTOLIC BLOOD PRESSURE: 85 MMHG

## 2022-12-26 VITALS — SYSTOLIC BLOOD PRESSURE: 135 MMHG | DIASTOLIC BLOOD PRESSURE: 77 MMHG

## 2022-12-26 VITALS — SYSTOLIC BLOOD PRESSURE: 150 MMHG | DIASTOLIC BLOOD PRESSURE: 82 MMHG

## 2022-12-26 VITALS — SYSTOLIC BLOOD PRESSURE: 144 MMHG | DIASTOLIC BLOOD PRESSURE: 83 MMHG

## 2022-12-26 VITALS — DIASTOLIC BLOOD PRESSURE: 74 MMHG | SYSTOLIC BLOOD PRESSURE: 142 MMHG

## 2022-12-26 LAB
BASE EXCESS BLDA CALC-SCNC: -4.6 MMOL/L
BUN SERPL-MCNC: 8 MG/DL (ref 7–18)
CHLORIDE SERPL-SCNC: 108 MMOL/L (ref 98–107)
CO2 SERPL-SCNC: 21 MMOL/L (ref 21–32)
CREAT SERPL-MCNC: 0.4 MG/DL (ref 0.6–1.3)
GLUCOSE SERPL-MCNC: 172 MG/DL (ref 74–106)
HCO3 BLDA-SCNC: 17.8 MMOL/L
HCT VFR BLD AUTO: 28.7 % (ref 31.2–41.9)
HGB BLDA OXIMETRY-MCNC: 10.5 G/DL (ref 12–16)
INHALED O2 CONCENTRATION: 30 %
INTRINSIC PEEP RESPIRATORY: 5 CMH20
MAGNESIUM SERPL-MCNC: 1.8 MG/DL (ref 1.8–2.4)
MCH RBC QN AUTO: 31.3 UUG (ref 24.7–32.8)
MCV RBC AUTO: 95.1 FL (ref 75.5–95.3)
PCO2 TEMP ADJ BLDA: 25 MMHG (ref 35–45)
PEEP SETTING VENT: 350 ML
PH TEMP ADJ BLDA: 7.47 [PH] (ref 7.35–7.45)
PHOSPHATE SERPL-MCNC: 5.1 MG/DL (ref 2.5–4.9)
PLATELET # BLD AUTO: 301 K/UL (ref 179–408)
PO2 TEMP ADJ BLDA: 143.5 MMHG (ref 75–100)
POTASSIUM SERPL-SCNC: 3.3 MMOL/L (ref 3.5–5.1)
SET RATE, BG: 4
SPECIMEN DRAWN FROM PATIENT: (no result)
VENTILATION MODE VENT: (no result)

## 2022-12-26 RX ADMIN — VALPROIC ACID SCH MG: 250 SOLUTION ORAL at 18:06

## 2022-12-26 RX ADMIN — PROPOFOL PRN MLS/HR: 10 INJECTION, EMULSION INTRAVENOUS at 03:45

## 2022-12-26 RX ADMIN — DEXTROSE PRN MLS/HR: 5 SOLUTION INTRAVENOUS at 07:28

## 2022-12-26 RX ADMIN — Medication SCH MG: at 08:44

## 2022-12-26 RX ADMIN — SENNOSIDES SCH TAB: 8.6 TABLET, COATED ORAL at 21:00

## 2022-12-26 RX ADMIN — FAMOTIDINE SCH MG: 10 INJECTION INTRAVENOUS at 08:34

## 2022-12-26 RX ADMIN — VALPROIC ACID SCH MG: 250 SOLUTION ORAL at 08:43

## 2022-12-26 RX ADMIN — DEXTROSE SCH MLS/HR: 50 INJECTION, SOLUTION INTRAVENOUS at 06:58

## 2022-12-26 RX ADMIN — PROPOFOL PRN MLS/HR: 10 INJECTION, EMULSION INTRAVENOUS at 19:39

## 2022-12-26 RX ADMIN — FENOFIBRATE SCH MG: 145 TABLET ORAL at 08:44

## 2022-12-26 RX ADMIN — DEXTROSE SCH MLS/HR: 50 INJECTION, SOLUTION INTRAVENOUS at 21:16

## 2022-12-26 RX ADMIN — DEXTROSE PRN MLS/HR: 5 SOLUTION INTRAVENOUS at 18:02

## 2022-12-26 RX ADMIN — DEXTROSE PRN MLS/HR: 5 SOLUTION INTRAVENOUS at 03:00

## 2022-12-26 RX ADMIN — METFORMIN HYDROCHLORIDE SCH MG: 500 TABLET ORAL at 18:05

## 2022-12-26 RX ADMIN — METFORMIN HYDROCHLORIDE SCH MG: 500 TABLET ORAL at 08:34

## 2022-12-26 RX ADMIN — Medication PRN ML: at 14:11

## 2022-12-26 RX ADMIN — Medication SCH MG: at 08:42

## 2022-12-26 RX ADMIN — ATORVASTATIN CALCIUM SCH MG: 10 TABLET, FILM COATED ORAL at 20:50

## 2022-12-26 RX ADMIN — Medication SCH MG: at 08:43

## 2022-12-26 RX ADMIN — FAMOTIDINE SCH MG: 10 INJECTION INTRAVENOUS at 20:50

## 2022-12-26 RX ADMIN — DEXTROSE SCH MLS/HR: 50 INJECTION, SOLUTION INTRAVENOUS at 14:12

## 2022-12-27 VITALS — DIASTOLIC BLOOD PRESSURE: 81 MMHG | SYSTOLIC BLOOD PRESSURE: 133 MMHG

## 2022-12-27 VITALS — SYSTOLIC BLOOD PRESSURE: 121 MMHG | DIASTOLIC BLOOD PRESSURE: 70 MMHG

## 2022-12-27 VITALS — DIASTOLIC BLOOD PRESSURE: 88 MMHG | SYSTOLIC BLOOD PRESSURE: 140 MMHG

## 2022-12-27 VITALS — SYSTOLIC BLOOD PRESSURE: 97 MMHG | DIASTOLIC BLOOD PRESSURE: 55 MMHG

## 2022-12-27 VITALS — SYSTOLIC BLOOD PRESSURE: 135 MMHG | DIASTOLIC BLOOD PRESSURE: 74 MMHG

## 2022-12-27 VITALS — DIASTOLIC BLOOD PRESSURE: 86 MMHG | SYSTOLIC BLOOD PRESSURE: 134 MMHG

## 2022-12-27 VITALS — SYSTOLIC BLOOD PRESSURE: 97 MMHG | DIASTOLIC BLOOD PRESSURE: 52 MMHG

## 2022-12-27 VITALS — SYSTOLIC BLOOD PRESSURE: 140 MMHG | DIASTOLIC BLOOD PRESSURE: 88 MMHG

## 2022-12-27 VITALS — DIASTOLIC BLOOD PRESSURE: 66 MMHG | SYSTOLIC BLOOD PRESSURE: 121 MMHG

## 2022-12-27 VITALS — DIASTOLIC BLOOD PRESSURE: 74 MMHG | SYSTOLIC BLOOD PRESSURE: 127 MMHG

## 2022-12-27 VITALS — SYSTOLIC BLOOD PRESSURE: 142 MMHG | DIASTOLIC BLOOD PRESSURE: 89 MMHG

## 2022-12-27 VITALS — DIASTOLIC BLOOD PRESSURE: 71 MMHG | SYSTOLIC BLOOD PRESSURE: 117 MMHG

## 2022-12-27 VITALS — SYSTOLIC BLOOD PRESSURE: 120 MMHG | DIASTOLIC BLOOD PRESSURE: 68 MMHG

## 2022-12-27 VITALS — SYSTOLIC BLOOD PRESSURE: 134 MMHG | DIASTOLIC BLOOD PRESSURE: 76 MMHG

## 2022-12-27 VITALS — SYSTOLIC BLOOD PRESSURE: 106 MMHG | DIASTOLIC BLOOD PRESSURE: 59 MMHG

## 2022-12-27 VITALS — DIASTOLIC BLOOD PRESSURE: 67 MMHG | SYSTOLIC BLOOD PRESSURE: 120 MMHG

## 2022-12-27 VITALS — DIASTOLIC BLOOD PRESSURE: 55 MMHG | SYSTOLIC BLOOD PRESSURE: 148 MMHG

## 2022-12-27 VITALS — DIASTOLIC BLOOD PRESSURE: 59 MMHG | SYSTOLIC BLOOD PRESSURE: 110 MMHG

## 2022-12-27 VITALS — SYSTOLIC BLOOD PRESSURE: 121 MMHG | DIASTOLIC BLOOD PRESSURE: 68 MMHG

## 2022-12-27 VITALS — SYSTOLIC BLOOD PRESSURE: 122 MMHG | DIASTOLIC BLOOD PRESSURE: 73 MMHG

## 2022-12-27 VITALS — DIASTOLIC BLOOD PRESSURE: 56 MMHG | SYSTOLIC BLOOD PRESSURE: 98 MMHG

## 2022-12-27 VITALS — SYSTOLIC BLOOD PRESSURE: 95 MMHG | DIASTOLIC BLOOD PRESSURE: 56 MMHG

## 2022-12-27 VITALS — DIASTOLIC BLOOD PRESSURE: 77 MMHG | SYSTOLIC BLOOD PRESSURE: 126 MMHG

## 2022-12-27 VITALS — DIASTOLIC BLOOD PRESSURE: 76 MMHG | SYSTOLIC BLOOD PRESSURE: 116 MMHG

## 2022-12-27 VITALS — SYSTOLIC BLOOD PRESSURE: 137 MMHG | DIASTOLIC BLOOD PRESSURE: 85 MMHG

## 2022-12-27 VITALS — SYSTOLIC BLOOD PRESSURE: 99 MMHG | DIASTOLIC BLOOD PRESSURE: 53 MMHG

## 2022-12-27 VITALS — SYSTOLIC BLOOD PRESSURE: 116 MMHG | DIASTOLIC BLOOD PRESSURE: 62 MMHG

## 2022-12-27 LAB
BASE EXCESS BLDA CALC-SCNC: -1 MMOL/L
BIPAP ANDOR CPAP SETTING VENT: 5 CMH20
BUN SERPL-MCNC: 7 MG/DL (ref 7–18)
CHLORIDE SERPL-SCNC: 107 MMOL/L (ref 98–107)
CO2 SERPL-SCNC: 24 MMOL/L (ref 21–32)
CREAT SERPL-MCNC: 0.5 MG/DL (ref 0.6–1.3)
GLUCOSE SERPL-MCNC: 169 MG/DL (ref 74–106)
HCO3 BLDA-SCNC: 22.1 MMOL/L
HCT VFR BLD AUTO: 28 % (ref 31.2–41.9)
HGB BLDA OXIMETRY-MCNC: 10.2 G/DL (ref 12–16)
INHALED O2 CONCENTRATION: 30 %
MAGNESIUM SERPL-MCNC: 1.7 MG/DL (ref 1.8–2.4)
MCH RBC QN AUTO: 31.8 UUG (ref 24.7–32.8)
MCV RBC AUTO: 94.8 FL (ref 75.5–95.3)
PCO2 TEMP ADJ BLDA: 31.4 MMHG (ref 35–45)
PH TEMP ADJ BLDA: 7.47 [PH] (ref 7.35–7.45)
PLATELET # BLD AUTO: 294 K/UL (ref 179–408)
PO2 TEMP ADJ BLDA: 133 MMHG (ref 75–100)
POTASSIUM SERPL-SCNC: 3.7 MMOL/L (ref 3.5–5.1)
SPECIMEN DRAWN FROM PATIENT: (no result)
VENTILATION MODE VENT: (no result)

## 2022-12-27 RX ADMIN — Medication SCH MG: at 08:16

## 2022-12-27 RX ADMIN — PROPOFOL PRN MLS/HR: 10 INJECTION, EMULSION INTRAVENOUS at 04:46

## 2022-12-27 RX ADMIN — ACETAMINOPHEN PRN MG: 325 TABLET ORAL at 04:57

## 2022-12-27 RX ADMIN — PROPOFOL PRN MLS/HR: 10 INJECTION, EMULSION INTRAVENOUS at 17:12

## 2022-12-27 RX ADMIN — FAMOTIDINE SCH MG: 10 INJECTION INTRAVENOUS at 08:07

## 2022-12-27 RX ADMIN — Medication SCH MG: at 08:10

## 2022-12-27 RX ADMIN — ATORVASTATIN CALCIUM SCH MG: 10 TABLET, FILM COATED ORAL at 20:41

## 2022-12-27 RX ADMIN — DEXTROSE SCH MLS/HR: 50 INJECTION, SOLUTION INTRAVENOUS at 21:19

## 2022-12-27 RX ADMIN — FAMOTIDINE SCH MG: 10 INJECTION INTRAVENOUS at 20:41

## 2022-12-27 RX ADMIN — MAGNESIUM SULFATE IN DEXTROSE SCH MLS/HR: 10 INJECTION, SOLUTION INTRAVENOUS at 10:01

## 2022-12-27 RX ADMIN — SENNOSIDES SCH TAB: 8.6 TABLET, COATED ORAL at 20:42

## 2022-12-27 RX ADMIN — METFORMIN HYDROCHLORIDE SCH MG: 500 TABLET ORAL at 08:05

## 2022-12-27 RX ADMIN — DEXTROSE SCH MLS/HR: 50 INJECTION, SOLUTION INTRAVENOUS at 13:26

## 2022-12-27 RX ADMIN — MAGNESIUM SULFATE IN DEXTROSE SCH MLS/HR: 10 INJECTION, SOLUTION INTRAVENOUS at 08:05

## 2022-12-27 RX ADMIN — Medication SCH MG: at 08:14

## 2022-12-27 RX ADMIN — DEXTROSE PRN MLS/HR: 5 SOLUTION INTRAVENOUS at 19:29

## 2022-12-27 RX ADMIN — FENOFIBRATE SCH MG: 145 TABLET ORAL at 08:06

## 2022-12-27 RX ADMIN — METFORMIN HYDROCHLORIDE SCH MG: 500 TABLET ORAL at 17:12

## 2022-12-27 RX ADMIN — DEXTROSE PRN MLS/HR: 5 SOLUTION INTRAVENOUS at 04:48

## 2022-12-27 RX ADMIN — DEXTROSE SCH MLS/HR: 50 INJECTION, SOLUTION INTRAVENOUS at 05:55

## 2022-12-27 RX ADMIN — VALPROIC ACID SCH MG: 250 SOLUTION ORAL at 17:16

## 2022-12-27 RX ADMIN — VALPROIC ACID SCH MG: 250 SOLUTION ORAL at 08:13

## 2022-12-27 NOTE — NUR
Attempted spontaneous breathing trials, pt was placed on cpap 5, pressure support 8, 30% 
Fio2. Pt became tachypneic, respiratory rate between high 30's and low 40's. Spo2 wnl. Pt 
unable to tolerate SBT. Pt placed back on A/C mode per Dr. Jara. Respirations improved, 
no resp. distress noted. Will continue to monitor.

## 2022-12-27 NOTE — NUR
Recieved pt on Diprovan @15. Pt is tachypnic at 27 bpm on ventilator. pt is NSR with a O2 
asturation of 100% TPN is Glucerna 1.2 is running at 45 ml/hr

## 2022-12-28 VITALS — SYSTOLIC BLOOD PRESSURE: 124 MMHG | DIASTOLIC BLOOD PRESSURE: 79 MMHG

## 2022-12-28 VITALS — DIASTOLIC BLOOD PRESSURE: 57 MMHG | SYSTOLIC BLOOD PRESSURE: 99 MMHG

## 2022-12-28 VITALS — SYSTOLIC BLOOD PRESSURE: 97 MMHG | DIASTOLIC BLOOD PRESSURE: 60 MMHG

## 2022-12-28 VITALS — DIASTOLIC BLOOD PRESSURE: 81 MMHG | SYSTOLIC BLOOD PRESSURE: 131 MMHG

## 2022-12-28 VITALS — SYSTOLIC BLOOD PRESSURE: 103 MMHG | DIASTOLIC BLOOD PRESSURE: 67 MMHG

## 2022-12-28 VITALS — SYSTOLIC BLOOD PRESSURE: 91 MMHG | DIASTOLIC BLOOD PRESSURE: 48 MMHG

## 2022-12-28 VITALS — SYSTOLIC BLOOD PRESSURE: 108 MMHG | DIASTOLIC BLOOD PRESSURE: 66 MMHG

## 2022-12-28 VITALS — DIASTOLIC BLOOD PRESSURE: 74 MMHG | SYSTOLIC BLOOD PRESSURE: 115 MMHG

## 2022-12-28 VITALS — SYSTOLIC BLOOD PRESSURE: 104 MMHG | DIASTOLIC BLOOD PRESSURE: 67 MMHG

## 2022-12-28 VITALS — SYSTOLIC BLOOD PRESSURE: 107 MMHG | DIASTOLIC BLOOD PRESSURE: 67 MMHG

## 2022-12-28 VITALS — SYSTOLIC BLOOD PRESSURE: 127 MMHG | DIASTOLIC BLOOD PRESSURE: 77 MMHG

## 2022-12-28 VITALS — DIASTOLIC BLOOD PRESSURE: 76 MMHG | SYSTOLIC BLOOD PRESSURE: 124 MMHG

## 2022-12-28 VITALS — DIASTOLIC BLOOD PRESSURE: 53 MMHG | SYSTOLIC BLOOD PRESSURE: 127 MMHG

## 2022-12-28 VITALS — DIASTOLIC BLOOD PRESSURE: 50 MMHG | SYSTOLIC BLOOD PRESSURE: 123 MMHG

## 2022-12-28 VITALS — SYSTOLIC BLOOD PRESSURE: 134 MMHG | DIASTOLIC BLOOD PRESSURE: 77 MMHG

## 2022-12-28 VITALS — DIASTOLIC BLOOD PRESSURE: 70 MMHG | SYSTOLIC BLOOD PRESSURE: 118 MMHG

## 2022-12-28 VITALS — DIASTOLIC BLOOD PRESSURE: 74 MMHG | SYSTOLIC BLOOD PRESSURE: 110 MMHG

## 2022-12-28 VITALS — SYSTOLIC BLOOD PRESSURE: 111 MMHG | DIASTOLIC BLOOD PRESSURE: 61 MMHG

## 2022-12-28 VITALS — DIASTOLIC BLOOD PRESSURE: 84 MMHG | SYSTOLIC BLOOD PRESSURE: 133 MMHG

## 2022-12-28 VITALS — DIASTOLIC BLOOD PRESSURE: 80 MMHG | SYSTOLIC BLOOD PRESSURE: 133 MMHG

## 2022-12-28 VITALS — DIASTOLIC BLOOD PRESSURE: 88 MMHG | SYSTOLIC BLOOD PRESSURE: 136 MMHG

## 2022-12-28 VITALS — SYSTOLIC BLOOD PRESSURE: 100 MMHG | DIASTOLIC BLOOD PRESSURE: 60 MMHG

## 2022-12-28 VITALS — SYSTOLIC BLOOD PRESSURE: 93 MMHG | DIASTOLIC BLOOD PRESSURE: 47 MMHG

## 2022-12-28 VITALS — DIASTOLIC BLOOD PRESSURE: 77 MMHG | SYSTOLIC BLOOD PRESSURE: 130 MMHG

## 2022-12-28 VITALS — SYSTOLIC BLOOD PRESSURE: 82 MMHG | DIASTOLIC BLOOD PRESSURE: 47 MMHG

## 2022-12-28 LAB
BASE EXCESS BLDA CALC-SCNC: -3 MMOL/L
BUN SERPL-MCNC: 9 MG/DL (ref 7–18)
CHLORIDE SERPL-SCNC: 103 MMOL/L (ref 98–107)
CO2 SERPL-SCNC: 22 MMOL/L (ref 21–32)
CREAT SERPL-MCNC: 0.5 MG/DL (ref 0.6–1.3)
GLUCOSE SERPL-MCNC: 148 MG/DL (ref 74–106)
HCO3 BLDA-SCNC: 19.2 MMOL/L
HCT VFR BLD AUTO: 30.7 % (ref 31.2–41.9)
HGB BLDA OXIMETRY-MCNC: 10.5 G/DL (ref 12–16)
INHALED O2 CONCENTRATION: 30 %
INTRINSIC PEEP RESPIRATORY: 5 CMH20
MAGNESIUM SERPL-MCNC: 2 MG/DL (ref 1.8–2.4)
MCH RBC QN AUTO: 31.3 UUG (ref 24.7–32.8)
MCV RBC AUTO: 93.5 FL (ref 75.5–95.3)
PCO2 TEMP ADJ BLDA: 25.8 MMHG (ref 35–45)
PEEP SETTING VENT: 350 ML
PH TEMP ADJ BLDA: 7.49 [PH] (ref 7.35–7.45)
PHOSPHATE SERPL-MCNC: 4.4 MG/DL (ref 2.5–4.9)
PLATELET # BLD AUTO: 352 K/UL (ref 179–408)
PO2 TEMP ADJ BLDA: 117.6 MMHG (ref 75–100)
POTASSIUM SERPL-SCNC: 3.6 MMOL/L (ref 3.5–5.1)
SET RATE, BG: 20
SPECIMEN DRAWN FROM PATIENT: (no result)
TRIGL SERPL-MCNC: 206 MG/DL (ref 30–150)

## 2022-12-28 RX ADMIN — ATORVASTATIN CALCIUM SCH MG: 10 TABLET, FILM COATED ORAL at 20:20

## 2022-12-28 RX ADMIN — Medication SCH MG: at 08:40

## 2022-12-28 RX ADMIN — FAMOTIDINE SCH MG: 10 INJECTION INTRAVENOUS at 20:20

## 2022-12-28 RX ADMIN — METFORMIN HYDROCHLORIDE SCH MG: 500 TABLET ORAL at 08:43

## 2022-12-28 RX ADMIN — METFORMIN HYDROCHLORIDE SCH MG: 500 TABLET ORAL at 18:14

## 2022-12-28 RX ADMIN — Medication SCH MG: at 08:38

## 2022-12-28 RX ADMIN — SENNOSIDES SCH TAB: 8.6 TABLET, COATED ORAL at 20:21

## 2022-12-28 RX ADMIN — PROPOFOL PRN MLS/HR: 10 INJECTION, EMULSION INTRAVENOUS at 10:00

## 2022-12-28 RX ADMIN — Medication SCH MG: at 08:42

## 2022-12-28 RX ADMIN — DEXTROSE SCH MLS/HR: 50 INJECTION, SOLUTION INTRAVENOUS at 05:56

## 2022-12-28 RX ADMIN — FUROSEMIDE SCH MG: 10 INJECTION, SOLUTION INTRAMUSCULAR; INTRAVENOUS at 20:20

## 2022-12-28 RX ADMIN — FAMOTIDINE SCH MG: 10 INJECTION INTRAVENOUS at 08:43

## 2022-12-28 RX ADMIN — DEXTROSE SCH MLS/HR: 50 INJECTION, SOLUTION INTRAVENOUS at 13:05

## 2022-12-28 RX ADMIN — VALPROIC ACID SCH MG: 250 SOLUTION ORAL at 08:41

## 2022-12-28 RX ADMIN — FENOFIBRATE SCH MG: 145 TABLET ORAL at 08:40

## 2022-12-28 RX ADMIN — VALPROIC ACID SCH MG: 250 SOLUTION ORAL at 18:14

## 2022-12-28 RX ADMIN — FUROSEMIDE SCH MG: 10 INJECTION, SOLUTION INTRAMUSCULAR; INTRAVENOUS at 12:53

## 2022-12-28 RX ADMIN — DEXTROSE PRN MLS/HR: 5 SOLUTION INTRAVENOUS at 08:52

## 2022-12-28 NOTE — NUR
Recoeved patient from night shift nurse. pt is sleeping and on propofol 20 mcg/kg/min. RR 20 
VS stable.

-------------------------------------------------------------------------------

Addendum: 12/28/22 at 1104 by COURTNEY SHELDON RN

-------------------------------------------------------------------------------

pt ventilator with ET tube settings AC/20, , RR 20, FIO2 30%

## 2022-12-29 VITALS — DIASTOLIC BLOOD PRESSURE: 71 MMHG | SYSTOLIC BLOOD PRESSURE: 116 MMHG

## 2022-12-29 VITALS — SYSTOLIC BLOOD PRESSURE: 117 MMHG | DIASTOLIC BLOOD PRESSURE: 76 MMHG

## 2022-12-29 VITALS — SYSTOLIC BLOOD PRESSURE: 141 MMHG | DIASTOLIC BLOOD PRESSURE: 76 MMHG

## 2022-12-29 VITALS — SYSTOLIC BLOOD PRESSURE: 167 MMHG | DIASTOLIC BLOOD PRESSURE: 134 MMHG

## 2022-12-29 VITALS — DIASTOLIC BLOOD PRESSURE: 76 MMHG | SYSTOLIC BLOOD PRESSURE: 141 MMHG

## 2022-12-29 VITALS — DIASTOLIC BLOOD PRESSURE: 115 MMHG | SYSTOLIC BLOOD PRESSURE: 134 MMHG

## 2022-12-29 VITALS — DIASTOLIC BLOOD PRESSURE: 67 MMHG | SYSTOLIC BLOOD PRESSURE: 110 MMHG

## 2022-12-29 VITALS — DIASTOLIC BLOOD PRESSURE: 117 MMHG | SYSTOLIC BLOOD PRESSURE: 182 MMHG

## 2022-12-29 VITALS — DIASTOLIC BLOOD PRESSURE: 78 MMHG | SYSTOLIC BLOOD PRESSURE: 126 MMHG

## 2022-12-29 VITALS — DIASTOLIC BLOOD PRESSURE: 76 MMHG | SYSTOLIC BLOOD PRESSURE: 126 MMHG

## 2022-12-29 VITALS — SYSTOLIC BLOOD PRESSURE: 168 MMHG | DIASTOLIC BLOOD PRESSURE: 123 MMHG

## 2022-12-29 VITALS — DIASTOLIC BLOOD PRESSURE: 85 MMHG | SYSTOLIC BLOOD PRESSURE: 151 MMHG

## 2022-12-29 VITALS — DIASTOLIC BLOOD PRESSURE: 77 MMHG | SYSTOLIC BLOOD PRESSURE: 131 MMHG

## 2022-12-29 VITALS — SYSTOLIC BLOOD PRESSURE: 109 MMHG | DIASTOLIC BLOOD PRESSURE: 66 MMHG

## 2022-12-29 VITALS — SYSTOLIC BLOOD PRESSURE: 97 MMHG | DIASTOLIC BLOOD PRESSURE: 39 MMHG

## 2022-12-29 VITALS — DIASTOLIC BLOOD PRESSURE: 75 MMHG | SYSTOLIC BLOOD PRESSURE: 118 MMHG

## 2022-12-29 VITALS — DIASTOLIC BLOOD PRESSURE: 123 MMHG | SYSTOLIC BLOOD PRESSURE: 173 MMHG

## 2022-12-29 VITALS — SYSTOLIC BLOOD PRESSURE: 103 MMHG | DIASTOLIC BLOOD PRESSURE: 60 MMHG

## 2022-12-29 VITALS — DIASTOLIC BLOOD PRESSURE: 76 MMHG | SYSTOLIC BLOOD PRESSURE: 117 MMHG

## 2022-12-29 VITALS — SYSTOLIC BLOOD PRESSURE: 103 MMHG | DIASTOLIC BLOOD PRESSURE: 73 MMHG

## 2022-12-29 LAB
BASE EXCESS BLDA CALC-SCNC: -1.7 MMOL/L
BUN SERPL-MCNC: 15 MG/DL (ref 7–18)
CHLORIDE SERPL-SCNC: 106 MMOL/L (ref 98–107)
CO2 SERPL-SCNC: 23 MMOL/L (ref 21–32)
CREAT SERPL-MCNC: 0.6 MG/DL (ref 0.6–1.3)
GLUCOSE SERPL-MCNC: 154 MG/DL (ref 74–106)
HCO3 BLDA-SCNC: 20.2 MMOL/L
HCT VFR BLD AUTO: 35.4 % (ref 31.2–41.9)
HGB BLDA OXIMETRY-MCNC: 12 G/DL (ref 12–16)
INHALED O2 CONCENTRATION: 30 %
INTRINSIC PEEP RESPIRATORY: 5 CMH20
MCH RBC QN AUTO: 30.6 UUG (ref 24.7–32.8)
MCV RBC AUTO: 93.4 FL (ref 75.5–95.3)
PCO2 TEMP ADJ BLDA: 26.2 MMHG (ref 35–45)
PEEP SETTING VENT: 350 ML
PH TEMP ADJ BLDA: 7.5 [PH] (ref 7.35–7.45)
PLATELET # BLD AUTO: 375 K/UL (ref 179–408)
PO2 TEMP ADJ BLDA: 107 MMHG (ref 75–100)
POTASSIUM SERPL-SCNC: 3.1 MMOL/L (ref 3.5–5.1)
SET RATE, BG: 4
SPECIMEN DRAWN FROM PATIENT: (no result)
VENTILATION MODE VENT: (no result)

## 2022-12-29 RX ADMIN — FAMOTIDINE SCH MG: 20 TABLET, FILM COATED ORAL at 20:36

## 2022-12-29 RX ADMIN — PROPOFOL PRN MLS/HR: 10 INJECTION, EMULSION INTRAVENOUS at 19:02

## 2022-12-29 RX ADMIN — FAMOTIDINE SCH MG: 20 TABLET, FILM COATED ORAL at 08:09

## 2022-12-29 RX ADMIN — METOPROLOL TARTRATE PRN MG: 5 INJECTION, SOLUTION INTRAVENOUS at 21:44

## 2022-12-29 RX ADMIN — ATORVASTATIN CALCIUM SCH MG: 10 TABLET, FILM COATED ORAL at 20:36

## 2022-12-29 RX ADMIN — Medication SCH MG: at 08:04

## 2022-12-29 RX ADMIN — POTASSIUM CHLORIDE SCH MLS/HR: 14.9 INJECTION, SOLUTION INTRAVENOUS at 08:03

## 2022-12-29 RX ADMIN — METFORMIN HYDROCHLORIDE SCH MG: 500 TABLET ORAL at 08:03

## 2022-12-29 RX ADMIN — POTASSIUM CHLORIDE SCH MLS/HR: 14.9 INJECTION, SOLUTION INTRAVENOUS at 11:52

## 2022-12-29 RX ADMIN — Medication SCH MG: at 08:07

## 2022-12-29 RX ADMIN — FENOFIBRATE SCH MG: 145 TABLET ORAL at 08:06

## 2022-12-29 RX ADMIN — SENNOSIDES SCH TAB: 8.6 TABLET, COATED ORAL at 20:36

## 2022-12-29 RX ADMIN — PROPOFOL PRN MLS/HR: 10 INJECTION, EMULSION INTRAVENOUS at 01:04

## 2022-12-29 RX ADMIN — VALPROIC ACID SCH MG: 250 SOLUTION ORAL at 08:06

## 2022-12-29 RX ADMIN — POTASSIUM CHLORIDE SCH MLS/HR: 14.9 INJECTION, SOLUTION INTRAVENOUS at 10:44

## 2022-12-29 RX ADMIN — METFORMIN HYDROCHLORIDE SCH MG: 500 TABLET ORAL at 17:52

## 2022-12-29 RX ADMIN — POTASSIUM CHLORIDE SCH MLS/HR: 14.9 INJECTION, SOLUTION INTRAVENOUS at 09:41

## 2022-12-29 RX ADMIN — Medication SCH MG: at 08:06

## 2022-12-29 RX ADMIN — VALPROIC ACID SCH MG: 250 SOLUTION ORAL at 17:52

## 2022-12-29 NOTE — NUR
Recieved pt from night shift nurse. pt on ventilator  with settings of AC 20  O2 30% 
PEEP 5. VS stable.

## 2022-12-29 NOTE — NUR
19:00 - Received the patient from outgoing Day shift nurse. Pt's eyes are open but she is 
unresponsive. Pt is contracted in both upper and lower extremities. No sign of distress 
noted. Caregivers from [facility] are in the lobby.  Significant redness observed on 
patients groin and perineal area.  Full bed bath given and linen changed. No signs of 
distress noted during the shift and vitals signs are stable at the end of the shift.

## 2022-12-29 NOTE — NUR
Pt breathing rapidly after cleaning and rate is not decreasing significantly for the past 10 
minutes. RT called who put her back on AC 20  O2 30% PEEP 5. Rate has decreased to 
20s.

## 2022-12-30 VITALS — DIASTOLIC BLOOD PRESSURE: 59 MMHG | SYSTOLIC BLOOD PRESSURE: 110 MMHG

## 2022-12-30 VITALS — DIASTOLIC BLOOD PRESSURE: 70 MMHG | SYSTOLIC BLOOD PRESSURE: 122 MMHG

## 2022-12-30 VITALS — SYSTOLIC BLOOD PRESSURE: 105 MMHG | DIASTOLIC BLOOD PRESSURE: 71 MMHG

## 2022-12-30 VITALS — DIASTOLIC BLOOD PRESSURE: 67 MMHG | SYSTOLIC BLOOD PRESSURE: 107 MMHG

## 2022-12-30 VITALS — SYSTOLIC BLOOD PRESSURE: 101 MMHG | DIASTOLIC BLOOD PRESSURE: 53 MMHG

## 2022-12-30 VITALS — DIASTOLIC BLOOD PRESSURE: 57 MMHG | SYSTOLIC BLOOD PRESSURE: 95 MMHG

## 2022-12-30 VITALS — DIASTOLIC BLOOD PRESSURE: 77 MMHG | SYSTOLIC BLOOD PRESSURE: 134 MMHG

## 2022-12-30 VITALS — DIASTOLIC BLOOD PRESSURE: 66 MMHG | SYSTOLIC BLOOD PRESSURE: 103 MMHG

## 2022-12-30 VITALS — DIASTOLIC BLOOD PRESSURE: 69 MMHG | SYSTOLIC BLOOD PRESSURE: 108 MMHG

## 2022-12-30 VITALS — DIASTOLIC BLOOD PRESSURE: 75 MMHG | SYSTOLIC BLOOD PRESSURE: 125 MMHG

## 2022-12-30 VITALS — SYSTOLIC BLOOD PRESSURE: 117 MMHG | DIASTOLIC BLOOD PRESSURE: 70 MMHG

## 2022-12-30 VITALS — SYSTOLIC BLOOD PRESSURE: 120 MMHG | DIASTOLIC BLOOD PRESSURE: 75 MMHG

## 2022-12-30 VITALS — DIASTOLIC BLOOD PRESSURE: 78 MMHG | SYSTOLIC BLOOD PRESSURE: 93 MMHG

## 2022-12-30 VITALS — DIASTOLIC BLOOD PRESSURE: 78 MMHG | SYSTOLIC BLOOD PRESSURE: 117 MMHG

## 2022-12-30 VITALS — DIASTOLIC BLOOD PRESSURE: 91 MMHG | SYSTOLIC BLOOD PRESSURE: 119 MMHG

## 2022-12-30 VITALS — DIASTOLIC BLOOD PRESSURE: 97 MMHG | SYSTOLIC BLOOD PRESSURE: 119 MMHG

## 2022-12-30 VITALS — SYSTOLIC BLOOD PRESSURE: 129 MMHG | DIASTOLIC BLOOD PRESSURE: 68 MMHG

## 2022-12-30 VITALS — DIASTOLIC BLOOD PRESSURE: 59 MMHG | SYSTOLIC BLOOD PRESSURE: 93 MMHG

## 2022-12-30 VITALS — SYSTOLIC BLOOD PRESSURE: 110 MMHG | DIASTOLIC BLOOD PRESSURE: 65 MMHG

## 2022-12-30 VITALS — DIASTOLIC BLOOD PRESSURE: 96 MMHG | SYSTOLIC BLOOD PRESSURE: 137 MMHG

## 2022-12-30 VITALS — DIASTOLIC BLOOD PRESSURE: 68 MMHG | SYSTOLIC BLOOD PRESSURE: 114 MMHG

## 2022-12-30 VITALS — SYSTOLIC BLOOD PRESSURE: 136 MMHG | DIASTOLIC BLOOD PRESSURE: 78 MMHG

## 2022-12-30 VITALS — SYSTOLIC BLOOD PRESSURE: 96 MMHG | DIASTOLIC BLOOD PRESSURE: 65 MMHG

## 2022-12-30 VITALS — DIASTOLIC BLOOD PRESSURE: 76 MMHG | SYSTOLIC BLOOD PRESSURE: 130 MMHG

## 2022-12-30 LAB
BASE EXCESS BLDA CALC-SCNC: -4.2 MMOL/L
BUN SERPL-MCNC: 23 MG/DL (ref 7–18)
CHLORIDE SERPL-SCNC: 108 MMOL/L (ref 98–107)
CO2 SERPL-SCNC: 20 MMOL/L (ref 21–32)
CREAT SERPL-MCNC: 0.7 MG/DL (ref 0.6–1.3)
GLUCOSE SERPL-MCNC: 107 MG/DL (ref 74–106)
HCO3 BLDA-SCNC: 17 MMOL/L
HCT VFR BLD AUTO: 32.3 % (ref 31.2–41.9)
HGB BLDA OXIMETRY-MCNC: 11 G/DL (ref 12–16)
INHALED O2 CONCENTRATION: 30 %
INTRINSIC PEEP RESPIRATORY: 5 CMH20
MAGNESIUM SERPL-MCNC: 1.8 MG/DL (ref 1.8–2.4)
MCH RBC QN AUTO: 30.9 UUG (ref 24.7–32.8)
MCV RBC AUTO: 94.1 FL (ref 75.5–95.3)
PCO2 TEMP ADJ BLDA: 21 MMHG (ref 35–45)
PH TEMP ADJ BLDA: 7.53 [PH] (ref 7.35–7.45)
PHOSPHATE SERPL-MCNC: 4.3 MG/DL (ref 2.5–4.9)
PLATELET # BLD AUTO: 463 K/UL (ref 179–408)
PO2 TEMP ADJ BLDA: 103.9 MMHG (ref 75–100)
POTASSIUM SERPL-SCNC: 4.8 MMOL/L (ref 3.5–5.1)
SPECIMEN DRAWN FROM PATIENT: (no result)
VENTILATION MODE VENT: (no result)

## 2022-12-30 RX ADMIN — FENOFIBRATE SCH MG: 145 TABLET ORAL at 09:51

## 2022-12-30 RX ADMIN — METFORMIN HYDROCHLORIDE SCH MG: 500 TABLET ORAL at 17:23

## 2022-12-30 RX ADMIN — SENNOSIDES SCH TAB: 8.6 TABLET, COATED ORAL at 20:23

## 2022-12-30 RX ADMIN — ATORVASTATIN CALCIUM SCH MG: 10 TABLET, FILM COATED ORAL at 20:23

## 2022-12-30 RX ADMIN — PROPOFOL PRN MLS/HR: 10 INJECTION, EMULSION INTRAVENOUS at 07:00

## 2022-12-30 RX ADMIN — VALPROIC ACID SCH MG: 250 SOLUTION ORAL at 09:50

## 2022-12-30 RX ADMIN — Medication SCH MG: at 09:50

## 2022-12-30 RX ADMIN — FAMOTIDINE SCH MG: 20 TABLET, FILM COATED ORAL at 20:23

## 2022-12-30 RX ADMIN — METFORMIN HYDROCHLORIDE SCH MG: 500 TABLET ORAL at 08:00

## 2022-12-30 RX ADMIN — VALPROIC ACID SCH MG: 250 SOLUTION ORAL at 17:23

## 2022-12-30 RX ADMIN — FAMOTIDINE SCH MG: 20 TABLET, FILM COATED ORAL at 09:50

## 2022-12-30 NOTE — NUR
RT attempted weaning of ventilator reads apneic road ventilator on CPAP mode propofol was 
off at the time.

## 2022-12-30 NOTE — NUR
Attempted spontaneous breathing trials. Pt placed on Cpap 5, PSV 8. Pt unable to trigger 
spontaneous breaths, pt becomes apneic. Rn notified. ABG drawn. Pt placed back on A/C mode. 
Will continue to monitor.

## 2022-12-31 VITALS — DIASTOLIC BLOOD PRESSURE: 71 MMHG | SYSTOLIC BLOOD PRESSURE: 124 MMHG

## 2022-12-31 VITALS — DIASTOLIC BLOOD PRESSURE: 70 MMHG | SYSTOLIC BLOOD PRESSURE: 120 MMHG

## 2022-12-31 VITALS — SYSTOLIC BLOOD PRESSURE: 126 MMHG | DIASTOLIC BLOOD PRESSURE: 77 MMHG

## 2022-12-31 VITALS — SYSTOLIC BLOOD PRESSURE: 128 MMHG | DIASTOLIC BLOOD PRESSURE: 74 MMHG

## 2022-12-31 VITALS — DIASTOLIC BLOOD PRESSURE: 76 MMHG | SYSTOLIC BLOOD PRESSURE: 133 MMHG

## 2022-12-31 VITALS — DIASTOLIC BLOOD PRESSURE: 65 MMHG | SYSTOLIC BLOOD PRESSURE: 110 MMHG

## 2022-12-31 VITALS — DIASTOLIC BLOOD PRESSURE: 73 MMHG | SYSTOLIC BLOOD PRESSURE: 121 MMHG

## 2022-12-31 VITALS — DIASTOLIC BLOOD PRESSURE: 55 MMHG | SYSTOLIC BLOOD PRESSURE: 101 MMHG

## 2022-12-31 VITALS — SYSTOLIC BLOOD PRESSURE: 107 MMHG | DIASTOLIC BLOOD PRESSURE: 73 MMHG

## 2022-12-31 VITALS — SYSTOLIC BLOOD PRESSURE: 95 MMHG | DIASTOLIC BLOOD PRESSURE: 56 MMHG

## 2022-12-31 VITALS — SYSTOLIC BLOOD PRESSURE: 141 MMHG | DIASTOLIC BLOOD PRESSURE: 76 MMHG

## 2022-12-31 VITALS — SYSTOLIC BLOOD PRESSURE: 121 MMHG | DIASTOLIC BLOOD PRESSURE: 71 MMHG

## 2022-12-31 VITALS — SYSTOLIC BLOOD PRESSURE: 136 MMHG | DIASTOLIC BLOOD PRESSURE: 74 MMHG

## 2022-12-31 VITALS — DIASTOLIC BLOOD PRESSURE: 76 MMHG | SYSTOLIC BLOOD PRESSURE: 132 MMHG

## 2022-12-31 VITALS — DIASTOLIC BLOOD PRESSURE: 79 MMHG | SYSTOLIC BLOOD PRESSURE: 129 MMHG

## 2022-12-31 VITALS — DIASTOLIC BLOOD PRESSURE: 74 MMHG | SYSTOLIC BLOOD PRESSURE: 115 MMHG

## 2022-12-31 VITALS — DIASTOLIC BLOOD PRESSURE: 58 MMHG | SYSTOLIC BLOOD PRESSURE: 98 MMHG

## 2022-12-31 VITALS — SYSTOLIC BLOOD PRESSURE: 131 MMHG | DIASTOLIC BLOOD PRESSURE: 74 MMHG

## 2022-12-31 VITALS — DIASTOLIC BLOOD PRESSURE: 85 MMHG | SYSTOLIC BLOOD PRESSURE: 136 MMHG

## 2022-12-31 VITALS — SYSTOLIC BLOOD PRESSURE: 110 MMHG | DIASTOLIC BLOOD PRESSURE: 65 MMHG

## 2022-12-31 VITALS — SYSTOLIC BLOOD PRESSURE: 138 MMHG | DIASTOLIC BLOOD PRESSURE: 78 MMHG

## 2022-12-31 VITALS — DIASTOLIC BLOOD PRESSURE: 68 MMHG | SYSTOLIC BLOOD PRESSURE: 115 MMHG

## 2022-12-31 LAB
BASE EXCESS BLDA CALC-SCNC: -5 MMOL/L
BUN SERPL-MCNC: 26 MG/DL (ref 7–18)
CHLORIDE SERPL-SCNC: 107 MMOL/L (ref 98–107)
CO2 SERPL-SCNC: 21 MMOL/L (ref 21–32)
CREAT SERPL-MCNC: 0.6 MG/DL (ref 0.6–1.3)
GLUCOSE SERPL-MCNC: 129 MG/DL (ref 74–106)
HCO3 BLDA-SCNC: 17.3 MMOL/L
HCT VFR BLD AUTO: 29.4 % (ref 31.2–41.9)
HGB BLDA OXIMETRY-MCNC: 10.7 G/DL (ref 12–16)
INHALED O2 CONCENTRATION: 30 %
INTRINSIC PEEP RESPIRATORY: 5 CMH20
MCH RBC QN AUTO: 31.5 UUG (ref 24.7–32.8)
MCV RBC AUTO: 95.2 FL (ref 75.5–95.3)
PCO2 TEMP ADJ BLDA: 24.3 MMHG (ref 35–45)
PEEP SETTING VENT: 300 ML
PH TEMP ADJ BLDA: 7.47 [PH] (ref 7.35–7.45)
PLATELET # BLD AUTO: 423 K/UL (ref 179–408)
PO2 TEMP ADJ BLDA: 118.7 MMHG (ref 75–100)
POTASSIUM SERPL-SCNC: 4.2 MMOL/L (ref 3.5–5.1)
SET RATE, BG: 20
SPECIMEN DRAWN FROM PATIENT: (no result)
VENTILATION MODE VENT: (no result)

## 2022-12-31 RX ADMIN — VALPROIC ACID SCH MG: 250 SOLUTION ORAL at 17:02

## 2022-12-31 RX ADMIN — METFORMIN HYDROCHLORIDE SCH MG: 500 TABLET ORAL at 17:02

## 2022-12-31 RX ADMIN — PROPOFOL PRN MLS/HR: 10 INJECTION, EMULSION INTRAVENOUS at 13:25

## 2022-12-31 RX ADMIN — FENOFIBRATE SCH MG: 145 TABLET ORAL at 09:38

## 2022-12-31 RX ADMIN — METFORMIN HYDROCHLORIDE SCH MG: 500 TABLET ORAL at 09:37

## 2022-12-31 RX ADMIN — FAMOTIDINE SCH MG: 20 TABLET, FILM COATED ORAL at 09:33

## 2022-12-31 RX ADMIN — Medication SCH MG: at 09:38

## 2022-12-31 RX ADMIN — VALPROIC ACID SCH MG: 250 SOLUTION ORAL at 09:32

## 2022-12-31 RX ADMIN — SENNOSIDES SCH TAB: 8.6 TABLET, COATED ORAL at 20:09

## 2022-12-31 RX ADMIN — Medication SCH MG: at 09:34

## 2022-12-31 RX ADMIN — Medication SCH MG: at 09:35

## 2022-12-31 RX ADMIN — FAMOTIDINE SCH MG: 20 TABLET, FILM COATED ORAL at 20:08

## 2022-12-31 RX ADMIN — ATORVASTATIN CALCIUM SCH MG: 10 TABLET, FILM COATED ORAL at 20:08

## 2022-12-31 RX ADMIN — ACETAMINOPHEN PRN MG: 325 TABLET ORAL at 15:58

## 2023-01-01 VITALS — SYSTOLIC BLOOD PRESSURE: 129 MMHG | DIASTOLIC BLOOD PRESSURE: 76 MMHG

## 2023-01-01 VITALS — DIASTOLIC BLOOD PRESSURE: 75 MMHG | SYSTOLIC BLOOD PRESSURE: 135 MMHG

## 2023-01-01 VITALS — DIASTOLIC BLOOD PRESSURE: 64 MMHG | SYSTOLIC BLOOD PRESSURE: 115 MMHG

## 2023-01-01 VITALS — SYSTOLIC BLOOD PRESSURE: 136 MMHG | DIASTOLIC BLOOD PRESSURE: 79 MMHG

## 2023-01-01 VITALS — DIASTOLIC BLOOD PRESSURE: 77 MMHG | SYSTOLIC BLOOD PRESSURE: 140 MMHG

## 2023-01-01 VITALS — SYSTOLIC BLOOD PRESSURE: 118 MMHG | DIASTOLIC BLOOD PRESSURE: 65 MMHG

## 2023-01-01 VITALS — SYSTOLIC BLOOD PRESSURE: 133 MMHG | DIASTOLIC BLOOD PRESSURE: 76 MMHG

## 2023-01-01 VITALS — SYSTOLIC BLOOD PRESSURE: 145 MMHG | DIASTOLIC BLOOD PRESSURE: 75 MMHG

## 2023-01-01 VITALS — DIASTOLIC BLOOD PRESSURE: 70 MMHG | SYSTOLIC BLOOD PRESSURE: 124 MMHG

## 2023-01-01 VITALS — SYSTOLIC BLOOD PRESSURE: 132 MMHG | DIASTOLIC BLOOD PRESSURE: 73 MMHG

## 2023-01-01 VITALS — DIASTOLIC BLOOD PRESSURE: 77 MMHG | SYSTOLIC BLOOD PRESSURE: 135 MMHG

## 2023-01-01 VITALS — DIASTOLIC BLOOD PRESSURE: 77 MMHG | SYSTOLIC BLOOD PRESSURE: 144 MMHG

## 2023-01-01 VITALS — SYSTOLIC BLOOD PRESSURE: 135 MMHG | DIASTOLIC BLOOD PRESSURE: 77 MMHG

## 2023-01-01 VITALS — SYSTOLIC BLOOD PRESSURE: 114 MMHG | DIASTOLIC BLOOD PRESSURE: 74 MMHG

## 2023-01-01 VITALS — SYSTOLIC BLOOD PRESSURE: 114 MMHG | DIASTOLIC BLOOD PRESSURE: 76 MMHG

## 2023-01-01 VITALS — SYSTOLIC BLOOD PRESSURE: 143 MMHG | DIASTOLIC BLOOD PRESSURE: 79 MMHG

## 2023-01-01 VITALS — SYSTOLIC BLOOD PRESSURE: 135 MMHG | DIASTOLIC BLOOD PRESSURE: 75 MMHG

## 2023-01-01 VITALS — SYSTOLIC BLOOD PRESSURE: 110 MMHG | DIASTOLIC BLOOD PRESSURE: 66 MMHG

## 2023-01-01 VITALS — DIASTOLIC BLOOD PRESSURE: 71 MMHG | SYSTOLIC BLOOD PRESSURE: 131 MMHG

## 2023-01-01 VITALS — DIASTOLIC BLOOD PRESSURE: 85 MMHG | SYSTOLIC BLOOD PRESSURE: 149 MMHG

## 2023-01-01 VITALS — DIASTOLIC BLOOD PRESSURE: 77 MMHG | SYSTOLIC BLOOD PRESSURE: 164 MMHG

## 2023-01-01 VITALS — SYSTOLIC BLOOD PRESSURE: 133 MMHG | DIASTOLIC BLOOD PRESSURE: 72 MMHG

## 2023-01-01 VITALS — DIASTOLIC BLOOD PRESSURE: 81 MMHG | SYSTOLIC BLOOD PRESSURE: 138 MMHG

## 2023-01-01 LAB
BASE EXCESS BLDA CALC-SCNC: -3.9 MMOL/L
BASE EXCESS BLDA CALC-SCNC: -4.2 MMOL/L
BUN SERPL-MCNC: 27 MG/DL (ref 7–18)
CHLORIDE SERPL-SCNC: 108 MMOL/L (ref 98–107)
CO2 SERPL-SCNC: 21 MMOL/L (ref 21–32)
CREAT SERPL-MCNC: 0.6 MG/DL (ref 0.6–1.3)
GLUCOSE SERPL-MCNC: 152 MG/DL (ref 74–106)
HCO3 BLDA-SCNC: 18.1 MMOL/L
HCO3 BLDA-SCNC: 19.5 MMOL/L
HCT VFR BLD AUTO: 29.4 % (ref 31.2–41.9)
HGB BLDA OXIMETRY-MCNC: 10 G/DL (ref 12–16)
HGB BLDA OXIMETRY-MCNC: 10.4 G/DL (ref 12–16)
INHALED O2 CONCENTRATION: 30 %
INHALED O2 CONCENTRATION: 32 %
INHALED O2 FLOW RATE: 3 L/MIN (ref 0–30)
INTRINSIC PEEP RESPIRATORY: 5 CMH20
MCH RBC QN AUTO: 30.8 UUG (ref 24.7–32.8)
MCV RBC AUTO: 95.6 FL (ref 75.5–95.3)
PCO2 TEMP ADJ BLDA: 23.8 MMHG (ref 35–45)
PCO2 TEMP ADJ BLDA: 31 MMHG (ref 35–45)
PEEP SETTING VENT: 328 ML
PH TEMP ADJ BLDA: 7.42 [PH] (ref 7.35–7.45)
PH TEMP ADJ BLDA: 7.5 [PH] (ref 7.35–7.45)
PLATELET # BLD AUTO: 467 K/UL (ref 179–408)
PO2 TEMP ADJ BLDA: 125.2 MMHG (ref 75–100)
PO2 TEMP ADJ BLDA: 134.2 MMHG (ref 75–100)
POTASSIUM SERPL-SCNC: 3.6 MMOL/L (ref 3.5–5.1)
SET RATE, BG: 29
SPECIMEN DRAWN FROM PATIENT: (no result)
SPECIMEN DRAWN FROM PATIENT: (no result)
VENTILATION MODE VENT: (no result)

## 2023-01-01 RX ADMIN — Medication SCH MG: at 09:08

## 2023-01-01 RX ADMIN — VALPROIC ACID SCH MG: 250 SOLUTION ORAL at 09:03

## 2023-01-01 RX ADMIN — Medication SCH MG: at 09:09

## 2023-01-01 RX ADMIN — FAMOTIDINE SCH MG: 20 TABLET, FILM COATED ORAL at 20:19

## 2023-01-01 RX ADMIN — METFORMIN HYDROCHLORIDE SCH MG: 500 TABLET ORAL at 17:07

## 2023-01-01 RX ADMIN — METOPROLOL TARTRATE PRN MG: 5 INJECTION, SOLUTION INTRAVENOUS at 18:12

## 2023-01-01 RX ADMIN — ATORVASTATIN CALCIUM SCH MG: 10 TABLET, FILM COATED ORAL at 20:19

## 2023-01-01 RX ADMIN — METFORMIN HYDROCHLORIDE SCH MG: 500 TABLET ORAL at 09:01

## 2023-01-01 RX ADMIN — ACETAMINOPHEN PRN MG: 325 TABLET ORAL at 09:15

## 2023-01-01 RX ADMIN — FENOFIBRATE SCH MG: 145 TABLET ORAL at 09:10

## 2023-01-01 RX ADMIN — VALPROIC ACID SCH MG: 250 SOLUTION ORAL at 17:07

## 2023-01-01 RX ADMIN — FAMOTIDINE SCH MG: 20 TABLET, FILM COATED ORAL at 09:01

## 2023-01-01 RX ADMIN — Medication SCH MG: at 09:07

## 2023-01-01 RX ADMIN — SENNOSIDES SCH TAB: 8.6 TABLET, COATED ORAL at 20:21

## 2023-01-01 NOTE — NUR
1000 all sedation off no sgns of discomfort Tylenol 650mg/g tube effective.

1100 Ventilator weaning started SIMV mode FIO2 30%

1200 Blood gas results to  Pulmonologist awaiting orders. Tolerating well, pink, 
warm, and dry oxygen saturation > 99% New plan extubate per Dr. Mendoza telephone call back 
extubate with post one hour blood gas and phone results..

## 2023-01-02 VITALS — SYSTOLIC BLOOD PRESSURE: 142 MMHG | DIASTOLIC BLOOD PRESSURE: 71 MMHG

## 2023-01-02 VITALS — DIASTOLIC BLOOD PRESSURE: 76 MMHG | SYSTOLIC BLOOD PRESSURE: 123 MMHG

## 2023-01-02 VITALS — SYSTOLIC BLOOD PRESSURE: 143 MMHG | DIASTOLIC BLOOD PRESSURE: 80 MMHG

## 2023-01-02 VITALS — DIASTOLIC BLOOD PRESSURE: 83 MMHG | SYSTOLIC BLOOD PRESSURE: 141 MMHG

## 2023-01-02 VITALS — SYSTOLIC BLOOD PRESSURE: 128 MMHG | DIASTOLIC BLOOD PRESSURE: 66 MMHG

## 2023-01-02 VITALS — SYSTOLIC BLOOD PRESSURE: 135 MMHG | DIASTOLIC BLOOD PRESSURE: 77 MMHG

## 2023-01-02 VITALS — DIASTOLIC BLOOD PRESSURE: 76 MMHG | SYSTOLIC BLOOD PRESSURE: 135 MMHG

## 2023-01-02 VITALS — DIASTOLIC BLOOD PRESSURE: 87 MMHG | SYSTOLIC BLOOD PRESSURE: 145 MMHG

## 2023-01-02 VITALS — SYSTOLIC BLOOD PRESSURE: 139 MMHG | DIASTOLIC BLOOD PRESSURE: 78 MMHG

## 2023-01-02 VITALS — SYSTOLIC BLOOD PRESSURE: 115 MMHG | DIASTOLIC BLOOD PRESSURE: 57 MMHG

## 2023-01-02 VITALS — DIASTOLIC BLOOD PRESSURE: 74 MMHG | SYSTOLIC BLOOD PRESSURE: 128 MMHG

## 2023-01-02 LAB
BASE EXCESS BLDA CALC-SCNC: -5.2 MMOL/L
BUN SERPL-MCNC: 20 MG/DL (ref 7–18)
CHLORIDE SERPL-SCNC: 107 MMOL/L (ref 98–107)
CO2 SERPL-SCNC: 25 MMOL/L (ref 21–32)
CREAT SERPL-MCNC: 0.4 MG/DL (ref 0.6–1.3)
GLUCOSE SERPL-MCNC: 95 MG/DL (ref 74–106)
HCO3 BLDA-SCNC: 19.1 MMOL/L
HCT VFR BLD AUTO: 31.6 % (ref 31.2–41.9)
HGB BLDA OXIMETRY-MCNC: 11 G/DL (ref 12–16)
INHALED O2 CONCENTRATION: 24 %
MCH RBC QN AUTO: 30.3 UUG (ref 24.7–32.8)
MCV RBC AUTO: 95.2 FL (ref 75.5–95.3)
PCO2 TEMP ADJ BLDA: 32.8 MMHG (ref 35–45)
PH TEMP ADJ BLDA: 7.38 [PH] (ref 7.35–7.45)
PLATELET # BLD AUTO: 517 K/UL (ref 179–408)
PO2 TEMP ADJ BLDA: 80.7 MMHG (ref 75–100)
POTASSIUM SERPL-SCNC: 3.9 MMOL/L (ref 3.5–5.1)
SPECIMEN DRAWN FROM PATIENT: (no result)

## 2023-01-02 RX ADMIN — FAMOTIDINE SCH MG: 20 TABLET, FILM COATED ORAL at 21:17

## 2023-01-02 RX ADMIN — Medication SCH MG: at 09:05

## 2023-01-02 RX ADMIN — ACETYLCYSTEINE SCH MG: 200 INHALANT RESPIRATORY (INHALATION) at 23:40

## 2023-01-02 RX ADMIN — METFORMIN HYDROCHLORIDE SCH MG: 500 TABLET ORAL at 09:03

## 2023-01-02 RX ADMIN — VALPROIC ACID SCH MG: 250 SOLUTION ORAL at 09:06

## 2023-01-02 RX ADMIN — SENNOSIDES SCH TAB: 8.6 TABLET, COATED ORAL at 21:00

## 2023-01-02 RX ADMIN — ACETYLCYSTEINE SCH MG: 200 INHALANT RESPIRATORY (INHALATION) at 15:30

## 2023-01-02 RX ADMIN — ATORVASTATIN CALCIUM SCH MG: 10 TABLET, FILM COATED ORAL at 21:17

## 2023-01-02 RX ADMIN — Medication SCH MG: at 09:06

## 2023-01-02 RX ADMIN — Medication SCH MG: at 09:04

## 2023-01-02 RX ADMIN — ACETAMINOPHEN PRN MG: 325 TABLET ORAL at 21:17

## 2023-01-02 RX ADMIN — ALBUTEROL SULFATE PRN MG: 2.5 SOLUTION RESPIRATORY (INHALATION) at 23:41

## 2023-01-02 RX ADMIN — FENOFIBRATE SCH MG: 145 TABLET ORAL at 09:05

## 2023-01-02 RX ADMIN — FAMOTIDINE SCH MG: 20 TABLET, FILM COATED ORAL at 09:05

## 2023-01-02 NOTE — NUR
END OF SUMMARY



Received patient in bed. She was extubated approximately 1600 and she continues to breathe 
well on 3 L NC Oxygen.  Pt is calm and cooperative, no sign of distress and vital signs are 
stable with systolic BP >130.  Pt's skin is still red and raw; Nystatin cream applied to the 
armpits, skin folds, buttocks and perenial areas. 

  Pt continues to have copious oral secretions.  Patient is positioned so her secretions 
will drain unto a towel.and patient was suctioned frequently.  Oral care performed.  Patient 
is made comfortable with a full bedbath, linen change, oral care.  One front tooth is noted 
to be loose.  Ptient's is turned and repositioned. 



No sign of distress noted at the change of sift report.  Vital signs are stable. Report 
endorsed to Day shift RN.

## 2023-01-02 NOTE — NUR
Remains on 2 liters nasal canula pink, warm, and dry oxygen saturation >100%. Kept clean 
warm and dry. Tolerating Glucerna per GT at 45 ml/hr. APRIL Carrizales and Dr. Mendoza both agree to 
transfer to telemetry unit. Blood gas reviewed by Dr. Mendoza.

## 2023-01-02 NOTE — NUR
PT. WAS ADMITTED BY NURSE REYEZ. sHE WAS MANAGING THIS PT. FROM THE START. HEAD TO TOE 
ASSESSMENT DONE BY HER. sISTER IN THE ROOM WITH PT. pt is stable.

## 2023-01-03 VITALS — SYSTOLIC BLOOD PRESSURE: 100 MMHG | DIASTOLIC BLOOD PRESSURE: 45 MMHG

## 2023-01-03 VITALS — SYSTOLIC BLOOD PRESSURE: 107 MMHG | DIASTOLIC BLOOD PRESSURE: 54 MMHG

## 2023-01-03 VITALS — DIASTOLIC BLOOD PRESSURE: 54 MMHG | SYSTOLIC BLOOD PRESSURE: 101 MMHG

## 2023-01-03 VITALS — DIASTOLIC BLOOD PRESSURE: 70 MMHG | SYSTOLIC BLOOD PRESSURE: 114 MMHG

## 2023-01-03 VITALS — SYSTOLIC BLOOD PRESSURE: 110 MMHG | DIASTOLIC BLOOD PRESSURE: 60 MMHG

## 2023-01-03 VITALS — SYSTOLIC BLOOD PRESSURE: 100 MMHG | DIASTOLIC BLOOD PRESSURE: 50 MMHG

## 2023-01-03 LAB
BASE EXCESS BLDA CALC-SCNC: -0.1 MMOL/L
BUN SERPL-MCNC: 21 MG/DL (ref 7–18)
CHLORIDE SERPL-SCNC: 108 MMOL/L (ref 98–107)
CO2 SERPL-SCNC: 26 MMOL/L (ref 21–32)
CREAT SERPL-MCNC: 0.4 MG/DL (ref 0.6–1.3)
GLUCOSE SERPL-MCNC: 129 MG/DL (ref 74–106)
HCO3 BLDA-SCNC: 24.8 MMOL/L
HCT VFR BLD AUTO: 33.9 % (ref 31.2–41.9)
HGB BLDA OXIMETRY-MCNC: 11.4 G/DL (ref 12–16)
INHALED O2 CONCENTRATION: 36 %
INHALED O2 FLOW RATE: 4 L/MIN (ref 0–30)
MCH RBC QN AUTO: 30.6 UUG (ref 24.7–32.8)
MCV RBC AUTO: 97.5 FL (ref 75.5–95.3)
PCO2 TEMP ADJ BLDA: 41.5 MMHG (ref 35–45)
PH TEMP ADJ BLDA: 7.39 [PH] (ref 7.35–7.45)
PLATELET # BLD AUTO: 445 K/UL (ref 179–408)
PO2 TEMP ADJ BLDA: 71.8 MMHG (ref 75–100)
POTASSIUM SERPL-SCNC: 4 MMOL/L (ref 3.5–5.1)
SPECIMEN DRAWN FROM PATIENT: (no result)

## 2023-01-03 RX ADMIN — VALPROIC ACID SCH MG: 250 SOLUTION ORAL at 17:25

## 2023-01-03 RX ADMIN — ALBUTEROL SULFATE PRN MG: 2.5 SOLUTION RESPIRATORY (INHALATION) at 08:11

## 2023-01-03 RX ADMIN — METFORMIN HYDROCHLORIDE SCH MG: 500 TABLET ORAL at 17:23

## 2023-01-03 RX ADMIN — FENOFIBRATE SCH MG: 145 TABLET ORAL at 09:05

## 2023-01-03 RX ADMIN — ALBUTEROL SULFATE PRN MG: 2.5 SOLUTION RESPIRATORY (INHALATION) at 15:46

## 2023-01-03 RX ADMIN — CLOTRIMAZOLE SCH GM: 1 CREAM TOPICAL at 16:48

## 2023-01-03 RX ADMIN — SENNOSIDES SCH TAB: 8.6 TABLET, COATED ORAL at 20:46

## 2023-01-03 RX ADMIN — FAMOTIDINE SCH MG: 20 TABLET, FILM COATED ORAL at 09:05

## 2023-01-03 RX ADMIN — ACETAMINOPHEN PRN MG: 325 TABLET ORAL at 21:02

## 2023-01-03 RX ADMIN — ATORVASTATIN CALCIUM SCH MG: 10 TABLET, FILM COATED ORAL at 20:46

## 2023-01-03 RX ADMIN — Medication SCH MG: at 09:00

## 2023-01-03 RX ADMIN — ALBUTEROL SULFATE PRN MG: 2.5 SOLUTION RESPIRATORY (INHALATION) at 23:19

## 2023-01-03 RX ADMIN — METFORMIN HYDROCHLORIDE SCH MG: 500 TABLET ORAL at 08:00

## 2023-01-03 RX ADMIN — ACETYLCYSTEINE SCH MG: 200 INHALANT RESPIRATORY (INHALATION) at 15:46

## 2023-01-03 RX ADMIN — ACETYLCYSTEINE SCH MG: 200 INHALANT RESPIRATORY (INHALATION) at 23:19

## 2023-01-03 RX ADMIN — FAMOTIDINE SCH MG: 20 TABLET, FILM COATED ORAL at 20:46

## 2023-01-03 RX ADMIN — VALPROIC ACID SCH MG: 250 SOLUTION ORAL at 09:06

## 2023-01-03 RX ADMIN — Medication SCH MG: at 09:05

## 2023-01-03 RX ADMIN — VALPROIC ACID SCH MG: 250 SOLUTION ORAL at 17:29

## 2023-01-03 RX ADMIN — ACETYLCYSTEINE SCH MG: 200 INHALANT RESPIRATORY (INHALATION) at 08:11

## 2023-01-03 NOTE — NUR
WOUND CARE CONSULT: PT PRESENTS WITH RASH TO RT AXILLA, BUTTOCKS, ABDOMINAL/GROIN FOLDS. 
RECOMMENDATIONS MADE FOR SKIN PROTECTION. DISCUSSED WITH NURSING STAFF. PT HAS MACK CATH. 
LOWER EXTREMITY CONTRACTURES NOTED. FIRST STEP LOW AIRLOSS MATTRESS IS ON ORDER. MD IN 
AGREEMENT WITH PLAN OF CARE.

## 2023-01-03 NOTE — NUR
PT HAD AN HEAD TO TOE ASSESSMENT  DONE HAS LEFT LATERAL FACE LATERAL ARM ALONG WITH BREAST 
HAS A RASH APPLIED CREAM TO THE AFFECTED AREA.  PT HAS A MACK AN A NEPROTOMY BAG DRAINING 
CLEAR YELLOW URINE AND HAD OUTPUT  ML.  PT SUCTIONED BY NURSE AND RT. PT TOLERATED 
WITHOUT DISTRESS.  HAS GLUCERNA INFUSING AT 45ML AN HOUR.  PT WAS GIVEN TYLENOL 650MG TWICE 
DURING SHIFT TOLERATED WELL  FOR PAIN.  PT REPOSITIONED Q2H ON SIDES NOT ON BACK.  
MEDICATION GIVEN AS ORDERED NO SIGNS OF ADVERSE REACTION WILL ENDORSE TO AM NURSE.

## 2023-01-04 VITALS — DIASTOLIC BLOOD PRESSURE: 69 MMHG | SYSTOLIC BLOOD PRESSURE: 120 MMHG

## 2023-01-04 VITALS — DIASTOLIC BLOOD PRESSURE: 65 MMHG | SYSTOLIC BLOOD PRESSURE: 119 MMHG

## 2023-01-04 VITALS — DIASTOLIC BLOOD PRESSURE: 62 MMHG | SYSTOLIC BLOOD PRESSURE: 110 MMHG

## 2023-01-04 VITALS — DIASTOLIC BLOOD PRESSURE: 62 MMHG | SYSTOLIC BLOOD PRESSURE: 113 MMHG

## 2023-01-04 LAB
BASE EXCESS BLDA CALC-SCNC: -1.1 MMOL/L
BUN SERPL-MCNC: 16 MG/DL (ref 7–18)
CHLORIDE SERPL-SCNC: 106 MMOL/L (ref 98–107)
CO2 SERPL-SCNC: 25 MMOL/L (ref 21–32)
CREAT SERPL-MCNC: 0.5 MG/DL (ref 0.6–1.3)
GLUCOSE SERPL-MCNC: 198 MG/DL (ref 74–106)
HCO3 BLDA-SCNC: 22.6 MMOL/L
HCT VFR BLD AUTO: 28.8 % (ref 31.2–41.9)
HGB BLDA OXIMETRY-MCNC: 10.1 G/DL (ref 12–16)
INHALED O2 CONCENTRATION: 36 %
INHALED O2 FLOW RATE: 4 L/MIN (ref 0–30)
MAGNESIUM SERPL-MCNC: 1.9 MG/DL (ref 1.8–2.4)
MCH RBC QN AUTO: 31 UUG (ref 24.7–32.8)
MCV RBC AUTO: 94.6 FL (ref 75.5–95.3)
PCO2 TEMP ADJ BLDA: 33.9 MMHG (ref 35–45)
PH TEMP ADJ BLDA: 7.44 [PH] (ref 7.35–7.45)
PHOSPHATE SERPL-MCNC: 3.7 MG/DL (ref 2.5–4.9)
PLATELET # BLD AUTO: 476 K/UL (ref 179–408)
PO2 TEMP ADJ BLDA: 189 MMHG (ref 75–100)
POTASSIUM SERPL-SCNC: 4 MMOL/L (ref 3.5–5.1)
SPECIMEN DRAWN FROM PATIENT: (no result)

## 2023-01-04 RX ADMIN — CLOTRIMAZOLE SCH GM: 1 CREAM TOPICAL at 09:40

## 2023-01-04 RX ADMIN — METFORMIN HYDROCHLORIDE SCH MG: 500 TABLET ORAL at 17:55

## 2023-01-04 RX ADMIN — SENNOSIDES SCH TAB: 8.6 TABLET, COATED ORAL at 21:00

## 2023-01-04 RX ADMIN — ACETAMINOPHEN PRN MG: 325 TABLET ORAL at 12:58

## 2023-01-04 RX ADMIN — ACETAMINOPHEN PRN MG: 325 TABLET ORAL at 06:32

## 2023-01-04 RX ADMIN — ALBUTEROL SULFATE PRN MG: 2.5 SOLUTION RESPIRATORY (INHALATION) at 07:20

## 2023-01-04 RX ADMIN — ATORVASTATIN CALCIUM SCH MG: 10 TABLET, FILM COATED ORAL at 21:51

## 2023-01-04 RX ADMIN — Medication SCH MG: at 09:00

## 2023-01-04 RX ADMIN — FAMOTIDINE SCH MG: 20 TABLET, FILM COATED ORAL at 08:58

## 2023-01-04 RX ADMIN — METFORMIN HYDROCHLORIDE SCH MG: 500 TABLET ORAL at 08:58

## 2023-01-04 RX ADMIN — ALBUTEROL SULFATE SCH MG: 2.5 SOLUTION RESPIRATORY (INHALATION) at 14:46

## 2023-01-04 RX ADMIN — ACETYLCYSTEINE SCH MG: 200 INHALANT RESPIRATORY (INHALATION) at 14:45

## 2023-01-04 RX ADMIN — Medication SCH MG: at 08:58

## 2023-01-04 RX ADMIN — VALPROIC ACID SCH MG: 250 SOLUTION ORAL at 17:54

## 2023-01-04 RX ADMIN — FAMOTIDINE SCH MG: 20 TABLET, FILM COATED ORAL at 21:50

## 2023-01-04 RX ADMIN — VALPROIC ACID SCH MG: 250 SOLUTION ORAL at 08:58

## 2023-01-04 RX ADMIN — CLOTRIMAZOLE SCH GM: 1 CREAM TOPICAL at 17:37

## 2023-01-04 RX ADMIN — ACETYLCYSTEINE SCH MG: 200 INHALANT RESPIRATORY (INHALATION) at 07:20

## 2023-01-04 RX ADMIN — Medication SCH MG: at 08:59

## 2023-01-04 RX ADMIN — FENOFIBRATE SCH MG: 145 TABLET ORAL at 08:58

## 2023-01-04 NOTE — NUR
MEDICATED WITH TYLENOL VIZ GT AS PATIENT WAS FACIAL GRIMACING REPOSITIONED MADE COMFORTABLE 
WILL CONTINUE TO OBSERVE

## 2023-01-04 NOTE — NUR
TOLERATE GT FEEDINGS AS ORDERED WITH NO GASTRIC RESIDUAL REPOSITIONED FOR COMFORT APHASIC 
ALL NEEDS ANTICIPATED AND SATISFIED WITH MAX ASSIST WILL CONTINUE TO OBSERVE.

## 2023-01-05 VITALS — DIASTOLIC BLOOD PRESSURE: 75 MMHG | SYSTOLIC BLOOD PRESSURE: 127 MMHG

## 2023-01-05 VITALS — SYSTOLIC BLOOD PRESSURE: 123 MMHG | DIASTOLIC BLOOD PRESSURE: 77 MMHG

## 2023-01-05 VITALS — SYSTOLIC BLOOD PRESSURE: 120 MMHG | DIASTOLIC BLOOD PRESSURE: 75 MMHG

## 2023-01-05 VITALS — DIASTOLIC BLOOD PRESSURE: 58 MMHG | SYSTOLIC BLOOD PRESSURE: 124 MMHG

## 2023-01-05 VITALS — SYSTOLIC BLOOD PRESSURE: 137 MMHG | DIASTOLIC BLOOD PRESSURE: 77 MMHG

## 2023-01-05 LAB
BUN SERPL-MCNC: 17 MG/DL (ref 7–18)
CHLORIDE SERPL-SCNC: 105 MMOL/L (ref 98–107)
CO2 SERPL-SCNC: 28 MMOL/L (ref 21–32)
CREAT SERPL-MCNC: 0.5 MG/DL (ref 0.6–1.3)
GLUCOSE SERPL-MCNC: 159 MG/DL (ref 74–106)
HCT VFR BLD AUTO: 29.3 % (ref 31.2–41.9)
MAGNESIUM SERPL-MCNC: 2.1 MG/DL (ref 1.8–2.4)
MCH RBC QN AUTO: 31 UUG (ref 24.7–32.8)
MCV RBC AUTO: 95.4 FL (ref 75.5–95.3)
PHOSPHATE SERPL-MCNC: 4 MG/DL (ref 2.5–4.9)
PLATELET # BLD AUTO: 459 K/UL (ref 179–408)
POTASSIUM SERPL-SCNC: 4 MMOL/L (ref 3.5–5.1)

## 2023-01-05 RX ADMIN — FAMOTIDINE SCH MG: 20 TABLET, FILM COATED ORAL at 10:02

## 2023-01-05 RX ADMIN — METFORMIN HYDROCHLORIDE SCH MG: 500 TABLET ORAL at 18:11

## 2023-01-05 RX ADMIN — ATORVASTATIN CALCIUM SCH MG: 10 TABLET, FILM COATED ORAL at 21:19

## 2023-01-05 RX ADMIN — VALPROIC ACID SCH MG: 250 SOLUTION ORAL at 18:11

## 2023-01-05 RX ADMIN — FENOFIBRATE SCH MG: 145 TABLET ORAL at 09:33

## 2023-01-05 RX ADMIN — Medication SCH MG: at 10:03

## 2023-01-05 RX ADMIN — ALBUTEROL SULFATE SCH MG: 2.5 SOLUTION RESPIRATORY (INHALATION) at 08:18

## 2023-01-05 RX ADMIN — FAMOTIDINE SCH MG: 20 TABLET, FILM COATED ORAL at 21:19

## 2023-01-05 RX ADMIN — METFORMIN HYDROCHLORIDE SCH MG: 500 TABLET ORAL at 09:34

## 2023-01-05 RX ADMIN — CLOTRIMAZOLE SCH GM: 1 CREAM TOPICAL at 17:31

## 2023-01-05 RX ADMIN — ACETAMINOPHEN PRN MG: 325 TABLET ORAL at 05:22

## 2023-01-05 RX ADMIN — CLOTRIMAZOLE SCH GM: 1 CREAM TOPICAL at 09:36

## 2023-01-05 RX ADMIN — ALBUTEROL SULFATE SCH MG: 2.5 SOLUTION RESPIRATORY (INHALATION) at 15:39

## 2023-01-05 RX ADMIN — ACETYLCYSTEINE SCH MG: 200 INHALANT RESPIRATORY (INHALATION) at 21:39

## 2023-01-05 RX ADMIN — SENNOSIDES SCH TAB: 8.6 TABLET, COATED ORAL at 21:19

## 2023-01-05 RX ADMIN — Medication SCH MG: at 09:39

## 2023-01-05 RX ADMIN — ACETAMINOPHEN PRN MG: 325 TABLET ORAL at 12:24

## 2023-01-05 RX ADMIN — ACETYLCYSTEINE SCH MG: 200 INHALANT RESPIRATORY (INHALATION) at 08:18

## 2023-01-05 RX ADMIN — Medication SCH MG: at 09:33

## 2023-01-05 RX ADMIN — ALBUTEROL SULFATE SCH MG: 2.5 SOLUTION RESPIRATORY (INHALATION) at 00:49

## 2023-01-05 RX ADMIN — ACETYLCYSTEINE SCH MG: 200 INHALANT RESPIRATORY (INHALATION) at 15:39

## 2023-01-05 RX ADMIN — ACETYLCYSTEINE SCH MG: 200 INHALANT RESPIRATORY (INHALATION) at 00:48

## 2023-01-05 RX ADMIN — VALPROIC ACID SCH MG: 250 SOLUTION ORAL at 10:03

## 2023-01-05 RX ADMIN — ALBUTEROL SULFATE SCH MG: 2.5 SOLUTION RESPIRATORY (INHALATION) at 21:39

## 2023-01-05 RX ADMIN — ACETAMINOPHEN PRN MG: 325 TABLET ORAL at 21:18

## 2023-01-05 NOTE — NUR
Patient is non verbal on tube feeding (glucerna 1.2 running at 70cc) No residual and flushed 
with 30cc. Caregiver at her bedside. Tylenol two tabs given via g-tube. patient is 
comfortable currently with no apparent distress noted. Labs are on the low side. Blood 
pressure taken WNL. Respiration unlabored. Calm and relax, wound treatment done on 
sacral/groin area. Sn, will continue to monitor on same plan of care.

## 2023-01-06 VITALS — SYSTOLIC BLOOD PRESSURE: 141 MMHG | DIASTOLIC BLOOD PRESSURE: 89 MMHG

## 2023-01-06 VITALS — SYSTOLIC BLOOD PRESSURE: 110 MMHG | DIASTOLIC BLOOD PRESSURE: 60 MMHG

## 2023-01-06 VITALS — SYSTOLIC BLOOD PRESSURE: 131 MMHG | DIASTOLIC BLOOD PRESSURE: 75 MMHG

## 2023-01-06 VITALS — SYSTOLIC BLOOD PRESSURE: 124 MMHG | DIASTOLIC BLOOD PRESSURE: 72 MMHG

## 2023-01-06 LAB
BUN SERPL-MCNC: 16 MG/DL (ref 7–18)
CHLORIDE SERPL-SCNC: 104 MMOL/L (ref 98–107)
CO2 SERPL-SCNC: 30 MMOL/L (ref 21–32)
CREAT SERPL-MCNC: 0.5 MG/DL (ref 0.6–1.3)
GLUCOSE SERPL-MCNC: 164 MG/DL (ref 74–106)
HCT VFR BLD AUTO: 30.1 % (ref 31.2–41.9)
MAGNESIUM SERPL-MCNC: 1.9 MG/DL (ref 1.8–2.4)
MCH RBC QN AUTO: 31 UUG (ref 24.7–32.8)
MCV RBC AUTO: 95.1 FL (ref 75.5–95.3)
PHOSPHATE SERPL-MCNC: 3.1 MG/DL (ref 2.5–4.9)
PLATELET # BLD AUTO: 464 K/UL (ref 179–408)
POTASSIUM SERPL-SCNC: 4.1 MMOL/L (ref 3.5–5.1)

## 2023-01-06 RX ADMIN — FAMOTIDINE SCH MG: 20 TABLET, FILM COATED ORAL at 09:00

## 2023-01-06 RX ADMIN — Medication SCH MG: at 09:00

## 2023-01-06 RX ADMIN — ACETYLCYSTEINE SCH MG: 200 INHALANT RESPIRATORY (INHALATION) at 15:30

## 2023-01-06 RX ADMIN — FENOFIBRATE SCH MG: 145 TABLET ORAL at 09:00

## 2023-01-06 RX ADMIN — METFORMIN HYDROCHLORIDE SCH MG: 500 TABLET ORAL at 08:00

## 2023-01-06 RX ADMIN — ACETYLCYSTEINE SCH MG: 200 INHALANT RESPIRATORY (INHALATION) at 07:55

## 2023-01-06 RX ADMIN — VALPROIC ACID SCH MG: 250 SOLUTION ORAL at 09:00

## 2023-01-06 RX ADMIN — CLOTRIMAZOLE SCH GM: 1 CREAM TOPICAL at 09:00

## 2023-01-06 RX ADMIN — Medication SCH MG: at 09:59

## 2023-01-06 RX ADMIN — ALBUTEROL SULFATE SCH MG: 2.5 SOLUTION RESPIRATORY (INHALATION) at 15:30

## 2023-01-06 RX ADMIN — ALBUTEROL SULFATE SCH MG: 2.5 SOLUTION RESPIRATORY (INHALATION) at 07:55

## 2023-01-06 RX ADMIN — ACETAMINOPHEN PRN MG: 325 TABLET ORAL at 06:08

## 2023-01-06 NOTE — NUR
TURNED AND REPOSITIONED FOR COMFORT NON VERBAL ALL NEEDS ANTICIPATED AND SATISFIED GT 
FEEDINGS REMAIN IN PROGRESS AS ORDERED WITH NO GASTRIC RESIDUAL AT THIS TIME PRIVATE CARE 
GIVER IS AT THE BEDSIDE MADE COMFORTABLE WILL CONTINUE TO OBSERVE.

## 2023-01-06 NOTE — NUR
Discharge instructions completed, paper work ready, signed and witnessed because patient is 
unable to sign. tube feeding disconnected and flushed. [place] 
and in a stable condition left the unit accompanied by the ambulance personnels to her 
destination.

## 2024-05-22 NOTE — NUR
CHIDI RN OPENING NOTE

PT RECEIVED IN BED WITH CAREGIVER AT BEDSIDE, AWAKE, NON-VERBAL, OPENS EYES. PT ON RA WITH 
CURRENT O2SAT OF 96%; NO S/S OF RESP DISTRESS, NO SOB OR COUGH, NON-LABORED AND EQUAL 
BREATHING; APPEARS COMFORTABLE OVERALL. PT ATTACHED TO EXTERNAL MONITOR SB WITH HR OF 44. PT 
NOTED TO HAVE RIGHT SIDE NEPHROSTOMY THAT'S DRAINING CLEAR AND YELLOW URINE. KALLI PICC INTACT 
AND PATENT, FLUSHES EASILY WITH NO RESISTANCE; D5W AT 80 ML/HR. BED IN LOWEST POSITION, CALL 
LIGHT WITHIN REACH, SIDE RAILS UP X3. WILL CONTINUE TO MONITOR THROUGHOUT THE NIGHT. Good thrill. No problems